# Patient Record
Sex: FEMALE | Race: WHITE | Employment: OTHER | URBAN - METROPOLITAN AREA
[De-identification: names, ages, dates, MRNs, and addresses within clinical notes are randomized per-mention and may not be internally consistent; named-entity substitution may affect disease eponyms.]

---

## 2017-01-12 RX ORDER — SIMVASTATIN 40 MG
40 TABLET ORAL NIGHTLY
Qty: 90 TABLET | Refills: 0 | Status: SHIPPED | OUTPATIENT
Start: 2017-01-12 | End: 2017-04-08 | Stop reason: SDUPTHER

## 2017-04-10 RX ORDER — SIMVASTATIN 40 MG
TABLET ORAL
Qty: 90 TABLET | Refills: 3 | Status: SHIPPED | OUTPATIENT
Start: 2017-04-10 | End: 2017-04-12 | Stop reason: SDUPTHER

## 2017-04-12 ENCOUNTER — OFFICE VISIT (OUTPATIENT)
Dept: INTERNAL MEDICINE | Age: 62
End: 2017-04-12

## 2017-04-12 VITALS
HEIGHT: 63 IN | RESPIRATION RATE: 16 BRPM | HEART RATE: 68 BPM | TEMPERATURE: 97.7 F | SYSTOLIC BLOOD PRESSURE: 112 MMHG | WEIGHT: 139.2 LBS | BODY MASS INDEX: 24.66 KG/M2 | OXYGEN SATURATION: 97 % | DIASTOLIC BLOOD PRESSURE: 68 MMHG

## 2017-04-12 DIAGNOSIS — Z11.59 NEED FOR HEPATITIS C SCREENING TEST: ICD-10-CM

## 2017-04-12 DIAGNOSIS — I10 ESSENTIAL HYPERTENSION: ICD-10-CM

## 2017-04-12 DIAGNOSIS — E78.2 MIXED HYPERLIPIDEMIA: ICD-10-CM

## 2017-04-12 DIAGNOSIS — M54.5 CHRONIC MIDLINE LOW BACK PAIN, WITH SCIATICA PRESENCE UNSPECIFIED: ICD-10-CM

## 2017-04-12 DIAGNOSIS — J45.20 MILD INTERMITTENT ASTHMA WITHOUT COMPLICATION: ICD-10-CM

## 2017-04-12 DIAGNOSIS — E11.9 TYPE 2 DIABETES MELLITUS WITHOUT COMPLICATION, WITHOUT LONG-TERM CURRENT USE OF INSULIN (HCC): Primary | ICD-10-CM

## 2017-04-12 DIAGNOSIS — G89.29 CHRONIC MIDLINE LOW BACK PAIN, WITH SCIATICA PRESENCE UNSPECIFIED: ICD-10-CM

## 2017-04-12 DIAGNOSIS — J44.9 CHRONIC OBSTRUCTIVE PULMONARY DISEASE, UNSPECIFIED COPD TYPE (HCC): ICD-10-CM

## 2017-04-12 DIAGNOSIS — Z79.899 ENCOUNTER FOR LONG-TERM CURRENT USE OF MEDICATION: ICD-10-CM

## 2017-04-12 LAB
ALBUMIN SERPL-MCNC: 4.2 G/DL (ref 3.9–4.9)
ALP BLD-CCNC: 13 U/L (ref 40–130)
ALT SERPL-CCNC: 14 U/L (ref 0–33)
AMPHETAMINE SCREEN, URINE: ABNORMAL
ANION GAP SERPL CALCULATED.3IONS-SCNC: 13 MEQ/L (ref 7–13)
AST SERPL-CCNC: 19 U/L (ref 0–35)
BARBITURATE SCREEN URINE: ABNORMAL
BENZODIAZEPINE SCREEN, URINE: ABNORMAL
BILIRUB SERPL-MCNC: 0.3 MG/DL (ref 0–1.2)
BUN BLDV-MCNC: 23 MG/DL (ref 8–23)
CALCIUM SERPL-MCNC: 9.5 MG/DL (ref 8.6–10.2)
CANNABINOID SCREEN URINE: POSITIVE
CHLORIDE BLD-SCNC: 102 MEQ/L (ref 98–107)
CHOLESTEROL, TOTAL: 153 MG/DL (ref 0–199)
CO2: 24 MEQ/L (ref 22–29)
COCAINE METABOLITE SCREEN URINE: ABNORMAL
CREAT SERPL-MCNC: 0.91 MG/DL (ref 0.5–0.9)
CREATININE URINE: 75.1 MG/DL
GFR AFRICAN AMERICAN: >60
GFR NON-AFRICAN AMERICAN: >60
GLOBULIN: 2.3 G/DL (ref 2.3–3.5)
GLUCOSE BLD-MCNC: 121 MG/DL (ref 74–109)
HBA1C MFR BLD: 5.9 % (ref 4.8–5.9)
HDLC SERPL-MCNC: 68 MG/DL (ref 40–59)
LDL CHOLESTEROL CALCULATED: 61 MG/DL (ref 0–129)
Lab: ABNORMAL
MICROALBUMIN UR-MCNC: 4.4 MG/DL
MICROALBUMIN/CREAT UR-RTO: 58.6 MG/G (ref 0–30)
OPIATE SCREEN URINE: ABNORMAL
PHENCYCLIDINE SCREEN URINE: ABNORMAL
POTASSIUM SERPL-SCNC: 4.2 MEQ/L (ref 3.5–5.1)
SODIUM BLD-SCNC: 139 MEQ/L (ref 132–144)
TOTAL PROTEIN: 6.5 G/DL (ref 6.4–8.1)
TRIGL SERPL-MCNC: 118 MG/DL (ref 0–200)

## 2017-04-12 PROCEDURE — 99214 OFFICE O/P EST MOD 30 MIN: CPT | Performed by: FAMILY MEDICINE

## 2017-04-12 RX ORDER — ALPRAZOLAM 0.25 MG/1
0.25 TABLET ORAL 3 TIMES DAILY PRN
COMMUNITY
End: 2017-04-13

## 2017-04-12 RX ORDER — TRAMADOL HYDROCHLORIDE 50 MG/1
50 TABLET ORAL EVERY 8 HOURS PRN
Qty: 60 TABLET | Refills: 2 | Status: SHIPPED | OUTPATIENT
Start: 2017-04-12 | End: 2017-04-13

## 2017-04-12 RX ORDER — METFORMIN HYDROCHLORIDE 500 MG/1
TABLET, EXTENDED RELEASE ORAL
Qty: 180 TABLET | Refills: 3 | Status: SHIPPED | OUTPATIENT
Start: 2017-04-12 | End: 2018-06-10 | Stop reason: SDUPTHER

## 2017-04-12 RX ORDER — SIMVASTATIN 40 MG
TABLET ORAL
Qty: 90 TABLET | Refills: 3 | Status: SHIPPED | OUTPATIENT
Start: 2017-04-12 | End: 2018-05-24 | Stop reason: SDUPTHER

## 2017-04-13 RX ORDER — LISINOPRIL 2.5 MG/1
2.5 TABLET ORAL DAILY
Qty: 90 TABLET | Refills: 1 | Status: SHIPPED | OUTPATIENT
Start: 2017-04-13 | End: 2017-05-15

## 2017-04-14 LAB — HEPATITIS C ANTIBODY INTERPRETATION: NORMAL

## 2017-05-10 ENCOUNTER — TELEPHONE (OUTPATIENT)
Dept: INTERNAL MEDICINE | Age: 62
End: 2017-05-10

## 2017-05-15 ENCOUNTER — OFFICE VISIT (OUTPATIENT)
Dept: INTERNAL MEDICINE | Age: 62
End: 2017-05-15

## 2017-05-15 VITALS
DIASTOLIC BLOOD PRESSURE: 82 MMHG | RESPIRATION RATE: 16 BRPM | OXYGEN SATURATION: 96 % | HEIGHT: 63 IN | BODY MASS INDEX: 24.41 KG/M2 | HEART RATE: 99 BPM | TEMPERATURE: 98.2 F | WEIGHT: 137.8 LBS | SYSTOLIC BLOOD PRESSURE: 128 MMHG

## 2017-05-15 DIAGNOSIS — R80.9 PROTEINURIA: Primary | ICD-10-CM

## 2017-05-15 PROCEDURE — 99213 OFFICE O/P EST LOW 20 MIN: CPT | Performed by: FAMILY MEDICINE

## 2017-05-15 ASSESSMENT — PATIENT HEALTH QUESTIONNAIRE - PHQ9
2. FEELING DOWN, DEPRESSED OR HOPELESS: 0
1. LITTLE INTEREST OR PLEASURE IN DOING THINGS: 0
SUM OF ALL RESPONSES TO PHQ QUESTIONS 1-9: 0
SUM OF ALL RESPONSES TO PHQ9 QUESTIONS 1 & 2: 0

## 2017-12-11 ENCOUNTER — OFFICE VISIT (OUTPATIENT)
Dept: INTERNAL MEDICINE | Age: 62
End: 2017-12-11

## 2017-12-11 VITALS
HEART RATE: 72 BPM | BODY MASS INDEX: 24.49 KG/M2 | HEIGHT: 63 IN | SYSTOLIC BLOOD PRESSURE: 110 MMHG | RESPIRATION RATE: 16 BRPM | WEIGHT: 138.2 LBS | OXYGEN SATURATION: 97 % | DIASTOLIC BLOOD PRESSURE: 70 MMHG | TEMPERATURE: 97.8 F

## 2017-12-11 DIAGNOSIS — E11.9 TYPE 2 DIABETES MELLITUS WITHOUT COMPLICATION, WITHOUT LONG-TERM CURRENT USE OF INSULIN (HCC): Primary | ICD-10-CM

## 2017-12-11 DIAGNOSIS — J44.9 CHRONIC OBSTRUCTIVE PULMONARY DISEASE, UNSPECIFIED COPD TYPE (HCC): ICD-10-CM

## 2017-12-11 DIAGNOSIS — M54.16 LUMBAR RADICULITIS: ICD-10-CM

## 2017-12-11 DIAGNOSIS — E11.9 ENCOUNTER FOR DIABETIC FOOT EXAM (HCC): ICD-10-CM

## 2017-12-11 DIAGNOSIS — I10 ESSENTIAL HYPERTENSION: ICD-10-CM

## 2017-12-11 DIAGNOSIS — Z12.2 ENCOUNTER FOR SCREENING FOR LUNG CANCER: ICD-10-CM

## 2017-12-11 DIAGNOSIS — Z12.11 SCREENING FOR COLON CANCER: ICD-10-CM

## 2017-12-11 DIAGNOSIS — F17.219 NICOTINE DEPENDENCE, CIGARETTES, WITH UNSPECIFIED NICOTINE-INDUCED DISORDERS: ICD-10-CM

## 2017-12-11 DIAGNOSIS — E55.9 HYPOVITAMINOSIS D: ICD-10-CM

## 2017-12-11 DIAGNOSIS — E78.2 MIXED HYPERLIPIDEMIA: ICD-10-CM

## 2017-12-11 DIAGNOSIS — Z12.31 SCREENING MAMMOGRAM, ENCOUNTER FOR: ICD-10-CM

## 2017-12-11 PROCEDURE — G0296 VISIT TO DETERM LDCT ELIG: HCPCS | Performed by: FAMILY MEDICINE

## 2017-12-11 PROCEDURE — 99214 OFFICE O/P EST MOD 30 MIN: CPT | Performed by: FAMILY MEDICINE

## 2017-12-11 RX ORDER — ESCITALOPRAM OXALATE 10 MG/1
15 TABLET ORAL DAILY
Qty: 130 TABLET | Refills: 2 | Status: SHIPPED | OUTPATIENT
Start: 2017-12-11 | End: 2018-06-11 | Stop reason: SDUPTHER

## 2017-12-11 NOTE — PROGRESS NOTES
Patient: Shannon Morse    YOB: 1955    Date: 12/11/17    Chief Complaint   Patient presents with    Diabetes     6 month follow up. She is due for labs and mammogram    Hypertension     6 month follow up    Hyperlipidemia     6 month follow up       Patient Active Problem List    Diagnosis Date Noted    Proteinuria     Asthma     Anxiety     Chronic back pain     COPD (chronic obstructive pulmonary disease) (HCC)     Depression     Type 2 diabetes mellitus without complication (HCC)     Mixed hyperlipidemia     Hypertension        Allergies   Allergen Reactions    Nickel Itching and Rash       Vitals:    12/11/17 1344   BP: 110/70   Site: Right Arm   Position: Sitting   Cuff Size: Small Adult   Pulse: 72   Resp: 16   Temp: 97.8 °F (36.6 °C)   TempSrc: Oral   SpO2: 97%   Weight: 138 lb 3.2 oz (62.7 kg)   Height: 5' 3\" (1.6 m)      Body mass index is 24.48 kg/m². Treatment Adherence:   Medication compliance:  compliant all of the time  Diet compliance:  compliant most of the time  Weight trend: increasing  Current exercise: no regular exercise  Diabetes Mellitus Type 2: Current symptoms/problems include none. Home blood sugar records:  patient does not test  Any episodes of hypoglycemia? no  Eye exam current (within one year): no  Tobacco history: She  reports that she has been smoking Cigarettes. She has a 30.00 pack-year smoking history. She has never used smokeless tobacco.   Daily Aspirin? No:   Known diabetic complications: none        HPI    She is here to follow-up on her diabetes her blood pressure and her high cholesterol. She is feeling well but she's found that when she runs out of her Lexapro she really goes into tailspin. She has trouble with [facility] calling in her refills on time I told her would be glad to refill the medicine for her. She'll continue to see her counselor.   She's no longer see a psychiatrist.  She is otherwise without complaints her sugars are good her blood pressures excellent she is maintaining her weight. She still smokes however. She still reports that night she gets left-sided leg cramping and spasm that is improved with drinking Gatorade but does return occasionally. Review of Systems    Constitutional: Negative for fatigue, fever and sweats. HEENT: Negative for eye discharge and vision loss. Negative for ear drainage, hearing loss and nasal drainage. Respiratory: positive for cough, negative for  dyspnea and wheezing. Cardiovascular:  Negative for chest pain, claudication and irregular heartbeat/palpitations. Gastrointestinal: Negative for abdominal pain, nausea, constipation and diarrhea. Genitourinary: Negative for dysuria, patient post menopausal.  Metabolic/Endocrine: Negative for cold intolerance, heat intolerance, polydipsia and polyphagia. No unintended weight loss or weight gain. Neuro/Psychiatric: Negative for gait disturbance. Negative for psychiatric symptoms. Dermatologic: Negative for pruritus and rash. Musculoskeletal: positive for bone/joint symptoms. No numbness or tingling. No loss of function. Hematology: Negative for bleeding and easy bruising. Immunology:  Negative for environmental allergies and food allergies. Physical Exam    Patient's medication, allergies, past medical, surgical, social and family histories were reviewed and updated as appropriate. PHYSICAL EXAM   General Appearance:  Alert oriented pleasant cooperative with the exam in no acute distress. HEENT: Eyes clear nonicteric facial muscles symmetrical.  Hearing good  Neck: Soft nontender no adenopathy or masses no carotid bruits  Lungs: Clear to auscultation no wheezes rhonchi or rales  Heart: Regular rate and rhythm without murmurs rubs or gallops  Extremities: Diabetic foot exam : Normal feet bilaterally without edema no deformities no callus no ulceration intact to fine touch. Today        Assessment:  1.  Type 2 diabetes mellitus without

## 2017-12-18 ENCOUNTER — NURSE ONLY (OUTPATIENT)
Dept: INTERNAL MEDICINE | Age: 62
End: 2017-12-18

## 2017-12-18 DIAGNOSIS — M54.16 LUMBAR RADICULITIS: ICD-10-CM

## 2017-12-18 DIAGNOSIS — Z12.11 SCREENING FOR COLON CANCER: ICD-10-CM

## 2017-12-18 DIAGNOSIS — E11.9 TYPE 2 DIABETES MELLITUS WITHOUT COMPLICATION, WITHOUT LONG-TERM CURRENT USE OF INSULIN (HCC): ICD-10-CM

## 2017-12-18 DIAGNOSIS — E55.9 HYPOVITAMINOSIS D: Primary | ICD-10-CM

## 2017-12-18 DIAGNOSIS — I10 ESSENTIAL HYPERTENSION: ICD-10-CM

## 2017-12-18 DIAGNOSIS — E78.2 MIXED HYPERLIPIDEMIA: ICD-10-CM

## 2017-12-18 LAB
ALBUMIN SERPL-MCNC: 4.1 G/DL (ref 3.9–4.9)
ALP BLD-CCNC: 13 U/L (ref 40–130)
ALT SERPL-CCNC: 10 U/L (ref 0–33)
ANION GAP SERPL CALCULATED.3IONS-SCNC: 17 MEQ/L (ref 7–13)
AST SERPL-CCNC: 16 U/L (ref 0–35)
BILIRUB SERPL-MCNC: 0.4 MG/DL (ref 0–1.2)
BILIRUBIN, POC: NORMAL
BLOOD URINE, POC: NORMAL
BUN BLDV-MCNC: 19 MG/DL (ref 8–23)
CALCIUM SERPL-MCNC: 9.2 MG/DL (ref 8.6–10.2)
CHLORIDE BLD-SCNC: 101 MEQ/L (ref 98–107)
CHOLESTEROL, TOTAL: 159 MG/DL (ref 0–199)
CLARITY, POC: NORMAL
CO2: 22 MEQ/L (ref 22–29)
COLOR, POC: YELLOW
CONTROL: NORMAL
CREAT SERPL-MCNC: 0.82 MG/DL (ref 0.5–0.9)
CREATININE URINE: 133.3 MG/DL
GFR AFRICAN AMERICAN: >60
GFR NON-AFRICAN AMERICAN: >60
GLOBULIN: 2.8 G/DL (ref 2.3–3.5)
GLUCOSE BLD-MCNC: 140 MG/DL (ref 74–109)
GLUCOSE URINE, POC: NORMAL
HBA1C MFR BLD: 5.8 % (ref 4.8–5.9)
HDLC SERPL-MCNC: 63 MG/DL (ref 40–59)
HEMOCCULT STL QL: NEGATIVE
KETONES, POC: NORMAL
LDL CHOLESTEROL CALCULATED: 63 MG/DL (ref 0–129)
LEUKOCYTE EST, POC: NORMAL
MICROALBUMIN UR-MCNC: 7.6 MG/DL
MICROALBUMIN/CREAT UR-RTO: 57 MG/G (ref 0–30)
NITRITE, POC: POSITIVE
PH, POC: 6
POTASSIUM SERPL-SCNC: 4.2 MEQ/L (ref 3.5–5.1)
PROTEIN, POC: NORMAL
SODIUM BLD-SCNC: 140 MEQ/L (ref 132–144)
SPECIFIC GRAVITY, POC: 1.02
TOTAL PROTEIN: 6.9 G/DL (ref 6.4–8.1)
TRIGL SERPL-MCNC: 164 MG/DL (ref 0–200)
UROBILINOGEN, POC: NORMAL
VITAMIN B-12: 634 PG/ML (ref 211–946)
VITAMIN D 25-HYDROXY: 55.1 NG/ML (ref 30–100)

## 2017-12-18 PROCEDURE — 81002 URINALYSIS NONAUTO W/O SCOPE: CPT | Performed by: FAMILY MEDICINE

## 2017-12-18 PROCEDURE — 82274 ASSAY TEST FOR BLOOD FECAL: CPT | Performed by: FAMILY MEDICINE

## 2017-12-18 PROCEDURE — 36415 COLL VENOUS BLD VENIPUNCTURE: CPT | Performed by: FAMILY MEDICINE

## 2017-12-18 RX ORDER — SULFAMETHOXAZOLE AND TRIMETHOPRIM 800; 160 MG/1; MG/1
1 TABLET ORAL 2 TIMES DAILY
Qty: 14 TABLET | Refills: 0 | Status: SHIPPED | OUTPATIENT
Start: 2017-12-18 | End: 2017-12-25

## 2017-12-18 NOTE — PROGRESS NOTES
Lab drawn per order of Dr. Patrizia Velazquez with no problems. Patient gave urine specimen in office today.

## 2017-12-21 ENCOUNTER — TELEPHONE (OUTPATIENT)
Dept: CASE MANAGEMENT | Age: 62
End: 2017-12-21

## 2017-12-21 NOTE — LETTER
12/21/2017      Cristian Alvarez  2013 [id number] [facility]    Dear Cristian Alvarez:       Your healthcare provider has referred you to the [facility].  Your appointment is Tuesday, January 9 at [facility]. I am enclosing some information that you may find helpful. Please keep in mind that lung cancer screening does not take the place of quitting smoking. The best way to not get lung cancer is to not smoke. If you would like help with this, please feel free to contact me. Also find enclosed a Statement of Understanding. Please review this document and call me with any questions. You can bring this with you to your appointment as will be asked to sign a copy of this document at the time of your scan. Your provider will contact you when the results of your CT lung screening are available. If you have any questions before then, please do not hesitate to contact me. Kindest regards,        Ervin Brooke RN, BSN  Lung Nodule Navigator  [facility]  [phone]  [email or web address]

## 2017-12-21 NOTE — TELEPHONE ENCOUNTER
Mario Grace is scheduled for a CT Lung Screening on January 9, 2017. I called to introduce myself and inform her I will be working with her throughout the lung screening process and into the follow up phase if needed. The questionnaire for lung cancer screening was completed. Mario Grace is a current smoker with a  49 pack year smoking history. I verified her referring physician has discussed the risks versus benefits associated with CT lung screening, the importance of follow up and annual screening  and offered smoking cessation if necessary. I answered all questions Mario Grace had regarding the CT lung screening procedure and offered smoking cessations resources if needed. The patient requested information on smoking cessation. In closing, I gave  my contact information and encouraged Mario Grace to call me with any questions or concerns.          Diaz Anthony, RN, BSN  [facility]ung Nurse [facility]  Office: [phone]  Fax: [phone]

## 2018-01-09 ENCOUNTER — HOSPITAL ENCOUNTER (OUTPATIENT)
Dept: WOMENS IMAGING | Age: 63
Discharge: HOME | End: 2018-01-09

## 2018-01-09 ENCOUNTER — HOSPITAL ENCOUNTER (OUTPATIENT)
Dept: CT IMAGING | Age: 63
Discharge: HOME | End: 2018-01-09

## 2018-01-09 VITALS
WEIGHT: 138 LBS | BODY MASS INDEX: 24.45 KG/M2 | DIASTOLIC BLOOD PRESSURE: 75 MMHG | SYSTOLIC BLOOD PRESSURE: 114 MMHG | RESPIRATION RATE: 16 BRPM | HEIGHT: 63 IN | HEART RATE: 89 BPM

## 2018-01-09 DIAGNOSIS — F17.219 NICOTINE DEPENDENCE, CIGARETTES, WITH UNSPECIFIED NICOTINE-INDUCED DISORDERS: ICD-10-CM

## 2018-01-09 DIAGNOSIS — Z12.31 SCREENING MAMMOGRAM, ENCOUNTER FOR: ICD-10-CM

## 2018-01-09 PROCEDURE — 77067 SCR MAMMO BI INCL CAD: CPT

## 2018-01-09 PROCEDURE — G0297 LDCT FOR LUNG CA SCREEN: HCPCS

## 2018-05-25 RX ORDER — SIMVASTATIN 40 MG
TABLET ORAL
Qty: 90 TABLET | Refills: 3 | Status: SHIPPED | OUTPATIENT
Start: 2018-05-25 | End: 2019-05-20 | Stop reason: SDUPTHER

## 2018-06-11 ENCOUNTER — OFFICE VISIT (OUTPATIENT)
Dept: INTERNAL MEDICINE CLINIC | Age: 63
End: 2018-06-11
Payer: MEDICARE

## 2018-06-11 VITALS
HEART RATE: 72 BPM | WEIGHT: 144 LBS | BODY MASS INDEX: 25.52 KG/M2 | RESPIRATION RATE: 16 BRPM | SYSTOLIC BLOOD PRESSURE: 114 MMHG | DIASTOLIC BLOOD PRESSURE: 70 MMHG | TEMPERATURE: 97.6 F | HEIGHT: 63 IN | OXYGEN SATURATION: 97 %

## 2018-06-11 DIAGNOSIS — E11.9 TYPE 2 DIABETES MELLITUS WITHOUT COMPLICATION, WITHOUT LONG-TERM CURRENT USE OF INSULIN (HCC): Primary | ICD-10-CM

## 2018-06-11 DIAGNOSIS — E78.2 MIXED HYPERLIPIDEMIA: ICD-10-CM

## 2018-06-11 DIAGNOSIS — F33.42 RECURRENT MAJOR DEPRESSIVE DISORDER, IN FULL REMISSION (HCC): ICD-10-CM

## 2018-06-11 DIAGNOSIS — J44.9 CHRONIC OBSTRUCTIVE PULMONARY DISEASE, UNSPECIFIED COPD TYPE (HCC): ICD-10-CM

## 2018-06-11 DIAGNOSIS — I10 ESSENTIAL HYPERTENSION: ICD-10-CM

## 2018-06-11 PROCEDURE — 99213 OFFICE O/P EST LOW 20 MIN: CPT | Performed by: FAMILY MEDICINE

## 2018-06-11 RX ORDER — ESCITALOPRAM OXALATE 10 MG/1
15 TABLET ORAL DAILY
Qty: 130 TABLET | Refills: 2 | Status: SHIPPED | OUTPATIENT
Start: 2018-06-11 | End: 2018-12-11 | Stop reason: SDUPTHER

## 2018-06-11 ASSESSMENT — PATIENT HEALTH QUESTIONNAIRE - PHQ9
1. LITTLE INTEREST OR PLEASURE IN DOING THINGS: 0
2. FEELING DOWN, DEPRESSED OR HOPELESS: 0
SUM OF ALL RESPONSES TO PHQ9 QUESTIONS 1 & 2: 0
SUM OF ALL RESPONSES TO PHQ QUESTIONS 1-9: 0

## 2018-12-11 ENCOUNTER — OFFICE VISIT (OUTPATIENT)
Dept: INTERNAL MEDICINE CLINIC | Age: 63
End: 2018-12-11
Payer: MEDICARE

## 2018-12-11 VITALS
HEIGHT: 63 IN | DIASTOLIC BLOOD PRESSURE: 68 MMHG | OXYGEN SATURATION: 96 % | TEMPERATURE: 97.3 F | BODY MASS INDEX: 25.87 KG/M2 | HEART RATE: 73 BPM | RESPIRATION RATE: 16 BRPM | SYSTOLIC BLOOD PRESSURE: 100 MMHG | WEIGHT: 146 LBS

## 2018-12-11 DIAGNOSIS — E11.9 TYPE 2 DIABETES MELLITUS WITHOUT COMPLICATION, WITHOUT LONG-TERM CURRENT USE OF INSULIN (HCC): ICD-10-CM

## 2018-12-11 DIAGNOSIS — Z91.81 AT HIGH RISK FOR FALLS: ICD-10-CM

## 2018-12-11 DIAGNOSIS — E78.2 MIXED HYPERLIPIDEMIA: ICD-10-CM

## 2018-12-11 DIAGNOSIS — Z12.11 SCREENING FOR COLON CANCER: ICD-10-CM

## 2018-12-11 DIAGNOSIS — I10 ESSENTIAL HYPERTENSION: Primary | ICD-10-CM

## 2018-12-11 DIAGNOSIS — Z23 NEED FOR VACCINATION WITH 13-POLYVALENT PNEUMOCOCCAL CONJUGATE VACCINE: ICD-10-CM

## 2018-12-11 DIAGNOSIS — Z12.31 VISIT FOR SCREENING MAMMOGRAM: ICD-10-CM

## 2018-12-11 DIAGNOSIS — F33.42 RECURRENT MAJOR DEPRESSIVE DISORDER, IN FULL REMISSION (HCC): ICD-10-CM

## 2018-12-11 DIAGNOSIS — M81.0 SENILE OSTEOPOROSIS: ICD-10-CM

## 2018-12-11 DIAGNOSIS — J45.909 MILD ASTHMA WITHOUT COMPLICATION, UNSPECIFIED WHETHER PERSISTENT: ICD-10-CM

## 2018-12-11 LAB — HBA1C MFR BLD: 5.6 %

## 2018-12-11 PROCEDURE — 3044F HG A1C LEVEL LT 7.0%: CPT | Performed by: FAMILY MEDICINE

## 2018-12-11 PROCEDURE — G8427 DOCREV CUR MEDS BY ELIG CLIN: HCPCS | Performed by: FAMILY MEDICINE

## 2018-12-11 PROCEDURE — 2022F DILAT RTA XM EVC RTNOPTHY: CPT | Performed by: FAMILY MEDICINE

## 2018-12-11 PROCEDURE — 4040F PNEUMOC VAC/ADMIN/RCVD: CPT | Performed by: FAMILY MEDICINE

## 2018-12-11 PROCEDURE — 3017F COLORECTAL CA SCREEN DOC REV: CPT | Performed by: FAMILY MEDICINE

## 2018-12-11 PROCEDURE — 1123F ACP DISCUSS/DSCN MKR DOCD: CPT | Performed by: FAMILY MEDICINE

## 2018-12-11 PROCEDURE — 1090F PRES/ABSN URINE INCON ASSESS: CPT | Performed by: FAMILY MEDICINE

## 2018-12-11 PROCEDURE — 1101F PT FALLS ASSESS-DOCD LE1/YR: CPT | Performed by: FAMILY MEDICINE

## 2018-12-11 PROCEDURE — G0009 ADMIN PNEUMOCOCCAL VACCINE: HCPCS | Performed by: FAMILY MEDICINE

## 2018-12-11 PROCEDURE — 99214 OFFICE O/P EST MOD 30 MIN: CPT | Performed by: FAMILY MEDICINE

## 2018-12-11 PROCEDURE — G8482 FLU IMMUNIZE ORDER/ADMIN: HCPCS | Performed by: FAMILY MEDICINE

## 2018-12-11 PROCEDURE — 4004F PT TOBACCO SCREEN RCVD TLK: CPT | Performed by: FAMILY MEDICINE

## 2018-12-11 PROCEDURE — G8419 CALC BMI OUT NRM PARAM NOF/U: HCPCS | Performed by: FAMILY MEDICINE

## 2018-12-11 PROCEDURE — G8400 PT W/DXA NO RESULTS DOC: HCPCS | Performed by: FAMILY MEDICINE

## 2018-12-11 PROCEDURE — 83036 HEMOGLOBIN GLYCOSYLATED A1C: CPT | Performed by: FAMILY MEDICINE

## 2018-12-11 PROCEDURE — 90670 PCV13 VACCINE IM: CPT | Performed by: FAMILY MEDICINE

## 2018-12-11 ASSESSMENT — PATIENT HEALTH QUESTIONNAIRE - PHQ9
1. LITTLE INTEREST OR PLEASURE IN DOING THINGS: 0
SUM OF ALL RESPONSES TO PHQ9 QUESTIONS 1 & 2: 0
SUM OF ALL RESPONSES TO PHQ QUESTIONS 1-9: 0
SUM OF ALL RESPONSES TO PHQ QUESTIONS 1-9: 0
2. FEELING DOWN, DEPRESSED OR HOPELESS: 0

## 2018-12-11 NOTE — PROGRESS NOTES
she is enjoying herself she still very active she has no cough fever chills chest pain numbness tingling weakness or problems with her bowels or bladder. Review of Systems    Constitutional: Negative for fatigue, fever and sweats. HEENT: Negative for eye discharge and vision loss. Negative for ear drainage, hearing loss and nasal drainage. Respiratory: positive for cough, negative for dyspnea and wheezing. Cardiovascular:  Negative for chest pain, claudication and irregular heartbeat/palpitations. Gastrointestinal: Negative for abdominal pain, nausea, constipation and diarrhea. Genitourinary: Negative for dysuria, patient post menopausal.   Metabolic/Endocrine: Negative for cold intolerance, heat intolerance, polydipsia and polyphagia. No unintended weight loss or weight gain. Neuro/Psychiatric: Negative for gait disturbance. Negative for psychiatric symptoms. Dermatologic: Negative for pruritus and rash. Musculoskeletal: Negative for bone/joint symptoms. No numbness or tingling. No loss of function. Hematology: Negative for bleeding and easy bruising. Immunology:  Negative for environmental allergies and food allergies. Physical Exam    Patient's medication, allergies, past medical, surgical, social and family histories were reviewed and updated as appropriate. PHYSICAL EXAM   General appearance: Alert oriented pleasant cooperative in no acute distress. HEENT: Eyes clear nonicteric facial muscles symmetrical.  Hearing good color good  Neck: Soft nontender no adenopathy without bruits or masses or tenderness  Lungs: Clear to auscultation without wheezes rhonchi or rales  Heart: Regular rate and rhythm without murmurs rubs or gallops  Extremities: Diabetic foot exam: She has normal feet without any decreased sensation she is intact to light touch there is no erythema or edema. She has no ulcerations deformities nail dystrophy or callus. Assessment:   Diagnosis Orders   1.  Essential

## 2018-12-13 ENCOUNTER — NURSE ONLY (OUTPATIENT)
Dept: INTERNAL MEDICINE CLINIC | Age: 63
End: 2018-12-13
Payer: MEDICARE

## 2018-12-13 ENCOUNTER — TELEPHONE (OUTPATIENT)
Dept: INTERNAL MEDICINE CLINIC | Age: 63
End: 2018-12-13

## 2018-12-13 DIAGNOSIS — E11.9 TYPE 2 DIABETES MELLITUS WITHOUT COMPLICATION, WITHOUT LONG-TERM CURRENT USE OF INSULIN (HCC): ICD-10-CM

## 2018-12-13 DIAGNOSIS — Z12.12 SCREENING FOR COLORECTAL CANCER: Primary | ICD-10-CM

## 2018-12-13 DIAGNOSIS — I10 ESSENTIAL HYPERTENSION: ICD-10-CM

## 2018-12-13 DIAGNOSIS — E78.2 MIXED HYPERLIPIDEMIA: ICD-10-CM

## 2018-12-13 DIAGNOSIS — Z12.11 SCREENING FOR COLORECTAL CANCER: Primary | ICD-10-CM

## 2018-12-13 DIAGNOSIS — Z12.11 SCREENING FOR COLON CANCER: ICD-10-CM

## 2018-12-13 LAB
ALBUMIN SERPL-MCNC: 4.4 G/DL (ref 3.9–4.9)
ALP BLD-CCNC: 14 U/L (ref 40–130)
ALT SERPL-CCNC: 12 U/L (ref 0–33)
ANION GAP SERPL CALCULATED.3IONS-SCNC: 13 MEQ/L (ref 7–13)
AST SERPL-CCNC: 15 U/L (ref 0–35)
BILIRUB SERPL-MCNC: 0.4 MG/DL (ref 0–1.2)
BUN BLDV-MCNC: 19 MG/DL (ref 8–23)
CALCIUM SERPL-MCNC: 9.7 MG/DL (ref 8.6–10.2)
CHLORIDE BLD-SCNC: 97 MEQ/L (ref 98–107)
CHOLESTEROL, TOTAL: 156 MG/DL (ref 0–199)
CO2: 28 MEQ/L (ref 22–29)
CONTROL: NORMAL
CREAT SERPL-MCNC: 1.05 MG/DL (ref 0.5–0.9)
CREATININE URINE: 78.4 MG/DL
GFR AFRICAN AMERICAN: >60
GFR NON-AFRICAN AMERICAN: 52.5
GLOBULIN: 3.4 G/DL (ref 2.3–3.5)
GLUCOSE BLD-MCNC: 139 MG/DL (ref 74–109)
HDLC SERPL-MCNC: 70 MG/DL (ref 40–59)
HEMOCCULT STL QL: NEGATIVE
LDL CHOLESTEROL CALCULATED: 52 MG/DL (ref 0–129)
MICROALBUMIN UR-MCNC: 3.2 MG/DL
MICROALBUMIN/CREAT UR-RTO: 40.8 MG/G (ref 0–30)
POTASSIUM SERPL-SCNC: 4.4 MEQ/L (ref 3.5–5.1)
SODIUM BLD-SCNC: 138 MEQ/L (ref 132–144)
TOTAL PROTEIN: 7.8 G/DL (ref 6.4–8.1)
TRIGL SERPL-MCNC: 171 MG/DL (ref 0–200)

## 2018-12-13 PROCEDURE — 82274 ASSAY TEST FOR BLOOD FECAL: CPT | Performed by: FAMILY MEDICINE

## 2019-05-20 RX ORDER — SIMVASTATIN 40 MG
TABLET ORAL
Qty: 90 TABLET | Refills: 3 | Status: SHIPPED | OUTPATIENT
Start: 2019-05-20 | End: 2020-06-11 | Stop reason: SDUPTHER

## 2019-05-20 NOTE — TELEPHONE ENCOUNTER
requesting medication refill.      Rx requested:  Requested Prescriptions     Pending Prescriptions Disp Refills    simvastatin (ZOCOR) 40 MG tablet [Pharmacy Med Name: SIMVASTATIN 40 MG TABLET] 90 tablet 3     Sig: TAKE 1 TABLET BY MOUTH EVERY DAY IN THE EVENING       Last Office Visit:   12/11/2018      Next Visit Date:  Future Appointments   Date Time Provider Josué Vargas   6/11/2019  1:00 PM Richard Gerber  [place], [place]

## 2019-06-11 ENCOUNTER — OFFICE VISIT (OUTPATIENT)
Dept: FAMILY MEDICINE CLINIC | Age: 64
End: 2019-06-11
Payer: MEDICARE

## 2019-06-11 VITALS
HEIGHT: 63 IN | BODY MASS INDEX: 25.8 KG/M2 | RESPIRATION RATE: 16 BRPM | TEMPERATURE: 97.5 F | HEART RATE: 76 BPM | WEIGHT: 145.6 LBS | SYSTOLIC BLOOD PRESSURE: 122 MMHG | OXYGEN SATURATION: 94 % | DIASTOLIC BLOOD PRESSURE: 80 MMHG

## 2019-06-11 DIAGNOSIS — Z12.2 ENCOUNTER FOR SCREENING FOR LUNG CANCER: ICD-10-CM

## 2019-06-11 DIAGNOSIS — J44.9 CHRONIC OBSTRUCTIVE PULMONARY DISEASE, UNSPECIFIED COPD TYPE (HCC): ICD-10-CM

## 2019-06-11 DIAGNOSIS — Z12.31 VISIT FOR SCREENING MAMMOGRAM: ICD-10-CM

## 2019-06-11 DIAGNOSIS — F17.210 CIGARETTE NICOTINE DEPENDENCE, UNCOMPLICATED: ICD-10-CM

## 2019-06-11 DIAGNOSIS — E11.9 TYPE 2 DIABETES MELLITUS WITHOUT COMPLICATION, WITHOUT LONG-TERM CURRENT USE OF INSULIN (HCC): Primary | ICD-10-CM

## 2019-06-11 DIAGNOSIS — M85.80 SENILE OSTEOPENIA: ICD-10-CM

## 2019-06-11 DIAGNOSIS — M81.0 SENILE OSTEOPOROSIS: ICD-10-CM

## 2019-06-11 DIAGNOSIS — Z23 NEED FOR SHINGLES VACCINE: ICD-10-CM

## 2019-06-11 DIAGNOSIS — Z87.891 PERSONAL HISTORY OF TOBACCO USE: ICD-10-CM

## 2019-06-11 DIAGNOSIS — I10 ESSENTIAL HYPERTENSION: ICD-10-CM

## 2019-06-11 DIAGNOSIS — E78.2 MIXED HYPERLIPIDEMIA: ICD-10-CM

## 2019-06-11 LAB — HBA1C MFR BLD: 5.8 %

## 2019-06-11 PROCEDURE — G0296 VISIT TO DETERM LDCT ELIG: HCPCS | Performed by: FAMILY MEDICINE

## 2019-06-11 PROCEDURE — G8427 DOCREV CUR MEDS BY ELIG CLIN: HCPCS | Performed by: FAMILY MEDICINE

## 2019-06-11 PROCEDURE — 99214 OFFICE O/P EST MOD 30 MIN: CPT | Performed by: FAMILY MEDICINE

## 2019-06-11 PROCEDURE — 1123F ACP DISCUSS/DSCN MKR DOCD: CPT | Performed by: FAMILY MEDICINE

## 2019-06-11 PROCEDURE — G8926 SPIRO NO PERF OR DOC: HCPCS | Performed by: FAMILY MEDICINE

## 2019-06-11 PROCEDURE — G8419 CALC BMI OUT NRM PARAM NOF/U: HCPCS | Performed by: FAMILY MEDICINE

## 2019-06-11 PROCEDURE — 3017F COLORECTAL CA SCREEN DOC REV: CPT | Performed by: FAMILY MEDICINE

## 2019-06-11 PROCEDURE — 2022F DILAT RTA XM EVC RTNOPTHY: CPT | Performed by: FAMILY MEDICINE

## 2019-06-11 PROCEDURE — 4040F PNEUMOC VAC/ADMIN/RCVD: CPT | Performed by: FAMILY MEDICINE

## 2019-06-11 PROCEDURE — 3023F SPIROM DOC REV: CPT | Performed by: FAMILY MEDICINE

## 2019-06-11 PROCEDURE — G8400 PT W/DXA NO RESULTS DOC: HCPCS | Performed by: FAMILY MEDICINE

## 2019-06-11 PROCEDURE — 4004F PT TOBACCO SCREEN RCVD TLK: CPT | Performed by: FAMILY MEDICINE

## 2019-06-11 PROCEDURE — 3044F HG A1C LEVEL LT 7.0%: CPT | Performed by: FAMILY MEDICINE

## 2019-06-11 PROCEDURE — 1090F PRES/ABSN URINE INCON ASSESS: CPT | Performed by: FAMILY MEDICINE

## 2019-06-11 PROCEDURE — 83036 HEMOGLOBIN GLYCOSYLATED A1C: CPT | Performed by: FAMILY MEDICINE

## 2019-06-19 ENCOUNTER — TELEPHONE (OUTPATIENT)
Dept: CASE MANAGEMENT | Age: 64
End: 2019-06-19

## 2019-06-25 ENCOUNTER — HOSPITAL ENCOUNTER (OUTPATIENT)
Dept: WOMENS IMAGING | Age: 64
Discharge: HOME | End: 2019-06-27
Payer: MEDICARE

## 2019-06-25 ENCOUNTER — HOSPITAL ENCOUNTER (OUTPATIENT)
Dept: CT IMAGING | Age: 64
Discharge: HOME | End: 2019-06-27
Payer: MEDICARE

## 2019-06-25 VITALS
SYSTOLIC BLOOD PRESSURE: 119 MMHG | WEIGHT: 145 LBS | BODY MASS INDEX: 25.69 KG/M2 | DIASTOLIC BLOOD PRESSURE: 78 MMHG | HEIGHT: 63 IN | RESPIRATION RATE: 16 BRPM | HEART RATE: 81 BPM

## 2019-06-25 DIAGNOSIS — Z12.31 VISIT FOR SCREENING MAMMOGRAM: ICD-10-CM

## 2019-06-25 DIAGNOSIS — M81.0 SENILE OSTEOPOROSIS: ICD-10-CM

## 2019-06-25 DIAGNOSIS — Z12.2 ENCOUNTER FOR SCREENING FOR LUNG CANCER: ICD-10-CM

## 2019-06-25 DIAGNOSIS — Z87.891 PERSONAL HISTORY OF TOBACCO USE: ICD-10-CM

## 2019-06-25 PROCEDURE — G0297 LDCT FOR LUNG CA SCREEN: HCPCS

## 2019-06-25 PROCEDURE — 77080 DXA BONE DENSITY AXIAL: CPT

## 2019-06-25 PROCEDURE — 77063 BREAST TOMOSYNTHESIS BI: CPT

## 2019-07-11 RX ORDER — ALENDRONATE SODIUM 70 MG/1
70 TABLET ORAL
Qty: 12 TABLET | Refills: 3 | Status: SHIPPED | OUTPATIENT
Start: 2019-07-11 | End: 2020-08-10 | Stop reason: ALTCHOICE

## 2019-07-25 DIAGNOSIS — E11.9 TYPE 2 DIABETES MELLITUS WITHOUT COMPLICATION, WITHOUT LONG-TERM CURRENT USE OF INSULIN (HCC): ICD-10-CM

## 2019-07-25 RX ORDER — METFORMIN HYDROCHLORIDE 500 MG/1
TABLET, EXTENDED RELEASE ORAL
Qty: 90 TABLET | Refills: 3 | Status: SHIPPED | OUTPATIENT
Start: 2019-07-25 | End: 2020-06-11 | Stop reason: SDUPTHER

## 2019-09-13 RX ORDER — CITALOPRAM 40 MG/1
40 TABLET ORAL DAILY
Qty: 90 TABLET | Refills: 0 | Status: SHIPPED | OUTPATIENT
Start: 2019-09-13 | End: 2019-12-09 | Stop reason: SDUPTHER

## 2019-12-11 ENCOUNTER — OFFICE VISIT (OUTPATIENT)
Dept: FAMILY MEDICINE CLINIC | Age: 64
End: 2019-12-11
Payer: MEDICARE

## 2019-12-11 VITALS
HEIGHT: 63 IN | OXYGEN SATURATION: 96 % | BODY MASS INDEX: 26.05 KG/M2 | DIASTOLIC BLOOD PRESSURE: 70 MMHG | SYSTOLIC BLOOD PRESSURE: 100 MMHG | TEMPERATURE: 97.7 F | WEIGHT: 147 LBS | RESPIRATION RATE: 16 BRPM | HEART RATE: 88 BPM

## 2019-12-11 VITALS
HEIGHT: 64 IN | RESPIRATION RATE: 16 BRPM | TEMPERATURE: 97.7 F | HEART RATE: 88 BPM | BODY MASS INDEX: 25.1 KG/M2 | SYSTOLIC BLOOD PRESSURE: 100 MMHG | DIASTOLIC BLOOD PRESSURE: 70 MMHG | OXYGEN SATURATION: 96 % | WEIGHT: 147 LBS

## 2019-12-11 DIAGNOSIS — M85.80 SENILE OSTEOPENIA: ICD-10-CM

## 2019-12-11 DIAGNOSIS — E11.9 TYPE 2 DIABETES MELLITUS WITHOUT COMPLICATION, WITHOUT LONG-TERM CURRENT USE OF INSULIN (HCC): Primary | ICD-10-CM

## 2019-12-11 DIAGNOSIS — J44.9 CHRONIC OBSTRUCTIVE PULMONARY DISEASE, UNSPECIFIED COPD TYPE (HCC): ICD-10-CM

## 2019-12-11 DIAGNOSIS — Z23 NEED FOR SHINGLES VACCINE: ICD-10-CM

## 2019-12-11 DIAGNOSIS — F33.42 RECURRENT MAJOR DEPRESSIVE DISORDER, IN FULL REMISSION (HCC): ICD-10-CM

## 2019-12-11 DIAGNOSIS — I10 ESSENTIAL HYPERTENSION: ICD-10-CM

## 2019-12-11 DIAGNOSIS — Z00.00 ROUTINE GENERAL MEDICAL EXAMINATION AT A HEALTH CARE FACILITY: Primary | ICD-10-CM

## 2019-12-11 DIAGNOSIS — E78.2 MIXED HYPERLIPIDEMIA: ICD-10-CM

## 2019-12-11 LAB — HBA1C MFR BLD: 6.2 %

## 2019-12-11 PROCEDURE — 4004F PT TOBACCO SCREEN RCVD TLK: CPT | Performed by: FAMILY MEDICINE

## 2019-12-11 PROCEDURE — 83036 HEMOGLOBIN GLYCOSYLATED A1C: CPT | Performed by: FAMILY MEDICINE

## 2019-12-11 PROCEDURE — 1123F ACP DISCUSS/DSCN MKR DOCD: CPT | Performed by: FAMILY MEDICINE

## 2019-12-11 PROCEDURE — G8482 FLU IMMUNIZE ORDER/ADMIN: HCPCS | Performed by: FAMILY MEDICINE

## 2019-12-11 PROCEDURE — G8399 PT W/DXA RESULTS DOCUMENT: HCPCS | Performed by: FAMILY MEDICINE

## 2019-12-11 PROCEDURE — 1090F PRES/ABSN URINE INCON ASSESS: CPT | Performed by: FAMILY MEDICINE

## 2019-12-11 PROCEDURE — 3017F COLORECTAL CA SCREEN DOC REV: CPT | Performed by: FAMILY MEDICINE

## 2019-12-11 PROCEDURE — G0438 PPPS, INITIAL VISIT: HCPCS | Performed by: FAMILY MEDICINE

## 2019-12-11 PROCEDURE — 3044F HG A1C LEVEL LT 7.0%: CPT | Performed by: FAMILY MEDICINE

## 2019-12-11 PROCEDURE — 3023F SPIROM DOC REV: CPT | Performed by: FAMILY MEDICINE

## 2019-12-11 PROCEDURE — 99214 OFFICE O/P EST MOD 30 MIN: CPT | Performed by: FAMILY MEDICINE

## 2019-12-11 PROCEDURE — G8926 SPIRO NO PERF OR DOC: HCPCS | Performed by: FAMILY MEDICINE

## 2019-12-11 PROCEDURE — G8417 CALC BMI ABV UP PARAM F/U: HCPCS | Performed by: FAMILY MEDICINE

## 2019-12-11 PROCEDURE — 4040F PNEUMOC VAC/ADMIN/RCVD: CPT | Performed by: FAMILY MEDICINE

## 2019-12-11 PROCEDURE — G8427 DOCREV CUR MEDS BY ELIG CLIN: HCPCS | Performed by: FAMILY MEDICINE

## 2019-12-11 PROCEDURE — 2022F DILAT RTA XM EVC RTNOPTHY: CPT | Performed by: FAMILY MEDICINE

## 2019-12-11 ASSESSMENT — PATIENT HEALTH QUESTIONNAIRE - PHQ9
SUM OF ALL RESPONSES TO PHQ QUESTIONS 1-9: 0
SUM OF ALL RESPONSES TO PHQ QUESTIONS 1-9: 0

## 2019-12-24 ENCOUNTER — TELEPHONE (OUTPATIENT)
Dept: WOMENS IMAGING | Age: 64
End: 2019-12-24

## 2020-03-03 RX ORDER — ESCITALOPRAM OXALATE 10 MG/1
TABLET ORAL
Qty: 130 TABLET | Refills: 1 | Status: SHIPPED | OUTPATIENT
Start: 2020-03-03 | End: 2020-06-11 | Stop reason: SDUPTHER

## 2020-06-11 ENCOUNTER — OFFICE VISIT (OUTPATIENT)
Dept: FAMILY MEDICINE CLINIC | Age: 65
End: 2020-06-11
Payer: MEDICARE

## 2020-06-11 VITALS
WEIGHT: 147 LBS | SYSTOLIC BLOOD PRESSURE: 112 MMHG | BODY MASS INDEX: 26.05 KG/M2 | OXYGEN SATURATION: 98 % | TEMPERATURE: 97.6 F | DIASTOLIC BLOOD PRESSURE: 88 MMHG | RESPIRATION RATE: 16 BRPM | HEART RATE: 73 BPM | HEIGHT: 63 IN

## 2020-06-11 DIAGNOSIS — I10 ESSENTIAL HYPERTENSION: ICD-10-CM

## 2020-06-11 DIAGNOSIS — E11.9 TYPE 2 DIABETES MELLITUS WITHOUT COMPLICATION, WITHOUT LONG-TERM CURRENT USE OF INSULIN (HCC): ICD-10-CM

## 2020-06-11 DIAGNOSIS — E78.2 MIXED HYPERLIPIDEMIA: ICD-10-CM

## 2020-06-11 LAB
ALBUMIN SERPL-MCNC: 3.9 G/DL (ref 3.5–4.6)
ALP BLD-CCNC: 16 U/L (ref 40–130)
ALT SERPL-CCNC: 9 U/L (ref 0–33)
ANION GAP SERPL CALCULATED.3IONS-SCNC: 15 MEQ/L (ref 9–15)
AST SERPL-CCNC: 17 U/L (ref 0–35)
BILIRUB SERPL-MCNC: <0.2 MG/DL (ref 0.2–0.7)
BUN BLDV-MCNC: 21 MG/DL (ref 8–23)
CALCIUM SERPL-MCNC: 9.6 MG/DL (ref 8.5–9.9)
CHLORIDE BLD-SCNC: 101 MEQ/L (ref 95–107)
CHOLESTEROL, FASTING: 147 MG/DL (ref 0–199)
CO2: 22 MEQ/L (ref 20–31)
CREAT SERPL-MCNC: 0.95 MG/DL (ref 0.5–0.9)
GFR AFRICAN AMERICAN: >60
GFR NON-AFRICAN AMERICAN: 58.7
GLOBULIN: 3.2 G/DL (ref 2.3–3.5)
GLUCOSE BLD-MCNC: 86 MG/DL (ref 70–99)
HBA1C MFR BLD: 6.5 % (ref 4.8–5.9)
HDLC SERPL-MCNC: 67 MG/DL (ref 40–59)
LDL CHOLESTEROL CALCULATED: 51 MG/DL (ref 0–129)
POTASSIUM SERPL-SCNC: 4.3 MEQ/L (ref 3.4–4.9)
SODIUM BLD-SCNC: 138 MEQ/L (ref 135–144)
TOTAL PROTEIN: 7.1 G/DL (ref 6.3–8)
TRIGLYCERIDE, FASTING: 144 MG/DL (ref 0–150)

## 2020-06-11 PROCEDURE — 3023F SPIROM DOC REV: CPT | Performed by: FAMILY MEDICINE

## 2020-06-11 PROCEDURE — 1090F PRES/ABSN URINE INCON ASSESS: CPT | Performed by: FAMILY MEDICINE

## 2020-06-11 PROCEDURE — G8427 DOCREV CUR MEDS BY ELIG CLIN: HCPCS | Performed by: FAMILY MEDICINE

## 2020-06-11 PROCEDURE — 2022F DILAT RTA XM EVC RTNOPTHY: CPT | Performed by: FAMILY MEDICINE

## 2020-06-11 PROCEDURE — 4040F PNEUMOC VAC/ADMIN/RCVD: CPT | Performed by: FAMILY MEDICINE

## 2020-06-11 PROCEDURE — 1123F ACP DISCUSS/DSCN MKR DOCD: CPT | Performed by: FAMILY MEDICINE

## 2020-06-11 PROCEDURE — 4004F PT TOBACCO SCREEN RCVD TLK: CPT | Performed by: FAMILY MEDICINE

## 2020-06-11 PROCEDURE — G8417 CALC BMI ABV UP PARAM F/U: HCPCS | Performed by: FAMILY MEDICINE

## 2020-06-11 PROCEDURE — G8399 PT W/DXA RESULTS DOCUMENT: HCPCS | Performed by: FAMILY MEDICINE

## 2020-06-11 PROCEDURE — G8926 SPIRO NO PERF OR DOC: HCPCS | Performed by: FAMILY MEDICINE

## 2020-06-11 PROCEDURE — 3017F COLORECTAL CA SCREEN DOC REV: CPT | Performed by: FAMILY MEDICINE

## 2020-06-11 PROCEDURE — 3046F HEMOGLOBIN A1C LEVEL >9.0%: CPT | Performed by: FAMILY MEDICINE

## 2020-06-11 PROCEDURE — 99214 OFFICE O/P EST MOD 30 MIN: CPT | Performed by: FAMILY MEDICINE

## 2020-06-11 RX ORDER — ALBUTEROL SULFATE 90 UG/1
2 AEROSOL, METERED RESPIRATORY (INHALATION) 4 TIMES DAILY PRN
Qty: 1 INHALER | Refills: 3 | Status: SHIPPED | OUTPATIENT
Start: 2020-06-11 | End: 2021-10-04 | Stop reason: SDUPTHER

## 2020-06-11 RX ORDER — ESCITALOPRAM OXALATE 10 MG/1
TABLET ORAL
Qty: 130 TABLET | Refills: 1 | Status: SHIPPED | OUTPATIENT
Start: 2020-06-11 | End: 2020-09-14 | Stop reason: SDUPTHER

## 2020-06-11 RX ORDER — ZOSTER VACCINE RECOMBINANT, ADJUVANTED 50 MCG/0.5
0.5 KIT INTRAMUSCULAR SEE ADMIN INSTRUCTIONS
Qty: 0.5 ML | Refills: 1 | Status: SHIPPED | OUTPATIENT
Start: 2020-06-11 | End: 2020-12-08

## 2020-06-11 RX ORDER — SIMVASTATIN 40 MG
TABLET ORAL
Qty: 90 TABLET | Refills: 3 | Status: SHIPPED | OUTPATIENT
Start: 2020-06-11 | End: 2021-06-15

## 2020-06-11 RX ORDER — ZOLEDRONIC ACID 5 MG/100ML
5 INJECTION, SOLUTION INTRAVENOUS ONCE
Qty: 100 ML | Refills: 0 | Status: SHIPPED | OUTPATIENT
Start: 2020-06-11 | End: 2021-10-04

## 2020-06-11 RX ORDER — METFORMIN HYDROCHLORIDE 500 MG/1
TABLET, EXTENDED RELEASE ORAL
Qty: 90 TABLET | Refills: 3 | Status: SHIPPED | OUTPATIENT
Start: 2020-06-11 | End: 2020-09-14 | Stop reason: SDUPTHER

## 2020-06-11 NOTE — PROGRESS NOTES
Patient: Kierra Bautista    YOB: 1955    Date: 6/11/20    Chief Complaint   Patient presents with    Hypertension     6 month f/u.  Diabetes     6 month f/u. Pts. last A1C was done on 12/11/2019 and was 6.2    Hyperlipidemia     6 month f/u.  Discuss Medications     Pt. is not taking the Fosamax right now because she either forgets or she is getting so much congestion from smoking that she is throwing it up. Patient Active Problem List    Diagnosis Date Noted    Osteoporosis      Overview Note:     2019 T -3.2      [facility] 06/11/2018    Proteinuria     Asthma     Anxiety     Chronic back pain     COPD (chronic obstructive pulmonary disease) (HCC)     Depression     Type 2 diabetes mellitus without complication (HCC)     Mixed hyperlipidemia     Essential hypertension        Allergies   Allergen Reactions    Nickel Itching and Rash       Vitals:    06/11/20 1324   BP: 112/88   Site: Left Upper Arm   Position: Sitting   Cuff Size: Medium Adult   Pulse: 73   Resp: 16   Temp: 97.6 °F (36.4 °C)   TempSrc: Temporal   SpO2: 98%   Weight: 147 lb (66.7 kg)   Height: 5' 3\" (1.6 m)      Body mass index is 26.04 kg/m². Treatment Adherence:   Medication compliance:  compliant all of the time  Diet compliance:  compliant all of the time  Weight trend: stable  Current exercise: no regular exercise  What might prevent you from meeting your goal?: none    Diabetes Mellitus Type 2: Current symptoms/problems include vomiting. Home blood sugar records: Doesn't check sugars at home  Any episodes of hypoglycemia? no  Eye exam current (within one year): no  Tobacco history: She  reports that she has been smoking cigarettes. She has a 49.00 pack-year smoking history. She has never used smokeless tobacco.   Daily Aspirin? No  Known diabetic complications: none        HPI    She comes in today to follow-up on her chronic medical problems.   Her rhythm without murmurs rubs or gallops          Assessment:   Diagnosis Orders   1. Mixed hyperlipidemia  simvastatin (ZOCOR) 40 MG tablet    Comprehensive Metabolic Panel    Lipid, Fasting   2. Chronic obstructive pulmonary disease, unspecified COPD type ([facility])  fluticasone-salmeterol (ADVAIR DISKUS) 100-50 MCG/DOSE diskus inhaler    albuterol sulfate HFA (VENTOLIN HFA) 108 (90 Base) MCG/ACT inhaler   3. Recurrent major depressive disorder, in full remission ([facility]  escitalopram (LEXAPRO) 10 MG tablet   4. Type 2 diabetes mellitus without complication, without long-term current use of insulin ([facility])  metFORMIN (GLUCOPHAGE-XR) 500 MG extended release tablet    Hemoglobin A1C    Microalbumin / Creatinine Urine Ratio   5. Mild asthma without complication, unspecified whether persistent  fluticasone-salmeterol (ADVAIR DISKUS) 100-50 MCG/DOSE diskus inhaler    albuterol sulfate HFA (VENTOLIN HFA) 108 (90 Base) MCG/ACT inhaler   6. Essential hypertension  Comprehensive Metabolic Panel   7. Visit for screening mammogram  TONO DIGITAL SCREEN W OR WO CAD BILATERAL   8. Senile osteoporosis  zoledronic acid (RECLAST) 5 MG/100ML SOLN   9.  Anxiety  escitalopram (LEXAPRO) 10 MG tablet               Plan:  Current Outpatient Medications   Medication Sig Dispense Refill    escitalopram (LEXAPRO) 10 MG tablet TAKE 1 & 1/2 TABLETS BY MOUTH EVERY  tablet 1    metFORMIN (GLUCOPHAGE-XR) 500 MG extended release tablet TAKE ONE TABLET BY MOUTH A DAY 90 tablet 3    simvastatin (ZOCOR) 40 MG tablet TAKE 1 TABLET BY MOUTH EVERY DAY IN THE EVENING 90 tablet 3    zoster recombinant adjuvanted vaccine (SHINGRIX) 50 MCG/0.5ML SUSR injection Inject 0.5 mLs into the muscle See Admin Instructions 1 dose now and repeat in 2-6 months 0.5 mL 1    fluticasone-salmeterol (ADVAIR DISKUS) 100-50 MCG/DOSE diskus inhaler Inhale 1 puff into the lungs every 12 hours 60 each 3    albuterol sulfate HFA (VENTOLIN HFA) 108 (90 Base) MCG/ACT inhaler Inhale 2 puffs into the lungs 4 times daily as needed for Wheezing 1 Inhaler 3    zoledronic acid (RECLAST) 5 MG/100ML SOLN Infuse 100 mLs intravenously once for 1 dose 733.01 100 mL 0    citalopram (CELEXA) 40 MG tablet TAKE 1 TABLET BY MOUTH EVERY DAY 90 tablet 3    alendronate (FOSAMAX) 70 MG tablet Take 1 tablet by mouth every 7 days 12 tablet 3    vitamin D (CHOLECALCIFEROL) 1000 UNIT TABS tablet Take 1,000 Units by mouth daily      Doxylamine Succinate, Sleep, (UNISOM PO) Take 1 tablet by mouth daily as needed      BIOTIN PO Take 1 tablet by mouth daily      Multiple Vitamins-Minerals (SENIOR MULTIVITAMIN PLUS PO) Take 1 tablet by mouth daily      Naproxen Sodium (ALEVE) 220 MG CAPS Take  by mouth daily as needed. No current facility-administered medications for this visit.       Orders Placed This Encounter   Procedures    TONO DIGITAL SCREEN W OR WO CAD BILATERAL     Standing Status:   Future     Standing Expiration Date:   8/11/2021    Hemoglobin A1C     Standing Status:   Future     Standing Expiration Date:   6/11/2021    Comprehensive Metabolic Panel     Standing Status:   Future     Standing Expiration Date:   6/11/2021    Microalbumin / Creatinine Urine Ratio     Standing Status:   Future     Standing Expiration Date:   6/11/2021    Lipid, Fasting     Standing Status:   Future     Standing Expiration Date:   6/11/2021       Orders Placed This Encounter   Medications    escitalopram (LEXAPRO) 10 MG tablet     Sig: TAKE 1 & 1/2 TABLETS BY MOUTH EVERY DAY     Dispense:  130 tablet     Refill:  1    metFORMIN (GLUCOPHAGE-XR) 500 MG extended release tablet     Sig: TAKE ONE TABLET BY MOUTH A DAY     Dispense:  90 tablet     Refill:  3    simvastatin (ZOCOR) 40 MG tablet     Sig: TAKE 1 TABLET BY MOUTH EVERY DAY IN THE EVENING     Dispense:  90 tablet     Refill:  3    zoster recombinant adjuvanted vaccine (SHINGRIX) 50 MCG/0.5ML SUSR injection     Sig: Inject 0.5 mLs into the muscle See

## 2020-06-12 PROBLEM — M81.0 SENILE OSTEOPOROSIS: Status: ACTIVE | Noted: 2020-06-12

## 2020-06-12 RX ORDER — ZOLEDRONIC ACID 5 MG/100ML
5 INJECTION, SOLUTION INTRAVENOUS ONCE
Status: CANCELLED | OUTPATIENT
Start: 2020-06-12

## 2020-06-30 ENCOUNTER — HOSPITAL ENCOUNTER (OUTPATIENT)
Dept: WOMENS IMAGING | Age: 65
Discharge: HOME | End: 2020-07-02
Payer: MEDICARE

## 2020-06-30 PROCEDURE — 77063 BREAST TOMOSYNTHESIS BI: CPT

## 2020-07-06 ENCOUNTER — NURSE ONLY (OUTPATIENT)
Dept: FAMILY MEDICINE CLINIC | Age: 65
End: 2020-07-06
Payer: MEDICARE

## 2020-07-06 ENCOUNTER — TELEPHONE (OUTPATIENT)
Dept: FAMILY MEDICINE CLINIC | Age: 65
End: 2020-07-06

## 2020-07-06 LAB
CONTROL: ABNORMAL
HEMOCCULT STL QL: ABNORMAL

## 2020-07-06 PROCEDURE — 82274 ASSAY TEST FOR BLOOD FECAL: CPT | Performed by: FAMILY MEDICINE

## 2020-07-06 NOTE — TELEPHONE ENCOUNTER
The stool sample that was dropped off tested positive for blood. It is recommended that when this test is positive that a follow-up colonoscopy done.   I will put in a referral to a gastroenterologist .

## 2020-07-15 ENCOUNTER — TELEPHONE (OUTPATIENT)
Dept: ENDOSCOPY | Age: 65
End: 2020-07-15

## 2020-07-15 NOTE — TELEPHONE ENCOUNTER
called in prescription for Suprep bowel prep kit to [facility] in [place] 7/15/2020 at 3:05pm

## 2020-07-28 ENCOUNTER — NURSE ONLY (OUTPATIENT)
Dept: PRIMARY CARE CLINIC | Age: 65
End: 2020-07-28

## 2020-07-29 ENCOUNTER — HOSPITAL ENCOUNTER (OUTPATIENT)
Age: 65
Setting detail: SPECIMEN
Discharge: HOME | End: 2020-07-29
Payer: MEDICARE

## 2020-07-29 PROCEDURE — U0003 INFECTIOUS AGENT DETECTION BY NUCLEIC ACID (DNA OR RNA); SEVERE ACUTE RESPIRATORY SYNDROME CORONAVIRUS 2 (SARS-COV-2) (CORONAVIRUS DISEASE [COVID-19]), AMPLIFIED PROBE TECHNIQUE, MAKING USE OF HIGH THROUGHPUT TECHNOLOGIES AS DESCRIBED BY CMS-2020-01-R: HCPCS

## 2020-07-31 LAB
SARS-COV-2: NOT DETECTED
SOURCE: NORMAL

## 2020-08-10 ENCOUNTER — HOSPITAL ENCOUNTER (OUTPATIENT)
Dept: INFUSION THERAPY | Age: 65
Setting detail: INFUSION SERIES
Discharge: HOME | End: 2020-08-10
Payer: MEDICARE

## 2020-08-10 VITALS
RESPIRATION RATE: 18 BRPM | TEMPERATURE: 97.2 F | SYSTOLIC BLOOD PRESSURE: 136 MMHG | HEART RATE: 76 BPM | DIASTOLIC BLOOD PRESSURE: 71 MMHG

## 2020-08-10 DIAGNOSIS — M81.0 SENILE OSTEOPOROSIS: Primary | ICD-10-CM

## 2020-08-10 PROCEDURE — 6360000002 HC RX W HCPCS: Performed by: FAMILY MEDICINE

## 2020-08-10 PROCEDURE — 96365 THER/PROPH/DIAG IV INF INIT: CPT

## 2020-08-10 RX ORDER — ZOLEDRONIC ACID 5 MG/100ML
5 INJECTION, SOLUTION INTRAVENOUS ONCE
Status: COMPLETED | OUTPATIENT
Start: 2020-08-10 | End: 2020-08-10

## 2020-08-10 RX ORDER — ZOLEDRONIC ACID 5 MG/100ML
5 INJECTION, SOLUTION INTRAVENOUS ONCE
Status: CANCELLED | OUTPATIENT
Start: 2020-08-10

## 2020-08-10 RX ADMIN — ZOLEDRONIC ACID 5 MG: 5 INJECTION, SOLUTION INTRAVENOUS at 13:12

## 2020-08-10 NOTE — FLOWSHEET NOTE
AVS printed and given to patient. Left the unit ambulatory. All equipment used in the care for this patient has been cleaned.

## 2020-08-10 NOTE — FLOWSHEET NOTE
Patient to the floor ambulatory for her yearly reclast.  Vital  signs taken. Education given regarding Reclast. Verbalized understanding. Call light within reach.

## 2020-09-14 RX ORDER — ESCITALOPRAM OXALATE 10 MG/1
TABLET ORAL
Qty: 130 TABLET | Refills: 1 | Status: SHIPPED | OUTPATIENT
Start: 2020-09-14 | End: 2021-01-21 | Stop reason: SDUPTHER

## 2020-09-14 RX ORDER — METFORMIN HYDROCHLORIDE 500 MG/1
TABLET, EXTENDED RELEASE ORAL
Qty: 90 TABLET | Refills: 3 | Status: SHIPPED | OUTPATIENT
Start: 2020-09-14 | End: 2021-12-01 | Stop reason: SDUPTHER

## 2020-09-14 NOTE — TELEPHONE ENCOUNTER
Patient requesting medication refill.  Please approve or deny this request.    Rx requested:  Requested Prescriptions     Pending Prescriptions Disp Refills    escitalopram (LEXAPRO) 10 MG tablet 130 tablet 1     Sig: TAKE 1 & 1/2 TABLETS BY MOUTH EVERY DAY    metFORMIN (GLUCOPHAGE-XR) 500 MG extended release tablet 90 tablet 3     Sig: TAKE ONE TABLET BY MOUTH A DAY         Last Office Visit:   6/11/2020      Next Visit Date:  Future Appointments   Date Time Provider Josué Vargas   12/10/2020  1:00 PM Daryl Carbone  [place], [place] [address]

## 2021-01-21 ENCOUNTER — OFFICE VISIT (OUTPATIENT)
Dept: FAMILY MEDICINE CLINIC | Age: 66
End: 2021-01-21
Payer: MEDICARE

## 2021-01-21 VITALS
HEIGHT: 63 IN | TEMPERATURE: 97.7 F | HEART RATE: 78 BPM | RESPIRATION RATE: 16 BRPM | WEIGHT: 145 LBS | DIASTOLIC BLOOD PRESSURE: 78 MMHG | BODY MASS INDEX: 25.69 KG/M2 | SYSTOLIC BLOOD PRESSURE: 110 MMHG | OXYGEN SATURATION: 95 %

## 2021-01-21 DIAGNOSIS — S34.109D CLOSED FRACTURE OF LUMBAR VERTEBRA WITH ROUTINE HEALING AND SPINAL CORD INJURY, SUBSEQUENT ENCOUNTER (HCC): ICD-10-CM

## 2021-01-21 DIAGNOSIS — F33.42 RECURRENT MAJOR DEPRESSIVE DISORDER, IN FULL REMISSION (HCC): ICD-10-CM

## 2021-01-21 DIAGNOSIS — S32.009D CLOSED FRACTURE OF LUMBAR VERTEBRA WITH ROUTINE HEALING AND SPINAL CORD INJURY, SUBSEQUENT ENCOUNTER (HCC): ICD-10-CM

## 2021-01-21 DIAGNOSIS — F41.9 ANXIETY: ICD-10-CM

## 2021-01-21 DIAGNOSIS — I10 ESSENTIAL HYPERTENSION: ICD-10-CM

## 2021-01-21 DIAGNOSIS — E11.9 TYPE 2 DIABETES MELLITUS WITHOUT COMPLICATION, WITHOUT LONG-TERM CURRENT USE OF INSULIN (HCC): Primary | ICD-10-CM

## 2021-01-21 DIAGNOSIS — J44.9 CHRONIC OBSTRUCTIVE PULMONARY DISEASE, UNSPECIFIED COPD TYPE (HCC): ICD-10-CM

## 2021-01-21 DIAGNOSIS — E78.2 MIXED HYPERLIPIDEMIA: ICD-10-CM

## 2021-01-21 LAB
ALBUMIN SERPL-MCNC: 4.4 G/DL (ref 3.5–4.6)
ALP BLD-CCNC: 12 U/L (ref 40–130)
ALT SERPL-CCNC: 9 U/L (ref 0–33)
ANION GAP SERPL CALCULATED.3IONS-SCNC: 12 MEQ/L (ref 9–15)
AST SERPL-CCNC: 17 U/L (ref 0–35)
BILIRUB SERPL-MCNC: 0.3 MG/DL (ref 0.2–0.7)
BUN BLDV-MCNC: 22 MG/DL (ref 8–23)
CALCIUM SERPL-MCNC: 9.6 MG/DL (ref 8.5–9.9)
CHLORIDE BLD-SCNC: 100 MEQ/L (ref 95–107)
CHOLESTEROL, FASTING: 155 MG/DL (ref 0–199)
CO2: 27 MEQ/L (ref 20–31)
CREAT SERPL-MCNC: 1.02 MG/DL (ref 0.5–0.9)
GFR AFRICAN AMERICAN: >60
GFR NON-AFRICAN AMERICAN: 54
GLOBULIN: 3.2 G/DL (ref 2.3–3.5)
GLUCOSE BLD-MCNC: 91 MG/DL (ref 70–99)
HBA1C MFR BLD: 6.2 %
HDLC SERPL-MCNC: 64 MG/DL (ref 40–59)
LDL CHOLESTEROL CALCULATED: 64 MG/DL (ref 0–129)
POTASSIUM SERPL-SCNC: 4.1 MEQ/L (ref 3.4–4.9)
SODIUM BLD-SCNC: 139 MEQ/L (ref 135–144)
TOTAL PROTEIN: 7.6 G/DL (ref 6.3–8)
TRIGLYCERIDE, FASTING: 135 MG/DL (ref 0–150)

## 2021-01-21 PROCEDURE — 99214 OFFICE O/P EST MOD 30 MIN: CPT | Performed by: FAMILY MEDICINE

## 2021-01-21 PROCEDURE — 83036 HEMOGLOBIN GLYCOSYLATED A1C: CPT | Performed by: FAMILY MEDICINE

## 2021-01-21 RX ORDER — FLUTICASONE FUROATE AND VILANTEROL 100; 25 UG/1; UG/1
1 POWDER RESPIRATORY (INHALATION) DAILY
Qty: 1 EACH | Refills: 3 | Status: SHIPPED | OUTPATIENT
Start: 2021-01-21 | End: 2021-05-13

## 2021-01-21 RX ORDER — CEFUROXIME AXETIL 250 MG/1
250 TABLET ORAL 2 TIMES DAILY
Qty: 20 TABLET | Refills: 0 | Status: SHIPPED | OUTPATIENT
Start: 2021-01-21 | End: 2021-01-31

## 2021-01-21 RX ORDER — ESCITALOPRAM OXALATE 10 MG/1
TABLET ORAL
Qty: 130 TABLET | Refills: 2 | Status: SHIPPED | OUTPATIENT
Start: 2021-01-21 | End: 2021-02-28

## 2021-01-21 NOTE — PROGRESS NOTES
Patient: Katie Jones (: 1955) is a 77 y.o. female,Established patient, here for evaluation of the following chief complaint(s):  Chief Complaint   Patient presents with    Diabetes     6 month follow up. She is due for labs.  Hypertension     6 month follow up    Hyperlipidemia     6 month follow up    Nausea & Vomiting     She complains of nausea and vomiting off and on.     Health Maintenance     Declines CT lung screening today. Date: 21    Allergies   Allergen Reactions    Nickel Itching and Rash       Vitals:    21 1325   BP: 110/78   Site: Right Upper Arm   Position: Sitting   Cuff Size: Small Adult   Pulse: 78   Resp: 16   Temp: 97.7 °F (36.5 °C)   TempSrc: Oral   SpO2: 95%   Weight: 145 lb (65.8 kg)   Height: 5' 3\" (1.6 m)      Body mass index is 25.69 kg/m². PHQ Scores 2019 2018 2018 5/15/2017 11/15/2014   PHQ2 Score 0 0 0 0 2   PHQ9 Score 0 0 0 0 2     Interpretation of Total Score Depression Severity: 1-4 = Minimal depression, 5-9 = Mild depression, 10-14 = Moderate depression, 15-19 = Moderately severe depression, 20-27 = Severe depression      Treatment Adherence:   Medication compliance:  compliant all of the time  Diet compliance:  compliant most of the time  Weight trend: decreasing  Current exercise: no regular exercise  Diabetes Mellitus Type 2: Current symptoms/problems include none. Home blood sugar records:  patient does not test  Any episodes of hypoglycemia? no  Eye exam current (within one year): no  Tobacco history: She  reports that she has been smoking cigarettes. She has a 49.00 pack-year smoking history. She has never used smokeless tobacco.   Daily Aspirin?  No:   Known diabetic complications: none        HPI    She comes in today to follow-up on her diabetes her lipids her blood pressure and her breathing disorder breathing has been bothering her of late because she could not afford the Advair inhaler would put her on her insurance stopped covering it she could not afford the co-pay. She says that the rescue inhaler helps her quite a bit. She still smoking and she is working desperately and trying to stop. She has had a moist cough no fever chills ear pain sore throat nausea vomiting or change in smell or taste. Review of Systems    Constitutional: Negative for fatigue, fever and sweats. HEENT: Negative for eye discharge and vision loss. Negative for ear drainage, hearing loss and nasal drainage. Respiratory: positive for cough, dyspnea and wheezing. Cardiovascular:  Negative for chest pain, claudication and irregular heartbeat/palpitations. Gastrointestinal: Negative for abdominal pain,  constipation and diarrhea. Positive for nausea and vomiting. Genitourinary: Negative for dysuria, patient postmenopausal.  Metabolic/Endocrine: Negative for cold intolerance, heat intolerance, polydipsia and polyphagia. No unintended weight loss or weight gain. Neuro/Psychiatric: Negative for gait disturbance. Negative for psychiatric symptoms. Dermatologic: Negative for pruritus and rash. Musculoskeletal: Negative for bone/joint symptoms. No numbness or tingling. No loss of function. Hematology: Negative for bleeding and easy bruising. Immunology:  Negative for environmental allergies and food allergies. Physical Exam    Patient's medication, allergies, past medical, surgical, social and family histories were reviewed and updated as appropriate. PHYSICAL EXAM     General: Alert oriented pleasant cooperative no acute distress. HEENT: Eyes clear nonicteric facial muscles symmetrical.  Color good  Neck: Soft nontender no adenopathy or masses no carotid bruits  Lungs: Scattered expiratory rhonchi occasional expiratory wheezes no rales no diminished breath sounds  Heart: Regular rate and rhythm without murmur  Extremities: Diabetic foot exam: She has dry skin bilaterally without callus ulcerations erythema or swelling.   She has no joint deformity her nails are well cared for. She is intact to sensation globally bilaterally to light touch              Assessment:   Diagnosis Orders   1. Type 2 diabetes mellitus without complication, without long-term current use of insulin ([facility])  POCT glycosylated hemoglobin (Hb A1C) 6.2 under good control    HM DIABETES FOOT EXAM   2. Mixed hyperlipidemia  Lipid, Fasting   3. Essential hypertension  Comprehensive Metabolic Panel   4. Closed fracture of lumbar vertebra with routine healing and spinal cord injury, [facility] ([facility]   resolved   5. Chronic obstructive pulmonary disease, unspecified COPD type ([facility]   we will place her on a cephalosporin and and Breo inhaler hopefully this will be covered by her insurance to help her with her breathing   6. Recurrent major depressive disorder, in full remission ([facility]  escitalopram (LEXAPRO) 10 MG tablet   7. Anxiety  escitalopram (LEXAPRO) 10 MG tablet         On this date 01/21/21 I have spent 32 minutes reviewing previous notes, test results and face to face with the patient discussing the diagnosis and importance of compliance with the treatment plan.        Plan:  Current Outpatient Medications   Medication Sig Dispense Refill    escitalopram (LEXAPRO) 10 MG tablet TAKE 1 & 1/2 TABLETS BY MOUTH EVERY  tablet 2    fluticasone-vilanterol (BREO ELLIPTA) 100-25 MCG/INH AEPB inhaler Inhale 1 puff into the lungs daily 1 each 3    cefUROXime (CEFTIN) 250 MG tablet Take 1 tablet by mouth 2 times daily for 10 days 20 tablet 0    metFORMIN (GLUCOPHAGE-XR) 500 MG extended release tablet TAKE ONE TABLET BY MOUTH A DAY 90 tablet 3    simvastatin (ZOCOR) 40 MG tablet TAKE 1 TABLET BY MOUTH EVERY DAY IN THE EVENING 90 tablet 3    albuterol sulfate HFA (VENTOLIN HFA) 108 (90 Base) MCG/ACT inhaler Inhale 2 puffs into the lungs 4 times daily as needed for Wheezing 1 Inhaler 3    vitamin D (CHOLECALCIFEROL) 1000 UNIT TABS tablet Take 1,000 Units by mouth daily      Doxylamine Succinate, Sleep, (UNISOM PO) Take 1 tablet by mouth daily as needed      Multiple Vitamins-Minerals (SENIOR MULTIVITAMIN PLUS PO) Take 1 tablet by mouth daily      Naproxen Sodium (ALEVE) 220 MG CAPS Take  by mouth daily as needed.  zoledronic acid (RECLAST) 5 MG/100ML SOLN Infuse 100 mLs intravenously once for 1 dose 733.01 100 mL 0     No current facility-administered medications for this visit. Orders Placed This Encounter   Procedures    Comprehensive Metabolic Panel     Standing Status:   Future     Number of Occurrences:   1     Standing Expiration Date:   1/21/2022    Lipid, Fasting     Standing Status:   Future     Number of Occurrences:   1     Standing Expiration Date:   1/21/2022    POCT glycosylated hemoglobin (Hb A1C)    HM DIABETES FOOT EXAM       Orders Placed This Encounter   Medications    escitalopram (LEXAPRO) 10 MG tablet     Sig: TAKE 1 & 1/2 TABLETS BY MOUTH EVERY DAY     Dispense:  130 tablet     Refill:  2    fluticasone-vilanterol (BREO ELLIPTA) 100-25 MCG/INH AEPB inhaler     Sig: Inhale 1 puff into the lungs daily     Dispense:  1 each     Refill:  3    cefUROXime (CEFTIN) 250 MG tablet     Sig: Take 1 tablet by mouth 2 times daily for 10 days     Dispense:  20 tablet     Refill:  0              Return in about 6 months (around 7/21/2021). An electronic signature was used to authenticate this note.   Dr. Ayanna Mccain      1/21/21  2:29 PM

## 2021-02-28 DIAGNOSIS — F41.9 ANXIETY: ICD-10-CM

## 2021-02-28 DIAGNOSIS — F33.42 RECURRENT MAJOR DEPRESSIVE DISORDER, IN FULL REMISSION (HCC): ICD-10-CM

## 2021-02-28 RX ORDER — ESCITALOPRAM OXALATE 10 MG/1
TABLET ORAL
Qty: 130 TABLET | Refills: 1 | Status: SHIPPED | OUTPATIENT
Start: 2021-02-28 | End: 2022-05-05 | Stop reason: SDUPTHER

## 2021-02-28 NOTE — TELEPHONE ENCOUNTER
Rx requested:  Requested Prescriptions     Pending Prescriptions Disp Refills    escitalopram (LEXAPRO) 10 MG tablet [Pharmacy Med Name: ESCITALOPRAM 10 MG TABLET] 130 tablet 1     Sig: TAKE 1 AND 1/2 TABLETS DAILY BY MOUTH       Last Office Visit:   1/21/2021        Next Visit Date:  Future Appointments   Date Time Provider Josué Vargas   7/21/2021  1:00 PM Rosendo Romero MD [address] [place] [address] 7

## 2021-05-13 RX ORDER — FLUTICASONE FUROATE AND VILANTEROL TRIFENATATE 100; 25 UG/1; UG/1
POWDER RESPIRATORY (INHALATION)
Qty: 1 EACH | Refills: 3 | Status: SHIPPED | OUTPATIENT
Start: 2021-05-13 | End: 2021-10-04 | Stop reason: SDUPTHER

## 2021-07-21 ENCOUNTER — OFFICE VISIT (OUTPATIENT)
Dept: FAMILY MEDICINE CLINIC | Age: 66
End: 2021-07-21
Payer: MEDICARE

## 2021-07-21 VITALS
DIASTOLIC BLOOD PRESSURE: 70 MMHG | WEIGHT: 144 LBS | SYSTOLIC BLOOD PRESSURE: 100 MMHG | BODY MASS INDEX: 25.52 KG/M2 | HEART RATE: 88 BPM | HEIGHT: 63 IN | OXYGEN SATURATION: 95 % | RESPIRATION RATE: 16 BRPM | TEMPERATURE: 97.9 F

## 2021-07-21 DIAGNOSIS — I10 ESSENTIAL HYPERTENSION: ICD-10-CM

## 2021-07-21 DIAGNOSIS — Z12.31 VISIT FOR SCREENING MAMMOGRAM: ICD-10-CM

## 2021-07-21 DIAGNOSIS — E78.2 MIXED HYPERLIPIDEMIA: ICD-10-CM

## 2021-07-21 DIAGNOSIS — E11.9 TYPE 2 DIABETES MELLITUS WITHOUT COMPLICATION, WITHOUT LONG-TERM CURRENT USE OF INSULIN (HCC): Primary | ICD-10-CM

## 2021-07-21 DIAGNOSIS — Z23 NEED FOR 23-POLYVALENT PNEUMOCOCCAL POLYSACCHARIDE VACCINE: ICD-10-CM

## 2021-07-21 DIAGNOSIS — Z12.11 SCREENING FOR COLON CANCER: ICD-10-CM

## 2021-07-21 DIAGNOSIS — M81.0 SENILE OSTEOPOROSIS: ICD-10-CM

## 2021-07-21 DIAGNOSIS — E11.9 TYPE 2 DIABETES MELLITUS WITHOUT COMPLICATION, WITHOUT LONG-TERM CURRENT USE OF INSULIN (HCC): ICD-10-CM

## 2021-07-21 LAB
ALBUMIN SERPL-MCNC: 4.2 G/DL (ref 3.5–4.6)
ALP BLD-CCNC: 15 U/L (ref 40–130)
ALT SERPL-CCNC: 9 U/L (ref 0–33)
ANION GAP SERPL CALCULATED.3IONS-SCNC: 16 MEQ/L (ref 9–15)
AST SERPL-CCNC: 17 U/L (ref 0–35)
BILIRUB SERPL-MCNC: 0.3 MG/DL (ref 0.2–0.7)
BUN BLDV-MCNC: 15 MG/DL (ref 8–23)
CALCIUM SERPL-MCNC: 9.6 MG/DL (ref 8.5–9.9)
CHLORIDE BLD-SCNC: 101 MEQ/L (ref 95–107)
CHOLESTEROL, FASTING: 136 MG/DL (ref 0–199)
CO2: 23 MEQ/L (ref 20–31)
CREAT SERPL-MCNC: 0.99 MG/DL (ref 0.5–0.9)
CREATININE URINE: 91.4 MG/DL
GFR AFRICAN AMERICAN: >60
GFR NON-AFRICAN AMERICAN: 55.8
GLOBULIN: 3.3 G/DL (ref 2.3–3.5)
GLUCOSE BLD-MCNC: 118 MG/DL (ref 70–99)
HBA1C MFR BLD: 5.2 %
HDLC SERPL-MCNC: 54 MG/DL (ref 40–59)
LDL CHOLESTEROL CALCULATED: 53 MG/DL (ref 0–129)
MICROALBUMIN UR-MCNC: 11.1 MG/DL
MICROALBUMIN/CREAT UR-RTO: 121.4 MG/G (ref 0–30)
POTASSIUM SERPL-SCNC: 4.3 MEQ/L (ref 3.4–4.9)
SODIUM BLD-SCNC: 140 MEQ/L (ref 135–144)
TOTAL PROTEIN: 7.5 G/DL (ref 6.3–8)
TRIGLYCERIDE, FASTING: 145 MG/DL (ref 0–150)

## 2021-07-21 PROCEDURE — G0009 ADMIN PNEUMOCOCCAL VACCINE: HCPCS | Performed by: FAMILY MEDICINE

## 2021-07-21 PROCEDURE — 83036 HEMOGLOBIN GLYCOSYLATED A1C: CPT | Performed by: FAMILY MEDICINE

## 2021-07-21 PROCEDURE — 90732 PPSV23 VACC 2 YRS+ SUBQ/IM: CPT | Performed by: FAMILY MEDICINE

## 2021-07-21 PROCEDURE — 99214 OFFICE O/P EST MOD 30 MIN: CPT | Performed by: FAMILY MEDICINE

## 2021-07-21 RX ORDER — SODIUM CHLORIDE 9 MG/ML
25 INJECTION, SOLUTION INTRAVENOUS PRN
Status: CANCELLED | OUTPATIENT
Start: 2021-07-21

## 2021-07-21 RX ORDER — METHYLPREDNISOLONE SODIUM SUCCINATE 125 MG/2ML
125 INJECTION, POWDER, LYOPHILIZED, FOR SOLUTION INTRAMUSCULAR; INTRAVENOUS ONCE
Status: CANCELLED | OUTPATIENT
Start: 2021-07-21 | End: 2021-07-21

## 2021-07-21 RX ORDER — SODIUM CHLORIDE 9 MG/ML
INJECTION, SOLUTION INTRAVENOUS ONCE
Status: CANCELLED | OUTPATIENT
Start: 2021-07-21 | End: 2021-07-21

## 2021-07-21 RX ORDER — SODIUM CHLORIDE 9 MG/ML
INJECTION, SOLUTION INTRAVENOUS CONTINUOUS
Status: CANCELLED | OUTPATIENT
Start: 2021-07-21

## 2021-07-21 RX ORDER — DIPHENHYDRAMINE HYDROCHLORIDE 50 MG/ML
50 INJECTION INTRAMUSCULAR; INTRAVENOUS ONCE
Status: CANCELLED | OUTPATIENT
Start: 2021-07-21 | End: 2021-07-21

## 2021-07-21 RX ORDER — ZOLEDRONIC ACID 5 MG/100ML
5 INJECTION, SOLUTION INTRAVENOUS ONCE
Status: CANCELLED | OUTPATIENT
Start: 2021-07-21 | End: 2021-07-21

## 2021-07-21 RX ORDER — 0.9 % SODIUM CHLORIDE 0.9 %
250 INTRAVENOUS SOLUTION INTRAVENOUS ONCE
Status: CANCELLED | OUTPATIENT
Start: 2021-07-21 | End: 2021-07-21

## 2021-07-21 RX ORDER — EPINEPHRINE 1 MG/ML
0.3 INJECTION, SOLUTION, CONCENTRATE INTRAVENOUS PRN
Status: CANCELLED | OUTPATIENT
Start: 2021-07-21

## 2021-07-21 RX ORDER — SODIUM CHLORIDE 0.9 % (FLUSH) 0.9 %
5-40 SYRINGE (ML) INJECTION PRN
Status: CANCELLED | OUTPATIENT
Start: 2021-07-21

## 2021-07-21 RX ORDER — HEPARIN SODIUM (PORCINE) LOCK FLUSH IV SOLN 100 UNIT/ML 100 UNIT/ML
500 SOLUTION INTRAVENOUS PRN
Status: CANCELLED | OUTPATIENT
Start: 2021-07-21

## 2021-07-21 SDOH — ECONOMIC STABILITY: TRANSPORTATION INSECURITY
IN THE PAST 12 MONTHS, HAS THE LACK OF TRANSPORTATION KEPT YOU FROM MEDICAL APPOINTMENTS OR FROM GETTING MEDICATIONS?: NO

## 2021-07-21 SDOH — ECONOMIC STABILITY: TRANSPORTATION INSECURITY
IN THE PAST 12 MONTHS, HAS LACK OF TRANSPORTATION KEPT YOU FROM MEETINGS, WORK, OR FROM GETTING THINGS NEEDED FOR DAILY LIVING?: NO

## 2021-07-21 SDOH — ECONOMIC STABILITY: FOOD INSECURITY: WITHIN THE PAST 12 MONTHS, YOU WORRIED THAT YOUR FOOD WOULD RUN OUT BEFORE YOU GOT MONEY TO BUY MORE.: NEVER TRUE

## 2021-07-21 SDOH — ECONOMIC STABILITY: FOOD INSECURITY: WITHIN THE PAST 12 MONTHS, THE FOOD YOU BOUGHT JUST DIDN'T LAST AND YOU DIDN'T HAVE MONEY TO GET MORE.: NEVER TRUE

## 2021-07-21 ASSESSMENT — SOCIAL DETERMINANTS OF HEALTH (SDOH): HOW HARD IS IT FOR YOU TO PAY FOR THE VERY BASICS LIKE FOOD, HOUSING, MEDICAL CARE, AND HEATING?: NOT HARD AT ALL
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
## 2021-09-02 ENCOUNTER — HOSPITAL ENCOUNTER (OUTPATIENT)
Dept: INFUSION THERAPY | Age: 66
Setting detail: INFUSION SERIES
Discharge: HOME | End: 2021-09-02
Payer: MEDICARE

## 2021-09-02 VITALS
SYSTOLIC BLOOD PRESSURE: 114 MMHG | RESPIRATION RATE: 16 BRPM | DIASTOLIC BLOOD PRESSURE: 69 MMHG | TEMPERATURE: 97.5 F | HEART RATE: 70 BPM

## 2021-09-02 DIAGNOSIS — M81.0 SENILE OSTEOPOROSIS: Primary | ICD-10-CM

## 2021-09-02 PROCEDURE — 6360000002 HC RX W HCPCS: Performed by: FAMILY MEDICINE

## 2021-09-02 PROCEDURE — 96365 THER/PROPH/DIAG IV INF INIT: CPT

## 2021-09-02 RX ORDER — HEPARIN SODIUM (PORCINE) LOCK FLUSH IV SOLN 100 UNIT/ML 100 UNIT/ML
500 SOLUTION INTRAVENOUS PRN
Status: CANCELLED | OUTPATIENT
Start: 2021-09-02

## 2021-09-02 RX ORDER — EPINEPHRINE 1 MG/ML
0.3 INJECTION, SOLUTION, CONCENTRATE INTRAVENOUS PRN
Status: CANCELLED | OUTPATIENT
Start: 2021-09-02

## 2021-09-02 RX ORDER — 0.9 % SODIUM CHLORIDE 0.9 %
250 INTRAVENOUS SOLUTION INTRAVENOUS ONCE
Status: CANCELLED | OUTPATIENT
Start: 2021-09-02 | End: 2021-09-02

## 2021-09-02 RX ORDER — METHYLPREDNISOLONE SODIUM SUCCINATE 125 MG/2ML
125 INJECTION, POWDER, LYOPHILIZED, FOR SOLUTION INTRAMUSCULAR; INTRAVENOUS ONCE
Status: CANCELLED | OUTPATIENT
Start: 2021-09-02 | End: 2021-09-02

## 2021-09-02 RX ORDER — SODIUM CHLORIDE 9 MG/ML
INJECTION, SOLUTION INTRAVENOUS CONTINUOUS
Status: CANCELLED | OUTPATIENT
Start: 2021-09-02

## 2021-09-02 RX ORDER — SODIUM CHLORIDE 9 MG/ML
INJECTION, SOLUTION INTRAVENOUS ONCE
Status: CANCELLED | OUTPATIENT
Start: 2021-09-02 | End: 2021-09-02

## 2021-09-02 RX ORDER — SODIUM CHLORIDE 0.9 % (FLUSH) 0.9 %
5-40 SYRINGE (ML) INJECTION PRN
Status: CANCELLED | OUTPATIENT
Start: 2021-09-02

## 2021-09-02 RX ORDER — ZOLEDRONIC ACID 5 MG/100ML
5 INJECTION, SOLUTION INTRAVENOUS ONCE
Status: COMPLETED | OUTPATIENT
Start: 2021-09-02 | End: 2021-09-02

## 2021-09-02 RX ORDER — ZOLEDRONIC ACID 5 MG/100ML
5 INJECTION, SOLUTION INTRAVENOUS ONCE
Status: CANCELLED | OUTPATIENT
Start: 2021-09-02 | End: 2021-09-02

## 2021-09-02 RX ORDER — DIPHENHYDRAMINE HYDROCHLORIDE 50 MG/ML
50 INJECTION INTRAMUSCULAR; INTRAVENOUS ONCE
Status: CANCELLED | OUTPATIENT
Start: 2021-09-02 | End: 2021-09-02

## 2021-09-02 RX ORDER — SODIUM CHLORIDE 9 MG/ML
25 INJECTION, SOLUTION INTRAVENOUS PRN
Status: CANCELLED | OUTPATIENT
Start: 2021-09-02

## 2021-09-02 RX ADMIN — ZOLEDRONIC ACID 5 MG: 5 INJECTION, SOLUTION INTRAVENOUS at 12:58

## 2021-09-02 NOTE — FLOWSHEET NOTE
Infusion is complete. Tolerated well. Left the unit ambulatory. All equipment used in the care for this patient has been cleaned.

## 2021-10-04 ENCOUNTER — OFFICE VISIT (OUTPATIENT)
Dept: FAMILY MEDICINE CLINIC | Age: 66
End: 2021-10-04
Payer: MEDICARE

## 2021-10-04 VITALS
RESPIRATION RATE: 17 BRPM | OXYGEN SATURATION: 96 % | DIASTOLIC BLOOD PRESSURE: 78 MMHG | HEART RATE: 78 BPM | SYSTOLIC BLOOD PRESSURE: 120 MMHG | BODY MASS INDEX: 25.52 KG/M2 | WEIGHT: 144 LBS | HEIGHT: 63 IN | TEMPERATURE: 97.2 F

## 2021-10-04 DIAGNOSIS — I10 ESSENTIAL HYPERTENSION: ICD-10-CM

## 2021-10-04 DIAGNOSIS — Z12.11 SCREEN FOR COLON CANCER: ICD-10-CM

## 2021-10-04 DIAGNOSIS — N18.31 CHRONIC RENAL FAILURE, STAGE 3A (HCC): ICD-10-CM

## 2021-10-04 DIAGNOSIS — Z87.891 PERSONAL HISTORY OF TOBACCO USE: ICD-10-CM

## 2021-10-04 DIAGNOSIS — E11.9 TYPE 2 DIABETES MELLITUS WITHOUT COMPLICATION, WITHOUT LONG-TERM CURRENT USE OF INSULIN (HCC): ICD-10-CM

## 2021-10-04 DIAGNOSIS — J44.9 CHRONIC OBSTRUCTIVE PULMONARY DISEASE, UNSPECIFIED COPD TYPE (HCC): Primary | ICD-10-CM

## 2021-10-04 DIAGNOSIS — F33.42 RECURRENT MAJOR DEPRESSIVE DISORDER, IN FULL REMISSION (HCC): ICD-10-CM

## 2021-10-04 DIAGNOSIS — Z23 NEED FOR INFLUENZA VACCINATION: ICD-10-CM

## 2021-10-04 DIAGNOSIS — Z23 NEED FOR SHINGLES VACCINE: ICD-10-CM

## 2021-10-04 PROCEDURE — G0296 VISIT TO DETERM LDCT ELIG: HCPCS | Performed by: FAMILY MEDICINE

## 2021-10-04 PROCEDURE — 99215 OFFICE O/P EST HI 40 MIN: CPT | Performed by: FAMILY MEDICINE

## 2021-10-04 RX ORDER — ALBUTEROL SULFATE 90 UG/1
2 AEROSOL, METERED RESPIRATORY (INHALATION) 4 TIMES DAILY PRN
Qty: 1 EACH | Refills: 2 | Status: SHIPPED | OUTPATIENT
Start: 2021-10-04 | End: 2022-05-05 | Stop reason: SDUPTHER

## 2021-10-04 RX ORDER — FLUTICASONE FUROATE AND VILANTEROL TRIFENATATE 100; 25 UG/1; UG/1
POWDER RESPIRATORY (INHALATION)
Qty: 1 EACH | Refills: 3 | Status: SHIPPED | OUTPATIENT
Start: 2021-10-04 | End: 2022-05-05 | Stop reason: SDUPTHER

## 2021-10-04 NOTE — PROGRESS NOTES
FRACTURE SURGERY      TUBAL LIGATION       Social History     Socioeconomic History    Marital status:      Spouse name: Not on file    Number of children: Not on file    Years of education: Not on file    Highest education level: Not on file   Occupational History    Not on file   Tobacco Use    Smoking status: Current Some Day Smoker     Packs/day: 1.00     Years: 49.00     Pack years: 49.00     Types: Cigarettes    Smokeless tobacco: Never Used    Tobacco comment: 12/21/17 started age 11   Vaping Use    Vaping Use: Never used   Substance and Sexual Activity    Alcohol use: No     Comment: denies    Drug use: No    Sexual activity: Not Currently   Other Topics Concern    Not on file   Social History Narrative    Not on file     Social Determinants of Health     Financial Resource Strain: Low Risk     Difficulty of Paying Living Expenses: Not hard at all   Food Insecurity: No Food Insecurity    Worried About [address] in the Last Year: Never true    Mena of Food in the Last Year: Never true   Transportation Needs: No Transportation Needs    Lack of Transportation (Medical): No    Lack of Transportation (Non-Medical):  No   Physical Activity:     Days of Exercise per Week:     Minutes of Exercise per Session:    Stress:     Feeling of Stress :    Social Connections:     Frequency of Communication with Friends and Family:     Frequency of Social Gatherings with Friends and Family:     Attends Rastafari Services:     Active Member of Clubs or Organizations:     Attends Club or Organization Meetings:     Marital Status:    Intimate Partner Violence:     Fear of Current or Ex-Partner:     Emotionally Abused:     Physically Abused:     Sexually Abused:      Family History   Problem Relation Age of Onset    Heart Disease Sister     High Blood Pressure Other     Cancer Other     High Blood Pressure Mother     Cancer Brother      Allergies   Allergen Reactions    Cardiovascular:      Rate and Rhythm: Normal rate and regular rhythm. Pulses:           Dorsalis pedis pulses are 2+ on the right side and 2+ on the left side. Heart sounds: Normal heart sounds. No murmur heard. Pulmonary:      Effort: No respiratory distress. Breath sounds: Decreased breath sounds (diffusely, mild reduction, occasinal rhonchorous cough) present. No wheezing or rales. Musculoskeletal:      Right lower leg: No edema. Left lower leg: No edema. Skin:     General: Skin is warm and dry. Neurological:      Mental Status: She is alert and oriented to person, place, and time. Lab Results   Component Value Date    LABA1C 5.2 07/21/2021    LABA1C 6.2 01/21/2021    LABA1C 6.5 (H) 06/11/2020     Lab Results   Component Value Date    LABMICR 11.10 (H) 07/21/2021    CREATININE 0.99 (H) 07/21/2021     Lab Results   Component Value Date    ALT 9 07/21/2021    AST 17 07/21/2021     Lab Results   Component Value Date    CHOL 156 12/13/2018    TRIG 171 12/13/2018    HDL 54 07/21/2021    LDLCALC 53 07/21/2021        Assessment & Plan   Visit Diagnoses and Associated Orders     Chronic obstructive pulmonary disease, unspecified COPD type ([facility] [address]    -  Primary    Stable, fair control on current meds. URged cessation of tobacco.    albuterol sulfate HFA (VENTOLIN HFA) 108 (90 Base) MCG/ACT inhaler [77699]      fluticasone-vilanterol (BREO ELLIPTA) 100-25 MCG/INH AEPB inhaler [753995]           Essential hypertension        Stable and well-controlled on current meds         Type 2 diabetes mellitus without complication, without long-term current use of insulin (HCC)        Stable and well-controlled on current meds         Chronic renal failure, stage 3a ([address]        stable and well controlled. Avoid NSAIDs and other nephrotoxic agents. Recurrent major depressive disorder, in full remission ([address]        worse, poorly controlled; no SI. Defers [facility] and med change at this time. Rec read: [employer]. Personal history of tobacco use        Is trying to quit. Support offert for cessation efforts. UT VISIT TO DISCUSS LUNG CA SCREEN W LDCT [ [facility]]      CT Lung Screen (Annual) [[address] Custom]   - Future Order         Need for influenza vaccination        WIll get all boosters at once at pharmacy. Need for shingles vaccine        WIll get all boosters at once at pharmacy. zoster recombinant adjuvanted vaccine [facility]) 50 MCG/0.5ML SUSR injection [445990]           Screen for colon cancer                   Time spent on prep, appt, A&P, note:  45 minutes. Reviewed with the patient: all disease processes, current clinical status, medications, activities and diet.      Side effects, adverse effects of the medication prescribed today, as well as treatment plan/ rationale and result expectations have been discussed with the patient who expresses understanding and desires to proceed.     Close follow up to evaluate treatment results and for coordination of care. I have reviewed the patient's medical history in detail and updated the computerized patient record. More than 50% of the appointment was spent in face-to-face counseling, education and care coordination. Orders Placed This Encounter   Procedures    CT Lung Screen (Annual)     Age: Patient is 74 y.o. Smoking History: Social History    Tobacco Use      Smoking status: Current Some Day Smoker        Packs/day: 1.00        Years: 49.00        Pack years: 52        Types: Cigarettes      Smokeless tobacco: Never Used      Tobacco comment: 17 started age 11    Vaping Use      Vaping Use: Never used    Alcohol use: No      Comment: denies    Drug use: No   Pack years: 52    Date of last lung cancer screenin2019     Standing Status:   Future     Standing Expiration Date:   2023     Order Specific Question:   Is there documentation of shared decision making? Answer:    Yes Order Specific Question:   Is this a low dose CT or a routine CT? Answer:   Low Dose CT [1]     Order Specific Question:   Is this the first (baseline) CT or an annual exam?     Answer:   Baseline [1]     Order Specific Question:   Does the patient show any signs or symptoms of lung cancer? Answer:   No     Order Specific Question:   Smoking Status? Answer:   Current Some Day Smoker [2]     Order Specific Question:   Smoking packs per day? Answer:   1     Order Specific Question:   Years smoking? Answer:   52    SC VISIT TO DISCUSS LUNG CA SCREEN W LDCT     Orders Placed This Encounter   Medications    albuterol sulfate HFA (VENTOLIN HFA) 108 (90 Base) MCG/ACT inhaler     Sig: Inhale 2 puffs into the lungs 4 times daily as needed for Wheezing     Dispense:  1 each     Refill:  2    fluticasone-vilanterol (BREO ELLIPTA) 100-25 MCG/INH AEPB inhaler     Sig: TAKE 1 PUFF BY MOUTH EVERY DAY     Dispense:  1 each     Refill:  3    zoster recombinant adjuvanted vaccine (SHINGRIX) 50 MCG/0.5ML SUSR injection     Sig: Inject 0.5 mLs into the muscle See Admin Instructions 1 dose now and repeat in 2-6 months     Dispense:  0.5 mL     Refill:  0     Medications Discontinued During This Encounter   Medication Reason    Multiple Vitamins-Minerals (SENIOR MULTIVITAMIN PLUS PO) LIST CLEANUP    albuterol sulfate HFA (VENTOLIN HFA) 108 (90 Base) MCG/ACT inhaler REORDER    BREO ELLIPTA 100-25 MCG/INH AEPB inhaler REORDER     Return in about 6 months (around 4/4/2022) for COPD, HTN - OV. Controlled Substance Monitoring:    Acute and Chronic Pain Monitoring:   No flowsheet data found.         Shon Grewal MD  Low Dose CT (LDCT) Lung Screening criteria met:     Age 55-77(Medicare) or 50-80 (USPSTF)   Pack year smoking >30 (Medicare) or >20 (USPSTF)   Still smoking or less than 15 year since quit   No sign or symptoms of lung cancer   > 11 months since last LDCT     Risks and benefits of lung cancer screening with LDCT scans discussed:    Significance of positive screen - False-positive LDCT results often occur. 95% of all positive results do not lead to a diagnosis of cancer. Usually further imaging can resolve most false-positive results; however, some patients may require invasive procedures. Over diagnosis risk - 10% to 12% of screen-detected lung cancer cases are over diagnosedthat is, the cancer would not have been detected in the patient's lifetime without the screening. Need for follow up screens annually to continue lung cancer screening effectiveness     Risks associated with radiation from annual LDCT- Radiation exposure is about the same as for a mammogram, which is about 1/3 of the annual background radiation exposure from everyday life. Starting screening at age 54 is not likely to increase cancer risk from radiation exposure. Patients with comorbidities resulting in life expectancy of < 10 years, or that would preclude treatment of an abnormality identified on CT, should not be screened due to lack of benefit.     To obtain maximal benefit from this screening, smoking cessation and long-term abstinence from smoking is critical

## 2021-10-06 ENCOUNTER — TELEPHONE (OUTPATIENT)
Dept: CASE MANAGEMENT | Age: 66
End: 2021-10-06

## 2021-10-19 ENCOUNTER — HOSPITAL ENCOUNTER (OUTPATIENT)
Dept: CT IMAGING | Age: 66
Discharge: HOME | End: 2021-10-21
Payer: MEDICARE

## 2021-10-19 DIAGNOSIS — Z87.891 PERSONAL HISTORY OF TOBACCO USE: ICD-10-CM

## 2021-10-19 PROCEDURE — 71271 CT THORAX LUNG CANCER SCR C-: CPT

## 2021-11-30 DIAGNOSIS — E11.9 TYPE 2 DIABETES MELLITUS WITHOUT COMPLICATION, WITHOUT LONG-TERM CURRENT USE OF INSULIN (HCC): ICD-10-CM

## 2021-11-30 NOTE — TELEPHONE ENCOUNTER
Rx request   Requested Prescriptions     Pending Prescriptions Disp Refills    metFORMIN (GLUCOPHAGE-XR) 500 MG extended release tablet 90 tablet 3     Sig: TAKE ONE TABLET BY MOUTH A DAY     LOV 10/4/2021  Next Visit Date:  Future Appointments   Date Time Provider Josué Vargas   4/4/2022  1:30 PM Cookie Abrams MD [address] [place] [address]

## 2021-12-01 RX ORDER — METFORMIN HYDROCHLORIDE 500 MG/1
TABLET, EXTENDED RELEASE ORAL
Qty: 90 TABLET | Refills: 3 | Status: SHIPPED | OUTPATIENT
Start: 2021-12-01 | End: 2022-05-22 | Stop reason: SDUPTHER

## 2022-05-05 ENCOUNTER — OFFICE VISIT (OUTPATIENT)
Dept: FAMILY MEDICINE CLINIC | Age: 67
End: 2022-05-05
Payer: MEDICARE

## 2022-05-05 VITALS
HEART RATE: 81 BPM | BODY MASS INDEX: 26.22 KG/M2 | DIASTOLIC BLOOD PRESSURE: 78 MMHG | OXYGEN SATURATION: 95 % | TEMPERATURE: 96.7 F | HEIGHT: 63 IN | WEIGHT: 148 LBS | SYSTOLIC BLOOD PRESSURE: 122 MMHG

## 2022-05-05 DIAGNOSIS — J44.9 CHRONIC OBSTRUCTIVE PULMONARY DISEASE, UNSPECIFIED COPD TYPE (HCC): ICD-10-CM

## 2022-05-05 DIAGNOSIS — E78.2 MIXED HYPERLIPIDEMIA: ICD-10-CM

## 2022-05-05 DIAGNOSIS — F33.42 RECURRENT MAJOR DEPRESSIVE DISORDER, IN FULL REMISSION (HCC): ICD-10-CM

## 2022-05-05 DIAGNOSIS — Z00.00 MEDICARE ANNUAL WELLNESS VISIT, SUBSEQUENT: Primary | ICD-10-CM

## 2022-05-05 DIAGNOSIS — E11.9 TYPE 2 DIABETES MELLITUS WITHOUT COMPLICATION, WITHOUT LONG-TERM CURRENT USE OF INSULIN (HCC): ICD-10-CM

## 2022-05-05 DIAGNOSIS — F41.9 ANXIETY: ICD-10-CM

## 2022-05-05 DIAGNOSIS — Z23 NEED FOR TDAP VACCINATION: ICD-10-CM

## 2022-05-05 PROCEDURE — 90715 TDAP VACCINE 7 YRS/> IM: CPT | Performed by: FAMILY MEDICINE

## 2022-05-05 PROCEDURE — 99214 OFFICE O/P EST MOD 30 MIN: CPT | Performed by: FAMILY MEDICINE

## 2022-05-05 PROCEDURE — G0439 PPPS, SUBSEQ VISIT: HCPCS | Performed by: FAMILY MEDICINE

## 2022-05-05 PROCEDURE — 90471 IMMUNIZATION ADMIN: CPT | Performed by: FAMILY MEDICINE

## 2022-05-05 RX ORDER — SIMVASTATIN 40 MG
TABLET ORAL
Qty: 90 TABLET | Refills: 4 | Status: SHIPPED | OUTPATIENT
Start: 2022-05-05

## 2022-05-05 RX ORDER — ALBUTEROL SULFATE 90 UG/1
2 AEROSOL, METERED RESPIRATORY (INHALATION) 4 TIMES DAILY PRN
Qty: 1 EACH | Refills: 2 | Status: SHIPPED | OUTPATIENT
Start: 2022-05-05 | End: 2022-09-06

## 2022-05-05 RX ORDER — ESCITALOPRAM OXALATE 10 MG/1
TABLET ORAL
Qty: 130 TABLET | Refills: 1 | Status: CANCELLED | OUTPATIENT
Start: 2022-05-05

## 2022-05-05 RX ORDER — SIMVASTATIN 40 MG
TABLET ORAL
Qty: 90 TABLET | Refills: 3 | Status: CANCELLED | OUTPATIENT
Start: 2022-05-05

## 2022-05-05 RX ORDER — FLUTICASONE FUROATE AND VILANTEROL TRIFENATATE 100; 25 UG/1; UG/1
POWDER RESPIRATORY (INHALATION)
Qty: 1 EACH | Refills: 3 | Status: CANCELLED | OUTPATIENT
Start: 2022-05-05

## 2022-05-05 RX ORDER — ESCITALOPRAM OXALATE 10 MG/1
TABLET ORAL
Qty: 130 TABLET | Refills: 1 | Status: SHIPPED | OUTPATIENT
Start: 2022-05-05

## 2022-05-05 RX ORDER — FLUTICASONE FUROATE AND VILANTEROL TRIFENATATE 100; 25 UG/1; UG/1
POWDER RESPIRATORY (INHALATION)
Qty: 1 EACH | Refills: 3 | Status: SHIPPED | OUTPATIENT
Start: 2022-05-05 | End: 2022-10-17

## 2022-05-05 ASSESSMENT — PATIENT HEALTH QUESTIONNAIRE - PHQ9
1. LITTLE INTEREST OR PLEASURE IN DOING THINGS: 0
SUM OF ALL RESPONSES TO PHQ QUESTIONS 1-9: 0
3. TROUBLE FALLING OR STAYING ASLEEP: 0
SUM OF ALL RESPONSES TO PHQ QUESTIONS 1-9: 0
8. MOVING OR SPEAKING SO SLOWLY THAT OTHER PEOPLE COULD HAVE NOTICED. OR THE OPPOSITE, BEING SO FIGETY OR RESTLESS THAT YOU HAVE BEEN MOVING AROUND A LOT MORE THAN USUAL: 0
SUM OF ALL RESPONSES TO PHQ QUESTIONS 1-9: 0
4. FEELING TIRED OR HAVING LITTLE ENERGY: 0
SUM OF ALL RESPONSES TO PHQ QUESTIONS 1-9: 0
2. FEELING DOWN, DEPRESSED OR HOPELESS: 0
7. TROUBLE CONCENTRATING ON THINGS, SUCH AS READING THE NEWSPAPER OR WATCHING TELEVISION: 0
7. TROUBLE CONCENTRATING ON THINGS, SUCH AS READING THE NEWSPAPER OR WATCHING TELEVISION: 0
SUM OF ALL RESPONSES TO PHQ QUESTIONS 1-9: 0
9. THOUGHTS THAT YOU WOULD BE BETTER OFF DEAD, OR OF HURTING YOURSELF: 0
SUM OF ALL RESPONSES TO PHQ QUESTIONS 1-9: 0
8. MOVING OR SPEAKING SO SLOWLY THAT OTHER PEOPLE COULD HAVE NOTICED. OR THE OPPOSITE, BEING SO FIGETY OR RESTLESS THAT YOU HAVE BEEN MOVING AROUND A LOT MORE THAN USUAL: 0
5. POOR APPETITE OR OVEREATING: 0
SUM OF ALL RESPONSES TO PHQ9 QUESTIONS 1 & 2: 0
9. THOUGHTS THAT YOU WOULD BE BETTER OFF DEAD, OR OF HURTING YOURSELF: 0
SUM OF ALL RESPONSES TO PHQ9 QUESTIONS 1 & 2: 0
10. IF YOU CHECKED OFF ANY PROBLEMS, HOW DIFFICULT HAVE THESE PROBLEMS MADE IT FOR YOU TO DO YOUR WORK, TAKE CARE OF THINGS AT HOME, OR GET ALONG WITH OTHER PEOPLE: 0
2. FEELING DOWN, DEPRESSED OR HOPELESS: 0
6. FEELING BAD ABOUT YOURSELF - OR THAT YOU ARE A FAILURE OR HAVE LET YOURSELF OR YOUR FAMILY DOWN: 0
3. TROUBLE FALLING OR STAYING ASLEEP: 0
4. FEELING TIRED OR HAVING LITTLE ENERGY: 0
1. LITTLE INTEREST OR PLEASURE IN DOING THINGS: 0
SUM OF ALL RESPONSES TO PHQ QUESTIONS 1-9: 0
6. FEELING BAD ABOUT YOURSELF - OR THAT YOU ARE A FAILURE OR HAVE LET YOURSELF OR YOUR FAMILY DOWN: 0
10. IF YOU CHECKED OFF ANY PROBLEMS, HOW DIFFICULT HAVE THESE PROBLEMS MADE IT FOR YOU TO DO YOUR WORK, TAKE CARE OF THINGS AT HOME, OR GET ALONG WITH OTHER PEOPLE: 0
5. POOR APPETITE OR OVEREATING: 0
SUM OF ALL RESPONSES TO PHQ QUESTIONS 1-9: 0

## 2022-05-05 ASSESSMENT — LIFESTYLE VARIABLES: HOW OFTEN DO YOU HAVE A DRINK CONTAINING ALCOHOL: NEVER

## 2022-05-05 NOTE — PROGRESS NOTES
Medicare Annual Wellness Visit    Zebedee Lennox is here for No chief complaint on file. Assessment & Plan   Medicare annual wellness visit, subsequent  Type 2 diabetes mellitus without complication, without long-term current use of insulin ([facility] [address]  Comments:  Stable and very well controlled on current meds  Orders:  -     Hemoglobin A1C; Future  Chronic obstructive pulmonary disease, unspecified COPD type ([facility] [address]  Comments:  Stable, fair control on current meds. URged cessation of tobacco.  Orders:  -     fluticasone-vilanterol (BREO ELLIPTA) 100-25 MCG/INH AEPB inhaler; TAKE 1 PUFF BY MOUTH EVERY DAY, Disp-1 each, R-3Normal  -     albuterol sulfate HFA (VENTOLIN HFA) 108 (90 Base) MCG/ACT inhaler; Inhale 2 puffs into the lungs 4 times daily as needed for Wheezing, Disp-1 each, R-2Normal  -     Spirometry With Bronchodilator  Mixed hyperlipidemia  Comments: Follow labs. Orders:  -     simvastatin (ZOCOR) 40 MG tablet; TAKE 1 TABLET BY MOUTH EVERY DAY IN THE EVENING, Disp-90 tablet, R-4Normal  Anxiety  Comments:  Stable and relatively well-controlled on Lexapro. Orders:  -     escitalopram (LEXAPRO) 10 MG tablet; TAKE 1 AND 1/2 TABLETS DAILY BY MOUTH, Disp-130 tablet, R-1Normal  Recurrent major depressive disorder, in full remission ([facility] [address]  Comments:  Stable and relatively well-controlled on Lexapro  Orders:  -     escitalopram (LEXAPRO) 10 MG tablet; TAKE 1 AND 1/2 TABLETS DAILY BY MOUTH, Disp-130 tablet, R-1Normal  Need for Tdap vaccination  -     Tdap (age 6y and older) IM ([facility] [address])      Recommendations for Preventive Services Due: see orders and patient instructions/AVS.  Recommended screening schedule for the next 5-10 years is provided to the patient in written form: see Patient Instructions/AVS.     Return for Medicare Annual Wellness Visit in 1 year and f/u 6 months.      Subjective   The following acute and/or chronic problems were also addressed today:    Patient's complete Health Risk Assessment and screening values have been reviewed and are found in Flowsheets. The following problems were reviewed today and where indicated follow up appointments were made and/or referrals ordered.     Positive Risk Factor Screenings with Interventions:         Tobacco Use:     Tobacco Use: High Risk    Smoking Tobacco Use: Current Some Day Smoker    Smokeless Tobacco Use: Never Used     E-Cigarettes/Vaping Use     Questions Responses    E-Cigarette/Vaping Use Never User    Start Date     Passive Exposure     Quit Date     Counseling Given     Comments         Substance Use - Tobacco Interventions:  tobacco cessation tips and resources provided, patient agrees to think about his/her triggers for tobacco use, why he/she should quit, and learn more about the harmful effects of tobacco, patient agrees to eliminate tobacco products from his/her home and work environments, patient agrees to avoid triggers and negative influences           General Health and ACP:  General  In general, how would you say your health is?: Fair  In the past 7 days, have you experienced any of the following: New or Increased Pain, New or Increased Fatigue, Loneliness, Social Isolation, Stress or Anger?: No  Do you get the social and emotional support that you need?: Yes  Do you have a Living Will?: (!) No    Advance Directives     Power of  Living Will ACP-Advance Directive ACP-Power of     Not on File Not on File Not on File Not on File      General Health Risk Interventions:  · No Living Will: Patient declines ACP discussion/assistance    Health Habits/Nutrition:     Physical Activity: Inactive    Days of Exercise per Week: 0 days    Minutes of Exercise per Session: 0 min     Have you lost any weight without trying in the past 3 months?: No     Have you seen the dentist within the past year?: N/A - wear dentures    Health Habits/Nutrition Interventions:  · Dental exam overdue:  dentures    Hearing/Vision:  Do you or your family notice any trouble with your hearing that hasn't been managed with hearing aids?: (!) Yes  Do you have difficulty driving, watching TV, or doing any of your daily activities because of your eyesight?: No  Have you had an eye exam within the past year?: (!) No  No exam data present    Hearing/Vision Interventions:  · Hearing concerns:  patient declines any further evaluation/treatment for hearing issues  · Vision concerns:  patient encouraged to make appointment with his/her eye specialist    Safety:  Do you have working smoke detectors?: (!) No  Do you have any tripping hazards - loose or unsecured carpets or rugs?: No  Do you have any tripping hazards - clutter in doorways, halls, or stairs?: No  Do you have either shower bars, grab bars, non-slip mats or non-slip surfaces in your shower or bathtub?: Yes  Do all of your stairways have a railing or banister?: Yes  Do you always fasten your seatbelt when you are in a car?: Yes    Safety Interventions:  · Home safety tips provided           Objective   There were no vitals filed for this visit. There is no height or weight on file to calculate BMI. Patient appears to be alert and oriented to person, place, time, situation and is in no acute distress. Mood appears stable and speech and thought are grossly normal.         Allergies   Allergen Reactions    Nickel Itching and Rash     Prior to Visit Medications    Medication Sig Taking?  Authorizing Provider   fluticasone-vilanterol (BREO ELLIPTA) 100-25 MCG/INH AEPB inhaler TAKE 1 PUFF BY MOUTH EVERY DAY Yes Elba Owen MD   albuterol sulfate HFA (VENTOLIN HFA) 108 (90 Base) MCG/ACT inhaler Inhale 2 puffs into the lungs 4 times daily as needed for Wheezing Yes Elba Owen MD   simvastatin (ZOCOR) 40 MG tablet TAKE 1 TABLET BY MOUTH EVERY DAY IN THE EVENING Yes Elba Owen MD   escitalopram (LEXAPRO) 10 MG tablet TAKE 1 AND 1/2 TABLETS DAILY BY MOUTH Yes Joseph Shearer Doyle Kayser, MD   metFORMIN (GLUCOPHAGE-XR) 500 MG extended release tablet TAKE ONE TABLET BY MOUTH A DAY  Dayami Arcos MD   Multiple Vitamins-Minerals (HAIR SKIN AND NAILS FORMULA) TABS Take 1 tablet by mouth daily  Historical Provider, MD   zoledronic acid (RECLAST) 5 MG/100ML SOLN Infuse 100 mLs intravenously once for 1 dose 733.01  Alvaro Krueger MD   vitamin D (CHOLECALCIFEROL) 1000 UNIT TABS tablet Take 1,000 Units by mouth daily  Historical Provider, MD   Doxylamine Succinate, Sleep, (UNISOM PO) Take 1 tablet by mouth daily as needed  Historical Provider, MD   Naproxen Sodium (ALEVE) 220 MG CAPS Take  by mouth daily as needed. Historical Provider, MD Horn (Including outside providers/suppliers regularly involved in providing care):   Patient Care Team:  Dayami Arcos MD as PCP - General (Family Medicine)  Dayami Arcos MD as PCP - [facility] Empaneled Provider     Reviewed and updated this visit:  Tobacco  Allergies  Meds  Problems  Med Hx  Surg Hx  Soc Hx  Fam Hx                 Tobacco Cessation Counseling: Patient advised about behavior change, including information about personal health harms, usage of appropriate cessation measures and benefits of cessation.   Time spent (minutes): 3

## 2022-05-05 NOTE — PATIENT INSTRUCTIONS
Stopping Smoking: Care Instructions  Your Care Instructions     Cigarette smokers crave the nicotine in cigarettes. Giving it up is much harder than simply changing a habit. Your body has to stop craving the nicotine. It is hard to quit, but you can do it. There are many tools that people use to quitsmoking. You may find that combining tools works best for you. There are several steps to quitting. First you get ready to quit. Then you get support to help you. After that, you learn new skills and behaviors to become anonsmoker. For many people, a necessary step is getting and using medicine. Your doctor will help you set up the plan that best meets your needs. You may want to attend a smoking cessation program to help you quit smoking. When you choose a program, look for one that has proven success. Ask your doctor for ideas. You will greatly increase your chances of success if you take medicineas well as get counseling or join a cessation program.  Some of the changes you feel when you first quit tobacco are uncomfortable. Your body will miss the nicotine at first, and you may feel short-tempered and grumpy. You may have trouble sleeping or concentrating. Medicine can help you deal with these symptoms. You may struggle with changing your smoking habits and rituals. The last step is the tricky one: Be prepared for the smoking urge to continue for a time. This is a lot to deal with, but keep at it. You willfeel better. Follow-up care is a key part of your treatment and safety. Be sure to make and go to all appointments, and call your doctor if you are having problems. It's also a good idea to know your test results and keep alist of the medicines you take. How can you care for yourself at home?  Ask your family, friends, and coworkers for support. You have a better chance of quitting if you have help and support.    Join a support group, such as Nicotine Anonymous, for people who are trying to quit smoking.  Consider signing up for a smoking cessation program, such as the American Lung Association's Freedom from Smoking program.   Get text messaging support. Go to the website at www.smokefree. gov to sign up for the [facility] program.   Set a quit date. Pick your date carefully so that it is not right in the middle of a big deadline or stressful time. Once you quit, do not even take a puff. Get rid of all ashtrays and lighters after your last cigarette. Clean your house and your clothes so that they do not smell of smoke.  Learn how to be a nonsmoker. Think about ways you can avoid those things that make you reach for a cigarette. ? Avoid situations that put you at greatest risk for smoking. For some people, it is hard to have a drink with friends without smoking. For others, they might skip a coffee break with coworkers who smoke. ? Change your daily routine. Take a different route to work or eat a meal in a different place.  Cut down on stress. Calm yourself or release tension by doing an activity you enjoy, such as reading a book, taking a hot bath, or gardening.  Talk to your doctor or pharmacist about nicotine replacement therapy, which replaces the nicotine in your body. You still get nicotine but you do not use tobacco. Nicotine replacement products help you slowly reduce the amount of nicotine you need. These products come in several forms, many of them available over-the-counter:  ? Nicotine patches  ? Nicotine gum and lozenges  ? Nicotine inhaler   Ask your doctor about bupropion (Wellbutrin) or varenicline (Chantix), which are prescription medicines. They do not contain nicotine. They help you by reducing withdrawal symptoms, such as stress and anxiety.  Some people find hypnosis, acupuncture, and massage helpful for ending the smoking habit.  Eat a healthy diet and get regular exercise. Having healthy habits will help your body move past its craving for nicotine.    Be prepared to keep trying. Most people are not successful the first few times they try to quit. Do not get mad at yourself if you smoke again. Make a list of things you learned and think about when you want to try again, such as next week, next month, or next year. Where can you learn more? Go to https://chpeafshanewstephanie.healthTRSB Groupe. org and sign in to your Ourpalm account. Enter Q130 in the PolicyStat box to learn more about \"Stopping Smoking: Care Instructions. \"     If you do not have an account, please click on the \"Sign Up Now\" link. Current as of: October 28, 2021               Content Version: 13.2  © 2006-2022 [employer]. Care instructions adapted under license by [facility] ([facility]). If you have questions about a medical condition or this instruction, always ask your healthcare professional. [place] [address] any warranty or liability for your use of this information. Learning About Benefits From Quitting Smoking  How does quitting smoking make you healthier? If you're thinking about quitting smoking, you may have a few reasons to besmoke-free. Your health may be one of them.  When you quit smoking, you lower your risks for cancer, lung disease, heart attack, stroke, blood vessel disease, and blindness from macular degeneration.  When you're smoke-free, you get sick less often, and you heal faster. You are less likely to get colds, flu, bronchitis, and pneumonia.  As a nonsmoker, you may find that your mood is better and you are less stressed. When and how will you feel healthier? Quitting has real health benefits that start from day 1 of being smoke-free. And the longer you stay smoke-free, the healthier you get and the better youfeel. The first hours   After just 20 minutes, your blood pressure and heart rate go down. That means there's less stress on your heart and blood vessels.    Within 12 hours, the level of carbon monoxide in your blood drops back to normal. That makes room for more oxygen. With more oxygen in your body, you may notice that you have more energy than when you smoked. After 2 weeks   Your lungs start to work better.  Your risk of heart attack starts to drop. After 1 month   When your lungs are clear, you cough less and breathe deeper, so it's easier to be active.  Your sense of taste and smell return. That means you can enjoy food more than you have since you started smoking. Over the years   Over the years, your risks of heart disease, heart attack, and stroke are lower.  After 10 years, your risk of dying from lung cancer is cut by about half. And your risk for many other types of cancer is lower too. How would quitting help others in your life? When you quit smoking, you improve the health of everyone who now breathes inyour smoke.  Their heart, lung, and cancer risks drop, much like yours.  They are sick less. For babies and small children, living smoke-free means they're less likely to have ear infections, pneumonia, and bronchitis.  If you're a woman who is or will be pregnant someday, quitting smoking means a healthier .  Children who are close to you are less likely to become adult smokers. Where can you learn more? Go to https://AllSchoolStuff.com.Kings[employer]. org and sign in to your SofTech account. Enter [phone] in the [facility] box to learn more about \"Learning About Benefits From Quitting Smoking. \"     If you do not have an account, please click on the \"Sign Up Now\" link. Current as of: 2021               Content Version: 13.2   Healthwise, Incorporated. Care instructions adapted under license by [facility] ([facility]). If you have questions about a medical condition or this instruction, always ask your healthcare professional. [place] [address] any warranty or liability for your use of this information.            Deciding About Using Medicines To Quit Smoking  How can you decide about using medicines to quit smoking? What are the medicines you can use? Your doctor may prescribe varenicline (Chantix) or bupropion (Zyban). These medicines can help you cope with cravings for tobacco. They are pills thatdon't contain nicotine. You also can use nicotine replacement products. These do contain nicotine. There are many types.  Gum and lozenges slowly release nicotine into your mouth.  Patches stick to your skin. They slowly release nicotine into your bloodstream.   An inhaler has a gallegos that contains nicotine. You breathe in a puff of nicotine vapor through your mouth and throat.  Nasal spray releases a mist that contains nicotine. What are key points about this decision?  Using medicines can double your chances of quitting smoking. They can ease cravings and withdrawal symptoms.  Getting counseling along with using medicine can raise your chances of quitting even more.  If you smoke fewer than 5 cigarettes a day, you may not need medicines to help you quit smoking.  These medicines have less nicotine than cigarettes. And by itself, nicotine is not nearly as harmful as smoking. The tars, carbon monoxide, and other toxic chemicals in tobacco cause the harmful effects.  The side effects of nicotine replacement products depend on the type of product. For example, a patch can make your skin red and itchy. Medicines in pill form can make you sick to your stomach. They can also cause dry mouth and trouble sleeping. For most people, the side effects are not bad enough to make them stop using the products. Why might you choose to use medicines to quit smoking?  You have tried on your own to stop smoking, but you were not able to stop.  You smoke more than 5 cigarettes a day.  You want to increase your chances of quitting smoking.  You want to reduce your cravings and withdrawal symptoms.    You feel the benefits of medicine outweigh the side effects. Why might you choose not to use medicine?  You want to try quitting on your own by stopping all at once (\"cold turkey\").  You want to cut back slowly on the number of cigarettes you smoke.  You smoke fewer than 5 cigarettes a day.  You do not like using medicine.  You feel the side effects of medicines outweigh the benefits.  You are worried about the cost of medicines. Your decision  Thinking about the facts and your feelings can help you make a decision that is right for you. Be sure you understand the benefits and risks of your options,and think about what else you need to do before you make the decision. Where can you learn more? Go to https://AptelapeTransform Software and ServicesewNeomed Institute.Siamosoci. org and sign in to your Dinner Lab account. Enter H880 in the [employer] Sciences box to learn more about \"Deciding About Using Medicines To Quit Smoking. \"     If you do not have an account, please click on the \"Sign Up Now\" link. Current as of: October 28, 2021               Content Version: 13.2  © 2006-2022 Healthwise, [employer]. Care instructions adapted under license by [facility] ([facility]). If you have questions about a medical condition or this instruction, always ask your healthcare professional. [place] [address] any warranty or liability for your use of this information. Personalized Preventive Plan for Meg Martinez - 5/5/2022  Medicare offers a range of preventive health benefits. Some of the tests and screenings are paid in full while other may be subject to a deductible, co-insurance, and/or copay. Some of these benefits include a comprehensive review of your medical history including lifestyle, illnesses that may run in your family, and various assessments and screenings as appropriate. After reviewing your medical record and screening and assessments performed today your provider may have ordered immunizations, labs, imaging, and/or referrals for you.   A list of these orders (if applicable) as well as your Preventive Care list are included within your After Visit Summary for your review. Other Preventive Recommendations:    · A preventive eye exam performed by an eye specialist is recommended every 1-2 years to screen for glaucoma; cataracts, macular degeneration, and other eye disorders. · A preventive dental visit is recommended every 6 months. · Try to get at least 150 minutes of exercise per week or 10,000 steps per day on a pedometer . · Order or download the FREE \"Exercise & Physical Activity: Your Everyday Guide\" from [employer]. Call 5-461.205.7098 or search [employer] on A[employer] online. · You need 0572-9117 mg of calcium and 4174-6286 IU of vitamin D per day. It is possible to meet your calcium requirement with diet alone, but a vitamin D supplement is usually necessary to meet this goal.  · When exposed to the sun, use a sunscreen that protects against both UVA and UVB radiation with an SPF of 30 or greater. Reapply every 2 to 3 hours or after sweating, drying off with a towel, or swimming. · Always wear a seat belt when traveling in a car. Always wear a helmet when riding a bicycle or motorcycle.

## 2022-05-22 RX ORDER — METFORMIN HYDROCHLORIDE 500 MG/1
TABLET, EXTENDED RELEASE ORAL
Qty: 90 TABLET | Refills: 3 | Status: SHIPPED | OUTPATIENT
Start: 2022-05-22

## 2022-05-22 NOTE — PROGRESS NOTES
Subjective  [facility], 74 y.o. female presents today with:  Chief Complaint   Patient presents with    Follow-up     no other concerns     Diabetes           HPI    Patient is here for hyperlipidemia. Is compliant with medications and has no apparent side effects from them. Patient is here for DM f/u. Sugars have been moderately well-controlled and patient has not been experiencing hyper- or hypoglycemic symptoms. Compliance with meds is good and there are no side effects. Patient exercises occasionally and tries to eat healthy. Is up to date on foot and eye exams and immunizations. COPD. On LABA/ICS and prn albuterol - less than 3 times a week. No significant cough, mild baseline shortness of breath, occasional wheezing, mild, occasional chest tightness. Patient is being treated for depression and has been compliant with meds which do not cause side effects. Mood is improved. No suicidal ideation.  Sleep is normal.      No other questions and or concerns for today's visit            Past Medical History:   Diagnosis Date    Anxiety     Asthma     Chronic back pain     Chronic renal failure, stage 3a ([facility] [address]     COPD (chronic obstructive pulmonary disease) (HCC)     Depression     History of tinnitus     Hyperlipidemia     Hypertension     Obesity     Osteoporosis     2019 T -3.2    Peripheral vascular disease (HCC)     Proteinuria     Type II or unspecified type diabetes mellitus without mention of complication, not stated as uncontrolled      Past Surgical History:   Procedure Laterality Date    BREAST SURGERY      L BREAST BIOPSY    FRACTURE SURGERY      TUBAL LIGATION       Social History     Socioeconomic History    Marital status:      Spouse name: Not on file    Number of children: Not on file    Years of education: Not on file    Highest education level: Not on file   Occupational History    Not on file   Tobacco Use    Smoking status: Current Some Day Smoker Packs/day: 1.00     Years: 49.00     Pack years: 49.00     Types: Cigarettes    Smokeless tobacco: Never Used    Tobacco comment: 12/21/17 started age 11   Vaping Use    Vaping Use: Never used   Substance and Sexual Activity    Alcohol use: No     Comment: denies    Drug use: No    Sexual activity: Not Currently   Other Topics Concern    Not on file   Social History Narrative    Not on file     Social Determinants of Health     Financial Resource Strain: Low Risk     Difficulty of Paying Living Expenses: Not hard at all   Food Insecurity: No Food Insecurity    Worried About [address] in the Last Year: Never true    [address] [facility] [address] in the Last Year: Never true   Transportation Needs: No Transportation Needs    Lack of Transportation (Medical): No    Lack of Transportation (Non-Medical):  No   Physical Activity: Inactive    Days of Exercise per Week: 0 days    Minutes of Exercise per Session: 0 min   Stress:     Feeling of Stress : Not on file   Social Connections:     Frequency of Communication with Friends and Family: Not on file    Frequency of Social Gatherings with Friends and Family: Not on file    Attends Mandaen Services: Not on file    Active Member of Clubs or Organizations: Not on file    Attends Club or Organization Meetings: Not on file    Marital Status: Not on file   Intimate Partner Violence:     Fear of Current or Ex-Partner: Not on file    Emotionally Abused: Not on file    Physically Abused: Not on file    Sexually Abused: Not on file   Housing Stability:     Unable to Pay for Housing in the Last Year: Not on file    Number of [place] in the Last Year: Not on file    Unstable Housing in the Last Year: Not on file     Family History   Problem Relation Age of Onset    Heart Disease Sister     High Blood Pressure Other     Cancer Other     High Blood Pressure Mother     Cancer Brother      Allergies   Allergen Reactions    Nickel Itching and Rash Current Outpatient Medications   Medication Sig Dispense Refill    metFORMIN (GLUCOPHAGE-XR) 500 MG extended release tablet TAKE ONE TABLET BY MOUTH A DAY 90 tablet 3    Multiple Vitamins-Minerals (HAIR SKIN AND NAILS FORMULA) TABS Take 1 tablet by mouth daily      vitamin D (CHOLECALCIFEROL) 1000 UNIT TABS tablet Take 1,000 Units by mouth daily      Doxylamine Succinate, Sleep, (UNISOM PO) Take 1 tablet by mouth daily as needed      Naproxen Sodium (ALEVE) 220 MG CAPS Take  by mouth daily as needed.  fluticasone-vilanterol (BREO ELLIPTA) 100-25 MCG/INH AEPB inhaler TAKE 1 PUFF BY MOUTH EVERY DAY 1 each 3    albuterol sulfate HFA (VENTOLIN HFA) 108 (90 Base) MCG/ACT inhaler Inhale 2 puffs into the lungs 4 times daily as needed for Wheezing 1 each 2    simvastatin (ZOCOR) 40 MG tablet TAKE 1 TABLET BY MOUTH EVERY DAY IN THE EVENING 90 tablet 4    escitalopram (LEXAPRO) 10 MG tablet TAKE 1 AND 1/2 TABLETS DAILY BY MOUTH 130 tablet 1    zoledronic acid (RECLAST) 5 MG/100ML SOLN Infuse 100 mLs intravenously once for 1 dose 733.01 100 mL 0     No current facility-administered medications for this visit. PMH, Surgical Hx, Family Hx, and Social Hxreviewed and updated. Health Maintenance reviewed. Objective    Vitals:    05/05/22 1332   BP: 122/78   Pulse: 81   Temp: 96.7 °F (35.9 °C)   TempSrc: Temporal   SpO2: 95%   Weight: 148 lb (67.1 kg)   Height: 5' 3\" (1.6 m)        Physical Exam  Constitutional:       General: She is not in acute distress. Appearance: She is well-developed. HENT:      Head: Normocephalic and atraumatic. Eyes:      General: No scleral icterus. Conjunctiva/sclera: Conjunctivae normal.   Cardiovascular:      Rate and Rhythm: Normal rate and regular rhythm. Pulses:           Dorsalis pedis pulses are 2+ on the right side and 2+ on the left side. Heart sounds: Normal heart sounds. No murmur heard. Pulmonary:      Effort: No respiratory distress. Breath sounds: Decreased breath sounds (diffusely, mild reduction, occasinal rhonchorous cough) present. No wheezing or rales. Musculoskeletal:      Right lower leg: No edema. Left lower leg: No edema. Skin:     General: Skin is warm and dry. Neurological:      Mental Status: She is alert and oriented to person, place, and time. Lab Results   Component Value Date    LABA1C 5.2 07/21/2021    LABA1C 6.2 01/21/2021    LABA1C 6.5 (H) 06/11/2020     Lab Results   Component Value Date    LABMICR 11.10 (H) 07/21/2021    CREATININE 0.99 (H) 07/21/2021     Lab Results   Component Value Date    ALT 9 07/21/2021    AST 17 07/21/2021     Lab Results   Component Value Date    CHOL 156 12/13/2018    TRIG 171 12/13/2018    HDL 54 07/21/2021    LDLCALC 53 07/21/2021        Assessment & Plan   Visit Diagnoses and Associated Orders     Chronic obstructive pulmonary disease, unspecified COPD type ([address]        Stable, fair control on current meds. URged cessation of tobacco.         Type 2 diabetes mellitus without complication, without long-term current use of insulin (HCC)        Stable and very well controlled on current medications. metFORMIN (GLUCOPHAGE-XR) 500 MG extended release tablet [[id number]]           Recurrent major depressive disorder, in full remission ([address]        Stable and well-controlled         Mixed hyperlipidemia        Follow labs. Anxiety        Continue Lexapro                     Reviewed with the patient: all disease processes, current clinical status, medications, activities and diet.      Side effects, adverse effects of the medication prescribed today, as well as treatment plan/ rationale and result expectations have been discussed with the patient who expresses understanding and desires to proceed.     Close follow up to evaluate treatment results and for coordination of care.   I have reviewed the patient's medical history in detail and updated the computerized patient record. More than 50% of the appointment was spent in face-to-face counseling, education and care coordination. Please note this report has been partially produced using speech recognition software and may contain mistakes related to that system including errors in grammar, punctuation and spelling as well as words and phrases that may seem inappropriate. If there are questions or concerns, please feel free to contact me to clarify. No orders of the defined types were placed in this encounter. Orders Placed This Encounter   Medications    metFORMIN (GLUCOPHAGE-XR) 500 MG extended release tablet     Sig: TAKE ONE TABLET BY MOUTH A DAY     Dispense:  90 tablet     Refill:  3     Medications Discontinued During This Encounter   Medication Reason    metFORMIN (GLUCOPHAGE-XR) 500 MG extended release tablet REORDER     Return in about 6 months (around 11/5/2022). Controlled Substance Monitoring:    Acute and Chronic Pain Monitoring:   No flowsheet data found.         Leela Velez MD

## 2022-06-06 DIAGNOSIS — E11.9 TYPE 2 DIABETES MELLITUS WITHOUT COMPLICATION, WITHOUT LONG-TERM CURRENT USE OF INSULIN (HCC): ICD-10-CM

## 2022-06-06 LAB — HBA1C MFR BLD: 7.1 % (ref 4.8–5.9)

## 2022-07-22 DIAGNOSIS — J44.9 CHRONIC OBSTRUCTIVE PULMONARY DISEASE, UNSPECIFIED COPD TYPE (HCC): ICD-10-CM

## 2022-07-25 RX ORDER — ALBUTEROL SULFATE 90 UG/1
2 AEROSOL, METERED RESPIRATORY (INHALATION) 4 TIMES DAILY PRN
Qty: 6.7 EACH | Refills: 2 | OUTPATIENT
Start: 2022-07-25

## 2022-07-25 NOTE — TELEPHONE ENCOUNTER
11/7/2022 Indio Poe MD [facility] Primary Care f/u 6 months       Past Visits    Date Provider Specialty Visit Type Primary Dx   07/07/2022 Indio Poe MD Family Medicine Office Visit Chronic obstructive pulmonary disease, unspecified COPD type [facility])   05/05/2022 Indio Poe MD Family Medicine Office Visit Medicare annual wellness visit, subsequent   05/05/2022 Indio Poe MD Family Medicine Office Visit Chronic obstructive pulmonary disease, unspecified COPD type ([facility] [address]

## 2022-08-01 ENCOUNTER — TELEPHONE (OUTPATIENT)
Dept: FAMILY MEDICINE CLINIC | Age: 67
End: 2022-08-01

## 2022-08-01 NOTE — TELEPHONE ENCOUNTER
Patient called into the office wanting to schedule with Dr. Marilin Best. Sx: double vision since Saturday and \"wooziness\"; per protocol I advised her to go to the ER. Patient verbalized understanding.

## 2022-08-18 ENCOUNTER — TELEPHONE (OUTPATIENT)
Dept: GASTROENTEROLOGY | Age: 67
End: 2022-08-18

## 2022-08-18 ENCOUNTER — TELEPHONE (OUTPATIENT)
Dept: FAMILY MEDICINE CLINIC | Age: 67
End: 2022-08-18

## 2022-08-18 DIAGNOSIS — Z12.31 ENCOUNTER FOR SCREENING MAMMOGRAM FOR MALIGNANT NEOPLASM OF BREAST: Primary | ICD-10-CM

## 2022-09-05 DIAGNOSIS — J44.9 CHRONIC OBSTRUCTIVE PULMONARY DISEASE, UNSPECIFIED COPD TYPE (HCC): ICD-10-CM

## 2022-09-06 RX ORDER — ALBUTEROL SULFATE 90 UG/1
2 AEROSOL, METERED RESPIRATORY (INHALATION) 4 TIMES DAILY PRN
Qty: 6.7 EACH | Refills: 2 | Status: SHIPPED | OUTPATIENT
Start: 2022-09-06

## 2022-10-15 DIAGNOSIS — J44.9 CHRONIC OBSTRUCTIVE PULMONARY DISEASE, UNSPECIFIED COPD TYPE (HCC): ICD-10-CM

## 2022-10-17 NOTE — TELEPHONE ENCOUNTER
Rx request   Requested Prescriptions     Pending Prescriptions Disp Refills    fluticasone-vilanterol (BREO ELLIPTA) 100-25 MCG/INH AEPB inhaler [Pharmacy Med Name: Jacob Riser 100-25 MCG INH] 60 each 3     Sig: INHALE 1 PUFF BY MOUTH EVERY DAY     LOV 7/7/2022  Last refill 5/5/22  Next Visit Date:  No future appointments.

## 2022-10-18 ENCOUNTER — TELEPHONE (OUTPATIENT)
Dept: FAMILY MEDICINE CLINIC | Age: 67
End: 2022-10-18

## 2022-10-18 DIAGNOSIS — J44.9 CHRONIC OBSTRUCTIVE PULMONARY DISEASE, UNSPECIFIED COPD TYPE (HCC): Primary | ICD-10-CM

## 2022-10-18 NOTE — TELEPHONE ENCOUNTER
Pharmacy states that fluticasone-vilanterol is not covered by insurance. Alternatives: Wixela 250-50, Symbicort 160-4.5, Breo Ellipta 100-25 mcg, Advair 250-50, Fluticasone- salmeterol 250-50. Please send to [facility] pharmacy on [address]

## 2022-10-19 RX ORDER — FLUTICASONE FUROATE AND VILANTEROL TRIFENATATE 100; 25 UG/1; UG/1
1 POWDER RESPIRATORY (INHALATION) DAILY
Qty: 60 EACH | Refills: 2 | Status: SHIPPED | OUTPATIENT
Start: 2022-10-19

## 2022-11-10 ENCOUNTER — OFFICE VISIT (OUTPATIENT)
Dept: FAMILY MEDICINE CLINIC | Age: 67
End: 2022-11-10
Payer: MEDICARE

## 2022-11-10 VITALS
HEART RATE: 110 BPM | HEIGHT: 63 IN | BODY MASS INDEX: 26.93 KG/M2 | TEMPERATURE: 97.2 F | SYSTOLIC BLOOD PRESSURE: 118 MMHG | DIASTOLIC BLOOD PRESSURE: 76 MMHG | OXYGEN SATURATION: 96 % | WEIGHT: 152 LBS

## 2022-11-10 DIAGNOSIS — Z87.891 PERSONAL HISTORY OF TOBACCO USE: ICD-10-CM

## 2022-11-10 DIAGNOSIS — F33.42 RECURRENT MAJOR DEPRESSIVE DISORDER, IN FULL REMISSION (HCC): ICD-10-CM

## 2022-11-10 DIAGNOSIS — E11.9 TYPE 2 DIABETES MELLITUS WITHOUT COMPLICATION, WITHOUT LONG-TERM CURRENT USE OF INSULIN (HCC): ICD-10-CM

## 2022-11-10 DIAGNOSIS — E78.2 MIXED HYPERLIPIDEMIA: ICD-10-CM

## 2022-11-10 DIAGNOSIS — F41.9 ANXIETY: ICD-10-CM

## 2022-11-10 DIAGNOSIS — N18.31 CHRONIC RENAL FAILURE, STAGE 3A (HCC): ICD-10-CM

## 2022-11-10 DIAGNOSIS — L57.0 ACTINIC KERATOSIS OF LEFT CHEEK: ICD-10-CM

## 2022-11-10 DIAGNOSIS — Z12.11 SCREENING FOR COLON CANCER: ICD-10-CM

## 2022-11-10 DIAGNOSIS — J44.9 CHRONIC OBSTRUCTIVE PULMONARY DISEASE, UNSPECIFIED COPD TYPE (HCC): Primary | ICD-10-CM

## 2022-11-10 PROBLEM — E11.22 TYPE 2 DIABETES MELLITUS WITH CHRONIC KIDNEY DISEASE (HCC): Status: ACTIVE | Noted: 2022-11-10

## 2022-11-10 LAB — HBA1C MFR BLD: 7.2 %

## 2022-11-10 PROCEDURE — 3078F DIAST BP <80 MM HG: CPT | Performed by: PHYSICIAN ASSISTANT

## 2022-11-10 PROCEDURE — 99213 OFFICE O/P EST LOW 20 MIN: CPT | Performed by: PHYSICIAN ASSISTANT

## 2022-11-10 PROCEDURE — 3051F HG A1C>EQUAL 7.0%<8.0%: CPT | Performed by: PHYSICIAN ASSISTANT

## 2022-11-10 PROCEDURE — 1123F ACP DISCUSS/DSCN MKR DOCD: CPT | Performed by: PHYSICIAN ASSISTANT

## 2022-11-10 PROCEDURE — 3074F SYST BP LT 130 MM HG: CPT | Performed by: PHYSICIAN ASSISTANT

## 2022-11-10 PROCEDURE — 83036 HEMOGLOBIN GLYCOSYLATED A1C: CPT | Performed by: PHYSICIAN ASSISTANT

## 2022-11-10 RX ORDER — ESCITALOPRAM OXALATE 10 MG/1
TABLET ORAL
Qty: 130 TABLET | Refills: 1 | Status: SHIPPED | OUTPATIENT
Start: 2022-11-10

## 2022-11-10 SDOH — ECONOMIC STABILITY: FOOD INSECURITY: WITHIN THE PAST 12 MONTHS, YOU WORRIED THAT YOUR FOOD WOULD RUN OUT BEFORE YOU GOT MONEY TO BUY MORE.: NEVER TRUE

## 2022-11-10 SDOH — ECONOMIC STABILITY: FOOD INSECURITY: WITHIN THE PAST 12 MONTHS, THE FOOD YOU BOUGHT JUST DIDN'T LAST AND YOU DIDN'T HAVE MONEY TO GET MORE.: NEVER TRUE

## 2022-11-10 ASSESSMENT — PATIENT HEALTH QUESTIONNAIRE - PHQ9
6. FEELING BAD ABOUT YOURSELF - OR THAT YOU ARE A FAILURE OR HAVE LET YOURSELF OR YOUR FAMILY DOWN: 0
SUM OF ALL RESPONSES TO PHQ QUESTIONS 1-9: 0
4. FEELING TIRED OR HAVING LITTLE ENERGY: 0
7. TROUBLE CONCENTRATING ON THINGS, SUCH AS READING THE NEWSPAPER OR WATCHING TELEVISION: 0
SUM OF ALL RESPONSES TO PHQ QUESTIONS 1-9: 0
SUM OF ALL RESPONSES TO PHQ QUESTIONS 1-9: 0
SUM OF ALL RESPONSES TO PHQ9 QUESTIONS 1 & 2: 0
1. LITTLE INTEREST OR PLEASURE IN DOING THINGS: 0
9. THOUGHTS THAT YOU WOULD BE BETTER OFF DEAD, OR OF HURTING YOURSELF: 0
8. MOVING OR SPEAKING SO SLOWLY THAT OTHER PEOPLE COULD HAVE NOTICED. OR THE OPPOSITE, BEING SO FIGETY OR RESTLESS THAT YOU HAVE BEEN MOVING AROUND A LOT MORE THAN USUAL: 0
2. FEELING DOWN, DEPRESSED OR HOPELESS: 0
3. TROUBLE FALLING OR STAYING ASLEEP: 0
5. POOR APPETITE OR OVEREATING: 0
10. IF YOU CHECKED OFF ANY PROBLEMS, HOW DIFFICULT HAVE THESE PROBLEMS MADE IT FOR YOU TO DO YOUR WORK, TAKE CARE OF THINGS AT HOME, OR GET ALONG WITH OTHER PEOPLE: 0
SUM OF ALL RESPONSES TO PHQ QUESTIONS 1-9: 0

## 2022-11-10 ASSESSMENT — SOCIAL DETERMINANTS OF HEALTH (SDOH): HOW HARD IS IT FOR YOU TO PAY FOR THE VERY BASICS LIKE FOOD, HOUSING, MEDICAL CARE, AND HEATING?: NOT HARD AT ALL

## 2022-11-10 NOTE — PROGRESS NOTES
Subjective  Home Mclain 1955 is a 69 y.o. female who presents today with:  Chief Complaint   Patient presents with    Establish Care     No other concerns        HPI  COPD/Asthma  Patient is on a LABA/ICS and prn albuterol. Patient denies chest pain or SOB at this time. Has base line SOB with exertion. No wheezing at this time. No chest tightness. Well controlled with medication. Patient in contemplation about quitting smoking, but has significantly reduced her smoking. Type DM II w/ stage 3a CKD  Last A1c 11/10/22 7.2%. Stable in comparison to last A1C. Compliant with Metformin. Has not experienced hyper/hypoglycemic symptoms. Patient is not exercising due to sob. Tries to eat health. Has not completed Diabetic foot exam.     Senile Osteoporosis  Patient has not had repeat DEXA. Patient was on reclast. Currently on Vitamin D. Complaint with medication. Depression/Anxiety  Patient is doing well. Still enjoys her daily activities. Compliant with medication. No SE. No SI/HI or sleep disturbance. Rash  Patient reports she has had flaky scab on the left check for \"couple of year\". Patient reports it was bigger in the past. Occasionally itchy. Patient would like to get it removed. Review of Systems   Constitutional:  Negative for activity change, appetite change, chills and fever. HENT:  Negative for congestion, drooling, sinus pressure, sinus pain and sore throat. Eyes:  Negative for visual disturbance. Respiratory:  Positive for shortness of breath. Negative for cough and chest tightness. Cardiovascular:  Negative for chest pain. Gastrointestinal:  Negative for abdominal pain, diarrhea, nausea and vomiting. Endocrine: Negative for cold intolerance. Genitourinary:  Negative for dysuria, flank pain, frequency and hematuria. Musculoskeletal:  Negative for arthralgias and back pain. Skin:  Positive for rash. Allergic/Immunologic: Negative for food allergies.    Neurological: Negative for weakness, light-headedness, numbness and headaches. Hematological:  Does not bruise/bleed easily. Past Medical History:   Diagnosis Date    Anxiety     Asthma     Chronic back pain     Chronic renal failure, stage 3a (HCC)     COPD (chronic obstructive pulmonary disease) (HCC)     Depression     History of tinnitus     Hyperlipidemia     Hypertension     Obesity     Osteoporosis     2019 T -3.2    Peripheral vascular disease (HCC)     Proteinuria     Type II or unspecified type diabetes mellitus without mention of complication, not stated as uncontrolled      Past Surgical History:   Procedure Laterality Date    BREAST SURGERY      L BREAST BIOPSY    FRACTURE SURGERY      TUBAL LIGATION       Social History     Socioeconomic History    Marital status:      Spouse name: Not on file    Number of children: Not on file    Years of education: Not on file    Highest education level: Not on file   Occupational History    Not on file   Tobacco Use    Smoking status: Some Days     Packs/day: 1.00     Years: 49.00     Pack years: 49.00     Types: Cigarettes    Smokeless tobacco: Never    Tobacco comments:     12/21/17 started age 11   Vaping Use    Vaping Use: Never used   Substance and Sexual Activity    Alcohol use: No     Comment: denies    Drug use: No    Sexual activity: Not Currently   Other Topics Concern    Not on file   Social History Narrative    Not on file     Social Determinants of Health     Financial Resource Strain: Low Risk     Difficulty of Paying Living Expenses: Not hard at all   Food Insecurity: No Food Insecurity    Worried About Running Out of Food in the Last Year: Never true    Ran Out of Food in the Last Year: Never true   Transportation Needs: No Transportation Needs    Lack of Transportation (Medical): No    Lack of Transportation (Non-Medical):  No   Physical Activity: Inactive    Days of Exercise per Week: 0 days    Minutes of Exercise per Session: 0 min   Stress: Not on file   Social Connections: Not on file   Intimate Partner Violence: Not on file   Housing Stability: Not on file     Family History   Problem Relation Age of Onset    Heart Disease Sister     High Blood Pressure Other     Cancer Other     High Blood Pressure Mother     Cancer Brother      Allergies   Allergen Reactions    Nickel Itching and Rash     Current Outpatient Medications   Medication Sig Dispense Refill    escitalopram (LEXAPRO) 10 MG tablet TAKE 1 AND 1/2 TABLETS DAILY BY MOUTH 130 tablet 1    BREO ELLIPTA 100-25 MCG/INH AEPB inhaler Inhale 1 puff into the lungs daily 60 each 2    albuterol sulfate HFA (PROVENTIL;VENTOLIN;PROAIR) 108 (90 Base) MCG/ACT inhaler INHALE 2 PUFFS INTO THE LUNGS 4 TIMES DAILY AS NEEDED FOR WHEEZING. 6.7 each 2    metFORMIN (GLUCOPHAGE-XR) 500 MG extended release tablet TAKE ONE TABLET BY MOUTH A DAY 90 tablet 3    simvastatin (ZOCOR) 40 MG tablet TAKE 1 TABLET BY MOUTH EVERY DAY IN THE EVENING 90 tablet 4    Multiple Vitamins-Minerals (HAIR SKIN AND NAILS FORMULA) TABS Take 1 tablet by mouth daily      vitamin D (CHOLECALCIFEROL) 1000 UNIT TABS tablet Take 1,000 Units by mouth daily      Doxylamine Succinate, Sleep, (UNISOM PO) Take 1 tablet by mouth daily as needed      Naproxen Sodium 220 MG CAPS Take  by mouth daily as needed. zoledronic acid (RECLAST) 5 MG/100ML SOLN Infuse 100 mLs intravenously once for 1 dose 733.01 100 mL 0     No current facility-administered medications for this visit. PMH, Surgical Hx, Family Hx, and Social Hx reviewed and updated. Health Maintenance reviewed.     Objective  Vitals:    11/10/22 1421   BP: 118/76   Site: Right Upper Arm   Position: Sitting   Cuff Size: Medium Adult   Pulse: (!) 110   Temp: 97.2 °F (36.2 °C)   TempSrc: Temporal   SpO2: 96%   Weight: 152 lb (68.9 kg)   Height: 5' 3\" (1.6 m)     BP Readings from Last 3 Encounters:   11/10/22 118/76   07/07/22 128/70   05/05/22 122/78     Wt Readings from Last 3 Encounters: 11/10/22 152 lb (68.9 kg)   07/07/22 150 lb (68 kg)   05/05/22 148 lb (67.1 kg)       Lab Results   Component Value Date    LABA1C 7.2 11/10/2022    LABA1C 7.1 (H) 06/06/2022    LABA1C 5.2 07/21/2021     Lab Results   Component Value Date    LABMICR 11.30 (H) 11/11/2022    CREATININE 0.99 (H) 07/21/2021     Lab Results   Component Value Date    ALT 9 07/21/2021    AST 17 07/21/2021     Lab Results   Component Value Date    CHOL 156 12/13/2018    TRIG 171 12/13/2018    HDL 59 11/11/2022    LDLCALC 70 11/11/2022          Physical Exam  Vitals and nursing note reviewed. Constitutional:       General: She is awake. She is not in acute distress. Appearance: Normal appearance. She is well-developed. She is not ill-appearing, toxic-appearing or diaphoretic. HENT:      Head: Normocephalic and atraumatic. Right Ear: Hearing and external ear normal.      Left Ear: Hearing and external ear normal.      Nose: Nose normal.   Eyes:      General: Lids are normal. Vision grossly intact. Gaze aligned appropriately. Conjunctiva/sclera: Conjunctivae normal.      Pupils: Pupils are equal, round, and reactive to light. Cardiovascular:      Rate and Rhythm: Normal rate and regular rhythm. Pulses: Normal pulses. Heart sounds: Normal heart sounds, S1 normal and S2 normal.   Pulmonary:      Effort: Pulmonary effort is normal.      Breath sounds: Normal breath sounds and air entry. Musculoskeletal:      Cervical back: Normal range of motion. Skin:     General: Skin is warm. Capillary Refill: Capillary refill takes less than 2 seconds. Comments: Scab on the right cheek. Rough in feel. No bleeding. Neurological:      Mental Status: She is alert and oriented to person, place, and time. Gait: Gait is intact.    Psychiatric:         Attention and Perception: Attention normal.         Mood and Affect: Mood normal.         Speech: Speech normal.         Behavior: Behavior normal. Behavior is cooperative. Assessment & Plan   Kiersten Ott was seen today for establish care. Diagnoses and all orders for this visit:    Chronic obstructive pulmonary disease, unspecified COPD type ([facility] [address]  -     CT lung screen [Initial/Annual]; Future  -     Full PFT Study With Bronchodilator; Future    Anxiety  Comments:  Stable and relatively well-controlled on Lexapro. Orders:  -     escitalopram (LEXAPRO) 10 MG tablet; TAKE 1 AND 1/2 TABLETS DAILY BY MOUTH    Recurrent major depressive disorder, in full remission ([facility] [address]  Comments:  Stable and relatively well-controlled on Lexapro  Orders:  -     escitalopram (LEXAPRO) 10 MG tablet; TAKE 1 AND 1/2 TABLETS DAILY BY MOUTH    Personal history of tobacco use  -     CT lung screen [Initial/Annual]; Future    Chronic renal failure, stage 3a (HCC)  -     Microalbumin / Creatinine Urine Ratio; Future  - continue monitor functions. Limit NSAID use. Need better control of medication. Type 2 diabetes mellitus without complication, without long-term current use of insulin (HCC)  -     POCT glycosylated hemoglobin (Hb A1C)  -      DIABETES FOOT EXAM  -     Microalbumin / Creatinine Urine Ratio; Future  - Emphasized diet and exercises. - Will move metformin to twice daily. Will f/u in 3 months for revaluation. Mixed hyperlipidemia  -     Lipid, Fasting; Future  - Stable, well controlled with diet. Screening for colon cancer  -     Fecal DNA Colorectal cancer screening (Cologuard)    Actinic keratosis of left cheek  -     Lucita Casillas [place], , Dermatology, [place]    Orders Placed This Encounter   Procedures    Fecal DNA Colorectal cancer screening (Cologuard)    CT lung screen [Initial/Annual]     Age: 69 y.o.    Smoking History:   Social History    Tobacco Use      Smoking status: Some Days        Packs/day: 1.00        Years: 49.00        Pack years: 52        Types: Cigarettes      Smokeless tobacco: Never      Tobacco comments: 12/21/17 started age 11 Vaping Use      Vaping Use: Never used    Alcohol use: No      Comment: denies    Drug use: No    Pack years: 52  Last CT lung screen: 10/21/2021     Standing Status:   Future     Standing Expiration Date:   11/10/2023     Order Specific Question:   Is there documentation of shared decision making? Answer:   Yes     Order Specific Question:   Does the patient show any signs or symptoms of lung cancer? Answer:   No     Order Specific Question:   Is this the first (baseline) CT or an annual exam?     Answer: Annual [2]     Order Specific Question:   Is this a low dose CT or a routine CT? Answer:   Low Dose CT [1]     Order Specific Question:   Smoking Status? Answer:   Some Days [2]     Order Specific Question:   Smoking packs per day? Answer:   1     Order Specific Question:   Years smoking?      Answer:   49    Lipid, Fasting     Standing Status:   Future     Number of Occurrences:   1     Standing Expiration Date:   11/10/2023    Microalbumin / Creatinine Urine Ratio     Standing Status:   Future     Number of Occurrences:   1     Standing Expiration Date:   11/10/2023    Angelito Lang DO, Dermatology, [place]     Referral Priority:   Routine     Referral Type:   Eval and Treat     Referral Reason:   Specialty Services Required     Referred to Provider:   Yair Mera DO     Requested Specialty:   Family Medicine     Number of Visits Requested:   1    POCT glycosylated hemoglobin (Hb A1C)    [facility] DIABETES FOOT EXAM    Full PFT Study With Bronchodilator     If an ABG is needed along with this PFT procedure, please place the appropriate lab order     Standing Status:   Future     Standing Expiration Date:   11/10/2023     Orders Placed This Encounter   Medications    escitalopram (LEXAPRO) 10 MG tablet     Sig: TAKE 1 AND 1/2 TABLETS DAILY BY MOUTH     Dispense:  130 tablet     Refill:  1     Medications Discontinued During This Encounter   Medication Reason    escitalopram (LEXAPRO) 10 MG tablet REORDER     Return in about 3 months (around 2/10/2023). Reviewed with the patient: current clinical status, medications, activities and diet. Side effects, adverse effects of the medication prescribed today, as well as treatment plan/ rationale and result expectations have been discussed with the patient who expresses understanding and desires to proceed. Close follow up to evaluate treatment results and for coordination of care. I have reviewed the patient's medical history in detail and updated the computerized patient record.     Jhon Mercado

## 2022-11-11 DIAGNOSIS — N18.31 CHRONIC RENAL FAILURE, STAGE 3A (HCC): ICD-10-CM

## 2022-11-11 DIAGNOSIS — E78.2 MIXED HYPERLIPIDEMIA: ICD-10-CM

## 2022-11-11 DIAGNOSIS — E11.9 TYPE 2 DIABETES MELLITUS WITHOUT COMPLICATION, WITHOUT LONG-TERM CURRENT USE OF INSULIN (HCC): ICD-10-CM

## 2022-11-11 LAB
CHOLESTEROL, FASTING: 173 MG/DL (ref 0–199)
CREATININE URINE: 79.8 MG/DL
HDLC SERPL-MCNC: 59 MG/DL (ref 40–59)
LDL CHOLESTEROL CALCULATED: 70 MG/DL (ref 0–129)
MICROALBUMIN UR-MCNC: 11.3 MG/DL
MICROALBUMIN/CREAT UR-RTO: 141.6 MG/G (ref 0–30)
TRIGLYCERIDE, FASTING: 219 MG/DL (ref 0–150)

## 2022-11-13 ASSESSMENT — ENCOUNTER SYMPTOMS
SINUS PRESSURE: 0
CHEST TIGHTNESS: 0
COUGH: 0
SHORTNESS OF BREATH: 1
SORE THROAT: 0
SINUS PAIN: 0
ABDOMINAL PAIN: 0
BACK PAIN: 0
NAUSEA: 0
DIARRHEA: 0
VOMITING: 0

## 2022-11-13 ASSESSMENT — VISUAL ACUITY: OU: 1

## 2022-11-22 ENCOUNTER — TELEPHONE (OUTPATIENT)
Dept: CARDIOTHORACIC SURGERY | Age: 67
End: 2022-11-22

## 2022-11-22 NOTE — TELEPHONE ENCOUNTER
Physician documentation on smoking history and CT Lung Screening reviewed. All required documentation complete. Patient is a current every day smoker with a 49 pack year history ( 1 ppd x 49 years) per physician documentation.

## 2022-11-23 ENCOUNTER — OFFICE VISIT (OUTPATIENT)
Dept: FAMILY MEDICINE CLINIC | Age: 67
End: 2022-11-23
Payer: MEDICARE

## 2022-11-23 VITALS — BODY MASS INDEX: 26.93 KG/M2 | WEIGHT: 152 LBS | HEIGHT: 63 IN | TEMPERATURE: 98.6 F

## 2022-11-23 DIAGNOSIS — D49.2 ABNORMAL SKIN GROWTH: ICD-10-CM

## 2022-11-23 DIAGNOSIS — L98.9 CHANGING SKIN LESION: Primary | ICD-10-CM

## 2022-11-23 PROCEDURE — 99214 OFFICE O/P EST MOD 30 MIN: CPT | Performed by: FAMILY MEDICINE

## 2022-11-23 PROCEDURE — 1123F ACP DISCUSS/DSCN MKR DOCD: CPT | Performed by: FAMILY MEDICINE

## 2022-11-23 ASSESSMENT — ENCOUNTER SYMPTOMS: COLOR CHANGE: 1

## 2022-11-23 NOTE — PROGRESS NOTES
Diagnosis Orders   1. Changing skin lesion        2. Abnormal skin growth          Return for 1 to 2-week appointment 15-minute procedure shave lesion left cheek. Patient Instructions   Patient will return for shave biopsy of lesion next week. Consider doing a partial biopsy with liquid nitrogen versus a full biopsy versus a full excision. Subjective:      Patient ID: Michel Deal is a 69 y.o. female who presents for:  Chief Complaint   Patient presents with    Skin Exam     Left cheek - x 1 year        Patient presents with a year-long presence of the wound that started out looking like a pimple and now has grown in size and increased in flake and sometimes bleeds. No history of skin cancer. Current Outpatient Medications on File Prior to Visit   Medication Sig Dispense Refill    escitalopram (LEXAPRO) 10 MG tablet TAKE 1 AND 1/2 TABLETS DAILY BY MOUTH 130 tablet 1    BREO ELLIPTA 100-25 MCG/INH AEPB inhaler Inhale 1 puff into the lungs daily 60 each 2    albuterol sulfate HFA (PROVENTIL;VENTOLIN;PROAIR) 108 (90 Base) MCG/ACT inhaler INHALE 2 PUFFS INTO THE LUNGS 4 TIMES DAILY AS NEEDED FOR WHEEZING. 6.7 each 2    metFORMIN (GLUCOPHAGE-XR) 500 MG extended release tablet TAKE ONE TABLET BY MOUTH A DAY 90 tablet 3    simvastatin (ZOCOR) 40 MG tablet TAKE 1 TABLET BY MOUTH EVERY DAY IN THE EVENING 90 tablet 4    Multiple Vitamins-Minerals (HAIR SKIN AND NAILS FORMULA) TABS Take 1 tablet by mouth daily      zoledronic acid (RECLAST) 5 MG/100ML SOLN Infuse 100 mLs intravenously once for 1 dose 733.01 100 mL 0    vitamin D (CHOLECALCIFEROL) 1000 UNIT TABS tablet Take 1,000 Units by mouth daily      Doxylamine Succinate, Sleep, (UNISOM PO) Take 1 tablet by mouth daily as needed      Naproxen Sodium 220 MG CAPS Take  by mouth daily as needed. No current facility-administered medications on file prior to visit.      Past Medical History:   Diagnosis Date    Anxiety     Asthma     Chronic back pain Chronic renal failure, stage 3a (HCC)     COPD (chronic obstructive pulmonary disease) (HCC)     Depression     History of tinnitus     Hyperlipidemia     Hypertension     Obesity     Osteoporosis     2019 T -3.2    Peripheral vascular disease (HCC)     Proteinuria     Type II or unspecified type diabetes mellitus without mention of complication, not stated as uncontrolled      Past Surgical History:   Procedure Laterality Date    BREAST SURGERY      L BREAST BIOPSY    FRACTURE SURGERY      TUBAL LIGATION       Social History     Socioeconomic History    Marital status:      Spouse name: Not on file    Number of children: Not on file    Years of education: Not on file    Highest education level: Not on file   Occupational History    Not on file   Tobacco Use    Smoking status: Some Days     Packs/day: 1.00     Years: 49.00     Pack years: 49.00     Types: Cigarettes    Smokeless tobacco: Never    Tobacco comments:     12/21/17 started age 11   Vaping Use    Vaping Use: Never used   Substance and Sexual Activity    Alcohol use: No     Comment: denies    Drug use: No    Sexual activity: Not Currently   Other Topics Concern    Not on file   Social History Narrative    Not on file     Social Determinants of Health     Financial Resource Strain: Low Risk     Difficulty of Paying Living Expenses: Not hard at all   Food Insecurity: No Food Insecurity    Worried About Running Out of Food in the Last Year: Never true    Ran Out of Food in the Last Year: Never true   Transportation Needs: No Transportation Needs    Lack of Transportation (Medical): No    Lack of Transportation (Non-Medical):  No   Physical Activity: Inactive    Days of Exercise per Week: 0 days    Minutes of Exercise per Session: 0 min   Stress: Not on file   Social Connections: Not on file   Intimate Partner Violence: Not on file   Housing Stability: Not on file     Family History   Problem Relation Age of Onset    Heart Disease Sister     High Blood Pressure Other     Cancer Other     High Blood Pressure Mother     Cancer Brother      Allergies:  Nickel    Review of Systems   Constitutional:  Negative for chills and fever. Skin:  Positive for color change. Allergic/Immunologic: Negative for environmental allergies, food allergies and immunocompromised state. Hematological:  Negative for adenopathy. Does not bruise/bleed easily. Psychiatric/Behavioral:  Negative for behavioral problems and sleep disturbance. Objective:   Temp 98.6 °F (37 °C)   Ht 5' 3\" (1.6 m)   Wt 152 lb (68.9 kg)   BMI 26.93 kg/m²     Physical Exam  Constitutional:       General: She is not in acute distress. Appearance: Normal appearance. She is well-developed. She is not toxic-appearing. HENT:      Head: Normocephalic and atraumatic. Right Ear: Hearing and tympanic membrane normal.      Left Ear: Hearing and tympanic membrane normal.      Nose: Nose normal. No nasal deformity. Eyes:      General: Lids are normal.         Right eye: No discharge. Left eye: No discharge. Conjunctiva/sclera: Conjunctivae normal.      Pupils: Pupils are equal, round, and reactive to light. Neck:      Thyroid: No thyroid mass or thyromegaly. Vascular: No JVD. Trachea: Trachea and phonation normal.   Cardiovascular:      Rate and Rhythm: Normal rate and regular rhythm. Pulmonary:      Effort: No accessory muscle usage or respiratory distress. Musculoskeletal:      Cervical back: Full passive range of motion without pain. Comments: FROM all large muscle groups and joints as witnessed when walking to exam table, getting on, and getting off the exam table. Skin:     General: Skin is warm and dry. Findings: No rash. Comments: Left cheek erythematous base rough white flake lesion x1   Neurological:      Mental Status: She is alert. Motor: No tremor or atrophy.       Gait: Gait normal.   Psychiatric:         Speech: Speech normal. week.  Consider doing a partial biopsy with liquid nitrogen versus a full biopsy versus a full excision. This note was partially created with the assistance of dictation. This may lead to grammatical or spelling errors. Arie Villela M.D.

## 2022-11-23 NOTE — PATIENT INSTRUCTIONS
Patient will return for shave biopsy of lesion next week. Consider doing a partial biopsy with liquid nitrogen versus a full biopsy versus a full excision.

## 2022-11-24 LAB — NONINV COLON CA DNA+OCC BLD SCRN STL QL: NEGATIVE

## 2022-11-25 ENCOUNTER — HOSPITAL ENCOUNTER (OUTPATIENT)
Dept: PULMONOLOGY | Age: 67
Discharge: HOME | End: 2022-11-25
Payer: MEDICARE

## 2022-11-25 ENCOUNTER — HOSPITAL ENCOUNTER (OUTPATIENT)
Dept: CT IMAGING | Age: 67
Discharge: HOME | End: 2022-11-27
Payer: MEDICARE

## 2022-11-25 DIAGNOSIS — J44.9 CHRONIC OBSTRUCTIVE PULMONARY DISEASE, UNSPECIFIED COPD TYPE (HCC): ICD-10-CM

## 2022-11-25 DIAGNOSIS — Z87.891 PERSONAL HISTORY OF TOBACCO USE: ICD-10-CM

## 2022-11-25 PROCEDURE — 94729 DIFFUSING CAPACITY: CPT

## 2022-11-25 PROCEDURE — 6360000002 HC RX W HCPCS: Performed by: PHYSICIAN ASSISTANT

## 2022-11-25 PROCEDURE — 94060 EVALUATION OF WHEEZING: CPT

## 2022-11-25 PROCEDURE — 94726 PLETHYSMOGRAPHY LUNG VOLUMES: CPT

## 2022-11-25 PROCEDURE — 71271 CT THORAX LUNG CANCER SCR C-: CPT

## 2022-11-25 RX ORDER — ALBUTEROL SULFATE 2.5 MG/3ML
2.5 SOLUTION RESPIRATORY (INHALATION) EVERY 6 HOURS PRN
Status: DISCONTINUED | OUTPATIENT
Start: 2022-11-25 | End: 2022-11-26 | Stop reason: HOSPADM

## 2022-11-25 RX ADMIN — ALBUTEROL SULFATE 2.5 MG: 2.5 SOLUTION RESPIRATORY (INHALATION) at 13:29

## 2022-11-28 PROCEDURE — 94060 EVALUATION OF WHEEZING: CPT | Performed by: INTERNAL MEDICINE

## 2022-11-28 PROCEDURE — 94726 PLETHYSMOGRAPHY LUNG VOLUMES: CPT | Performed by: INTERNAL MEDICINE

## 2022-11-28 NOTE — PROCEDURES
[address]                      [facility], [address] [facility]                    PULMONARY FUNCTION  Mulu Ibanezy   69 y.o.   female  Height 63 in  Weight 152 lb      Referring provider   KENJI Pascual    Reading provider   Neris Alonzo MD    Test meets ATS criteria for acceptability and reproducibility Yes    Diagnosis: BREWSTER: Yes  Cough   Yes, wheezing Yes  Smoking   yes    Spirometry   FVC            1.83 L   63%  Post bronchodilator 1.85 L  63% Change 0 %  FEV1          0.81 L  36%   Post bronchodilator  0.80 L  36%   Change -1%  FEV1/FVC  44  %             Post bronchodilator  43 %  MKI30-46% 0.35 L  18%  Post bronchodilator  0.34 L  17%   Change -4%    Lung volume   SVC           2.20 L  75%   RV              4.26 L 201%   TLC            6.46  L 131%   RV/TLC      65 %       Test interpretation     Spirometry meets ATS criteria for severe obstructive airway disease with no significant response to bronchodilator  Lung volume shows air trapping and hyperinflation       Clinical correlation is recommended     Neris Alonzo MD [facility], 11/28/2022 12:47 PM

## 2022-12-01 ENCOUNTER — OFFICE VISIT (OUTPATIENT)
Dept: FAMILY MEDICINE CLINIC | Age: 67
End: 2022-12-01

## 2022-12-01 VITALS — HEIGHT: 63 IN | TEMPERATURE: 98.8 F | WEIGHT: 152 LBS | BODY MASS INDEX: 26.93 KG/M2

## 2022-12-01 DIAGNOSIS — D49.2 ABNORMAL SKIN GROWTH: ICD-10-CM

## 2022-12-01 DIAGNOSIS — L98.9 CHANGING SKIN LESION: ICD-10-CM

## 2022-12-01 DIAGNOSIS — D49.2 ABNORMAL SKIN GROWTH: Primary | ICD-10-CM

## 2022-12-01 DIAGNOSIS — L57.0 ACTINIC KERATOSES: ICD-10-CM

## 2022-12-01 ASSESSMENT — ENCOUNTER SYMPTOMS: COLOR CHANGE: 1

## 2022-12-01 NOTE — PROGRESS NOTES
Diagnosis Orders   1. Abnormal skin growth  SC DESTRUC PREMALIGNANT, FIRST LESION    Specimen to Pathology Outpatient      2. Actinic keratoses  42395 - SC TANGENTIAL BIOPSY SKIN SINGLE LESION      3. Changing skin lesion  18362 - SC TANGENTIAL BIOPSY SKIN SINGLE LESION    Specimen to Pathology Outpatient            Orders Placed This Encounter   Procedures    Specimen to Pathology Outpatient     Margins requested on all specimens  R/O squamous cell cancer versus actinic keratosis  Location left anterior cheek. The thickest and most abnormal portion of the wound on the lateral aspect is sent to pathology     Standing Status:   Future     Standing Expiration Date:   12/1/2023     Order Specific Question:   PREVIOUS BIOPSY     Answer:   No     Order Specific Question:   PREOP DIAGNOSIS     Answer:   Abnormal skin growth changing skin lesion     Order Specific Question:   FROZEN SECTION - NO OR YES/SPECIMEN     Answer:   No    [id number] - SC TANGENTIAL BIOPSY SKIN SINGLE LESION    SC DESTRUC PREMALIGNANT, [address] [facility] [address] was seen today for procedure. Diagnoses and all orders for this visit:    Abnormal skin growth  -     SC DESTRUC PREMALIGNANT, FIRST LESION  -     Specimen to Pathology Outpatient; Future    Actinic keratoses  -     86018 - SC TANGENTIAL BIOPSY SKIN SINGLE LESION    Changing skin lesion  -     [id number] - SC TANGENTIAL BIOPSY SKIN SINGLE LESION  -     Specimen to Pathology Outpatient; Future      Return for Based on test results.     Patient Instructions   Incision/ Shave biopsy/ Laceration repair    -Clean surgical area with antibacterial soap and water once daily.    -Keep surgical site moist with vaseline or antibiotic ointment (single- Bacitracin, not triple-Neosporin) and apply a fresh bandage daily until a solid scab forms or if the wound is at risk for trauma or dirt.   -Follow up immediately if any growing redness (minimal redness or pale pink is normal along wound edges) surrounds the surgical site or if dripping drainage occurs at surgical site. Once a solid scab forms no more bandage needed. A wet scab can look yellow. This is not infection, just moisture.  -You may be instructed to soak the wound with Hydrogen Peroxide to loosen scabbing around sutures, this is not to be done more often that every 3 days, should be for 30 seconds-1 min and then rinsed off with water.   -Once the lesion is healed be sure to apply sunscreen to the area to prevent burn of the newer and more delicate skin during the first 6 months of healing.    -If the scar begins to be raised you may massage the area firmly twice a day to help break down scar tissue and help the area become a flat scar. There is some evidence that Mederma applied to a scar daily for the first few months can help shrink and fade it more quickly then without intervention. Cryotherapy instructions    Post op instructions given. A printed copy provided. It is best to leave blisters alone if they form for the first 1-3 days to allow the desired damaged tissue (precancer lesion, wart, or whatever lesion is being removed) to separate from healthy tissue. They should be covered with a bandage to prevent blister breakage and dirt exposure. The wounds should remain dry while there is a blister, therefore if this is a sweaty location like the foot you may need to change socks multiple times per day. When the blister(s) pop, or patient removes the top as instructed between day 2-3, apply antibiotic (NOT triple antibiotic, one brand is Neosporin) ointment, Bacitracin, and a bandage to affected area(s). The ointment should be applied to the open area as long as it is not covered with a scab. Exposed tissue is meant to be moist.  Once a scab is formed the patient may stop applying ointment. The scab may appear yellow while moist, don't confuse this with infection. If the wound is infection pus will drain from the site.  If this treatment was for a large wart you may note that a plug of skin may fall out of the area that was treated. That is the center of the wart and it is appropriate for it to come out. If exposed skin remains, treat that area as you would a ruptured blister as mentioned above. Bacitracin sample supplied Incision/ Shave biopsy/ Laceration repair    -Clean surgical area with antibacterial soap and water once daily.    -Keep surgical site moist with vaseline or antibiotic ointment (single- Bacitracin, not triple-Neosporin) and apply a fresh bandage daily until a solid scab forms or if the wound is at risk for trauma or dirt.   -Follow up immediately if any growing redness (minimal redness or pale pink is normal along wound edges) surrounds the surgical site or if dripping drainage occurs at surgical site. Once a solid scab forms no more bandage needed. A wet scab can look yellow. This is not infection, just moisture.  -You may be instructed to soak the wound with Hydrogen Peroxide to loosen scabbing around sutures, this is not to be done more often that every 3 days, should be for 30 seconds-1 min and then rinsed off with water.   -Once the lesion is healed be sure to apply sunscreen to the area to prevent burn of the newer and more delicate skin during the first 6 months of healing.    -If the scar begins to be raised you may massage the area firmly twice a day to help break down scar tissue and help the area become a flat scar. There is some evidence that Mederma applied to a scar daily for the first few months can help shrink and fade it more quickly then without intervention. Cryotherapy instructions    Post op instructions given. A printed copy provided. It is best to leave blisters alone if they form for the first 1-3 days to allow the desired damaged tissue (precancer lesion, wart, or whatever lesion is being removed) to separate from healthy tissue.   They should be covered with a bandage to prevent blister breakage and dirt exposure. The wounds should remain dry while there is a blister, therefore if this is a sweaty location like the foot you may need to change socks multiple times per day. When the blister(s) pop, or patient removes the top as instructed between day 2-3, apply antibiotic (NOT triple antibiotic, one brand is Neosporin) ointment, Bacitracin, and a bandage to affected area(s). The ointment should be applied to the open area as long as it is not covered with a scab. Exposed tissue is meant to be moist.  Once a scab is formed the patient may stop applying ointment. The scab may appear yellow while moist, don't confuse this with infection. If the wound is infection pus will drain from the site. If this treatment was for a large wart you may note that a plug of skin may fall out of the area that was treated. That is the center of the wart and it is appropriate for it to come out. If exposed skin remains, treat that area as you would a ruptured blister as mentioned above. Bacitracin sample supplied             She is here for biopsy of lesion. Most of it is very superficial and we have had the discussion on the strategy for how to determine what were dealing with and minimize the scar on the front of the face. Current Outpatient Medications on File Prior to Visit   Medication Sig Dispense Refill    escitalopram (LEXAPRO) 10 MG tablet TAKE 1 AND 1/2 TABLETS DAILY BY MOUTH 130 tablet 1    BREO ELLIPTA 100-25 MCG/INH AEPB inhaler Inhale 1 puff into the lungs daily 60 each 2    albuterol sulfate HFA (PROVENTIL;VENTOLIN;PROAIR) 108 (90 Base) MCG/ACT inhaler INHALE 2 PUFFS INTO THE LUNGS 4 TIMES DAILY AS NEEDED FOR WHEEZING.  6.7 each 2    metFORMIN (GLUCOPHAGE-XR) 500 MG extended release tablet TAKE ONE TABLET BY MOUTH A DAY 90 tablet 3    simvastatin (ZOCOR) 40 MG tablet TAKE 1 TABLET BY MOUTH EVERY DAY IN THE EVENING 90 tablet 4    Multiple Vitamins-Minerals (HAIR SKIN AND NAILS FORMULA) TABS Take 1 tablet by mouth daily      zoledronic acid (RECLAST) 5 MG/100ML SOLN Infuse 100 mLs intravenously once for 1 dose 733.01 100 mL 0    vitamin D (CHOLECALCIFEROL) 1000 UNIT TABS tablet Take 1,000 Units by mouth daily      Doxylamine Succinate, Sleep, (UNISOM PO) Take 1 tablet by mouth daily as needed      Naproxen Sodium 220 MG CAPS Take  by mouth daily as needed. No current facility-administered medications on file prior to visit. Nickel    Review of Systems   Constitutional:  Negative for chills and fever. Skin:  Positive for color change. Allergic/Immunologic: Negative for environmental allergies, food allergies and immunocompromised state. Hematological:  Negative for adenopathy. Does not bruise/bleed easily. Psychiatric/Behavioral:  Negative for behavioral problems and sleep disturbance. Temp 98.8 °F (37.1 °C)   Ht 5' 3\" (1.6 m)   Wt 152 lb (68.9 kg)   BMI 26.93 kg/m²   Physical Exam  Constitutional:       General: She is not in acute distress. Appearance: Normal appearance. She is well-developed. She is not toxic-appearing. HENT:      Head: Normocephalic and atraumatic. Right Ear: Hearing and tympanic membrane normal.      Left Ear: Hearing and tympanic membrane normal.      Nose: Nose normal. No nasal deformity. Eyes:      General: Lids are normal.         Right eye: No discharge. Left eye: No discharge. Conjunctiva/sclera: Conjunctivae normal.      Pupils: Pupils are equal, round, and reactive to light. Neck:      Thyroid: No thyroid mass or thyromegaly. Vascular: No JVD. Trachea: Trachea and phonation normal.   Cardiovascular:      Rate and Rhythm: Normal rate and regular rhythm. Pulmonary:      Effort: No accessory muscle usage or respiratory distress. Musculoskeletal:      Cervical back: Full passive range of motion without pain.       Comments: FROM all large muscle groups and joints as witnessed when walking to exam table, getting on, and getting off the exam table. Skin:     General: Skin is warm and dry. Findings: No rash. Comments: Left anterior cheek erythematous base raised lesion 1.5 cm with the lateral aspect having a thickened white plaque that is measuring approximately 6 x 6 mm   Neurological:      Mental Status: She is alert. Motor: No tremor or atrophy. Gait: Gait normal.   Psychiatric:         Speech: Speech normal.         Behavior: Behavior normal.         Thought Content: Thought content normal.         PROCEDURE: Tangential biopsy of portion of the left cheek wound laterally  Informed consent was given by the patient. Risks including infection, bleeding and scarring were discussed. No guarantee how the scar will look. Patient skin was prepped with alcohol. Wound was anesthetized using 1.0 cc of 1% lidocaine with epinephrine for anesthesia. A Dermablade was used to obtain the complete removal of the visible lesion. drysol was used at the base of site. Bacitracin ointment and bandage were placed on the wound. Estimated blood loss less than 1 cc    Post op wound care instructions were given to the patient. He/She will f/u in 2 weeks or sooner if needed for problems. PROCEDURE: The remainder of the lesion including more of the erythematous base is treated with liquid nitrogen today  The procedure was discussed with the patient. All questions were answered and alternative options discussed. The patient is aware of the risks of bleeding, infection, unsatisfactory scar result. Informed consent paperwork was signed by the patient. Liquid nitrogen was applied to the affected areas until freezing was noted then a thaw was allowed between dosing for a total of 2 applications. Applied to 1 lesion(s). The patient tolerated the procedure well. Post op instructions given. A printed copy provided. Ambrocio Stacy was seen today for procedure.     Diagnoses and all orders for this visit:    Abnormal skin growth  -     ND DESTRUC PREMALIGNANT, FIRST LESION  -     Specimen to Pathology Outpatient; Future    Actinic keratoses  -     19893 - ND TANGENTIAL BIOPSY SKIN SINGLE LESION    Changing skin lesion  -     04695 - ND TANGENTIAL BIOPSY SKIN SINGLE LESION  -     Specimen to Pathology Outpatient; Future      Return for Based on test results. Patient Instructions   Incision/ Shave biopsy/ Laceration repair    -Clean surgical area with antibacterial soap and water once daily.    -Keep surgical site moist with vaseline or antibiotic ointment (single- Bacitracin, not triple-Neosporin) and apply a fresh bandage daily until a solid scab forms or if the wound is at risk for trauma or dirt.   -Follow up immediately if any growing redness (minimal redness or pale pink is normal along wound edges) surrounds the surgical site or if dripping drainage occurs at surgical site. Once a solid scab forms no more bandage needed. A wet scab can look yellow. This is not infection, just moisture.  -You may be instructed to soak the wound with Hydrogen Peroxide to loosen scabbing around sutures, this is not to be done more often that every 3 days, should be for 30 seconds-1 min and then rinsed off with water.   -Once the lesion is healed be sure to apply sunscreen to the area to prevent burn of the newer and more delicate skin during the first 6 months of healing.    -If the scar begins to be raised you may massage the area firmly twice a day to help break down scar tissue and help the area become a flat scar. There is some evidence that Mederma applied to a scar daily for the first few months can help shrink and fade it more quickly then without intervention. Cryotherapy instructions    Post op instructions given. A printed copy provided.     It is best to leave blisters alone if they form for the first 1-3 days to allow the desired damaged tissue (precancer lesion, wart, or whatever lesion is being removed) to separate from healthy tissue. They should be covered with a bandage to prevent blister breakage and dirt exposure. The wounds should remain dry while there is a blister, therefore if this is a sweaty location like the foot you may need to change socks multiple times per day. When the blister(s) pop, or patient removes the top as instructed between day 2-3, apply antibiotic (NOT triple antibiotic, one brand is Neosporin) ointment, Bacitracin, and a bandage to affected area(s). The ointment should be applied to the open area as long as it is not covered with a scab. Exposed tissue is meant to be moist.  Once a scab is formed the patient may stop applying ointment. The scab may appear yellow while moist, don't confuse this with infection. If the wound is infection pus will drain from the site. If this treatment was for a large wart you may note that a plug of skin may fall out of the area that was treated. That is the center of the wart and it is appropriate for it to come out. If exposed skin remains, treat that area as you would a ruptured blister as mentioned above. Bacitracin sample supplied Incision/ Shave biopsy/ Laceration repair    -Clean surgical area with antibacterial soap and water once daily.    -Keep surgical site moist with vaseline or antibiotic ointment (single- Bacitracin, not triple-Neosporin) and apply a fresh bandage daily until a solid scab forms or if the wound is at risk for trauma or dirt.   -Follow up immediately if any growing redness (minimal redness or pale pink is normal along wound edges) surrounds the surgical site or if dripping drainage occurs at surgical site. Once a solid scab forms no more bandage needed. A wet scab can look yellow.   This is not infection, just moisture.  -You may be instructed to soak the wound with Hydrogen Peroxide to loosen scabbing around sutures, this is not to be done more often that every 3 days, should be for 30 seconds-1 min and then rinsed off with water.   -Once the lesion is healed be sure to apply sunscreen to the area to prevent burn of the newer and more delicate skin during the first 6 months of healing.    -If the scar begins to be raised you may massage the area firmly twice a day to help break down scar tissue and help the area become a flat scar. There is some evidence that Mederma applied to a scar daily for the first few months can help shrink and fade it more quickly then without intervention. Cryotherapy instructions    Post op instructions given. A printed copy provided. It is best to leave blisters alone if they form for the first 1-3 days to allow the desired damaged tissue (precancer lesion, wart, or whatever lesion is being removed) to separate from healthy tissue. They should be covered with a bandage to prevent blister breakage and dirt exposure. The wounds should remain dry while there is a blister, therefore if this is a sweaty location like the foot you may need to change socks multiple times per day. When the blister(s) pop, or patient removes the top as instructed between day 2-3, apply antibiotic (NOT triple antibiotic, one brand is Neosporin) ointment, Bacitracin, and a bandage to affected area(s). The ointment should be applied to the open area as long as it is not covered with a scab. Exposed tissue is meant to be moist.  Once a scab is formed the patient may stop applying ointment. The scab may appear yellow while moist, don't confuse this with infection. If the wound is infection pus will drain from the site. If this treatment was for a large wart you may note that a plug of skin may fall out of the area that was treated. That is the center of the wart and it is appropriate for it to come out. If exposed skin remains, treat that area as you would a ruptured blister as mentioned above. Bacitracin sample supplied       Robbie Flynn M.D.     This note was partially created with the assistance of dictation. This may lead to grammatical or spelling errors.

## 2022-12-01 NOTE — PATIENT INSTRUCTIONS
Incision/ Shave biopsy/ Laceration repair    -Clean surgical area with antibacterial soap and water once daily.    -Keep surgical site moist with vaseline or antibiotic ointment (single- Bacitracin, not triple-Neosporin) and apply a fresh bandage daily until a solid scab forms or if the wound is at risk for trauma or dirt.   -Follow up immediately if any growing redness (minimal redness or pale pink is normal along wound edges) surrounds the surgical site or if dripping drainage occurs at surgical site. Once a solid scab forms no more bandage needed. A wet scab can look yellow. This is not infection, just moisture.  -You may be instructed to soak the wound with Hydrogen Peroxide to loosen scabbing around sutures, this is not to be done more often that every 3 days, should be for 30 seconds-1 min and then rinsed off with water.   -Once the lesion is healed be sure to apply sunscreen to the area to prevent burn of the newer and more delicate skin during the first 6 months of healing.    -If the scar begins to be raised you may massage the area firmly twice a day to help break down scar tissue and help the area become a flat scar. There is some evidence that Mederma applied to a scar daily for the first few months can help shrink and fade it more quickly then without intervention. Cryotherapy instructions    Post op instructions given. A printed copy provided. It is best to leave blisters alone if they form for the first 1-3 days to allow the desired damaged tissue (precancer lesion, wart, or whatever lesion is being removed) to separate from healthy tissue. They should be covered with a bandage to prevent blister breakage and dirt exposure. The wounds should remain dry while there is a blister, therefore if this is a sweaty location like the foot you may need to change socks multiple times per day.    When the blister(s) pop, or patient removes the top as instructed between day 2-3, apply antibiotic (NOT triple antibiotic, one brand is Neosporin) ointment, Bacitracin, and a bandage to affected area(s). The ointment should be applied to the open area as long as it is not covered with a scab. Exposed tissue is meant to be moist.  Once a scab is formed the patient may stop applying ointment. The scab may appear yellow while moist, don't confuse this with infection. If the wound is infection pus will drain from the site. If this treatment was for a large wart you may note that a plug of skin may fall out of the area that was treated. That is the center of the wart and it is appropriate for it to come out. If exposed skin remains, treat that area as you would a ruptured blister as mentioned above. Bacitracin sample supplied Incision/ Shave biopsy/ Laceration repair    -Clean surgical area with antibacterial soap and water once daily.    -Keep surgical site moist with vaseline or antibiotic ointment (single- Bacitracin, not triple-Neosporin) and apply a fresh bandage daily until a solid scab forms or if the wound is at risk for trauma or dirt.   -Follow up immediately if any growing redness (minimal redness or pale pink is normal along wound edges) surrounds the surgical site or if dripping drainage occurs at surgical site. Once a solid scab forms no more bandage needed. A wet scab can look yellow. This is not infection, just moisture.  -You may be instructed to soak the wound with Hydrogen Peroxide to loosen scabbing around sutures, this is not to be done more often that every 3 days, should be for 30 seconds-1 min and then rinsed off with water.   -Once the lesion is healed be sure to apply sunscreen to the area to prevent burn of the newer and more delicate skin during the first 6 months of healing.    -If the scar begins to be raised you may massage the area firmly twice a day to help break down scar tissue and help the area become a flat scar.  There is some evidence that Mederma applied to a scar daily for the first few months can help shrink and fade it more quickly then without intervention. Cryotherapy instructions    Post op instructions given. A printed copy provided. It is best to leave blisters alone if they form for the first 1-3 days to allow the desired damaged tissue (precancer lesion, wart, or whatever lesion is being removed) to separate from healthy tissue. They should be covered with a bandage to prevent blister breakage and dirt exposure. The wounds should remain dry while there is a blister, therefore if this is a sweaty location like the foot you may need to change socks multiple times per day. When the blister(s) pop, or patient removes the top as instructed between day 2-3, apply antibiotic (NOT triple antibiotic, one brand is Neosporin) ointment, Bacitracin, and a bandage to affected area(s). The ointment should be applied to the open area as long as it is not covered with a scab. Exposed tissue is meant to be moist.  Once a scab is formed the patient may stop applying ointment. The scab may appear yellow while moist, don't confuse this with infection. If the wound is infection pus will drain from the site. If this treatment was for a large wart you may note that a plug of skin may fall out of the area that was treated. That is the center of the wart and it is appropriate for it to come out. If exposed skin remains, treat that area as you would a ruptured blister as mentioned above.     Bacitracin sample supplied

## 2022-12-12 NOTE — RESULT ENCOUNTER NOTE
Notify patient lesion is squamous cell cancer. They do not have evidence that the entire lesion has been removed because it was friable and fell apart as we were removing it. I would like to refer her to a specialist for Mohs removal of the remainder of the lesion if she is okay with that.   If so, diagnosis squamous cell cancer of the face refer to Dr. Devonna Colla Mohs procedure

## 2022-12-13 DIAGNOSIS — C44.320 SCC (SQUAMOUS CELL CARCINOMA), FACE: Primary | ICD-10-CM

## 2023-02-10 ENCOUNTER — OFFICE VISIT (OUTPATIENT)
Dept: FAMILY MEDICINE CLINIC | Age: 68
End: 2023-02-10
Payer: MEDICARE

## 2023-02-10 VITALS
RESPIRATION RATE: 14 BRPM | BODY MASS INDEX: 27.29 KG/M2 | HEIGHT: 63 IN | DIASTOLIC BLOOD PRESSURE: 72 MMHG | OXYGEN SATURATION: 95 % | WEIGHT: 154 LBS | TEMPERATURE: 96.4 F | HEART RATE: 107 BPM | SYSTOLIC BLOOD PRESSURE: 114 MMHG

## 2023-02-10 DIAGNOSIS — E55.9 VITAMIN D DEFICIENCY: Primary | ICD-10-CM

## 2023-02-10 DIAGNOSIS — F33.42 RECURRENT MAJOR DEPRESSIVE DISORDER, IN FULL REMISSION (HCC): ICD-10-CM

## 2023-02-10 DIAGNOSIS — Z72.0 TOBACCO ABUSE: ICD-10-CM

## 2023-02-10 DIAGNOSIS — E11.22 TYPE 2 DIABETES MELLITUS WITH STAGE 3A CHRONIC KIDNEY DISEASE, WITHOUT LONG-TERM CURRENT USE OF INSULIN (HCC): ICD-10-CM

## 2023-02-10 DIAGNOSIS — F41.9 ANXIETY: ICD-10-CM

## 2023-02-10 DIAGNOSIS — J44.9 CHRONIC OBSTRUCTIVE PULMONARY DISEASE, UNSPECIFIED COPD TYPE (HCC): ICD-10-CM

## 2023-02-10 DIAGNOSIS — N18.31 TYPE 2 DIABETES MELLITUS WITH STAGE 3A CHRONIC KIDNEY DISEASE, WITHOUT LONG-TERM CURRENT USE OF INSULIN (HCC): ICD-10-CM

## 2023-02-10 DIAGNOSIS — I10 ESSENTIAL HYPERTENSION: ICD-10-CM

## 2023-02-10 DIAGNOSIS — N18.31 CHRONIC RENAL FAILURE, STAGE 3A (HCC): ICD-10-CM

## 2023-02-10 DIAGNOSIS — E78.2 MIXED HYPERLIPIDEMIA: ICD-10-CM

## 2023-02-10 PROCEDURE — 99213 OFFICE O/P EST LOW 20 MIN: CPT | Performed by: PHYSICIAN ASSISTANT

## 2023-02-10 PROCEDURE — 3078F DIAST BP <80 MM HG: CPT | Performed by: PHYSICIAN ASSISTANT

## 2023-02-10 PROCEDURE — 1123F ACP DISCUSS/DSCN MKR DOCD: CPT | Performed by: PHYSICIAN ASSISTANT

## 2023-02-10 PROCEDURE — 3074F SYST BP LT 130 MM HG: CPT | Performed by: PHYSICIAN ASSISTANT

## 2023-02-10 RX ORDER — MELATONIN
1000 DAILY
Qty: 30 TABLET | Refills: 5 | Status: SHIPPED | OUTPATIENT
Start: 2023-02-10

## 2023-02-10 RX ORDER — ESCITALOPRAM OXALATE 10 MG/1
10 TABLET ORAL DAILY
Qty: 90 TABLET | Refills: 1 | Status: SHIPPED | OUTPATIENT
Start: 2023-02-10 | End: 2023-05-11

## 2023-02-10 RX ORDER — NICOTINE 21 MG/24HR
1 PATCH, TRANSDERMAL 24 HOURS TRANSDERMAL EVERY 24 HOURS
Qty: 30 PATCH | Refills: 3 | Status: SHIPPED | OUTPATIENT
Start: 2023-02-10 | End: 2024-02-10

## 2023-02-10 RX ORDER — SIMVASTATIN 40 MG
TABLET ORAL
Qty: 90 TABLET | Refills: 4 | Status: SHIPPED | OUTPATIENT
Start: 2023-02-10

## 2023-02-10 RX ORDER — FLUTICASONE FUROATE AND VILANTEROL 100; 25 UG/1; UG/1
1 POWDER RESPIRATORY (INHALATION) DAILY
Qty: 60 EACH | Refills: 2 | Status: SHIPPED | OUTPATIENT
Start: 2023-02-10

## 2023-02-10 RX ORDER — METFORMIN HYDROCHLORIDE 500 MG/1
TABLET, EXTENDED RELEASE ORAL
Qty: 90 TABLET | Refills: 3 | Status: SHIPPED | OUTPATIENT
Start: 2023-02-10

## 2023-02-10 RX ORDER — ALBUTEROL SULFATE 90 UG/1
2 AEROSOL, METERED RESPIRATORY (INHALATION) 4 TIMES DAILY PRN
Qty: 6.7 EACH | Refills: 2 | Status: SHIPPED | OUTPATIENT
Start: 2023-02-10

## 2023-02-10 SDOH — ECONOMIC STABILITY: FOOD INSECURITY: WITHIN THE PAST 12 MONTHS, THE FOOD YOU BOUGHT JUST DIDN'T LAST AND YOU DIDN'T HAVE MONEY TO GET MORE.: NEVER TRUE

## 2023-02-10 SDOH — ECONOMIC STABILITY: INCOME INSECURITY: HOW HARD IS IT FOR YOU TO PAY FOR THE VERY BASICS LIKE FOOD, HOUSING, MEDICAL CARE, AND HEATING?: NOT HARD AT ALL

## 2023-02-10 SDOH — ECONOMIC STABILITY: HOUSING INSECURITY
IN THE LAST 12 MONTHS, WAS THERE A TIME WHEN YOU DID NOT HAVE A STEADY PLACE TO SLEEP OR SLEPT IN A SHELTER (INCLUDING NOW)?: NO

## 2023-02-10 SDOH — ECONOMIC STABILITY: FOOD INSECURITY: WITHIN THE PAST 12 MONTHS, YOU WORRIED THAT YOUR FOOD WOULD RUN OUT BEFORE YOU GOT MONEY TO BUY MORE.: NEVER TRUE

## 2023-02-10 ASSESSMENT — PATIENT HEALTH QUESTIONNAIRE - PHQ9
5. POOR APPETITE OR OVEREATING: 0
2. FEELING DOWN, DEPRESSED OR HOPELESS: 0
SUM OF ALL RESPONSES TO PHQ QUESTIONS 1-9: 0
SUM OF ALL RESPONSES TO PHQ9 QUESTIONS 1 & 2: 0
SUM OF ALL RESPONSES TO PHQ QUESTIONS 1-9: 0
8. MOVING OR SPEAKING SO SLOWLY THAT OTHER PEOPLE COULD HAVE NOTICED. OR THE OPPOSITE, BEING SO FIGETY OR RESTLESS THAT YOU HAVE BEEN MOVING AROUND A LOT MORE THAN USUAL: 0
6. FEELING BAD ABOUT YOURSELF - OR THAT YOU ARE A FAILURE OR HAVE LET YOURSELF OR YOUR FAMILY DOWN: 0
3. TROUBLE FALLING OR STAYING ASLEEP: 0
4. FEELING TIRED OR HAVING LITTLE ENERGY: 0
1. LITTLE INTEREST OR PLEASURE IN DOING THINGS: 0
7. TROUBLE CONCENTRATING ON THINGS, SUCH AS READING THE NEWSPAPER OR WATCHING TELEVISION: 0
SUM OF ALL RESPONSES TO PHQ QUESTIONS 1-9: 0
10. IF YOU CHECKED OFF ANY PROBLEMS, HOW DIFFICULT HAVE THESE PROBLEMS MADE IT FOR YOU TO DO YOUR WORK, TAKE CARE OF THINGS AT HOME, OR GET ALONG WITH OTHER PEOPLE: 0
9. THOUGHTS THAT YOU WOULD BE BETTER OFF DEAD, OR OF HURTING YOURSELF: 0
SUM OF ALL RESPONSES TO PHQ QUESTIONS 1-9: 0

## 2023-02-10 NOTE — PROGRESS NOTES
Grisel Clark Living 1955 is a 69 y.o. female who presents today with:  Chief Complaint   Patient presents with    3 Month Follow-Up       HPI  Depression and Anxiety  Doing well with lexapro. Patient compliant with medication. No se. Patient states anxiety and mood is more stable. She recently just lost her dog. No SI/HI. DM  Patient is here for DM f/u. Sugars have been fairly well-controlled and patient has not been experiencing hyper- or hypoglycemic symptoms. Compliance with meds is good and there are no side effects. Patient exercises occasionally and tries to eat healthy. CKD  Last microalbuminuria creatinine  levels at 141/6 on 11/11/22. Patient is avoiding nephrotoxic agents. Denies any swelling in legs. Patient is trying to drink plenty of fluids. Patient is here for hyperlipidemia. Is compliant with medications and has no apparent side effects from them. COPD  - compliant with her medication. - patient trying to stop smoking.   - PFT on 11/25/22 shows COPD with hyperinflation with no significant response to bronchodilator    Tobacco Dependence  - down to 10 cigarettes a day. Would like to try patch therapy. Review of Systems   Constitutional:  Negative for activity change, appetite change, chills and fever. HENT:  Negative for congestion, drooling, sinus pressure, sinus pain and sore throat. Eyes:  Negative for visual disturbance. Respiratory:  Negative for cough, chest tightness and shortness of breath. Cardiovascular:  Negative for chest pain. Gastrointestinal:  Negative for abdominal pain, diarrhea, nausea and vomiting. Endocrine: Negative for cold intolerance. Genitourinary:  Negative for dysuria, flank pain, frequency and hematuria. Musculoskeletal:  Negative for arthralgias and back pain. Skin:  Negative for rash. Allergic/Immunologic: Negative for food allergies.    Neurological:  Negative for weakness, light-headedness, numbness and headaches. Hematological:  Does not bruise/bleed easily. Past Medical History:   Diagnosis Date    Anxiety     Asthma     Chronic back pain     Chronic renal failure, stage 3a (HCC)     COPD (chronic obstructive pulmonary disease) (HCC)     Depression     History of tinnitus     Hyperlipidemia     Hypertension     Obesity     Osteoporosis     2019 T -3.2    Peripheral vascular disease (HCC)     Proteinuria     Type II or unspecified type diabetes mellitus without mention of complication, not stated as uncontrolled      Past Surgical History:   Procedure Laterality Date    BREAST SURGERY      L BREAST BIOPSY    FRACTURE SURGERY      TUBAL LIGATION       Social History     Socioeconomic History    Marital status:      Spouse name: Not on file    Number of children: Not on file    Years of education: Not on file    Highest education level: Not on file   Occupational History    Not on file   Tobacco Use    Smoking status: Some Days     Packs/day: 1.00     Years: 49.00     Pack years: 49.00     Types: Cigarettes    Smokeless tobacco: Never    Tobacco comments:     12/21/17 started age 11   Vaping Use    Vaping Use: Never used   Substance and Sexual Activity    Alcohol use: No     Comment: denies    Drug use: No    Sexual activity: Not Currently   Other Topics Concern    Not on file   Social History Narrative    Not on file     Social Determinants of Health     Financial Resource Strain: Low Risk     Difficulty of Paying Living Expenses: Not hard at all   Food Insecurity: No Food Insecurity    Worried About Running Out of Food in the Last Year: Never true    Ran Out of Food in the Last Year: Never true   Transportation Needs: No Transportation Needs    Lack of Transportation (Medical): No    Lack of Transportation (Non-Medical):  No   Physical Activity: Inactive    Days of Exercise per Week: 0 days    Minutes of Exercise per Session: 0 min   Stress: Not on file   Social Connections: Not on file   Intimate Partner Violence: Not on file   Housing Stability: Unknown    Unable to Pay for Housing in the Last Year: Not on file    Number of [place] in the Last Year: Not on file    Unstable Housing in the Last Year: No     Family History   Problem Relation Age of Onset    Heart Disease Sister     High Blood Pressure Other     Cancer Other     High Blood Pressure Mother     Cancer Brother      Allergies   Allergen Reactions    Nickel Itching and Rash     Current Outpatient Medications   Medication Sig Dispense Refill    albuterol sulfate HFA (PROVENTIL;VENTOLIN;PROAIR) 108 (90 Base) MCG/ACT inhaler Inhale 2 puffs into the lungs 4 times daily as needed for Wheezing 6.7 each 2    fluticasone furoate-vilanterol (BREO ELLIPTA) 100-25 MCG/ACT inhaler Inhale 1 puff into the lungs daily 60 each 2    escitalopram (LEXAPRO) 10 MG tablet Take 1 tablet by mouth daily 90 tablet 1    metFORMIN (GLUCOPHAGE-XR) 500 MG extended release tablet TAKE ONE TABLET BY MOUTH A DAY 90 tablet 3    simvastatin (ZOCOR) 40 MG tablet TAKE 1 TABLET BY MOUTH EVERY DAY IN THE EVENING 90 tablet 4    Multiple Vitamins-Minerals (HAIR SKIN AND NAILS FORMULA) TABS Take 1 tablet by mouth daily 30 tablet 5    vitamin D3 (CHOLECALCIFEROL) 25 MCG (1000 UT) TABS tablet Take 1 tablet by mouth daily 30 tablet 5    nicotine (NICODERM CQ) 14 MG/24HR Place 1 patch onto the skin every 24 hours 30 patch 3    Doxylamine Succinate, Sleep, (UNISOM PO) Take 1 tablet by mouth daily as needed      zoledronic acid (RECLAST) 5 MG/100ML SOLN Infuse 100 mLs intravenously once for 1 dose 733.01 100 mL 0     No current facility-administered medications for this visit. PMH, Surgical Hx, Family Hx, and Social Hx reviewed and updated. Health Maintenance reviewed.     Objective  Vitals:    02/10/23 1308   BP: 114/72   Site: Right Upper Arm   Position: Sitting   Cuff Size: Medium Adult   Pulse: (!) 107   Resp: 14   Temp: (!) 96.4 °F (35.8 °C)   TempSrc: Temporal   SpO2: 95% Weight: 154 lb (69.9 kg)   Height: 5' 3\" (1.6 m)     BP Readings from Last 3 Encounters:   02/10/23 114/72   11/10/22 118/76   07/07/22 128/70     Wt Readings from Last 3 Encounters:   02/10/23 154 lb (69.9 kg)   12/01/22 152 lb (68.9 kg)   11/23/22 152 lb (68.9 kg)       Lab Results   Component Value Date    LABA1C 7.2 11/10/2022    LABA1C 7.1 (H) 06/06/2022    LABA1C 5.2 07/21/2021     Lab Results   Component Value Date    LABMICR 11.30 (H) 11/11/2022    CREATININE 0.99 (H) 07/21/2021     Lab Results   Component Value Date    ALT 9 07/21/2021    AST 17 07/21/2021     Lab Results   Component Value Date    CHOL 156 12/13/2018    TRIG 171 12/13/2018    HDL 59 11/11/2022    LDLCALC 70 11/11/2022          Physical Exam  Vitals and nursing note reviewed. Constitutional:       General: She is awake. She is not in acute distress. Appearance: Normal appearance. She is well-developed. She is not ill-appearing, toxic-appearing or diaphoretic. HENT:      Head: Normocephalic and atraumatic. Right Ear: Hearing and external ear normal.      Left Ear: Hearing and external ear normal.      Nose: Nose normal.   Eyes:      General: Lids are normal. Vision grossly intact. Gaze aligned appropriately. Conjunctiva/sclera: Conjunctivae normal.      Pupils: Pupils are equal, round, and reactive to light. Cardiovascular:      Rate and Rhythm: Normal rate and regular rhythm. Pulses: Normal pulses. Heart sounds: Normal heart sounds, S1 normal and S2 normal.   Pulmonary:      Effort: Pulmonary effort is normal.      Breath sounds: Normal air entry. Wheezing present. Musculoskeletal:      Cervical back: Normal range of motion. Skin:     General: Skin is warm. Capillary Refill: Capillary refill takes less than 2 seconds. Neurological:      Mental Status: She is alert and oriented to person, place, and time. Gait: Gait is intact.    Psychiatric:         Attention and Perception: Attention normal. Mood and Affect: Mood normal.         Speech: Speech normal.         Behavior: Behavior normal. Behavior is cooperative. Assessment & Plan   1. Vitamin D deficiency  -     vitamin D3 (CHOLECALCIFEROL) 25 MCG (1000 UT) TABS tablet; Take 1 tablet by mouth daily, Disp-30 tablet, R-5Normal  2. Chronic obstructive pulmonary disease, unspecified COPD type ([facility]  Comments:  Stable, fair control on current meds. URged cessation of tobacco.  Assessment & Plan:   Borderline controlled, continue current medications, lifestyle modifications recommended and will start nicotine replacement  Orders:  -     albuterol sulfate HFA (PROVENTIL;VENTOLIN;PROAIR) 108 (90 Base) MCG/ACT inhaler; Inhale 2 puffs into the lungs 4 times daily as needed for Wheezing, Disp-6.7 each, R-2DX Code Needed  . Normal  -     fluticasone furoate-vilanterol (BREO ELLIPTA) 100-25 MCG/ACT inhaler; Inhale 1 puff into the lungs daily, Disp-60 each, R-2Normal  3. Recurrent major depressive disorder, in full remission ([facility]  Assessment & Plan:   Well-controlled, continue current medications  Orders:  -     escitalopram (LEXAPRO) 10 MG tablet; Take 1 tablet by mouth daily, Disp-90 tablet, R-1Normal  4. Chronic renal failure, stage 3a ([facility]  Assessment & Plan:   At goal, continue current medications and avoid nephrotoxic agents  Orders:  -     Comprehensive Metabolic Panel; Future  5. Anxiety  Comments:  Stable and relatively well-controlled on Lexapro. Assessment & Plan:   Well-controlled, continue current medications  Orders:  -     escitalopram (LEXAPRO) 10 MG tablet; Take 1 tablet by mouth daily, Disp-90 tablet, R-1Normal  6. Recurrent major depressive disorder, in full remission ([facility]  Comments:  Stable and relatively well-controlled on Lexapro  Assessment & Plan:   Well-controlled, continue current medications  Orders:  -     escitalopram (LEXAPRO) 10 MG tablet; Take 1 tablet by mouth daily, Disp-90 tablet, R-1Normal  7.  Mixed hyperlipidemia  Comments: Follow labs. Assessment & Plan:   At goal, continue current medications and lifestyle modifications recommended  Orders:  -     simvastatin (ZOCOR) 40 MG tablet; TAKE 1 TABLET BY MOUTH EVERY DAY IN THE EVENING, Disp-90 tablet, R-4Normal  8. Tobacco abuse  -     nicotine (NICODERM CQ) 14 MG/24HR; Place 1 patch onto the skin every 24 hours, Disp-30 patch, R-3Normal  9. Type 2 diabetes mellitus with stage 3a chronic kidney disease, without long-term current use of insulin ([facility]  Assessment & Plan:   At goal, continue current medications and lifestyle modifications recommended  Orders:  -     metFORMIN (GLUCOPHAGE-XR) 500 MG extended release tablet; TAKE ONE TABLET BY MOUTH A DAY, Disp-90 tablet, R-3Normal  10. Essential hypertension  Assessment & Plan:   Well-controlled, continue current medications     Orders Placed This Encounter   Procedures    Comprehensive Metabolic Panel     Standing Status:   Future     Standing Expiration Date:   2/10/2024     Orders Placed This Encounter   Medications    albuterol sulfate HFA (PROVENTIL;VENTOLIN;PROAIR) 108 (90 Base) MCG/ACT inhaler     Sig: Inhale 2 puffs into the lungs 4 times daily as needed for Wheezing     Dispense:  6.7 each     Refill:  2     DX Code Needed  .     fluticasone furoate-vilanterol (BREO ELLIPTA) 100-25 MCG/ACT inhaler     Sig: Inhale 1 puff into the lungs daily     Dispense:  60 each     Refill:  2    escitalopram (LEXAPRO) 10 MG tablet     Sig: Take 1 tablet by mouth daily     Dispense:  90 tablet     Refill:  1    metFORMIN (GLUCOPHAGE-XR) 500 MG extended release tablet     Sig: TAKE ONE TABLET BY MOUTH A DAY     Dispense:  90 tablet     Refill:  3    simvastatin (ZOCOR) 40 MG tablet     Sig: TAKE 1 TABLET BY MOUTH EVERY DAY IN THE EVENING     Dispense:  90 tablet     Refill:  4    Multiple Vitamins-Minerals (HAIR SKIN AND NAILS FORMULA) TABS     Sig: Take 1 tablet by mouth daily     Dispense:  30 tablet     Refill:  5 vitamin D3 (CHOLECALCIFEROL) 25 MCG (1000 UT) TABS tablet     Sig: Take 1 tablet by mouth daily     Dispense:  30 tablet     Refill:  5    nicotine (NICODERM CQ) 14 MG/24HR     Sig: Place 1 patch onto the skin every 24 hours     Dispense:  30 patch     Refill:  3     Medications Discontinued During This Encounter   Medication Reason    BREO ELLIPTA 100-25 MCG/INH AEPB inhaler LIST CLEANUP    vitamin D (CHOLECALCIFEROL) 1000 UNIT TABS tablet LIST CLEANUP    Naproxen Sodium 220 MG CAPS LIST CLEANUP    Multiple Vitamins-Minerals (HAIR SKIN AND NAILS FORMULA) TABS REORDER    metFORMIN (GLUCOPHAGE-XR) 500 MG extended release tablet REORDER    simvastatin (ZOCOR) 40 MG tablet REORDER    albuterol sulfate HFA (PROVENTIL;VENTOLIN;PROAIR) 108 (90 Base) MCG/ACT inhaler REORDER    escitalopram (LEXAPRO) 10 MG tablet REORDER     Return for Follow Up AWV. On this date 2/10/2023 I have spent 30 minutes reviewing previous notes, test results and face to face with the patient discussing the diagnosis and importance of compliance with the treatment plan as well as documenting on the day of the visit. Reviewed with the patient: current clinical status, medications, activities and diet. Side effects, adverse effects of the medication prescribed today, as well as treatment plan/ rationale and result expectations have been discussed with the patient who expresses understanding and desires to proceed. Close follow up to evaluate treatment results and for coordination of care. I have reviewed the patient's medical history in detail and updated the computerized patient record.     Jhon Anders

## 2023-02-11 ASSESSMENT — ENCOUNTER SYMPTOMS
SHORTNESS OF BREATH: 0
SINUS PRESSURE: 0
NAUSEA: 0
ABDOMINAL PAIN: 0
DIARRHEA: 0
COUGH: 0
SORE THROAT: 0
CHEST TIGHTNESS: 0
VOMITING: 0
SINUS PAIN: 0
BACK PAIN: 0

## 2023-02-11 ASSESSMENT — VISUAL ACUITY: OU: 1

## 2023-02-12 NOTE — ASSESSMENT & PLAN NOTE
Borderline controlled, continue current medications, lifestyle modifications recommended and will start nicotine replacement

## 2023-03-04 DIAGNOSIS — E55.9 VITAMIN D DEFICIENCY: ICD-10-CM

## 2023-03-06 RX ORDER — MELATONIN
Qty: 30 TABLET | Refills: 5 | OUTPATIENT
Start: 2023-03-06

## 2023-05-03 DIAGNOSIS — F41.9 ANXIETY: ICD-10-CM

## 2023-05-03 DIAGNOSIS — F33.42 RECURRENT MAJOR DEPRESSIVE DISORDER, IN FULL REMISSION (HCC): ICD-10-CM

## 2023-05-03 NOTE — TELEPHONE ENCOUNTER
Rx request   Requested Prescriptions     Pending Prescriptions Disp Refills    escitalopram (LEXAPRO) 10 MG tablet [Pharmacy Med Name: ESCITALOPRAM 10 MG TABLET] 130 tablet 1     Sig: TAKE 1 AND 1/2 TABLETS BY MOUTH DAILY     LOV 2/10/2023  Next Visit Date:  Future Appointments   Date Time Provider Josué Vargas   5/11/2023  1:00 PM Reina Taveras

## 2023-05-04 RX ORDER — ESCITALOPRAM OXALATE 10 MG/1
TABLET ORAL
Qty: 130 TABLET | Refills: 1 | Status: SHIPPED | OUTPATIENT
Start: 2023-05-04

## 2023-05-11 ENCOUNTER — OFFICE VISIT (OUTPATIENT)
Dept: FAMILY MEDICINE CLINIC | Age: 68
End: 2023-05-11

## 2023-05-11 VITALS
DIASTOLIC BLOOD PRESSURE: 75 MMHG | WEIGHT: 162.8 LBS | OXYGEN SATURATION: 94 % | HEIGHT: 63 IN | BODY MASS INDEX: 28.84 KG/M2 | SYSTOLIC BLOOD PRESSURE: 118 MMHG | TEMPERATURE: 98.2 F | HEART RATE: 110 BPM

## 2023-05-11 DIAGNOSIS — Z72.0 TOBACCO ABUSE: ICD-10-CM

## 2023-05-11 DIAGNOSIS — Z87.891 PERSONAL HISTORY OF TOBACCO USE, PRESENTING HAZARDS TO HEALTH: ICD-10-CM

## 2023-05-11 DIAGNOSIS — N18.31 CHRONIC RENAL FAILURE, STAGE 3A (HCC): ICD-10-CM

## 2023-05-11 DIAGNOSIS — Z12.31 ENCOUNTER FOR SCREENING MAMMOGRAM FOR BREAST CANCER: ICD-10-CM

## 2023-05-11 DIAGNOSIS — Z00.00 MEDICARE ANNUAL WELLNESS VISIT, SUBSEQUENT: Primary | ICD-10-CM

## 2023-05-11 DIAGNOSIS — E11.22 TYPE 2 DIABETES MELLITUS WITH STAGE 3A CHRONIC KIDNEY DISEASE, WITHOUT LONG-TERM CURRENT USE OF INSULIN (HCC): ICD-10-CM

## 2023-05-11 DIAGNOSIS — Z78.0 ASYMPTOMATIC MENOPAUSAL STATE: ICD-10-CM

## 2023-05-11 DIAGNOSIS — E55.9 VITAMIN D DEFICIENCY: ICD-10-CM

## 2023-05-11 DIAGNOSIS — N18.31 TYPE 2 DIABETES MELLITUS WITH STAGE 3A CHRONIC KIDNEY DISEASE, WITHOUT LONG-TERM CURRENT USE OF INSULIN (HCC): ICD-10-CM

## 2023-05-11 ASSESSMENT — PATIENT HEALTH QUESTIONNAIRE - PHQ9
2. FEELING DOWN, DEPRESSED OR HOPELESS: 0
4. FEELING TIRED OR HAVING LITTLE ENERGY: 1
5. POOR APPETITE OR OVEREATING: 1
1. LITTLE INTEREST OR PLEASURE IN DOING THINGS: 0
SUM OF ALL RESPONSES TO PHQ QUESTIONS 1-9: 2
SUM OF ALL RESPONSES TO PHQ QUESTIONS 1-9: 2
10. IF YOU CHECKED OFF ANY PROBLEMS, HOW DIFFICULT HAVE THESE PROBLEMS MADE IT FOR YOU TO DO YOUR WORK, TAKE CARE OF THINGS AT HOME, OR GET ALONG WITH OTHER PEOPLE: 0
SUM OF ALL RESPONSES TO PHQ QUESTIONS 1-9: 2
9. THOUGHTS THAT YOU WOULD BE BETTER OFF DEAD, OR OF HURTING YOURSELF: 0
3. TROUBLE FALLING OR STAYING ASLEEP: 0
8. MOVING OR SPEAKING SO SLOWLY THAT OTHER PEOPLE COULD HAVE NOTICED. OR THE OPPOSITE, BEING SO FIGETY OR RESTLESS THAT YOU HAVE BEEN MOVING AROUND A LOT MORE THAN USUAL: 0
7. TROUBLE CONCENTRATING ON THINGS, SUCH AS READING THE NEWSPAPER OR WATCHING TELEVISION: 0
SUM OF ALL RESPONSES TO PHQ9 QUESTIONS 1 & 2: 0
SUM OF ALL RESPONSES TO PHQ QUESTIONS 1-9: 2
6. FEELING BAD ABOUT YOURSELF - OR THAT YOU ARE A FAILURE OR HAVE LET YOURSELF OR YOUR FAMILY DOWN: 0

## 2023-05-11 ASSESSMENT — LIFESTYLE VARIABLES
HOW MANY STANDARD DRINKS CONTAINING ALCOHOL DO YOU HAVE ON A TYPICAL DAY: PATIENT DOES NOT DRINK
HOW OFTEN DO YOU HAVE A DRINK CONTAINING ALCOHOL: NEVER

## 2023-05-23 ENCOUNTER — HOSPITAL ENCOUNTER (OUTPATIENT)
Dept: WOMENS IMAGING | Age: 68
Discharge: HOME | End: 2023-05-25
Payer: MEDICARE

## 2023-05-23 DIAGNOSIS — Z78.0 ASYMPTOMATIC MENOPAUSAL STATE: ICD-10-CM

## 2023-05-23 DIAGNOSIS — Z12.31 ENCOUNTER FOR SCREENING MAMMOGRAM FOR BREAST CANCER: ICD-10-CM

## 2023-05-23 PROCEDURE — 77080 DXA BONE DENSITY AXIAL: CPT

## 2023-05-23 PROCEDURE — 77063 BREAST TOMOSYNTHESIS BI: CPT

## 2023-06-09 ENCOUNTER — TELEPHONE (OUTPATIENT)
Dept: FAMILY MEDICINE CLINIC | Age: 68
End: 2023-06-09

## 2023-06-21 DIAGNOSIS — J44.9 CHRONIC OBSTRUCTIVE PULMONARY DISEASE, UNSPECIFIED COPD TYPE (HCC): ICD-10-CM

## 2023-06-21 RX ORDER — FLUTICASONE FUROATE AND VILANTEROL TRIFENATATE 100; 25 UG/1; UG/1
POWDER RESPIRATORY (INHALATION)
Qty: 60 EACH | Refills: 2 | Status: SHIPPED | OUTPATIENT
Start: 2023-06-21

## 2023-06-21 NOTE — TELEPHONE ENCOUNTER
Comments:     Last Office Visit (last PCP visit):   5/11/2023    Next Visit Date:  Future Appointments   Date Time Provider Josué Vargas   11/13/2023  1:00 PM KENJI Gomez [facility]       **If hasn't been seen in over a year OR hasn't followed up according to last diabetes/ADHD visit, make appointment for patient before sending refill to provider.     Rx requested:  Requested Prescriptions     Pending Prescriptions Disp Refills    BREO ELLIPTA 100-25 MCG/ACT inhaler [Pharmacy Med Name: BREO ELLIPTA 100-25 MCG INH] 60 each 2     Sig: TAKE 1 PUFF BY MOUTH EVERY DAY

## 2023-09-14 DIAGNOSIS — E78.2 MIXED HYPERLIPIDEMIA: ICD-10-CM

## 2023-09-14 DIAGNOSIS — J44.9 CHRONIC OBSTRUCTIVE PULMONARY DISEASE, UNSPECIFIED COPD TYPE (HCC): ICD-10-CM

## 2023-09-14 DIAGNOSIS — F33.42 RECURRENT MAJOR DEPRESSIVE DISORDER, IN FULL REMISSION (HCC): ICD-10-CM

## 2023-09-14 DIAGNOSIS — F41.9 ANXIETY: ICD-10-CM

## 2023-09-14 RX ORDER — SIMVASTATIN 40 MG
TABLET ORAL
Qty: 90 TABLET | Refills: 4 | OUTPATIENT
Start: 2023-09-14

## 2023-09-14 RX ORDER — ESCITALOPRAM OXALATE 10 MG/1
15 TABLET ORAL DAILY
Qty: 135 TABLET | Refills: 0 | Status: SHIPPED | OUTPATIENT
Start: 2023-09-14 | End: 2023-12-13

## 2023-09-14 RX ORDER — FLUTICASONE FUROATE AND VILANTEROL 100; 25 UG/1; UG/1
POWDER RESPIRATORY (INHALATION)
Qty: 60 EACH | Refills: 2 | Status: SHIPPED | OUTPATIENT
Start: 2023-09-14

## 2023-09-14 RX ORDER — ALBUTEROL SULFATE 90 UG/1
2 AEROSOL, METERED RESPIRATORY (INHALATION) 4 TIMES DAILY PRN
Qty: 6.7 EACH | Refills: 2 | Status: SHIPPED | OUTPATIENT
Start: 2023-09-14

## 2023-09-14 NOTE — TELEPHONE ENCOUNTER
Refill Request. Patient states that she will be looking for a new provider because she is not interested in changing her insurance.  She requested that we cancell her appointment on 11/13/2023    LOV  5/11/2023

## 2023-09-14 NOTE — TELEPHONE ENCOUNTER
Helen sent about 2-3 month supply. Please inform patient to establish with another provider before November.

## 2023-10-25 ENCOUNTER — TRANSCRIBE ORDERS (OUTPATIENT)
Dept: ADMINISTRATIVE | Age: 68
End: 2023-10-25

## 2023-10-25 DIAGNOSIS — Z12.2 SCREENING FOR LUNG CANCER: Primary | ICD-10-CM

## 2023-10-25 DIAGNOSIS — Z12.2 SCREENING FOR MALIGNANT NEOPLASM OF RESPIRATORY ORGAN: ICD-10-CM

## 2023-12-14 DIAGNOSIS — F41.9 ANXIETY: ICD-10-CM

## 2023-12-14 DIAGNOSIS — F33.42 RECURRENT MAJOR DEPRESSIVE DISORDER, IN FULL REMISSION (HCC): ICD-10-CM

## 2023-12-14 RX ORDER — ESCITALOPRAM OXALATE 10 MG/1
TABLET ORAL
Qty: 135 TABLET | Refills: 0 | OUTPATIENT
Start: 2023-12-14

## 2023-12-21 DIAGNOSIS — J44.9 CHRONIC OBSTRUCTIVE PULMONARY DISEASE, UNSPECIFIED COPD TYPE (HCC): ICD-10-CM

## 2023-12-21 RX ORDER — ALBUTEROL SULFATE 90 UG/1
2 AEROSOL, METERED RESPIRATORY (INHALATION) 4 TIMES DAILY PRN
Qty: 6.7 EACH | Refills: 2 | OUTPATIENT
Start: 2023-12-21

## 2024-01-18 ENCOUNTER — APPOINTMENT (OUTPATIENT)
Dept: CT IMAGING | Age: 69
DRG: 064 | End: 2024-01-18
Payer: MEDICARE

## 2024-01-18 ENCOUNTER — APPOINTMENT (OUTPATIENT)
Dept: GENERAL RADIOLOGY | Age: 69
DRG: 064 | End: 2024-01-18
Payer: MEDICARE

## 2024-01-18 ENCOUNTER — APPOINTMENT (OUTPATIENT)
Dept: CT IMAGING | Age: 69
DRG: 064 | End: 2024-01-18
Attending: INTERNAL MEDICINE
Payer: MEDICARE

## 2024-01-18 ENCOUNTER — HOSPITAL ENCOUNTER (INPATIENT)
Age: 69
LOS: 5 days | Discharge: HOME | DRG: 064 | End: 2024-01-23
Attending: INTERNAL MEDICINE | Admitting: INTERNAL MEDICINE
Payer: MEDICARE

## 2024-01-18 DIAGNOSIS — I61.0 BASAL GANGLIA HEMORRHAGE (HCC): ICD-10-CM

## 2024-01-18 DIAGNOSIS — I63.512 CEREBRAL INFARCTION DUE TO OCCLUSION OF LEFT MIDDLE CEREBRAL ARTERY (HCC): Primary | ICD-10-CM

## 2024-01-18 PROBLEM — I62.9 INTRACRANIAL HEMORRHAGE (HCC): Status: ACTIVE | Noted: 2024-01-18

## 2024-01-18 LAB
ALBUMIN SERPL-MCNC: 2 G/DL (ref 3.5–4.6)
ALP SERPL-CCNC: 16 U/L (ref 40–130)
ALT SERPL-CCNC: 37 U/L (ref 0–33)
ANION GAP SERPL CALCULATED.3IONS-SCNC: 15 MEQ/L (ref 9–15)
ANION GAP SERPL CALCULATED.3IONS-SCNC: 15 MEQ/L (ref 9–15)
APTT PPP: 27.3 SEC (ref 24.4–36.8)
AST SERPL-CCNC: 38 U/L (ref 0–35)
BASOPHILS # BLD: 0 K/UL (ref 0–0.2)
BASOPHILS NFR BLD: 0.2 %
BILIRUB SERPL-MCNC: <0.2 MG/DL (ref 0.2–0.7)
BUN SERPL-MCNC: 36 MG/DL (ref 8–23)
BUN SERPL-MCNC: 41 MG/DL (ref 8–23)
CALCIUM SERPL-MCNC: 6.6 MG/DL (ref 8.5–9.9)
CALCIUM SERPL-MCNC: 9.3 MG/DL (ref 8.5–9.9)
CHLORIDE SERPL-SCNC: 109 MEQ/L (ref 95–107)
CHLORIDE SERPL-SCNC: 98 MEQ/L (ref 95–107)
CO2 SERPL-SCNC: 16 MEQ/L (ref 20–31)
CO2 SERPL-SCNC: 24 MEQ/L (ref 20–31)
CREAT SERPL-MCNC: 1 MG/DL (ref 0.5–0.9)
CREAT SERPL-MCNC: 1.14 MG/DL (ref 0.5–0.9)
EOSINOPHIL # BLD: 0.1 K/UL (ref 0–0.7)
EOSINOPHIL NFR BLD: 1 %
ERYTHROCYTE [DISTWIDTH] IN BLOOD BY AUTOMATED COUNT: 12.8 % (ref 11.5–14.5)
GLOBULIN SER CALC-MCNC: 3.3 G/DL (ref 2.3–3.5)
GLUCOSE BLD-MCNC: 141 MG/DL (ref 70–99)
GLUCOSE BLD-MCNC: 174 MG/DL (ref 70–99)
GLUCOSE BLD-MCNC: 224 MG/DL (ref 70–99)
GLUCOSE SERPL-MCNC: 171 MG/DL (ref 70–99)
GLUCOSE SERPL-MCNC: 183 MG/DL (ref 70–99)
HCT VFR BLD AUTO: 42.9 % (ref 37–47)
HGB BLD-MCNC: 13.8 G/DL (ref 12–16)
INR PPP: 1.1
LYMPHOCYTES # BLD: 0.9 K/UL (ref 1–4.8)
LYMPHOCYTES NFR BLD: 8.9 %
MAGNESIUM SERPL-MCNC: 1.7 MG/DL (ref 1.7–2.4)
MCH RBC QN AUTO: 29.9 PG (ref 27–31.3)
MCHC RBC AUTO-ENTMCNC: 32.2 % (ref 33–37)
MCV RBC AUTO: 93.1 FL (ref 79.4–94.8)
MONOCYTES # BLD: 0.6 K/UL (ref 0.2–0.8)
MONOCYTES NFR BLD: 5.9 %
NEUTROPHILS # BLD: 8.7 K/UL (ref 1.4–6.5)
NEUTS SEG NFR BLD: 83 %
PERFORMED ON: ABNORMAL
PLATELET # BLD AUTO: 277 K/UL (ref 130–400)
POC CREATININE WHOLE BLOOD: 1
POTASSIUM SERPL-SCNC: 2.6 MEQ/L (ref 3.4–4.9)
POTASSIUM SERPL-SCNC: 3.7 MEQ/L (ref 3.4–4.9)
PROT SERPL-MCNC: 5.3 G/DL (ref 6.3–8)
PROTHROMBIN TIME: 14.4 SEC (ref 12.3–14.9)
RBC # BLD AUTO: 4.61 M/UL (ref 4.2–5.4)
SODIUM SERPL-SCNC: 137 MEQ/L (ref 135–144)
SODIUM SERPL-SCNC: 140 MEQ/L (ref 135–144)
TROPONIN, HIGH SENSITIVITY: 14 NG/L (ref 0–19)
TROPONIN, HIGH SENSITIVITY: 16 NG/L (ref 0–19)
WBC # BLD AUTO: 10.5 K/UL (ref 4.8–10.8)

## 2024-01-18 PROCEDURE — 80053 COMPREHEN METABOLIC PANEL: CPT

## 2024-01-18 PROCEDURE — 93005 ELECTROCARDIOGRAM TRACING: CPT

## 2024-01-18 PROCEDURE — 6360000002 HC RX W HCPCS

## 2024-01-18 PROCEDURE — 87088 URINE BACTERIA CULTURE: CPT

## 2024-01-18 PROCEDURE — 6370000000 HC RX 637 (ALT 250 FOR IP): Performed by: INTERNAL MEDICINE

## 2024-01-18 PROCEDURE — 85610 PROTHROMBIN TIME: CPT

## 2024-01-18 PROCEDURE — 96365 THER/PROPH/DIAG IV INF INIT: CPT

## 2024-01-18 PROCEDURE — 71045 X-RAY EXAM CHEST 1 VIEW: CPT

## 2024-01-18 PROCEDURE — 2580000003 HC RX 258: Performed by: INTERNAL MEDICINE

## 2024-01-18 PROCEDURE — 87077 CULTURE AEROBIC IDENTIFY: CPT

## 2024-01-18 PROCEDURE — B244ZZ4 ULTRASONOGRAPHY OF RIGHT HEART, TRANSESOPHAGEAL: ICD-10-PCS | Performed by: INTERNAL MEDICINE

## 2024-01-18 PROCEDURE — 70496 CT ANGIOGRAPHY HEAD: CPT

## 2024-01-18 PROCEDURE — 2000000000 HC ICU R&B

## 2024-01-18 PROCEDURE — 85730 THROMBOPLASTIN TIME PARTIAL: CPT

## 2024-01-18 PROCEDURE — 87186 SC STD MICRODIL/AGAR DIL: CPT

## 2024-01-18 PROCEDURE — 84484 ASSAY OF TROPONIN QUANT: CPT

## 2024-01-18 PROCEDURE — 92610 EVALUATE SWALLOWING FUNCTION: CPT

## 2024-01-18 PROCEDURE — 99222 1ST HOSP IP/OBS MODERATE 55: CPT | Performed by: NEUROLOGICAL SURGERY

## 2024-01-18 PROCEDURE — 70498 CT ANGIOGRAPHY NECK: CPT

## 2024-01-18 PROCEDURE — 94640 AIRWAY INHALATION TREATMENT: CPT

## 2024-01-18 PROCEDURE — 70450 CT HEAD/BRAIN W/O DYE: CPT

## 2024-01-18 PROCEDURE — 6360000004 HC RX CONTRAST MEDICATION

## 2024-01-18 PROCEDURE — 36415 COLL VENOUS BLD VENIPUNCTURE: CPT

## 2024-01-18 PROCEDURE — 99285 EMERGENCY DEPT VISIT HI MDM: CPT

## 2024-01-18 PROCEDURE — 83735 ASSAY OF MAGNESIUM: CPT

## 2024-01-18 PROCEDURE — 87086 URINE CULTURE/COLONY COUNT: CPT

## 2024-01-18 PROCEDURE — 85025 COMPLETE CBC W/AUTO DIFF WBC: CPT

## 2024-01-18 PROCEDURE — 81001 URINALYSIS AUTO W/SCOPE: CPT

## 2024-01-18 PROCEDURE — 99291 CRITICAL CARE FIRST HOUR: CPT | Performed by: INTERNAL MEDICINE

## 2024-01-18 RX ORDER — NICOTINE 21 MG/24HR
1 PATCH, TRANSDERMAL 24 HOURS TRANSDERMAL EVERY 24 HOURS
Status: DISCONTINUED | OUTPATIENT
Start: 2024-01-18 | End: 2024-01-23 | Stop reason: HOSPADM

## 2024-01-18 RX ORDER — ALBUTEROL SULFATE 90 UG/1
2 AEROSOL, METERED RESPIRATORY (INHALATION) 4 TIMES DAILY PRN
Status: DISCONTINUED | OUTPATIENT
Start: 2024-01-18 | End: 2024-01-23 | Stop reason: HOSPADM

## 2024-01-18 RX ORDER — ONDANSETRON 2 MG/ML
4 INJECTION INTRAMUSCULAR; INTRAVENOUS EVERY 6 HOURS PRN
Status: DISCONTINUED | OUTPATIENT
Start: 2024-01-18 | End: 2024-01-23 | Stop reason: HOSPADM

## 2024-01-18 RX ORDER — INSULIN LISPRO 100 [IU]/ML
0-4 INJECTION, SOLUTION INTRAVENOUS; SUBCUTANEOUS NIGHTLY
Status: DISCONTINUED | OUTPATIENT
Start: 2024-01-18 | End: 2024-01-23 | Stop reason: HOSPADM

## 2024-01-18 RX ORDER — ESCITALOPRAM OXALATE 10 MG/1
15 TABLET ORAL DAILY
Status: DISCONTINUED | OUTPATIENT
Start: 2024-01-18 | End: 2024-01-23 | Stop reason: HOSPADM

## 2024-01-18 RX ORDER — SODIUM CHLORIDE 0.9 % (FLUSH) 0.9 %
5-40 SYRINGE (ML) INJECTION EVERY 12 HOURS SCHEDULED
Status: DISCONTINUED | OUTPATIENT
Start: 2024-01-18 | End: 2024-01-23 | Stop reason: HOSPADM

## 2024-01-18 RX ORDER — ROSUVASTATIN CALCIUM 40 MG/1
40 TABLET, COATED ORAL NIGHTLY
Status: DISCONTINUED | OUTPATIENT
Start: 2024-01-18 | End: 2024-01-23 | Stop reason: HOSPADM

## 2024-01-18 RX ORDER — DEXTROSE MONOHYDRATE 100 MG/ML
INJECTION, SOLUTION INTRAVENOUS CONTINUOUS PRN
Status: DISCONTINUED | OUTPATIENT
Start: 2024-01-18 | End: 2024-01-23 | Stop reason: HOSPADM

## 2024-01-18 RX ORDER — BUDESONIDE AND FORMOTEROL FUMARATE DIHYDRATE 80; 4.5 UG/1; UG/1
2 AEROSOL RESPIRATORY (INHALATION)
Status: DISCONTINUED | OUTPATIENT
Start: 2024-01-18 | End: 2024-01-23 | Stop reason: HOSPADM

## 2024-01-18 RX ORDER — ONDANSETRON 4 MG/1
4 TABLET, ORALLY DISINTEGRATING ORAL EVERY 8 HOURS PRN
Status: DISCONTINUED | OUTPATIENT
Start: 2024-01-18 | End: 2024-01-23 | Stop reason: HOSPADM

## 2024-01-18 RX ORDER — POTASSIUM CHLORIDE 7.45 MG/ML
10 INJECTION INTRAVENOUS ONCE
Status: COMPLETED | OUTPATIENT
Start: 2024-01-18 | End: 2024-01-18

## 2024-01-18 RX ORDER — ESCITALOPRAM OXALATE 10 MG/1
15 TABLET ORAL DAILY
Status: ON HOLD | COMMUNITY

## 2024-01-18 RX ORDER — INSULIN LISPRO 100 [IU]/ML
0-4 INJECTION, SOLUTION INTRAVENOUS; SUBCUTANEOUS
Status: DISCONTINUED | OUTPATIENT
Start: 2024-01-18 | End: 2024-01-23 | Stop reason: HOSPADM

## 2024-01-18 RX ORDER — SODIUM CHLORIDE, SODIUM LACTATE, POTASSIUM CHLORIDE, CALCIUM CHLORIDE 600; 310; 30; 20 MG/100ML; MG/100ML; MG/100ML; MG/100ML
INJECTION, SOLUTION INTRAVENOUS CONTINUOUS
Status: DISCONTINUED | OUTPATIENT
Start: 2024-01-18 | End: 2024-01-19

## 2024-01-18 RX ORDER — FLUTICASONE FUROATE AND VILANTEROL 100; 25 UG/1; UG/1
1 POWDER RESPIRATORY (INHALATION) DAILY
Status: DISCONTINUED | OUTPATIENT
Start: 2024-01-18 | End: 2024-01-18 | Stop reason: CLARIF

## 2024-01-18 RX ORDER — LABETALOL HYDROCHLORIDE 5 MG/ML
10 INJECTION, SOLUTION INTRAVENOUS EVERY 10 MIN PRN
Status: DISCONTINUED | OUTPATIENT
Start: 2024-01-18 | End: 2024-01-23 | Stop reason: HOSPADM

## 2024-01-18 RX ORDER — SODIUM CHLORIDE 0.9 % (FLUSH) 0.9 %
5-40 SYRINGE (ML) INJECTION PRN
Status: DISCONTINUED | OUTPATIENT
Start: 2024-01-18 | End: 2024-01-23 | Stop reason: HOSPADM

## 2024-01-18 RX ORDER — BUDESONIDE AND FORMOTEROL FUMARATE DIHYDRATE 80; 4.5 UG/1; UG/1
AEROSOL RESPIRATORY (INHALATION)
Status: DISCONTINUED
Start: 2024-01-18 | End: 2024-01-18 | Stop reason: WASHOUT

## 2024-01-18 RX ORDER — SODIUM CHLORIDE 9 MG/ML
INJECTION, SOLUTION INTRAVENOUS PRN
Status: DISCONTINUED | OUTPATIENT
Start: 2024-01-18 | End: 2024-01-23 | Stop reason: HOSPADM

## 2024-01-18 RX ORDER — ACETAMINOPHEN 325 MG/1
650 TABLET ORAL EVERY 4 HOURS PRN
Status: DISCONTINUED | OUTPATIENT
Start: 2024-01-18 | End: 2024-01-23 | Stop reason: HOSPADM

## 2024-01-18 RX ADMIN — POTASSIUM CHLORIDE 10 MEQ: 7.46 INJECTION, SOLUTION INTRAVENOUS at 09:33

## 2024-01-18 RX ADMIN — SODIUM CHLORIDE, POTASSIUM CHLORIDE, SODIUM LACTATE AND CALCIUM CHLORIDE: 600; 310; 30; 20 INJECTION, SOLUTION INTRAVENOUS at 16:12

## 2024-01-18 RX ADMIN — IOPAMIDOL 75 ML: 612 INJECTION, SOLUTION INTRAVENOUS at 09:17

## 2024-01-18 RX ADMIN — Medication 10 ML: at 21:06

## 2024-01-18 RX ADMIN — BUDESONIDE AND FORMOTEROL FUMARATE DIHYDRATE 2 PUFF: 80; 4.5 AEROSOL RESPIRATORY (INHALATION) at 18:00

## 2024-01-18 ASSESSMENT — PAIN DESCRIPTION - LOCATION
LOCATION: ARM

## 2024-01-18 ASSESSMENT — PAIN - FUNCTIONAL ASSESSMENT
PAIN_FUNCTIONAL_ASSESSMENT: 0-10

## 2024-01-18 ASSESSMENT — PAIN SCALES - GENERAL
PAINLEVEL_OUTOF10: 8
PAINLEVEL_OUTOF10: 0
PAINLEVEL_OUTOF10: 8
PAINLEVEL_OUTOF10: 0
PAINLEVEL_OUTOF10: 8
PAINLEVEL_OUTOF10: 8

## 2024-01-18 ASSESSMENT — PAIN DESCRIPTION - ORIENTATION
ORIENTATION: RIGHT

## 2024-01-18 NOTE — H&P
Problem Relation Age of Onset    Heart Disease Sister     High Blood Pressure Other     Cancer Other     High Blood Pressure Mother     Cancer Brother        Review of Systems   12 point review of systems reviewed with patient with pertinent positives listed in HPI otherwise ROS negative    Physical Exam   /83   Pulse (!) 109   Temp 97.5 °F (36.4 °C) (Tympanic)   Resp 20   Ht 1.6 m (5' 3\")   Wt 70.8 kg (156 lb)   SpO2 94%   BMI 27.63 kg/m²       CONSTITUTIONAL:  Awake, alert, no apparent distress, and appears stated age  EYES: pupils equal, round and reactive to light   NECK:  Supple   LUNGS:  No increased work of breathing, good air exchange, clear to auscultation bilaterally, no crackles or wheezing  CARDIOVASCULAR:  Normal apical impulse, regular rate and rhythm  ABDOMEN:  No scars, normal bowel sounds, soft, non-distended, non-tender  MUSCULOSKELETAL:  There is no redness, warmth, or swelling of the joints.  Full range of motion noted  NEUROLOGIC:  Awake, oriented to self and situation.  Right-sided facial droop, right-sided weakness  SKIN:  No bruising or bleeding, normal skin color, texture, turgor, no redness, warmth, or swelling, no rashes, and no lesions    Labs      Recent Results (from the past 24 hour(s))   POCT Glucose    Collection Time: 01/18/24  8:24 AM   Result Value Ref Range    POC Glucose 224 (H) 70 - 99 mg/dl    Performed on ACCU-CHEK    CBC with Auto Differential    Collection Time: 01/18/24  8:30 AM   Result Value Ref Range    WBC 10.5 4.8 - 10.8 K/uL    RBC 4.61 4.20 - 5.40 M/uL    Hemoglobin 13.8 12.0 - 16.0 g/dL    Hematocrit 42.9 37.0 - 47.0 %    MCV 93.1 79.4 - 94.8 fL    MCH 29.9 27.0 - 31.3 pg    MCHC 32.2 (L) 33.0 - 37.0 %    RDW 12.8 11.5 - 14.5 %    Platelets 277 130 - 400 K/uL    Neutrophils % 83.0 %    Lymphocytes % 8.9 %    Monocytes % 5.9 %    Eosinophils % 1.0 %    Basophils % 0.2 %    Neutrophils Absolute 8.7 (H) 1.4 - 6.5 K/uL    Lymphocytes Absolute 0.9 (L) 1.0  DROOP, R SIDED WEAKNESS, SLURRED SPEECH TECHNOLOGIST PROVIDED HISTORY: Reason for exam:->R SIDED FACIAL DROOP, R SIDED WEAKNESS, SLURRED SPEECH Has a \"code stroke\" or \"stroke alert\" been called?->Yes Decision Support Exception - unselect if not a suspected or confirmed emergency medical condition->Emergency Medical Condition (MA) What reading provider will be dictating this exam?->CRC FINDINGS: BRAIN/VENTRICLES: In approximately 2.0 by 1.8 x 1.5 cm acute intraparenchymal hematoma is present within the corona radiata inferolateral to the left frontal horn of the lateral ventricle.  There is no significant mass effect or midline shift.  No abnormal extra-axial fluid collection.  Ex vacuo dilatation of the lateral and 3rd ventricles appears appropriate for generalized volume loss.  A small to moderate-sized area of encephalomalacia within the right temporal occipital junction and very small areas of encephalomalacia within the central left cerebellar hemisphere, left frontoparietal corona radiata, and right thalamus are most consistent with chronic ischemic infarcts.  Mild predominantly supratentorial white matter changes are most consistent with chronic small vessel microangiopathy. ORBITS: The visualized portion of the orbits demonstrate no acute abnormality. SINUSES: A small amount of fluid is present within the left maxillary sinus. The other visualized paranasal sinuses and mastoid air cells demonstrate no acute abnormality. SOFT TISSUES/SKULL:  No acute abnormality of the visualized skull or soft tissues.     Approximately 2 cm acute intraparenchymal left frontal lobe hematoma.  Other chronic findings, as noted. Veronica Del Cid NP was notified at approximately 8:54 a.m. RECOMMENDATIONS: Follow-up imaging; as clinically warranted.       Assessment      Hospital Problems             Last Modified POA    * (Principal) Intracranial hemorrhage (HCC) 1/18/2024 Yes       Plan   Intracranial hemorrhage  Anxiety

## 2024-01-18 NOTE — PROGRESS NOTES
[facility]   Facility/Department: [facility]  Speech Language Pathology  Clinical Bedside Swallow Evaluation    NAME:Mulu Pandya  : 1955 (68 y.o.)   [x]   confirmed    MRN: [id number]  ROOM: [place] [id number]  ADMISSION DATE: 2024  PATIENT DIAGNOSIS(ES): Intracranial hemorrhage (HCC) [I62.9]  Basal ganglia hemorrhage (HCC) [I61.0]  Cerebral infarction due to occlusion of left middle cerebral artery (HCC) [I63.512]  Chief Complaint   Patient presents with    Cerebrovascular Accident     Patient Active Problem List    Diagnosis Date Noted    Type 2 diabetes mellitus with chronic kidney disease 11/10/2022    Intracranial hemorrhage (HCC) 2024    Cerebral infarction due to occlusion of left middle cerebral artery (HCC) 2024    Chronic renal failure, stage 3a (HCC)     Senile osteoporosis 2020    Recurrent major depressive disorder, in full remission (HCC) 2018    Proteinuria     Asthma     Anxiety     Chronic back pain     COPD (chronic obstructive pulmonary disease) (HCC)     Depression     Mixed hyperlipidemia     Essential hypertension      Past Medical History:   Diagnosis Date    Anxiety     Asthma     Chronic back pain     Chronic renal failure, stage 3a (HCC)     COPD (chronic obstructive pulmonary disease) (HCC)     Depression     History of tinnitus     Hyperlipidemia     Hypertension     Obesity     Osteoporosis      T -3.2    Peripheral vascular disease (HCC)     Proteinuria     Type II or unspecified type diabetes mellitus without mention of complication, not stated as uncontrolled      Past Surgical History:   Procedure Laterality Date    BREAST SURGERY Left 2009    Benign bx    FRACTURE SURGERY      TUBAL LIGATION       Allergies   Allergen Reactions    Nickel Itching and Rash       DATE ONSET: 24    Date of Evaluation: 2024   Evaluating Therapist: Sandra Stewart, SLP    Dysphagia Diagnosis  Dysphagia Diagnosis: Suspected needs further  Phase - Comment: Moderate oral dysphagia present on all trialed consistencies with reduced labial seal,  labial/lingual/buccal strength, ROM, and coordination which resulted in reduced lingual manipulation, anterior loss, decreased A-P transfer, and suspected poor bolus control and premature entry to pharynx. Min buccal resiude following pureed trials.  Pharyngeal Phase Comment: Pharyngeal dysphagia suspected d/t wet cough, wet vocal quality, throat clear, and audible swallows. Pt exhibited watery eyes and fluctuations of 02 following all trials. SpO2 range from 100-92 on thin liquids trials and 100-96% on pureed solid trials. Fluctuations of >2 in SpO2 may indicate reduced airway protection.  Pt did not pass 3oz swallow screen. Hyolaryngeal movement observed but suspected weak.    PO Trials  Neuromuscular Estim Used: No  Assessment Method(s): Observation;Palpation  Patient Position: upright in bed  Vocal Quality: Wet;Low volume  Consistency Presented: Pureed  How Presented: SLP-fed/Presented  How much presented: 3  Bolus Acceptance: No impairment  Bolus Formation/Control: Impaired  Type of Impairment: Lip closure;Suspected premature spilling  Propulsion: Discoordination  Oral Residue: Less than 10% of bolus;Right  Initiation of Swallow: Other (comment) (suspected delayed)  Aspiration Signs/Symptoms: Decrease in O2 saturations;Watery eyes;Change of vocal quality;Throat clear  Pharyngeal Phase Characteristics: Suspected pharyngeal residue;Other (comment) (suspected)  Additional PO Trials: 1      PO Trials 2  Consistency Presented: Thin;Ice Chips  How Presented: Cup/sip;SLP-fed/Presented  How much presented: 6 (2 ice cubes)  Bolus Acceptance: No impairment  Bolus Formation/Control: Impaired  Type of Impairment: Suspected premature spilling;Lip closure;Spillage  Propulsion: Discoordination  Oral Residue: None  Initiation of Swallow: Other (comment) (suspected delayed)  Aspiration Signs/Symptoms: Decrease in O2

## 2024-01-18 NOTE — ED PROVIDER NOTES
PHYSICAL EXAM    (up to 7 for level 4, 8 or more for level 5)     ED Triage Vitals [01/18/24 0825]   BP Temp Temp Source Pulse Respirations SpO2 Height Weight - Scale   118/60 97.5 °F (36.4 °C) Tympanic (!) 106 20 96 % -- 70.8 kg (156 lb)       Physical Exam  Vitals and nursing note reviewed.   Constitutional:       General: She is not in acute distress.     Appearance: Normal appearance. She is normal weight. She is not ill-appearing or toxic-appearing.   HENT:      Head: Normocephalic and atraumatic.      Right Ear: Tympanic membrane, ear canal and external ear normal. There is no impacted cerumen.      Left Ear: Tympanic membrane, ear canal and external ear normal. There is no impacted cerumen.      Nose: Nose normal.      Mouth/Throat:      Mouth: Mucous membranes are moist.      Pharynx: Oropharynx is clear.   Eyes:      Extraocular Movements: Extraocular movements intact.      Pupils: Pupils are equal, round, and reactive to light.   Cardiovascular:      Rate and Rhythm: Normal rate and regular rhythm.      Pulses: Normal pulses.      Heart sounds: Normal heart sounds.   Pulmonary:      Effort: Pulmonary effort is normal.      Breath sounds: Normal breath sounds.   Abdominal:      General: Abdomen is flat.      Palpations: Abdomen is soft.   Musculoskeletal:         General: Normal range of motion.      Cervical back: Normal range of motion and neck supple.   Skin:     General: Skin is warm and dry.      Capillary Refill: Capillary refill takes less than 2 seconds.   Neurological:      General: No focal deficit present.      Mental Status: She is alert and oriented to person, place, and time. Mental status is at baseline.      GCS: GCS eye subscore is 4. GCS verbal subscore is 5. GCS motor subscore is 6.      Cranial Nerves: Dysarthria and facial asymmetry present.      Motor: Weakness present.      Comments: Pupils +2, bilaterally.  PERRLA.  A/ox 4.  Patient is noted with mild to moderate dysarthria  Occluded left MCA distal M2 segment.   2. Development venous anomaly (DVA) present within the left basal ganglia   region.  Nearby intraparenchymal hemorrhage is better appreciated on recent   noncontrast CT evaluation.   3. 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  Can correlate with   non-emergent contrast enhanced MR IAC evaluation.   4. Small bubbly air-fluid level present within the left maxillary sinus.   Finding raises the possibility of acute sinusitis in the appropriate clinical   setting.   5. Incidental 1.7 cm left thyroid nodule.  Recommend correlation with   non-emergent thyroid ultrasound.   Preliminary findings are discussed with Dr. Del Cid on 01/18/2024 at 10:34 a.m.         CT HEAD WO CONTRAST   Final Result   Approximately 2 cm acute intraparenchymal left frontal lobe hematoma.  Other   chronic findings, as noted.      Veronica Del Cid NP was notified at approximately 8:54 a.m.      RECOMMENDATIONS:   Follow-up imaging; as clinically warranted.         CT HEAD WO CONTRAST    (Results Pending)         ED BEDSIDE ULTRASOUND:   Performed by ED Physician - none    LABS:  Labs Reviewed   CBC WITH AUTO DIFFERENTIAL - Abnormal; Notable for the following components:       Result Value    MCHC 32.2 (*)     Neutrophils Absolute 8.7 (*)     Lymphocytes Absolute 0.9 (*)     All other components within normal limits   COMPREHENSIVE METABOLIC PANEL - Abnormal; Notable for the following components:    Potassium 2.6 (*)     Chloride 109 (*)     CO2 16 (*)     Glucose 183 (*)     BUN 36 (*)     Creatinine 1.00 (*)     Calcium 6.6 (*)     Total Protein 5.3 (*)     Albumin 2.0 (*)     Alkaline Phosphatase 16 (*)     ALT 37 (*)     AST 38 (*)     All other components within normal limits    Narrative:     CALL  [facility] tel. [phone],  POTASSIUM results called to and read back by MAKAYLA LAROSE RN, 01/18/2024  09:04, by WILBERTO

## 2024-01-18 NOTE — ED TRIAGE NOTES
Pt presents to ED via EMS with stroke like symptoms. Speech slurred and pt unable to ambulate like normal. Alert and oriented X4.

## 2024-01-18 NOTE — CONSULTS
Pulmonary and Critical Care Medicine  Consult Note  Encounter Date: 2024 3:35 PM    Ms. Mulu Pandya is a 68 y.o. female  : 1955  Requesting Provider: Justine Crenshaw DO    Reason for request: ICU management            HISTORY OF PRESENT ILLNESS:    Patient is 68 y.o. presents with right-sided weakness, aphasia right-sided facial droop.  Patient report falling today after above symptoms, no head trauma however she reported head trauma on the right 4 days ago.  She is poor historian mainly due to aphasia,  reports no chest pain or difficulty breathing.  No history of sleep apnea.  She is not on blood thinners          Past Medical History:        Diagnosis Date    Anxiety     Asthma     Chronic back pain     Chronic renal failure, stage 3a (HCC)     COPD (chronic obstructive pulmonary disease) (HCC)     Depression     History of tinnitus     Hyperlipidemia     Hypertension     Obesity     Osteoporosis     2019 T -3.2    Peripheral vascular disease (HCC)     Proteinuria     Type II or unspecified type diabetes mellitus without mention of complication, not stated as uncontrolled        Past Surgical History:        Procedure Laterality Date    BREAST SURGERY Left 2009    Benign bx    FRACTURE SURGERY      TUBAL LIGATION         Social History:     reports that she has been smoking cigarettes. She has a 49.0 pack-year smoking history. She has never used smokeless tobacco. She reports that she does not drink alcohol and does not use drugs.    Family History:       Problem Relation Age of Onset    Heart Disease Sister     High Blood Pressure Other     Cancer Other     High Blood Pressure Mother     Cancer Brother        Allergies:  Nickel        MEDICATIONS during current hospitalization:    Continuous Infusions:   dextrose      sodium chloride         Scheduled Meds:   sodium chloride flush  5-40 mL IntraVENous 2 times per day    rosuvastatin  40 mg Oral Nightly    insulin lispro  0-4 Units SubCUTAneous

## 2024-01-18 NOTE — CARE COORDINATION
Case Management Assessment  Initial Evaluation    Date/Time of Evaluation: 1/18/2024 3:37 PM  Assessment Completed by: Shelley Granados RN    If patient is discharged prior to next notation, then this note serves as note for discharge by case management.    Patient Name: Mulu Pandya                   YOB: 1955  Diagnosis: Intracranial hemorrhage (HCC) [I62.9]  Basal ganglia hemorrhage (HCC) [I61.0]  Cerebral infarction due to occlusion of left middle cerebral artery (HCC) [I63.512]                   Date / Time: 1/18/2024  8:17 AM    Patient Admission Status: Inpatient   Readmission Risk (Low < 19, Mod (19-27), High > 27): Readmission Risk Score: 12.7    Current PCP: Eileen Edmondson APRN - NP  PCP verified by CM? (P) Yes    Chart Reviewed: Yes      History Provided by: (P) Patient  Patient Orientation: (P) Alert and Oriented, Person, Place, Situation, Self    Patient Cognition: (P) Alert    Hospitalization in the last 30 days (Readmission):  No    If yes, Readmission Assessment in  Navigator will be completed.    Advance Directives:      Code Status: Full Code   Patient's Primary Decision Maker is: (P) Legal Next of Kin ( Shaq)    Primary Decision Maker: Shaq Pandya - Spouse - [phone]    Discharge Planning:    Patient lives with: (P) Spouse/Significant Other Type of Home: (P) House  Primary Care Giver: (P) Self  Patient Support Systems include: (P) Spouse/Significant Other   Current Financial resources: (P) Medicare  Current community resources: (P) None  Current services prior to admission: (P) None            Current DME:              Type of Home Care services:       ADLS  Prior functional level: (P) Independent in ADLs/IADLs  Current functional level: (P) Independent in ADLs/IADLs    PT AM-PAC:   /24  OT AM-PAC:   /24    Family can provide assistance at DC: (P) Yes  Would you like Case Management to discuss the discharge plan with any other family members/significant others, and

## 2024-01-18 NOTE — CONSULTS
NEUROSURGERY CONSULT NOTE      Patient Name: Mulu Pandya  Patient : 1955  MRN: [id number]       PCP: Eileen Edmondson, BALTAZAR Irizarry NP        History of Present Ilness: 68 y.o. presents in neurosurgical consultation from Dr. Esparza for evaluation of left frontal ICH.  Per the patient's  she has been falling for the last week and having generalized weakness.  Patient would have transient speech difficulties as well which would improve.  She then presented today with worsening symptoms of right facial droop and right-sided weakness.  CT head revealed left frontal ICH and neurosurgery was consulted.  CTA revealed left M2 occlusion.  Patient denies headache.    Chief Complaint   Patient presents with    Cerebrovascular Accident            Past Medical History:        Diagnosis Date    Anxiety     Asthma     Chronic back pain     Chronic renal failure, stage 3a (HCC)     COPD (chronic obstructive pulmonary disease) (HCC)     Depression     History of tinnitus     Hyperlipidemia     Hypertension     Obesity     Osteoporosis     2019 T -3.2    Peripheral vascular disease (HCC)     Proteinuria     Type II or unspecified type diabetes mellitus without mention of complication, not stated as uncontrolled        Past Surgical History:        Procedure Laterality Date    BREAST SURGERY Left 2009    Benign bx    FRACTURE SURGERY      TUBAL LIGATION         Home Medications:   Prior to Admission medications    Medication Sig Start Date End Date Taking? Authorizing Provider   fluticasone furoate-vilanterol (BREO ELLIPTA) 100-25 MCG/ACT inhaler TAKE 1 PUFF BY MOUTH EVERY DAY 23   Collin Chavez PA   escitalopram (LEXAPRO) 10 MG tablet Take 1.5 tablets by mouth daily 23  Collin Chavez PA   albuterol sulfate HFA (PROVENTIL;VENTOLIN;PROAIR) 108 (90 Base) MCG/ACT inhaler Inhale 2 puffs into the lungs 4 times daily as needed for Wheezing 23   Collin Chavez PA   metFORMIN (GLUCOPHAGE-XR) 500 MG

## 2024-01-18 NOTE — CARE COORDINATION
Case Management Assessment  Initial Evaluation    Date/Time of Evaluation: 1/18/2024 3:31 PM  Assessment Completed by: KASSANDRA Cesar    If patient is discharged prior to next notation, then this note serves as note for discharge by case management.    Patient Name: Mulu Pandya                   YOB: 1955  Diagnosis: Intracranial hemorrhage (HCC) [I62.9]  Basal ganglia hemorrhage (HCC) [I61.0]  Cerebral infarction due to occlusion of left middle cerebral artery (HCC) [I63.512]                   Date / Time: 1/18/2024  8:17 AM    Patient Admission Status: Inpatient   Readmission Risk (Low < 19, Mod (19-27), High > 27): Readmission Risk Score: 12.7    Current PCP: Eileen Edmondson APRN - NP  PCP verified by CM? Yes    Chart Reviewed: Yes      History Provided by: Patient, Spouse  Patient Orientation: Alert and Oriented    Patient Cognition: Alert    Hospitalization in the last 30 days (Readmission):  No    If yes, Readmission Assessment in CM Navigator will be completed.    Advance Directives:      Code Status: Full Code   Patient's Primary Decision Maker is: Legal Next of Kin    Primary Decision Maker: Shaq Pandya - Spouse - [phone]    Discharge Planning:    Patient lives with:   Type of Home:    Primary Care Giver: Self  Patient Support Systems include: Spouse/Significant Other   Current Financial resources: Medicare  Current community resources: None  Current services prior to admission:              Current DME:              Type of Home Care services:       ADLS  Prior functional level: Independent in ADLs/IADLs  Current functional level: Other (see comment) (PEND PT/OT WHEN ABLE)    PT AM-PAC:   /24  OT AM-PAC:   /24    Family can provide assistance at DC: Yes  Would you like Case Management to discuss the discharge plan with any other family members/significant others, and if so, who? Yes ()  Plans to Return to Present Housing: Unknown at present  Other Identified Issues/Barriers  to RETURNING to current housing: MEDICAL CLEARANCE  Potential Assistance needed at discharge:              Potential DME:    Patient expects to discharge to:    Plan for transportation at discharge:      Financial    Payor: [employer] / Plan: [employer] ESSENTIAL/PLUS / Product Type: *No Product type* /     Does insurance require precert for SNF: Yes    Potential assistance Purchasing Medications:    Meds-to-Beds request:        [facility]/pharmacy #[id number] - [place], [place] - [address] [facility] -  [phone] - F [phone]  [address] [place] [id number]  Phone: [phone] Fax: [phone]      Notes:    Factors facilitating achievement of predicted outcomes: Motivated, Cooperative, Pleasant, Sense of humor, and Good insight into deficits    Barriers to discharge: Medical complications    Additional Case Management Notes: LSW met with pt at bedside. Pt is alert and oriented.  is on his way up to the hospital. Pt lives at home with . Independent at baseline. No Home O2. Does not use any DME to ambulate. No Dialysis. DC plan TBD. Pending PT/OT when able.     The Plan for Transition of Care is related to the following treatment goals of Intracranial hemorrhage (HCC) [I62.9]  Basal ganglia hemorrhage (HCC) [I61.0]  Cerebral infarction due to occlusion of left middle cerebral artery (HCC) [I63.512]    IF APPLICABLE: The Patient and/or patient representative Mulu and her family were provided with a choice of provider and agrees with the discharge plan. Freedom of choice list with basic dialogue that supports the patient's individualized plan of care/goals and shares the quality data associated with the providers was provided to:     Patient Representative Name:       The Patient and/or Patient Representative Agree with the Discharge Plan?      KASSANDRA Cesar  Case Management Department  Ph: [phone] Fax: [phone]

## 2024-01-18 NOTE — PROGRESS NOTES
Spiritual Care Services     Summary of Visit:   responded to a request for a follow up visit because patient's  arrived.  was in the ER waiting room and appeared calm.  provided a supportive presence until patient returned from CAT scan.  provided a blessing for a quick healing.    Encounter Summary  Encounter Overview/Reason : Crisis, Follow-up  Service Provided For:: Patient and family together  Referral/Consult From:: Nurse  Support System: Spouse  Complexity of Encounter: High  Begin Time: 0930  End Time : 1000  Total Time Calculated: 30 min     Crisis  Type: Code Stroke                         Spiritual Assessment/Intervention/Outcomes:    Assessment: Calm    Intervention: Sustaining Presence/Ministry of presence, Prayer (assurance of)/[place], Nurtured Hope    Outcome: Receptive      Care Plan:    Plan and Referrals  Plan/Referrals: Continue Support (comment)    Provide further support as needed or requested      Spiritual Care Services   Electronically signed by Chaplain Joe on 1/18/2024 at 10:26 AM.    To reach a  for emotional and spiritual support, place an EPIC consult request.   If a  is needed immediately, dial “0” and ask to page the on-call .

## 2024-01-18 NOTE — PROGRESS NOTES
15;25PM Received from ER via cart to CCU 8. Alert and oriented. Speech slurred. R sided weakness. Able to follow commands. MP_ST.   Dr Pavon in to see patient and upated on stat;us  15:45pm Speech in for swallow eval

## 2024-01-18 NOTE — ACP (ADVANCE CARE PLANNING)
Advance Care Planning   Healthcare Decision Maker:    Primary Decision Maker: Shaq Pandya - [facility] - [phone]    Click here to complete Healthcare Decision Makers including selection of the Healthcare Decision Maker Relationship (ie \"Primary\").  Today we documented Decision Maker(s) consistent with Legal Next of Kin hierarchy.       If the relationship to the patient does NOT follow our state's Next of Kin hierarchy, the patient MUST complete an ACP Document to allow him/her to act on the patient's behalf. :      Electronically signed by KASSANDRA Cesar on 1/18/2024 at 3:31 PM

## 2024-01-18 NOTE — PROGRESS NOTES
Spiritual Health Services     Summary of Visit:  Code Toy.    This  attempted to visit, however, patient was gone to CT and no family present in the ED. [place] advised to contact Spiritual Care when and if family arrive.    Encounter Summary  Encounter Overview/Reason : (P) Attempted Encounter  Service Provided For:: (P) Patient not available (Gone for CT and no family present in the ED.)  Referral/Consult From:: (P) Multi-disciplinary team  Support System: (P) Spouse                               Spiritual Assessment/Intervention/Outcomes:    Assessment: (P) Unable to assess                Care Plan:    Plan and Referrals  Plan/Referrals: (P) Continue Support (comment)    Continue to support.      Spiritual Health Services   Electronically signed by JULIAN Osuna on 1/18/2024 at 8:43 AM.    To reach a  for emotional and spiritual support, place an EPIC consult request.   If a  is needed immediately, dial “0” and ask to page the on-call .

## 2024-01-19 ENCOUNTER — APPOINTMENT (OUTPATIENT)
Dept: MRI IMAGING | Age: 69
DRG: 064 | End: 2024-01-19
Payer: MEDICARE

## 2024-01-19 ENCOUNTER — APPOINTMENT (OUTPATIENT)
Dept: GENERAL RADIOLOGY | Age: 69
DRG: 064 | End: 2024-01-19
Payer: MEDICARE

## 2024-01-19 ENCOUNTER — APPOINTMENT (OUTPATIENT)
Dept: CT IMAGING | Age: 69
DRG: 064 | End: 2024-01-19
Payer: MEDICARE

## 2024-01-19 LAB
ANION GAP SERPL CALCULATED.3IONS-SCNC: 18 MEQ/L (ref 9–15)
BUN SERPL-MCNC: 40 MG/DL (ref 8–23)
CALCIUM SERPL-MCNC: 9.1 MG/DL (ref 8.5–9.9)
CHLORIDE SERPL-SCNC: 101 MEQ/L (ref 95–107)
CHOLEST SERPL-MCNC: 143 MG/DL (ref 0–199)
CO2 SERPL-SCNC: 22 MEQ/L (ref 20–31)
CREAT SERPL-MCNC: 1.08 MG/DL (ref 0.5–0.9)
ERYTHROCYTE [DISTWIDTH] IN BLOOD BY AUTOMATED COUNT: 13.2 % (ref 11.5–14.5)
GLUCOSE BLD-MCNC: 258 MG/DL (ref 70–99)
GLUCOSE BLD-MCNC: 429 MG/DL (ref 70–99)
GLUCOSE SERPL-MCNC: 182 MG/DL (ref 70–99)
HBA1C MFR BLD: 8 % (ref 4.8–5.9)
HCT VFR BLD AUTO: 41 % (ref 37–47)
HDLC SERPL-MCNC: 28 MG/DL (ref 40–59)
HGB BLD-MCNC: 13.6 G/DL (ref 12–16)
LDLC SERPL CALC-MCNC: 85 MG/DL (ref 0–129)
MCH RBC QN AUTO: 29.6 PG (ref 27–31.3)
MCHC RBC AUTO-ENTMCNC: 33.2 % (ref 33–37)
MCV RBC AUTO: 89.1 FL (ref 79.4–94.8)
PERFORMED ON: ABNORMAL
PLATELET # BLD AUTO: 305 K/UL (ref 130–400)
POC CREATININE: 1.3 MG/DL (ref 0.6–1.2)
POC SAMPLE TYPE: ABNORMAL
POTASSIUM SERPL-SCNC: 3.7 MEQ/L (ref 3.4–4.9)
RBC # BLD AUTO: 4.6 M/UL (ref 4.2–5.4)
SODIUM SERPL-SCNC: 141 MEQ/L (ref 135–144)
TRIGL SERPL-MCNC: 148 MG/DL (ref 0–150)
WBC # BLD AUTO: 12.6 K/UL (ref 4.8–10.8)

## 2024-01-19 PROCEDURE — 2000000000 HC ICU R&B

## 2024-01-19 PROCEDURE — 92611 MOTION FLUOROSCOPY/SWALLOW: CPT

## 2024-01-19 PROCEDURE — 6370000000 HC RX 637 (ALT 250 FOR IP): Performed by: INTERNAL MEDICINE

## 2024-01-19 PROCEDURE — 80048 BASIC METABOLIC PNL TOTAL CA: CPT

## 2024-01-19 PROCEDURE — 83036 HEMOGLOBIN GLYCOSYLATED A1C: CPT

## 2024-01-19 PROCEDURE — 94640 AIRWAY INHALATION TREATMENT: CPT

## 2024-01-19 PROCEDURE — 2580000003 HC RX 258: Performed by: INTERNAL MEDICINE

## 2024-01-19 PROCEDURE — 92526 ORAL FUNCTION THERAPY: CPT

## 2024-01-19 PROCEDURE — 94760 N-INVAS EAR/PLS OXIMETRY 1: CPT

## 2024-01-19 PROCEDURE — 36415 COLL VENOUS BLD VENIPUNCTURE: CPT

## 2024-01-19 PROCEDURE — 97162 PT EVAL MOD COMPLEX 30 MIN: CPT

## 2024-01-19 PROCEDURE — 85027 COMPLETE CBC AUTOMATED: CPT

## 2024-01-19 PROCEDURE — 99223 1ST HOSP IP/OBS HIGH 75: CPT | Performed by: PSYCHIATRY & NEUROLOGY

## 2024-01-19 PROCEDURE — 70450 CT HEAD/BRAIN W/O DYE: CPT

## 2024-01-19 PROCEDURE — 70551 MRI BRAIN STEM W/O DYE: CPT

## 2024-01-19 PROCEDURE — 99232 SBSQ HOSP IP/OBS MODERATE 35: CPT | Performed by: INTERNAL MEDICINE

## 2024-01-19 PROCEDURE — 94761 N-INVAS EAR/PLS OXIMETRY MLT: CPT

## 2024-01-19 PROCEDURE — 74230 X-RAY XM SWLNG FUNCJ C+: CPT

## 2024-01-19 PROCEDURE — 92523 SPEECH SOUND LANG COMPREHEN: CPT

## 2024-01-19 PROCEDURE — 2500000003 HC RX 250 WO HCPCS: Performed by: NURSE PRACTITIONER

## 2024-01-19 PROCEDURE — 80061 LIPID PANEL: CPT

## 2024-01-19 PROCEDURE — 99232 SBSQ HOSP IP/OBS MODERATE 35: CPT | Performed by: NEUROLOGICAL SURGERY

## 2024-01-19 RX ORDER — LORAZEPAM 2 MG/ML
1 INJECTION INTRAMUSCULAR
Status: ACTIVE | OUTPATIENT
Start: 2024-01-19 | End: 2024-01-20

## 2024-01-19 RX ORDER — MECOBALAMIN 5000 MCG
5 TABLET,DISINTEGRATING ORAL NIGHTLY
Status: DISCONTINUED | OUTPATIENT
Start: 2024-01-19 | End: 2024-01-23 | Stop reason: HOSPADM

## 2024-01-19 RX ADMIN — BARIUM SULFATE 50 G: 0.81 POWDER, FOR SUSPENSION ORAL at 13:16

## 2024-01-19 RX ADMIN — Medication 5 MG: at 20:00

## 2024-01-19 RX ADMIN — BUDESONIDE AND FORMOTEROL FUMARATE DIHYDRATE 2 PUFF: 80; 4.5 AEROSOL RESPIRATORY (INHALATION) at 16:26

## 2024-01-19 RX ADMIN — INSULIN LISPRO 4 UNITS: 100 INJECTION, SOLUTION INTRAVENOUS; SUBCUTANEOUS at 19:59

## 2024-01-19 RX ADMIN — INSULIN LISPRO 2 UNITS: 100 INJECTION, SOLUTION INTRAVENOUS; SUBCUTANEOUS at 16:55

## 2024-01-19 RX ADMIN — ROSUVASTATIN CALCIUM 40 MG: 40 TABLET, FILM COATED ORAL at 20:00

## 2024-01-19 RX ADMIN — Medication 10 ML: at 20:08

## 2024-01-19 RX ADMIN — METOPROLOL TARTRATE 12.5 MG: 25 TABLET, FILM COATED ORAL at 09:54

## 2024-01-19 RX ADMIN — METOPROLOL TARTRATE 12.5 MG: 25 TABLET, FILM COATED ORAL at 20:00

## 2024-01-19 RX ADMIN — BARIUM SULFATE 80 ML: 400 SUSPENSION ORAL at 13:16

## 2024-01-19 RX ADMIN — BUDESONIDE AND FORMOTEROL FUMARATE DIHYDRATE 2 PUFF: 80; 4.5 AEROSOL RESPIRATORY (INHALATION) at 03:37

## 2024-01-19 ASSESSMENT — PAIN SCALES - GENERAL
PAINLEVEL_OUTOF10: 0

## 2024-01-19 NOTE — PROGRESS NOTES
Physical Therapy Med Surg Initial Assessment  Facility/Department: [facility]  Room: [place] [address]       NAME: Mulu Pandya  : 1955 (68 y.o.)  MRN: [id number]  CODE STATUS: Full Code    Date of Service: 2024    Patient Diagnosis(es): Intracranial hemorrhage (HCC) [I62.9]  Basal ganglia hemorrhage (HCC) [I61.0]  Cerebral infarction due to occlusion of left middle cerebral artery (HCC) [I63.512]   Chief Complaint   Patient presents with    Cerebrovascular Accident     Patient Active Problem List    Diagnosis Date Noted    Type 2 diabetes mellitus with chronic kidney disease 11/10/2022    Intracranial hemorrhage (HCC) 2024    Cerebral infarction due to occlusion of left middle cerebral artery (HCC) 2024    Chronic renal failure, stage 3a (HCC)     Senile osteoporosis 2020    Recurrent major depressive disorder, in full remission (HCC) 2018    Proteinuria     Asthma     Anxiety     Chronic back pain     COPD (chronic obstructive pulmonary disease) (HCC)     Depression     Mixed hyperlipidemia     Essential hypertension         Past Medical History:   Diagnosis Date    Anxiety     Asthma     Chronic back pain     Chronic renal failure, stage 3a (HCC)     COPD (chronic obstructive pulmonary disease) (HCC)     Depression     History of tinnitus     Hyperlipidemia     Hypertension     Obesity     Osteoporosis      T -3.2    Peripheral vascular disease (HCC)     Proteinuria     Type II or unspecified type diabetes mellitus without mention of complication, not stated as uncontrolled      Past Surgical History:   Procedure Laterality Date    BREAST SURGERY Left     Benign bx    FRACTURE SURGERY      TUBAL LIGATION         Chart Reviewed: Yes  Family / Caregiver Present: Yes (Spouse and son)  Diagnosis: Intracranial hemorrhage (HCC)  General Comment  Comments: Pt resting in bed - agreeable to PT evaluation    Restrictions:  Restrictions/Precautions: Fall Risk  Position Activity  touching assist  Long Term Goal 2: Pt to complete transfers with touching assist  Long Term Goal 3: Pt to manage 50ft with LRD and [place]    AMPAC (6 CLICK) BASIC MOBILITY  AM-PAC Inpatient Mobility Raw Score : 15     Therapy Time:   Individual   Time In 0954   Time Out 1016   Minutes 22           Tiffany Carrington, PT, 01/19/24 at 1:12 PM         Definitions for assistance levels  Independent = pt does not require any physical supervision or assistance from another person for activity completion. Device may be needed.  Stand by assistance = pt requires verbal cues or instructions from another person, close to but not touching, to perform the activity  Minimal assistance= pt performs 75% or more of the activity; assistance is required to complete the activity  Moderate assistance= pt performs 50% of the activity; assistance is required to complete the activity  Maximal assistance = pt performs 25% of the activity; assistance is required to complete the activity  Dependent = pt requires total physical assistance to accomplish the task

## 2024-01-19 NOTE — CARE COORDINATION
STEVENW rounded with RN. RN report Therapy currently working with pt at bedside.   PT/OT recommended Rehab.   LSW called pt's  which currently at bedside.   Discuss DC plan with  and pt. FOC offered.   Both agree with DC plan to [facility]. [facility]   Need rehab order.   Pending Rehab acceptance and need pre-cert.     STEVENW will follow.     Electronically signed by KASSANDRA Cesar on 1/19/2024 at 11:01 AM

## 2024-01-19 NOTE — PROGRESS NOTES
[facility]   Facility/Department: [facility]  Speech Language Pathology  Initial Speech/Language/Cognitive Assessment    NAME:Mulu Pandya  : 1955 (68 y.o.)   [x]   confirmed    MRN: [id number]  ROOM: [place] [address]  ADMISSION DATE: 2024  PATIENT DIAGNOSIS(ES): Intracranial hemorrhage (HCC) [I62.9]  Basal ganglia hemorrhage (HCC) [I61.0]  Cerebral infarction due to occlusion of left middle cerebral artery (HCC) [I63.512]  Chief Complaint   Patient presents with    Cerebrovascular Accident     Patient Active Problem List    Diagnosis Date Noted    Type 2 diabetes mellitus with chronic kidney disease 11/10/2022    Intracranial hemorrhage (HCC) 2024    Cerebral infarction due to occlusion of left middle cerebral artery (HCC) 2024    Chronic renal failure, stage 3a (HCC)     Senile osteoporosis 2020    Recurrent major depressive disorder, in full remission (HCC) 2018    Proteinuria     Asthma     Anxiety     Chronic back pain     COPD (chronic obstructive pulmonary disease) (HCC)     Depression     Mixed hyperlipidemia     Essential hypertension      Past Medical History:   Diagnosis Date    Anxiety     Asthma     Chronic back pain     Chronic renal failure, stage 3a (HCC)     COPD (chronic obstructive pulmonary disease) (HCC)     Depression     History of tinnitus     Hyperlipidemia     Hypertension     Obesity     Osteoporosis      T -3.2    Peripheral vascular disease (HCC)     Proteinuria     Type II or unspecified type diabetes mellitus without mention of complication, not stated as uncontrolled      Past Surgical History:   Procedure Laterality Date    BREAST SURGERY Left     Benign bx    FRACTURE SURGERY      TUBAL LIGATION         Date of Onset: 24    Date of Evaluation: 2024   Evaluating Therapist: Micaela Aggarwal, SLP        Diagnosis: pt p/w moderate-severe expressive aphasia and moderate dysarthria. Pt p/w min-mod receptive aphasia needing further  (%): 40 %  Overall Impairment Severity: Moderate    Comprehension  Auditory Comprehension  Comprehension: Exceptions  Simple yes/no questions: WFL  Moderate yes/no questions: WFL  One Step Commands: WFL  Two Step Commands: WFL  Multistep Commands: WFL  Other (Comment):  (needs further assessment of paragraph comprehension)  Reading Comprehension  Reading Status: Unable to assess (needs further assessment)    Expression  Expression  Primary Mode of Expression: Verbal  Verbal Expression  Verbal Expression: Exceptions to functional limits  Initiation: Moderate  Repetition: Moderate (42%)  Automatic Speech: Moderate (% acc, counting unable past 3 without visual cues)  Confrontation: Severe (3/6 acc)  Convergent: Moderate (2/3 acc)  Divergent: Moderate  Responsive: Mild (71% acc)  Conversation: Moderate  Interfering Components: Paraphasia  Effective Techniques: Communication board;Word retrived strategies;Provide extra time;Overarticulate  Written Expression  Written Expression: Unable to assess    Cognition       Additional Assessments       Recommendations  Requires SLP Intervention: Yes  D/C Recommendations: Ongoing speech therapy is recommended during this hospitalization  Duration of Treatment: during LOS or until goals met  Frequency of Treatment: 2-3x/week    Prognosis  Speech Therapy Prognosis  Prognosis: Good  Prognosis Considerations: Age;Previous Level of Function    Education  Patient Education: Role of SLP, rationale of evaluation, results of evaluation, and plan of care    Treatment/Goals  Short Term Goals  Time Frame for Short Term Goals: 1-2 weeks  Goal 1: Pt will complete confrontational naming tasks with 80% accuracy with mild verbal cues to help the patient express their basic wants and needs.  Goal 2: Within 1-5 days of implementing the ST POC, pt's functional reading/writing skills will be assessed in relation to executive functioning and reading comprehension (e.g. bill paying, reading rx

## 2024-01-19 NOTE — PROGRESS NOTES
Internal Medicine   Hospitalist   Progress Note    2024   1:04 PM    Name:  Mulu Pandya  MRN:    [id number]     IP Day: 1     Admit Date: 2024  8:17 AM  PCP: Eileen Edmondson APRN - NP    Code Status:  Full Code    Assessment and Plan:        Active Problems/ diagnosis:     Left basal ganglia ICH  Left MCA occlusion  History of COPD/asthma-listed, no exacerbation  Chronic back pain  Obesity  HTN  HLD  PVD  Type 2 diabetes    Plan  Repeat CT head yesterday and today are stable ICH.  Neurosurgery is following.  Neurologically stable.  Neurology is also following  MRI of the brain has been obtained  Resume current medication  Blood pressure stable, monitor.  Resume current medication  Sliding-scale insulin, hypoglycemia protocol  Discussed plan of care with patient and her  at bedside in the ICU  DVT PPx     7 pm- 7 am, please contact on call Hospitalist for any needs     Subjective:      no new events.  Denies any new symptoms.    Physical Examination:      Vitals:  /83   Pulse (!) 114   Temp 98 °F (36.7 °C) (Oral)   Resp 23   Ht 1.6 m (5' 3\")   Wt 68.7 kg (151 lb 7.3 oz)   SpO2 94%   BMI 26.83 kg/m²   Temp (24hrs), Av °F (36.7 °C), Min:97.8 °F (36.6 °C), Max:98.2 °F (36.8 °C)      General appearance: alert, cooperative and no distress  Mental Status: oriented to person, place and time and normal affect  Lungs: clear to auscultation bilaterally, normal effort  Heart: regular rate and rhythm, no murmur  Abdomen: soft, nontender, nondistended, bowel sounds present, no masses  Extremities: no edema, redness, tenderness in the calves  Skin: no gross lesions, rashes  Neuro exam: Oriented x 3, weak on the right side worse in hand  but able to left care right arm against gravity, 4/5 right leg     Data:     Labs:  Recent Labs     24  0830 24  0352   WBC 10.5 12.6*   HGB 13.8 13.6    305     Recent Labs     24  1743 24  0352    141   K 3.7 3.7   CL 98  inhaler 2 puff  2 puff Inhalation 4x Daily PRN Justine Crenshaw DO        escitalopram (LEXAPRO) tablet 15 mg  15 mg Oral Daily Justine Crenshaw DO        nicotine (NICODERM CQ) 14 MG/24HR 1 patch  1 patch TransDERmal Q24H Justine Crenshaw DO        budesonide-formoterol (SYMBICORT) 80-4.5 MCG/ACT inhaler 2 puff  2 puff Inhalation BID RT Justine Crenshaw DO   2 puff at 01/19/24 0337       New information updated in the note today, rest of the examination did not change compared to yesterday.    Additional work up or/and treatment plan may be added today or then after based on clinical progression. I am managing a portion of pt care. Some medical issues are handled by other specialists. Additional work up and treatment should be done in out pt setting by pt PCP and other out pt providers.      In addition to examining and evaluating pt, I spent additional time explaining care, normaland abnormal findings, and treatment plan. All of pt questions were answered. Counseling, diet and education were provided. Case will be discussed with nursing staff when appropriate. Family will be updated if and when appropriate.       Electronically signed by Justine Crenshaw DO on 1/19/2024 at 1:04 PM

## 2024-01-19 NOTE — PROGRESS NOTES
Pulmonary & Critical Care Medicine ICU Progress Note  Chief complaint : Right sided weakness, aphasia     Subjunctive/24 hour events :   Patient seen and examined during multidisciplinary rounds with RN, charge nurse, RT, pharmacy, dietitian, and social service.   Patient had an uneventful night. She is still slightly weak on the right with aphasia. She is alert and oriented, family is at bedside. She is on room air and O2 sats are 95%. No fever overnight and urine output 800 overnight.       Social History     Tobacco Use    Smoking status: Some Days     Current packs/day: 1.00     Average packs/day: 1 pack/day for 49.0 years (49.0 ttl pk-yrs)     Types: Cigarettes    Smokeless tobacco: Never    Tobacco comments:     12/21/17 started age 13   Substance Use Topics    Alcohol use: No     Comment: denies         Problem Relation Age of Onset    Heart Disease Sister     High Blood Pressure Other     Cancer Other     High Blood Pressure Mother     Cancer Brother        No results for input(s): \"PHART\", \"[id number]\", \"PO2ART\" in the last 72 hours.    MV Settings:     / / /            IV:   dextrose      sodium chloride      lactated ringers IV soln 75 mL/hr at 01/18/24 2314       Vitals:  /83   Pulse (!) 114   Temp 98 °F (36.7 °C) (Oral)   Resp 23   Ht 1.6 m (5' 3\")   Wt 68.7 kg (151 lb 7.3 oz)   SpO2 94%   BMI 26.83 kg/m²    Tmax:       Intake/Output Summary (Last 24 hours) at 1/19/2024 1018  Last data filed at 1/19/2024 0500  Gross per 24 hour   Intake 560.38 ml   Output 800 ml   Net -239.62 ml       EXAM:    General: alert, cooperative  Head: normocephalic, atraumatic  Eyes:No gross abnormalities.  ENT:  MMM no lesions  Neck:  supple and no masses  Chest : Good air movement no rales no wheezes   Heart:: Heart sounds are normal.  Regular rate and rhythm without murmur, gallop or rub.  ABD:  bowel sounds normal, soft, non-tender  Musculoskeletal : no cyanosis, no clubbing, and no edema  Neuro:  Grossly  OTHER: Developmental venous anomaly present within the left basal ganglia region.  Nearby intraparenchymal hemorrhage is better appreciated on recent noncontrast CT evaluation.  No dural venous sinus thrombosis on this non-dedicated study.  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  Small bubbly air-fluid level present within the left maxillary sinus.     1. Occluded left MCA distal M2 segment. 2. Development venous anomaly (DVA) present within the left basal ganglia region.  Nearby intraparenchymal hemorrhage is better appreciated on recent noncontrast CT evaluation. 3. 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  Can correlate with non-emergent contrast enhanced MR IAC evaluation. 4. Small bubbly air-fluid level present within the left maxillary sinus. Finding raises the possibility of acute sinusitis in the appropriate clinical setting. 5. Incidental 1.7 cm left thyroid nodule.  Recommend correlation with non-emergent thyroid ultrasound. Preliminary findings are discussed with Dr. Del Cid on 01/18/2024 at 10:34 a.m.     CT HEAD WO CONTRAST    Result Date: 1/18/2024  EXAMINATION: CT OF THE HEAD WITHOUT CONTRAST  1/18/2024 8:33 am TECHNIQUE: CT of the head was performed without the administration of intravenous contrast. Automated exposure control, iterative reconstruction, and/or weight based adjustment of the mA/kV was utilized to reduce the radiation dose to as low as reasonably achievable. COMPARISON: None available. HISTORY: ORDERING SYSTEM PROVIDED HISTORY: R SIDED FACIAL DROOP, R SIDED WEAKNESS, SLURRED SPEECH TECHNOLOGIST PROVIDED HISTORY: Reason for exam:->R SIDED FACIAL DROOP, R SIDED WEAKNESS, SLURRED SPEECH Has a \"code stroke\" or \"stroke alert\" been called?->Yes Decision Support Exception - unselect if not a suspected or confirmed

## 2024-01-19 NOTE — PROGRESS NOTES
NEUROSURGERY and SPINE FOLLOW-UP NOTE      Patient Name: Mulu Pandya  Patient : 1955  MRN: [id number]       PCP: Eileen Edmondson APRN - NP      History of Present Ilness: 68 y.o. presents with follow-up, she does not have any changes.  She denies any headaches.  Weakness on right side is about the same.  She is denying any hearing loss or dizziness.    Chief Complaint   Patient presents with    Cerebrovascular Accident              Past Medical History:        Diagnosis Date    Anxiety     Asthma     Chronic back pain     Chronic renal failure, stage 3a (HCC)     COPD (chronic obstructive pulmonary disease) (HCC)     Depression     History of tinnitus     Hyperlipidemia     Hypertension     Obesity     Osteoporosis     2019 T -3.2    Peripheral vascular disease (HCC)     Proteinuria     Type II or unspecified type diabetes mellitus without mention of complication, not stated as uncontrolled        Past Surgical History:        Procedure Laterality Date    BREAST SURGERY Left     Benign bx    FRACTURE SURGERY      TUBAL LIGATION         Home Medications:   Prior to Admission medications    Medication Sig Start Date End Date Taking? Authorizing Provider   escitalopram (LEXAPRO) 10 MG tablet Take 1.5 tablets by mouth daily   Yes Provider, MD Keevn   metFORMIN (GLUCOPHAGE) 1000 MG tablet Take 1 tablet by mouth 2 times daily (with meals)   Yes Provider, MD Keven   fluticasone furoate-vilanterol (BREO ELLIPTA) 100-25 MCG/ACT inhaler TAKE 1 PUFF BY MOUTH EVERY DAY 23   Collin Chavez PA   albuterol sulfate HFA (PROVENTIL;VENTOLIN;PROAIR) 108 (90 Base) MCG/ACT inhaler Inhale 2 puffs into the lungs 4 times daily as needed for Wheezing 23   Collin Chavez PA   simvastatin (ZOCOR) 40 MG tablet TAKE 1 TABLET BY MOUTH EVERY DAY IN THE EVENING 2/10/23   Collin Chavez PA   Multiple Vitamins-Minerals (HAIR SKIN AND NAILS FORMULA) TABS Take 1 tablet by mouth daily 2/10/23   Collin Chavez PA  15-Karri-2021

## 2024-01-19 NOTE — PROCEDURES
[facility]   Facility/Department: [facility]  Speech Language Pathology  Modified Barium Swallow (MBS)    NAME:Mulu Pandya  : 1955 (68 y.o.)   [x]   confirmed    MRN: [id number]  ROOM: [place] [address]  ADMISSION DATE: 2024  PATIENT DIAGNOSIS(ES): Intracranial hemorrhage (HCC) [I62.9]  Basal ganglia hemorrhage (HCC) [I61.0]  Cerebral infarction due to occlusion of left middle cerebral artery (HCC) [I63.512]  Chief Complaint   Patient presents with    Cerebrovascular Accident     Patient Active Problem List    Diagnosis Date Noted    Type 2 diabetes mellitus with chronic kidney disease 11/10/2022    Intracranial hemorrhage (HCC) 2024    Cerebral infarction due to occlusion of left middle cerebral artery (HCC) 2024    Chronic renal failure, stage 3a (HCC)     Senile osteoporosis 2020    Recurrent major depressive disorder, in full remission (HCC) 2018    Proteinuria     Asthma     Anxiety     Chronic back pain     COPD (chronic obstructive pulmonary disease) (HCC)     Depression     Mixed hyperlipidemia     Essential hypertension      Past Medical History:   Diagnosis Date    Anxiety     Asthma     Chronic back pain     Chronic renal failure, stage 3a (HCC)     COPD (chronic obstructive pulmonary disease) (HCC)     Depression     History of tinnitus     Hyperlipidemia     Hypertension     Obesity     Osteoporosis      T -3.2    Peripheral vascular disease (HCC)     Proteinuria     Type II or unspecified type diabetes mellitus without mention of complication, not stated as uncontrolled      Past Surgical History:   Procedure Laterality Date    BREAST SURGERY Left     Benign bx    FRACTURE SURGERY      TUBAL LIGATION       Allergies   Allergen Reactions    Nickel Itching and Rash       Date of Onset:  24      Date of Evaluation: 2024   Evaluating Therapist: Micaela Aggarwal SLP      DYSPHAGIA DIAGNOSIS:  Mild-moderate oral; mild pharyngeal

## 2024-01-19 NOTE — PLAN OF CARE
Nutrition Problem #1: Predicted inadequate energy intake  Intervention: Food and/or Nutrient Delivery: Modify Current Diet, Start Oral Nutrition Supplement  Nutritional

## 2024-01-19 NOTE — PROGRESS NOTES
Comprehensive Nutrition Assessment    Type and Reason for Visit:  Initial, Positive Nutrition Screen (+ malnutrition screen)    Nutrition Recommendations/Plan:   Add No Concentrated Sweets to pureed diet with mildly thick (nectar) liquids  Add Frozen oral supplement ( Magic Cup to L & D)  Monitor for adequacy of po intake versus need for supplemental IVF/tube feeding     Malnutrition Assessment:  Malnutrition Status:  No malnutrition (01/19/24 1315)        Nutrition Assessment:    Nutritional status appeared adequate PTA, based on available information, to be dysphagia diet per SLP recommendations . Will add No concentrated sweets to diet to aid in glycemic control, begin frozen oral supplemetn @ L &D ( only ONS available compliant for thick liquids), May need to consider IVF for hydration if pt remains on thick liquids after MBS    Nutrition Related Findings:    PMHx of anxiety, COPD, CVA,  type II DM, HTN, asthma, PUD, HLD, CKD stage III, and osteoporosis who presents to the hospital for right-sided weakness, facial drooping, and slurred speech.\".Dx : ICH, BSE competed 1/19 and dysphagia diet ordered, + residual aphasia;  labs noted : gluc < 200, mild elevation in BUN/Creat, No GI complaints PTA Wound Type: None       Current Nutrition Intake & Therapies:    Average Meal Intake: Unable to assess (new po diet)  Average Supplements Intake: None Ordered  ADULT DIET; Dysphagia - Pureed; Mildly Thick (Nectar)    Anthropometric Measures:  Height: 160 cm (5' 3\")  Ideal Body Weight (IBW): 115 lbs (52 kg)    Admission Body Weight: 70.8 kg (156 lb)  Current Body Weight: 68.5 kg (151 lb), 131.3 % IBW. Weight Source: Bed Scale  Current BMI (kg/m2): 26.8  Usual Body Weight: 69.9 kg (154 lb) (( 2/2023))  % Weight Change (Calculated): -1.9                    BMI Categories: Overweight (BMI 25.0-29.9)    Estimated Daily Nutrient Needs:  Energy Requirements Based On: Kcal/kg  Weight Used for Energy Requirements: Current  Energy

## 2024-01-19 NOTE — PROGRESS NOTES
[facility]  Facility/Department: [facility]  Speech Language Pathology   Treatment Note      Mulu Pandya  1955  [id number]/[id number]  [x]   confirmed      Date: 2024    Intracranial hemorrhage (HCC) [I62.9]  Basal ganglia hemorrhage (HCC) [I61.0]  Cerebral infarction due to occlusion of left middle cerebral artery (HCC) [I63.512]         Diet NPO     Respiratory Status:  Room Air  No active isolations      Subjective:  Alert, Cooperative, and Pleasant        Interventions used this date:  Dysphagia Treatment      Objective/Assessment:  Patient progressing towards goals:  Short-term Goals  Timeframe for Short-term Goals: 2-4x week  Goal 1: Pt will complete lingual/labial/buccal exercises x10/each to promote increased OM strength/coordination and improve oral phase of swallow with cues as needed.  Goal 2: Pt will complete tongue base exercises x10/each  to promote increased ability for bolus control and A-P transfer to improve oral phase of swallow.  Goal 3: Pt will tolerate PO trials deemed approrpiate by treating SLP with adequate oral clearance with no overt s/s of difficulty or aspiration to assess readiness for MBS.  Pt tolerated ice chips x2 with delayed swallow and no wet vocal quality or cough  Pt tolerated puree x3 with increased bolus propulsion time and no overt s/s of aspiration  Thin trials x2 pt had x1 delayed cough  Nectar liquids by cup x2 with no cough or anterior spillage    Pt has reports she has a baseline \"smokers cough\". SLP recommends puree/nectar diet with f/u MBS to rule out aspiration.  Goal 4: Pt will participate in an instrumental procedure to assess oropharyngeal function and determine least restrictive diet.  MBS recommended this date to rule out silent aspiration. Reported to NITZA James, verbalized understanding    Treatment/Activity Tolerance:  Patient tolerated treatment well    Plan:  Continue per POC    Pain Assessment:  Patient does not c/o pain.    Pain

## 2024-01-19 NOTE — PROGRESS NOTES
Shift Summary:  Bedside report received from Graciela Villareal RN, Handoff NIHSS/GCS/Neuro check completed.   Patient continued on Q1 Neuro checks overnight, patient remains alert and oriented x4, continues with slurred speech and expressive aphasia but able to follow commands and answer neurocheck questions, Bp's and HR stable overnight, no complaints of pain. Patient tolerated 0600 CT scan, bedside handoff report with handoff NIHSS/GCS and Neurocheck completed with oncoming nurse NITZA James.

## 2024-01-19 NOTE — CONSULTS
Subjective:      Patient ID: Mulu Pandya is a 68 y.o. female who presents today for intracerebral hemorrhage..    HPI 68-year-old right-handed female who has been following at home brought in with new speech difficulty as well as right-sided weakness.  Initially it was a bed protocol though patient had intracerebral hemorrhage.  Patient has aphasia but tries to communicate and reportedly has been following the last few days.  The etiology of this is not very clear.  Patient noted that she had acute right-sided symptoms.  No previous history of strokes reported.  She actually denies any headaches and is still comprehending very well.  Patient had no head injury and is not on blood thinners.    Review of Systems   Constitutional:  Negative for fever.   HENT:  Negative for ear pain, tinnitus and trouble swallowing.    Eyes:  Negative for photophobia and visual disturbance.   Respiratory:  Negative for choking and shortness of breath.    Cardiovascular:  Negative for chest pain and palpitations.   Gastrointestinal:  Negative for nausea and vomiting.   Musculoskeletal:  Negative for back pain, gait problem, joint swelling, myalgias, neck pain and neck stiffness.   Skin:  Negative for color change.   Allergic/Immunologic: Negative for food allergies.   Neurological:  Positive for speech difficulty and weakness. Negative for dizziness, tremors, seizures, syncope, facial asymmetry, light-headedness, numbness and headaches.   Psychiatric/Behavioral:  Negative for behavioral problems, confusion, hallucinations and sleep disturbance.        Past Medical History:   Diagnosis Date    Anxiety     Asthma     Chronic back pain     Chronic renal failure, stage 3a ([facility])     COPD (chronic obstructive pulmonary disease) ([facility])     Depression     History of tinnitus     Hyperlipidemia     Hypertension     Obesity     Osteoporosis     2019 T -3.2    Peripheral vascular disease (HCC)     Proteinuria     Type II or unspecified type  01/19/2024 03:52 AM    K 3.7 01/19/2024 03:52 AM     01/19/2024 03:52 AM    CO2 22 01/19/2024 03:52 AM    BUN 40 01/19/2024 03:52 AM    CREATININE 1.08 01/19/2024 03:52 AM    GFRAA >60.0 07/21/2021 02:01 PM    LABGLOM 55.1 01/19/2024 03:52 AM    GLUCOSE 182 01/19/2024 03:52 AM    GLUCOSE 132 12/13/2011 10:46 AM    PROT 5.3 01/18/2024 08:30 AM    LABALBU 2.0 01/18/2024 08:30 AM    LABALBU 4.4 12/13/2011 10:46 AM    CALCIUM 9.1 01/19/2024 03:52 AM    BILITOT <0.2 01/18/2024 08:30 AM    ALKPHOS 16 01/18/2024 08:30 AM    AST 38 01/18/2024 08:30 AM    ALT 37 01/18/2024 08:30 AM     Lab Results   Component Value Date/Time    PROTIME 14.4 01/18/2024 08:30 AM    INR 1.1 01/18/2024 08:30 AM     Lab Results   Component Value Date/Time    TSH 2.300 08/06/2014 09:10 PM    [id number] 634 12/18/2017 07:41 PM     Lab Results   Component Value Date/Time    TRIG 148 01/19/2024 03:52 AM    HDL 28 01/19/2024 03:52 AM    LDLCALC 85 01/19/2024 03:52 AM     Lab Results   Component Value Date/Time    LABAMPH Neg 04/12/2017 04:42 PM    BARBSCNU Neg 04/12/2017 04:42 PM    LABBENZ Neg 04/12/2017 04:42 PM    OPIATESCREENURINE Neg 04/12/2017 04:42 PM    PHENCYCLIDINESCREENURINE Neg 04/12/2017 04:42 PM     No results found for: \"LITHIUM\", \"DILFRTOT\", \"VALPROATE\"    Assessment:   Left frontal periventricular Intractable parenchymal hemorrhage.  There is no mass effect or any other asymmetries.  CT scan was reviewed and there appears to be a possible subacute stroke on the right parietal lobe.  This may have explained  her falls.  It is unclear when this may have occurred.  Given this findings and a very atypical presentation of intracerebral hemorrhage in this area an MRI of the brain is recommended.  This is also to rule out an underlying hemorrhagic infarct patient also has occlusion or stenosis of the middle cerebral artery on the left.  Patient's symptoms and appearance was not a TNK candidate or intra-arterial intervention candidate.

## 2024-01-20 LAB
ALBUMIN SERPL-MCNC: 2.4 G/DL (ref 3.5–4.6)
ANION GAP SERPL CALCULATED.3IONS-SCNC: 15 MEQ/L (ref 9–15)
BASOPHILS # BLD: 0.1 K/UL (ref 0–0.2)
BASOPHILS NFR BLD: 0.4 %
BUN SERPL-MCNC: 33 MG/DL (ref 8–23)
CALCIUM SERPL-MCNC: 8.7 MG/DL (ref 8.5–9.9)
CHLORIDE SERPL-SCNC: 103 MEQ/L (ref 95–107)
CO2 SERPL-SCNC: 22 MEQ/L (ref 20–31)
CREAT SERPL-MCNC: 0.95 MG/DL (ref 0.5–0.9)
EKG ATRIAL RATE: 107 BPM
EKG P AXIS: 89 DEGREES
EKG P-R INTERVAL: 152 MS
EKG Q-T INTERVAL: 338 MS
EKG QRS DURATION: 74 MS
EKG QTC CALCULATION (BAZETT): 451 MS
EKG R AXIS: 72 DEGREES
EKG T AXIS: 63 DEGREES
EKG VENTRICULAR RATE: 107 BPM
EOSINOPHIL # BLD: 0.1 K/UL (ref 0–0.7)
EOSINOPHIL NFR BLD: 0.7 %
ERYTHROCYTE [DISTWIDTH] IN BLOOD BY AUTOMATED COUNT: 13.2 % (ref 11.5–14.5)
GLUCOSE BLD-MCNC: 109 MG/DL (ref 70–99)
GLUCOSE BLD-MCNC: 151 MG/DL (ref 70–99)
GLUCOSE BLD-MCNC: 295 MG/DL (ref 70–99)
GLUCOSE BLD-MCNC: 303 MG/DL (ref 70–99)
GLUCOSE BLD-MCNC: 367 MG/DL (ref 70–99)
GLUCOSE SERPL-MCNC: 193 MG/DL (ref 70–99)
HCT VFR BLD AUTO: 38.5 % (ref 37–47)
HGB BLD-MCNC: 12.8 G/DL (ref 12–16)
LYMPHOCYTES # BLD: 1.4 K/UL (ref 1–4.8)
LYMPHOCYTES NFR BLD: 11.4 %
MCH RBC QN AUTO: 29.6 PG (ref 27–31.3)
MCHC RBC AUTO-ENTMCNC: 33.2 % (ref 33–37)
MCV RBC AUTO: 89.1 FL (ref 79.4–94.8)
MONOCYTES # BLD: 1.2 K/UL (ref 0.2–0.8)
MONOCYTES NFR BLD: 10.1 %
NEUTROPHILS # BLD: 9.1 K/UL (ref 1.4–6.5)
NEUTS SEG NFR BLD: 75.6 %
PERFORMED ON: ABNORMAL
PHOSPHATE SERPL-MCNC: 3.9 MG/DL (ref 2.3–4.8)
PLATELET # BLD AUTO: 305 K/UL (ref 130–400)
POTASSIUM SERPL-SCNC: 3.4 MEQ/L (ref 3.4–4.9)
RBC # BLD AUTO: 4.32 M/UL (ref 4.2–5.4)
SODIUM SERPL-SCNC: 140 MEQ/L (ref 135–144)
WBC # BLD AUTO: 12.1 K/UL (ref 4.8–10.8)

## 2024-01-20 PROCEDURE — 6370000000 HC RX 637 (ALT 250 FOR IP): Performed by: INTERNAL MEDICINE

## 2024-01-20 PROCEDURE — 36415 COLL VENOUS BLD VENIPUNCTURE: CPT

## 2024-01-20 PROCEDURE — 2580000003 HC RX 258: Performed by: INTERNAL MEDICINE

## 2024-01-20 PROCEDURE — 97167 OT EVAL HIGH COMPLEX 60 MIN: CPT

## 2024-01-20 PROCEDURE — 80069 RENAL FUNCTION PANEL: CPT

## 2024-01-20 PROCEDURE — 97535 SELF CARE MNGMENT TRAINING: CPT

## 2024-01-20 PROCEDURE — 94640 AIRWAY INHALATION TREATMENT: CPT

## 2024-01-20 PROCEDURE — 99232 SBSQ HOSP IP/OBS MODERATE 35: CPT | Performed by: NURSE PRACTITIONER

## 2024-01-20 PROCEDURE — 85025 COMPLETE CBC W/AUTO DIFF WBC: CPT

## 2024-01-20 PROCEDURE — 51798 US URINE CAPACITY MEASURE: CPT

## 2024-01-20 PROCEDURE — 2700000000 HC OXYGEN THERAPY PER DAY

## 2024-01-20 PROCEDURE — 1210000000 HC MED SURG R&B

## 2024-01-20 PROCEDURE — 94761 N-INVAS EAR/PLS OXIMETRY MLT: CPT

## 2024-01-20 PROCEDURE — 93010 ELECTROCARDIOGRAM REPORT: CPT | Performed by: INTERNAL MEDICINE

## 2024-01-20 RX ORDER — INSULIN LISPRO 100 [IU]/ML
5 INJECTION, SOLUTION INTRAVENOUS; SUBCUTANEOUS
Status: DISCONTINUED | OUTPATIENT
Start: 2024-01-20 | End: 2024-01-23 | Stop reason: HOSPADM

## 2024-01-20 RX ORDER — INSULIN GLARGINE 100 [IU]/ML
10 INJECTION, SOLUTION SUBCUTANEOUS NIGHTLY
Status: DISCONTINUED | OUTPATIENT
Start: 2024-01-20 | End: 2024-01-23 | Stop reason: HOSPADM

## 2024-01-20 RX ORDER — 0.9 % SODIUM CHLORIDE 0.9 %
1000 INTRAVENOUS SOLUTION INTRAVENOUS ONCE
Status: COMPLETED | OUTPATIENT
Start: 2024-01-20 | End: 2024-01-20

## 2024-01-20 RX ADMIN — Medication 10 ML: at 20:53

## 2024-01-20 RX ADMIN — Medication 10 ML: at 11:40

## 2024-01-20 RX ADMIN — BUDESONIDE AND FORMOTEROL FUMARATE DIHYDRATE 2 PUFF: 80; 4.5 AEROSOL RESPIRATORY (INHALATION) at 19:14

## 2024-01-20 RX ADMIN — ESCITALOPRAM OXALATE 15 MG: 10 TABLET ORAL at 08:32

## 2024-01-20 RX ADMIN — INSULIN LISPRO 3 UNITS: 100 INJECTION, SOLUTION INTRAVENOUS; SUBCUTANEOUS at 08:33

## 2024-01-20 RX ADMIN — BUDESONIDE AND FORMOTEROL FUMARATE DIHYDRATE 2 PUFF: 80; 4.5 AEROSOL RESPIRATORY (INHALATION) at 04:23

## 2024-01-20 RX ADMIN — SODIUM CHLORIDE 1000 ML: 9 INJECTION, SOLUTION INTRAVENOUS at 11:53

## 2024-01-20 RX ADMIN — INSULIN LISPRO 4 UNITS: 100 INJECTION, SOLUTION INTRAVENOUS; SUBCUTANEOUS at 11:38

## 2024-01-20 RX ADMIN — INSULIN LISPRO 5 UNITS: 100 INJECTION, SOLUTION INTRAVENOUS; SUBCUTANEOUS at 11:38

## 2024-01-20 RX ADMIN — INSULIN GLARGINE 10 UNITS: 100 INJECTION, SOLUTION SUBCUTANEOUS at 20:52

## 2024-01-20 RX ADMIN — METOPROLOL TARTRATE 12.5 MG: 25 TABLET, FILM COATED ORAL at 08:32

## 2024-01-20 RX ADMIN — ROSUVASTATIN CALCIUM 40 MG: 40 TABLET, FILM COATED ORAL at 20:53

## 2024-01-20 RX ADMIN — METOPROLOL TARTRATE 12.5 MG: 25 TABLET, FILM COATED ORAL at 20:53

## 2024-01-20 RX ADMIN — Medication 5 MG: at 20:53

## 2024-01-20 ASSESSMENT — PAIN SCALES - GENERAL: PAINLEVEL_OUTOF10: 0

## 2024-01-20 NOTE — PROGRESS NOTES
PT ALERT AND ORIENTED X 4 .   FALL RISK... URINTATES PER BEDPAN.   Sr on tele. Denies pain.  Informed family they need the HIPAA code to get information . Updated  at bedside. Cardiology consulted.

## 2024-01-20 NOTE — PROGRESS NOTES
01/20/24 1200   RT Protocol   History Pulmonary Disease 2   Respiratory pattern 0   Breath sounds 0   Cough 0   Indications for Bronchodilator Therapy On home bronchodilators   Bronchodilator Assessment Score 2

## 2024-01-20 NOTE — PROGRESS NOTES
I reviewed MRI which reveals left parietal acute infarction as well as stable left periventricular ICH.  There are multiple old infarcts.  There is right CP angle mass which could be consistent with meningioma or vestibular schwannoma.    I recommend follow-up MRI brain with and without contrast to further evaluate right CP angle mass further,  as outpatient when renal function has improved.  No acute neurosurgical interventions recommended. Stroke management per Neurology.  Bull Arias MD

## 2024-01-20 NOTE — PROGRESS NOTES
Physical Therapy Med Surg Daily Treatment Note  Facility/Department: [address] ORTHO TELE  Room: [place] [address]       NAME: Mulu Pandya  : 1955 (68 y.o.)  MRN: [id number]  CODE STATUS: Full Code    Date of Service: 2024    Patient Diagnosis(es): Intracranial hemorrhage (HCC) [I62.9]  Basal ganglia hemorrhage (HCC) [I61.0]  Cerebral infarction due to occlusion of left middle cerebral artery (HCC) [I63.512]   Chief Complaint   Patient presents with    Cerebrovascular Accident     Patient Active Problem List    Diagnosis Date Noted    Type 2 diabetes mellitus with chronic kidney disease 11/10/2022    Intracranial hemorrhage (HCC) 2024    Cerebral infarction due to occlusion of left middle cerebral artery (HCC) 2024    Chronic renal failure, stage 3a (HCC)     Senile osteoporosis 2020    Recurrent major depressive disorder, in full remission (HCC) 2018    Proteinuria     Asthma     Anxiety     Chronic back pain     COPD (chronic obstructive pulmonary disease) (HCC)     Depression     Mixed hyperlipidemia     Essential hypertension         Past Medical History:   Diagnosis Date    Anxiety     Asthma     Chronic back pain     Chronic renal failure, stage 3a (HCC)     COPD (chronic obstructive pulmonary disease) (HCC)     Depression     History of tinnitus     Hyperlipidemia     Hypertension     Obesity     Osteoporosis      T -3.2    Peripheral vascular disease (HCC)     Proteinuria     Type II or unspecified type diabetes mellitus without mention of complication, not stated as uncontrolled      Past Surgical History:   Procedure Laterality Date    BREAST SURGERY Left     Benign bx    FRACTURE SURGERY      TUBAL LIGATION         Chart Reviewed: Yes  Family / Caregiver Present: Yes (Spouse and son)  Diagnosis: Intracranial hemorrhage (HCC)    Restrictions:       SUBJECTIVE:   Subjective: Patient resting in bed. Agreeable to tx.    Pain  Patient denies pain pre/post

## 2024-01-20 NOTE — PROGRESS NOTES
[facility] OCCUPATIONAL THERAPY EVALUATION - ACUTE     NAME: Mulu Pandya  : 1955 (68 y.o.)  MRN: [id number]  CODE STATUS: Full Code  [facility]    Date of Service: 2024    Patient Diagnosis(es): Intracranial hemorrhage (HCC) [I62.9]  Basal ganglia hemorrhage (HCC) [I61.0]  Cerebral infarction due to occlusion of left middle cerebral artery (HCC) [I63.512]   Patient Active Problem List    Diagnosis Date Noted    Type 2 diabetes mellitus with chronic kidney disease 11/10/2022    Intracranial hemorrhage (HCC) 2024    Cerebral infarction due to occlusion of left middle cerebral artery (HCC) 2024    Chronic renal failure, stage 3a (HCC)     Senile osteoporosis 2020    Recurrent major depressive disorder, in full remission (HCC) 2018    Proteinuria     Asthma     Anxiety     Chronic back pain     COPD (chronic obstructive pulmonary disease) (HCC)     Depression     Mixed hyperlipidemia     Essential hypertension         Past Medical History:   Diagnosis Date    Anxiety     Asthma     Chronic back pain     Chronic renal failure, stage 3a (HCC)     COPD (chronic obstructive pulmonary disease) (HCC)     Depression     History of tinnitus     Hyperlipidemia     Hypertension     Obesity     Osteoporosis      T -3.2    Peripheral vascular disease (HCC)     Proteinuria     Type II or unspecified type diabetes mellitus without mention of complication, not stated as uncontrolled      Past Surgical History:   Procedure Laterality Date    BREAST SURGERY Left     Benign bx    FRACTURE SURGERY      TUBAL LIGATION          Restrictions  Restrictions/Precautions: Fall Risk, Seizure         Other position/activity restrictions: Pt with current bedrest order however verbal affirmation from RN that bedrest order to be removed per physician preference.    Safety Devices: Safety Devices  Type of Devices: All fall risk precautions in place     Patient's date of birth confirmed:  Yes    General:  Chart Reviewed: Yes    Subjective          Pain at start of treatment: No    Pain at end of treatment: No    Location: n/a  Description: n/a  Nursing notified: No  Intervention: None    Prior Level of Function:  Social/Functional History  Lives With: Spouse  Type of Home: House  Home Layout: One level  Home Access: Stairs to enter with rails  Entrance Stairs - Number of Steps: 3  Entrance Stairs - Rails: Left (up)  Bathroom Shower/Tub: Walk-in shower  Bathroom Toilet: Standard  Bathroom Equipment: Grab bars in shower, Shower chair, Hand-held shower  Bathroom Accessibility: Accessible  Home Equipment: Cane  Receives Help From: Family  ADL Assistance: Independent  Homemaking Assistance: Independent  Homemaking Responsibilities: Yes  Ambulation Assistance: Independent (normally without AD)  Transfer Assistance: Independent  Active : Yes  Mode of Transportation: Car  Occupation: Retired  Leisure & Hobbies: TV, music, reading  Additional Comments: pt with aphasia,    OBJECTIVE:     Orientation Status:  Orientation  Overall Orientation Status: Within Functional Limits    Observation:  Observation/Palpation  Posture: Fair  Observation: No acute distress noted, R side facial droop, pt with expressive aphasia, pt with productive cough    Cognition Status:  Cognition  Overall Cognitive Status: Exceptions  Arousal/Alertness: Appropriate responses to stimuli  Following Commands: Follows one step commands consistently  Attention Span: Appears intact  Initiation: Requires cues for some  Sequencing: Requires cues for some    Perception Status:  Perception  Overall Perceptual Status:  (mild R side neglect)    Vision and Hearing Status:  Vision  Vision Exceptions: Wears glasses at all times  Hearing  Hearing: Within functional limits        GROSS ASSESSMENT AROM/PROM:  AROM: Generally decreased, functional (RUE IMP, LUE WFL)       ROM:   LUE AROM (degrees)  LUE AROM : WFL  Left Hand AROM (degrees)  Left Hand

## 2024-01-20 NOTE — RT PROTOCOL NOTE
RT Inhaler-Nebulizer Bronchodilator Protocol Note    There is a bronchodilator order in the chart from a provider indicating to follow the RT Bronchodilator Protocol and there is an “Initiate RT Inhaler-Nebulizer Bronchodilator Protocol” order as well (see protocol at bottom of note).    CXR Findings:  No results found.    The findings from the last RT Protocol Assessment were as follows:   History Pulmonary Disease:    Respiratory Pattern:    Breath Sounds:    Cough:    Indication for Bronchodilator Therapy:    Bronchodilator Assessment Score:      Aerosolized bronchodilator medication orders have been revised according to the RT Inhaler-Nebulizer Bronchodilator Protocol below.    Respiratory Therapist to perform RT Therapy Protocol Assessment initially then follow the protocol.  Repeat RT Therapy Protocol Assessment PRN for score 0-3 or on second treatment, BID, and PRN for scores above 3.    No Indications - adjust the frequency to every 6 hours PRN wheezing or bronchospasm, if no treatments needed after 48 hours then discontinue using Per Protocol order mode.     If indication present, adjust the RT bronchodilator orders based on the Bronchodilator Assessment Score as indicated below.  Use Inhaler orders unless patient has one or more of the following: on home nebulizer, not able to hold breath for 10 seconds, is not alert and oriented, cannot activate and use MDI correctly, or respiratory rate 25 breaths per minute or more, then use the equivalent nebulizer order(s) with same Frequency and PRN reasons based on the score.  If a patient is on this medication at home then do not decrease Frequency below that used at home.    0-3 - enter or revise RT bronchodilator order(s) to equivalent RT Bronchodilator order with Frequency of every 4 hours PRN for wheezing or increased work of breathing using Per Protocol order mode.        4-6 - enter or revise RT Bronchodilator order(s) to two equivalent RT bronchodilator

## 2024-01-20 NOTE — PROGRESS NOTES
NIHSS exam stable as well as neuro exams, no changes throughout shift. Patient denies headache.     Patient taken for MRI see report, Dr. Chavez notified of results. Per Dr. Chavez please keep patient in ICU overnight.    Patient taken for MBS today, see report. Order placed for updated diet.    Patient worked with PT today and was up in chair for a small period of time. Patient is a minimal +1 assist. Patient does need some cues for safety and reminded to move at a slow pace.     Patient did report inability to rest today or sleep overnight due to neuro checks. Environment adjusted for comfort.

## 2024-01-20 NOTE — PROGRESS NOTES
Internal Medicine   Hospitalist   Progress Note    2024   11:29 AM    Name:  Mulu Pandya  MRN:    [id number]     IP Day: 2     Admit Date: 2024  8:17 AM  PCP: Eileen Edmondson APRN - NP    Code Status:  Full Code    Assessment and Plan:        Active Problems/ diagnosis:     Left basal ganglia ICH  Left MCA occlusion  Meningioma noted on MRI  History of COPD/asthma-listed, no exacerbation  Chronic back pain  Obesity  HTN  HLD  PVD  Type 2 diabetes    Plan  Obtain orthostatic vitals   Repeat CT head and MRI and today are stable ICH.  Neurosurgery is following.  Neurologically stable.  Neurology is also following  Resume current medication  Blood pressure stable, monitor.  Resume current medication  Sliding-scale insulin, hypoglycemia protocol. Added scheduled insulin   Discussed plan of care with patient and her  at bedside in the ICU  DVT PPx     7 pm- 7 am, please contact on call Hospitalist for any needs     Dispo- ok for floor transfer. Discussed with NSGY    Subjective:     Doing well today except some orthostatic dizziness.     Physical Examination:      Vitals:  /73   Pulse 90   Temp 97.4 °F (36.3 °C) (Oral)   Resp 27   Ht 1.6 m (5' 3\")   Wt 68.7 kg (151 lb 7.3 oz)   SpO2 95%   BMI 26.83 kg/m²   Temp (24hrs), Av.2 °F (36.8 °C), Min:97.4 °F (36.3 °C), Max:98.6 °F (37 °C)      General appearance: alert, cooperative and no distress  Mental Status: oriented to person, place and time and normal affect  Lungs: clear to auscultation bilaterally, normal effort  Heart: regular rate and rhythm, no murmur  Abdomen: soft, nontender, nondistended, bowel sounds present, no masses  Extremities: no edema, redness, tenderness in the calves  Skin: no gross lesions, rashes  Neuro exam: Oriented x 3, weak on the right side worse in hand  but able to left care right arm against gravity, 4/5 right leg     Data:     Labs:  Recent Labs     24  0352 24  0441   WBC 12.6* 12.1*   HGB

## 2024-01-20 NOTE — PLAN OF CARE
status to a safe level of function  Outcome: Progressing     Problem: Nutrition Deficit:  Goal: Optimize nutritional status  Outcome: Progressing     Problem: Depression  Goal: Will be euthymic at discharge  Description: INTERVENTIONS:  1. Administer medication as ordered  2. Provide emotional support via 1:1 interaction with staff  3. Encourage involvement in milieu/groups/activities  4. Monitor for social isolation  Outcome: Progressing     Problem: Anxiety  Goal: Will report anxiety at manageable levels  Description: INTERVENTIONS:  1. Administer medication as ordered  2. Teach and rehearse alternative coping skills  3. Provide emotional support with 1:1 interaction with staff  Outcome: Progressing     Problem: Spiritual Care  Goal: Pt/Family able to move forward in process of forgiving self, others, and/or higher power  Description: INTERVENTIONS:  1. Assist patient/family to overcome blocks to healing by use of spiritual practices (prayer, meditation, guided imagery, reiki, breath work, etc).  2. De-myth guilt and help patient/family identify possible irrational spiritual/cultural beliefs and values.  3. Explore possibilities of making amends & reconciliation with self, others, and/or a greater power.  4. Guide patient/family in identifying painful feelings of guilt.  5. Help patient/famiy explore and identify spiritual beliefs, cultural understandings or values that may help or hinder letting go of issue.  6. Help patient/family explore feelings of anger, bitterness, resentment.  7. Help patient/family identify and examine the situation in which these feelings are experienced.  8. Help patient/family identify destructive displacement of feelings onto other individuals.  9. Invite use of sacraments/rituals/ceremonies as appropriate (e.g. - confession, anointing, smudging).  10. Refer patient/family to formal counseling and/or to [facility] for further support work.  Outcome: Progressing     Problem:  Confusion  Goal: Confusion, delirium, dementia, or psychosis is improved or at baseline  Description: INTERVENTIONS:  1. Assess for possible contributors to thought disturbance, including medications, impaired vision or hearing, underlying metabolic abnormalities, dehydration, psychiatric diagnoses, and notify attending LIP  2. Hessel high risk fall precautions, as indicated  3. Provide frequent short contacts to provide reality reorientation, refocusing and direction  4. Decrease environmental stimuli, including noise as appropriate  5. Monitor and intervene to maintain adequate nutrition, hydration, elimination, sleep and activity  6. If unable to ensure safety without constant attention obtain sitter and review sitter guidelines with assigned personnel  7. Initiate Psychosocial CNS and Spiritual Care consult, as indicated  Outcome: Progressing     Problem: ABCDS Injury Assessment  Goal: Absence of physical injury  Outcome: Progressing  Flowsheets (Taken 1/20/2024 7926)  Absence of Physical Injury: Implement safety measures based on patient assessment     Problem: Chronic Conditions and Co-morbidities  Goal: Patient's chronic conditions and co-morbidity symptoms are monitored and maintained or improved  Outcome: Progressing

## 2024-01-20 NOTE — PROGRESS NOTES
[facility] Neurology Daily Progress Note  Name: Mulu Pandya  Age: 68 y.o.  Gender: female  CodeStatus: Full Code  Allergies: Nickel    Chief Complaint:Cerebrovascular Accident    Primary Care Provider: Eileen Edmondson APRN - NP  InpatientTreatment Team: Treatment Team: Attending Provider: Justine Crenshaw DO; Consulting Physician: Zheng Chavez MD; Utilization Reviewer: Denisa Bowling RN; Utilization Reviewer: Sunny Swartz RN; Registered Nurse: Kandi Zayas RN  Admission Date: 1/18/2024      HPI   Pt seen and examined for neuro follow up.  Currently alert and oriented x 2, no acute distress, cooperative.  Transfer out of the ICU today.  Patient with some expressive aphasia.  Right-sided weakness and incoordination noted.  Denies headache or vision changes.  Taking bite-size diet.  Thin liquids.  No seizure activity reported.  Afebrile.  Blood pressure stable.  Orthostatics negative.    Vitals:    01/20/24 1259   BP: 119/78   Pulse: 89   Resp: 20   Temp: 97.9 °F (36.6 °C)   SpO2: 98%        Review of Systems   Constitutional:  Negative for fatigue and fever.   HENT:  Negative for hearing loss and trouble swallowing.    Eyes:  Negative for visual disturbance.   Respiratory:  Negative for cough, chest tightness, shortness of breath and wheezing.    Cardiovascular:  Negative for chest pain, palpitations and leg swelling.   Gastrointestinal:  Negative for nausea and vomiting.   Musculoskeletal:  Positive for gait problem.   Skin:  Negative for color change and rash.   Neurological:  Positive for speech difficulty and weakness. Negative for dizziness, tremors, seizures, syncope, facial asymmetry, light-headedness, numbness and headaches.   Psychiatric/Behavioral:  Positive for confusion. Negative for agitation and hallucinations. The patient is not nervous/anxious.          Physical Exam  Vitals and nursing note reviewed.   Constitutional:       General: She is not in acute distress.     Appearance: She is not  restricted diffusion.    There is an acute intraparenchymal hemorrhage in the left anterior parietal  periventricular white matter adjacent to the left frontal horn, with no sign  of any underlying mass or any surrounding edema.  This is not associated with  any restricted diffusion to suggest hemorrhagic conversion of an infarct.  This may be from amyloid angiopathy or hypertension.  There is no mass effect  from this hemorrhage.    There is a moderate-sized area of encephalomalacia of the cortex and  subcortical white matter of the right mid-posterior temporoparietal lobe,  with no restricted diffusion.  This is consistent with an old infarct in the  inferior right mid MCA territory.    An old small infarction is seen in the left inferior cerebellar hemisphere in  the left PICA territory.  Multiple tiny infarcts are scattered throughout  both inferior cerebellar hemispheres in the PICA territories.    An old lacunar infarct is present in the right thalamus.    There is a the right CP angle mass measuring 2.4 x 1.6 x 2.1 cm, poorly seen  on the CT, consistent with a meningioma.  It produces moderate mass effect  upon the right middle cerebellar peduncle and minimal mass effect upon the  right pontomedullary junction.    No midline shift.    There is moderate dilatation of the ventricles and sulci representing  age-appropriate atrophy. There is mild periventricular and subcortical white  matter T2 signal abnormality representing age-appropriate small vessel  ischemia. There is moderate patchy T2 signal abnormality in the brett from  small vessel ischemia.    The sellar/suprasellar regions appear unremarkable.    The normal signal voids within the major intracranial vessels appear  maintained.    ORBITS: The visualized portion of the orbits demonstrate no acute abnormality.    SINUSES: The visualized paranasal sinuses and mastoid air cells demonstrate  no acute abnormality.    BONES/SOFT TISSUES: The bone marrow

## 2024-01-20 NOTE — PROGRESS NOTES
See OT evaluation for all goals and OT POC. Electronically signed by Kolton Carvajal OTR/L on 1/20/2024 at 4:09 PM

## 2024-01-20 NOTE — PROGRESS NOTES
Shift Summary:  1954: POC blood sugar on left side result at 429, perfect served  result and repeated on right side at 1955 POC BG result 326, 4 units insulin given per sliding scale and no further orders at this time.  2000:Head to toe assessment complete, neuro check remains unchanged, patient denies needs at this time.   Rest of shift uneventful, bg back in the 150's, patient received linen change and bath, afebrile, no BM, patient rested well overnight between assessments.

## 2024-01-21 LAB
ALBUMIN SERPL-MCNC: 2.6 G/DL (ref 3.5–4.6)
ANION GAP SERPL CALCULATED.3IONS-SCNC: 14 MEQ/L (ref 9–15)
BACTERIA URNS QL MICRO: ABNORMAL /HPF
BASOPHILS # BLD: 0.1 K/UL (ref 0–0.2)
BASOPHILS NFR BLD: 0.4 %
BILIRUB UR QL STRIP: NEGATIVE
BUN SERPL-MCNC: 27 MG/DL (ref 8–23)
CALCIUM SERPL-MCNC: 8.5 MG/DL (ref 8.5–9.9)
CHLORIDE SERPL-SCNC: 102 MEQ/L (ref 95–107)
CLARITY UR: ABNORMAL
CO2 SERPL-SCNC: 24 MEQ/L (ref 20–31)
COLOR UR: YELLOW
CREAT SERPL-MCNC: 1 MG/DL (ref 0.5–0.9)
EOSINOPHIL # BLD: 0.1 K/UL (ref 0–0.7)
EOSINOPHIL NFR BLD: 0.4 %
EPI CELLS #/AREA URNS AUTO: ABNORMAL /HPF (ref 0–5)
ERYTHROCYTE [DISTWIDTH] IN BLOOD BY AUTOMATED COUNT: 13.2 % (ref 11.5–14.5)
GLUCOSE BLD-MCNC: 154 MG/DL (ref 70–99)
GLUCOSE BLD-MCNC: 203 MG/DL (ref 70–99)
GLUCOSE BLD-MCNC: 225 MG/DL (ref 70–99)
GLUCOSE BLD-MCNC: 245 MG/DL (ref 70–99)
GLUCOSE SERPL-MCNC: 208 MG/DL (ref 70–99)
GLUCOSE UR STRIP-MCNC: 500 MG/DL
HCT VFR BLD AUTO: 37.9 % (ref 37–47)
HGB BLD-MCNC: 12.3 G/DL (ref 12–16)
HGB UR QL STRIP: ABNORMAL
HYALINE CASTS #/AREA URNS AUTO: ABNORMAL /HPF (ref 0–5)
KETONES UR STRIP-MCNC: NEGATIVE MG/DL
LEUKOCYTE ESTERASE UR QL STRIP: ABNORMAL
LYMPHOCYTES # BLD: 1.3 K/UL (ref 1–4.8)
LYMPHOCYTES NFR BLD: 9.5 %
MCH RBC QN AUTO: 29.6 PG (ref 27–31.3)
MCHC RBC AUTO-ENTMCNC: 32.5 % (ref 33–37)
MCV RBC AUTO: 91.1 FL (ref 79.4–94.8)
MONOCYTES # BLD: 1.4 K/UL (ref 0.2–0.8)
MONOCYTES NFR BLD: 10.2 %
NEUTROPHILS # BLD: 10.3 K/UL (ref 1.4–6.5)
NEUTS SEG NFR BLD: 76.8 %
NITRITE UR QL STRIP: NEGATIVE
PERFORMED ON: ABNORMAL
PH UR STRIP: 5.5 [PH] (ref 5–9)
PHOSPHATE SERPL-MCNC: 3.5 MG/DL (ref 2.3–4.8)
PLATELET # BLD AUTO: 321 K/UL (ref 130–400)
POTASSIUM SERPL-SCNC: 3.7 MEQ/L (ref 3.4–4.9)
PROT UR STRIP-MCNC: 30 MG/DL
RBC # BLD AUTO: 4.16 M/UL (ref 4.2–5.4)
RBC #/AREA URNS AUTO: ABNORMAL /HPF (ref 0–5)
SODIUM SERPL-SCNC: 140 MEQ/L (ref 135–144)
SP GR UR STRIP: 1.02 (ref 1–1.03)
URINE REFLEX TO CULTURE: YES
UROBILINOGEN UR STRIP-ACNC: 1 E.U./DL
WBC # BLD AUTO: 13.5 K/UL (ref 4.8–10.8)
WBC #/AREA URNS AUTO: >100 /HPF (ref 0–5)

## 2024-01-21 PROCEDURE — 2580000003 HC RX 258: Performed by: INTERNAL MEDICINE

## 2024-01-21 PROCEDURE — 6370000000 HC RX 637 (ALT 250 FOR IP): Performed by: INTERNAL MEDICINE

## 2024-01-21 PROCEDURE — 99231 SBSQ HOSP IP/OBS SF/LOW 25: CPT | Performed by: NURSE PRACTITIONER

## 2024-01-21 PROCEDURE — 94640 AIRWAY INHALATION TREATMENT: CPT

## 2024-01-21 PROCEDURE — 80069 RENAL FUNCTION PANEL: CPT

## 2024-01-21 PROCEDURE — 97535 SELF CARE MNGMENT TRAINING: CPT

## 2024-01-21 PROCEDURE — 99223 1ST HOSP IP/OBS HIGH 75: CPT | Performed by: INTERNAL MEDICINE

## 2024-01-21 PROCEDURE — 1210000000 HC MED SURG R&B

## 2024-01-21 PROCEDURE — 85025 COMPLETE CBC W/AUTO DIFF WBC: CPT

## 2024-01-21 PROCEDURE — 36415 COLL VENOUS BLD VENIPUNCTURE: CPT

## 2024-01-21 PROCEDURE — 94761 N-INVAS EAR/PLS OXIMETRY MLT: CPT

## 2024-01-21 RX ADMIN — INSULIN LISPRO 1 UNITS: 100 INJECTION, SOLUTION INTRAVENOUS; SUBCUTANEOUS at 08:11

## 2024-01-21 RX ADMIN — INSULIN LISPRO 5 UNITS: 100 INJECTION, SOLUTION INTRAVENOUS; SUBCUTANEOUS at 16:41

## 2024-01-21 RX ADMIN — INSULIN LISPRO 3 UNITS: 100 INJECTION, SOLUTION INTRAVENOUS; SUBCUTANEOUS at 11:52

## 2024-01-21 RX ADMIN — ROSUVASTATIN CALCIUM 40 MG: 40 TABLET, FILM COATED ORAL at 21:48

## 2024-01-21 RX ADMIN — Medication 10 ML: at 08:13

## 2024-01-21 RX ADMIN — INSULIN LISPRO 5 UNITS: 100 INJECTION, SOLUTION INTRAVENOUS; SUBCUTANEOUS at 08:11

## 2024-01-21 RX ADMIN — Medication 5 MG: at 21:48

## 2024-01-21 RX ADMIN — INSULIN LISPRO 5 UNITS: 100 INJECTION, SOLUTION INTRAVENOUS; SUBCUTANEOUS at 11:53

## 2024-01-21 RX ADMIN — INSULIN GLARGINE 10 UNITS: 100 INJECTION, SOLUTION SUBCUTANEOUS at 21:48

## 2024-01-21 RX ADMIN — METOPROLOL TARTRATE 12.5 MG: 25 TABLET, FILM COATED ORAL at 08:12

## 2024-01-21 RX ADMIN — BUDESONIDE AND FORMOTEROL FUMARATE DIHYDRATE 2 PUFF: 80; 4.5 AEROSOL RESPIRATORY (INHALATION) at 17:15

## 2024-01-21 RX ADMIN — INSULIN LISPRO 1 UNITS: 100 INJECTION, SOLUTION INTRAVENOUS; SUBCUTANEOUS at 16:40

## 2024-01-21 RX ADMIN — BUDESONIDE AND FORMOTEROL FUMARATE DIHYDRATE 2 PUFF: 80; 4.5 AEROSOL RESPIRATORY (INHALATION) at 05:23

## 2024-01-21 RX ADMIN — METOPROLOL TARTRATE 12.5 MG: 25 TABLET, FILM COATED ORAL at 21:48

## 2024-01-21 RX ADMIN — ESCITALOPRAM OXALATE 15 MG: 10 TABLET ORAL at 08:12

## 2024-01-21 RX ADMIN — Medication 10 ML: at 21:49

## 2024-01-21 NOTE — PROGRESS NOTES
Internal Medicine   Hospitalist   Progress Note    2024   2:36 PM    Name:  Mulu Pandya  MRN:    [id number]     IP Day: 3     Admit Date: 2024  8:17 AM  PCP: Eileen Edmondson APRN - NP    Code Status:  Full Code    Assessment and Plan:        Active Problems/ diagnosis:     Left basal ganglia ICH  Left MCA occlusion  Meningioma noted on MRI  History of COPD/asthma-listed, no exacerbation  Chronic back pain  Obesity  HTN  HLD  PVD  Type 2 diabetes    Plan  Moved of the ICU yesterday. Neuro is recommending GUSTABO   Repeat CT head and MRI and today are stable ICH.  Neurosurgery is following.  Neurologically stable.  Neurology is also following  Resume current medication  Blood pressure stable, monitor.  Resume current medication  Sliding-scale insulin, hypoglycemia protocol. Added scheduled insulin   Discussed plan of care with patient and her  at bedside in the ICU  DVT PPx     7 pm- 7 am, please contact on call Hospitalist for any needs     Dispo- ok for floor transfer. Discussed with NSGY    Subjective:     Doing well.     Physical Examination:      Vitals:  /71   Pulse (!) 102   Temp 97.9 °F (36.6 °C) (Oral)   Resp 17   Ht 1.6 m (5' 3\")   Wt 68.7 kg (151 lb 7.3 oz)   SpO2 96%   BMI 26.83 kg/m²   Temp (24hrs), Av.1 °F (36.7 °C), Min:97.9 °F (36.6 °C), Max:98.6 °F (37 °C)      General appearance: alert, cooperative and no distress  Mental Status: oriented to person, place and time and normal affect  Lungs: clear to auscultation bilaterally, normal effort  Heart: regular rate and rhythm, no murmur  Abdomen: soft, nontender, nondistended, bowel sounds present, no masses  Extremities: no edema, redness, tenderness in the calves  Skin: no gross lesions, rashes  Neuro exam: Oriented x 3, weak on the right side worse in hand  but able to left care right arm against gravity, 4/5 right leg     Data:     Labs:  Recent Labs     24  0441 24  0457   WBC 12.1* 13.5*   HGB 12.8 12.3  2053    insulin lispro (HUMALOG) injection vial 0-4 Units  0-4 Units SubCUTAneous TID WC Justine Crenshaw DO   3 Units at 01/21/24 1152    insulin lispro (HUMALOG) injection vial 0-4 Units  0-4 Units SubCUTAneous Nightly Justine Crenshaw DO   4 Units at 01/19/24 1959    labetalol (NORMODYNE;TRANDATE) injection 10 mg  10 mg IntraVENous Q10 Min PRN Justine Crenshaw DO        albuterol sulfate HFA (PROVENTIL;VENTOLIN;PROAIR) 108 (90 Base) MCG/ACT inhaler 2 puff  2 puff Inhalation 4x Daily PRN Justine Crenshaw DO        escitalopram (LEXAPRO) tablet 15 mg  15 mg Oral Daily Justine Crenshaw DO   15 mg at 01/21/24 0812    nicotine (NICODERM CQ) 14 MG/24HR 1 patch  1 patch TransDERmal Q24H Justine Crenshaw DO        budesonide-formoterol (SYMBICORT) 80-4.5 MCG/ACT inhaler 2 puff  2 puff Inhalation BID RT Justine Crenshaw DO   2 puff at 01/21/24 0523       New information updated in the note today, rest of the examination did not change compared to yesterday.    Additional work up or/and treatment plan may be added today or then after based on clinical progression. I am managing a portion of pt care. Some medical issues are handled by other specialists. Additional work up and treatment should be done in out pt setting by pt PCP and other out pt providers.      In addition to examining and evaluating pt, I spent additional time explaining care, normaland abnormal findings, and treatment plan. All of pt questions were answered. Counseling, diet and education were provided. Case will be discussed with nursing staff when appropriate. Family will be updated if and when appropriate.       Electronically signed by Justine Crenshaw DO on 1/21/2024 at 2:36 PM

## 2024-01-21 NOTE — PLAN OF CARE
Problem: Discharge Planning  Goal: Discharge to home or other facility with appropriate resources  Outcome: Progressing     Problem: Pain  Goal: Verbalizes/displays adequate comfort level or baseline comfort level  Outcome: Progressing  Flowsheets (Taken 1/20/2024 1715 by Kandi Zayas RN)  Verbalizes/displays adequate comfort level or baseline comfort level: Assess pain using appropriate pain scale     Problem: Safety - Adult  Goal: Free from fall injury  Outcome: Progressing     Problem: Neurosensory - Adult  Goal: Achieves stable or improved neurological status  Outcome: Progressing  Goal: Absence of seizures  Outcome: Progressing  Goal: Remains free of injury related to seizures activity  Outcome: Progressing  Goal: Achieves maximal functionality and self care  Outcome: Progressing     Problem: Respiratory - Adult  Goal: Achieves optimal ventilation and oxygenation  Outcome: Progressing     Problem: Cardiovascular - Adult  Goal: Maintains optimal cardiac output and hemodynamic stability  Outcome: Progressing  Goal: Absence of cardiac dysrhythmias or at baseline  Outcome: Progressing     Problem: Musculoskeletal - Adult  Goal: Return mobility to safest level of function  Outcome: Progressing  Goal: Maintain proper alignment of affected body part  Outcome: Progressing  Goal: Return ADL status to a safe level of function  Outcome: Progressing     Problem: Nutrition Deficit:  Goal: Optimize nutritional status  Outcome: Progressing     Problem: Depression  Goal: Will be euthymic at discharge  Description: INTERVENTIONS:  1. Administer medication as ordered  2. Provide emotional support via 1:1 interaction with staff  3. Encourage involvement in milieu/groups/activities  4. Monitor for social isolation  Outcome: Progressing     Problem: Anxiety  Goal: Will report anxiety at manageable levels  Description: INTERVENTIONS:  1. Administer medication as ordered  2. Teach and rehearse alternative coping skills  3.  activity  6. If unable to ensure safety without constant attention obtain sitter and review sitter guidelines with assigned personnel  7. Initiate Psychosocial CNS and Spiritual Care consult, as indicated  Outcome: Progressing     Problem: ABCDS Injury Assessment  Goal: Absence of physical injury  Outcome: Progressing     Problem: Chronic Conditions and Co-morbidities  Goal: Patient's chronic conditions and co-morbidity symptoms are monitored and maintained or improved  Outcome: Progressing

## 2024-01-21 NOTE — PLAN OF CARE
Problem: Discharge Planning  Goal: Discharge to home or other facility with appropriate resources  1/21/2024 1043 by Lindsey Clarke, RN  Outcome: Progressing     Problem: Pain  Goal: Verbalizes/displays adequate comfort level or baseline comfort level  1/21/2024 1043 by Lindsey Clarke, RN  Outcome: Progressing     Problem: Safety - Adult  Goal: Free from fall injury  1/21/2024 1043 by Lindsey Clarke, RN  Outcome: Progressing

## 2024-01-21 NOTE — PROGRESS NOTES
Physical Therapy Med Surg Daily Treatment Note  Facility/Department: [address] ORTHO TELE  Room: [place] [id number]       NAME: Mulu Pandya  : 1955 (68 y.o.)  MRN: [id number]  CODE STATUS: Full Code    Date of Service: 2024    Patient Diagnosis(es): Intracranial hemorrhage (HCC) [I62.9]  Basal ganglia hemorrhage (HCC) [I61.0]  Cerebral infarction due to occlusion of left middle cerebral artery (HCC) [I63.512]   Chief Complaint   Patient presents with    Cerebrovascular Accident     Patient Active Problem List    Diagnosis Date Noted    Type 2 diabetes mellitus with chronic kidney disease 11/10/2022    Intracranial hemorrhage (HCC) 2024    Cerebral infarction due to occlusion of left middle cerebral artery (HCC) 2024    Chronic renal failure, stage 3a (HCC)     Senile osteoporosis 2020    Recurrent major depressive disorder, in full remission (HCC) 2018    Proteinuria     Asthma     Anxiety     Chronic back pain     COPD (chronic obstructive pulmonary disease) (HCC)     Depression     Mixed hyperlipidemia     Essential hypertension         Past Medical History:   Diagnosis Date    Anxiety     Asthma     Chronic back pain     Chronic renal failure, stage 3a (HCC)     COPD (chronic obstructive pulmonary disease) (HCC)     Depression     History of tinnitus     Hyperlipidemia     Hypertension     Obesity     Osteoporosis      T -3.2    Peripheral vascular disease (HCC)     Proteinuria     Type II or unspecified type diabetes mellitus without mention of complication, not stated as uncontrolled      Past Surgical History:   Procedure Laterality Date    BREAST SURGERY Left     Benign bx    FRACTURE SURGERY      TUBAL LIGATION         Diagnosis: Intracranial hemorrhage (HCC)    Restrictions:  Restrictions/Precautions: Fall Risk;Seizure    SUBJECTIVE:   Subjective: Patient resting in bed. Agreeable to tx.    Pain  Pain: Denies pre and post session pain.    OBJECTIVE:        Bed  mobility  Supine to Sit: Moderate assistance  Sit to Supine: Moderate assistance  Bed Mobility Comments: Use of HR. HOB flat with increased time to complete    Transfers  Sit to Stand: Unable to assess  Stand to Sit: Unable to assess  Comment: Unable to assess due to nausea/vomiting.                        PT Exercises  Exercise Treatment: Seated: Hip flexion, knee ext x10                     ASSESSMENT   Body Structures, Functions, Activity Limitations Requiring Skilled Therapeutic Intervention: Decreased functional mobility ;Decreased ADL status;Decreased tolerance to work activity;Decreased balance;Decreased endurance;Decreased ROM;Decreased body mechanics;Decreased safe awareness;Decreased strength;Decreased fine motor control;Decreased coordination  Assessment: Treatment limited by nausea and vomiting once patient sat EOB. Able to sit EOB And complete open chain LE exercises brielfly. No reports of dizziness however patient returned to supine. Nursing notified.     Discharge Recommendations:  Continue to assess pending progress, Patient would benefit from continued therapy after discharge         Goals  Long Term Goals  Long Term Goal 1: Pt to complete bed mobility with touching assist  Long Term Goal 2: Pt to complete transfers with touching assist  Long Term Goal 3: Pt to manage 50ft with LRD and [place]ion    PLAN    General Plan: 1 time a day 3-6 times a week  Safety Devices  Type of Devices: All fall risk precautions in place, Bed alarm in place, Call light within reach     [facility] (6 CLICK) BASIC MOBILITY  AM-PAC Inpatient Mobility Raw Score : 15     Therapy Time   Individual   Time In 1045   Time Out 1100   Minutes 15     Timed Code Treatment Minutes: 15 Minutes     Bed mob: 10  There ex 5  Dilia Kim PTA, 01/21/24 at 12:29 PM     Electronically signed by Dilia Kim PTA on 1/21/2024 at 12:29 PM      Definitions for assistance levels  Independent = pt does not require any physical supervision or

## 2024-01-21 NOTE — PROGRESS NOTES
lobe  periventricular hematoma identified.  There is no significant surrounding  edema or mass effect.  Stable encephalomalacia areas identified from old  insult within the right temporal lobe with evolving insult identified within  the left parietal lobe.  No abnormal extra-axial fluid collection.  Mild ex  vacuole dilatation seen of the ventricular system.  Minimal chronic small  vessel ischemic changes seen within the periventricular and subcortical white  matter to suggest chronic small vessel ischemic change.  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    ORBITS: The visualized portion of the orbits demonstrate no acute abnormality.    SINUSES: The visualized paranasal sinuses and mastoid air cells demonstrate  no acute abnormality.    SOFT TISSUES/SKULL:  No acute abnormality of the visualized skull or soft  tissues.    Impression  Stable parenchymal hemorrhage involving the left frontal periventricular  white matter.  No significant surrounding edema or mass effect.  Evolving  insult in the left parietal lobe with encephalomalacia change stable within  the right temporal lobe.  No results found for this or any previous visit.  No results found for this or any previous visit.      Carotid duplex: No results found for this or any previous visit.  No results found for this or any previous visit.  No results found for this or any previous visit.      Echo No results found for this or any previous visit.            Assessment/Plan:    1/19/24:  Left frontal periventricular Intractable parenchymal hemorrhage.  There is no mass effect or any other asymmetries.  CT scan was reviewed and there appears to be a possible subacute stroke on the right parietal lobe.  This may have explained  her falls.  It is unclear when this may have occurred.  Given this findings and a very atypical presentation of intracerebral hemorrhage in this area an MRI  of the brain is recommended.  This is also to rule out an underlying hemorrhagic infarct patient also has occlusion or stenosis of the middle cerebral artery on the left.  Patient's symptoms and appearance was not a TNK candidate or intra-arterial intervention candidate.  Patient has weakness of mostly in the right arm and appears to be stable and no headache is reported.  Will follow her up with the MRI.  CT angiogram otherwise did not show anything significant.     1/20/24:  MRI of the brain completed and showed's moderate-sized acute infarct in the left superior mid parietal lobe MCA territory with minimal mass effect.  There is no change in small acute intraparenchymal hemorrhage in the left anterior parietal periventricular white matter.  No mass effect or edema.  There are old infarcts in the right mid posterior MCA territory, left inferior cerebellar hemisphere in the PICA territory, right thalamus and multiple tiny infarcts in both inferior cerebellar hemispheres in the PICA territories.  Patient also noted to have right CP angle meningioma measuring 2.4 x 1.6 x 2.1 cm producing moderate mass effect upon the right middle cerebral appendical and right pontomedullary junction.  CTA of the head neck shows occluded left MCA and distal M2 segment.  Developmental venous anomaly present within the left basal ganglia region with nearby intraparenchymal hemorrhage.  No significant carotid stenosis identified.  No antiplatelets or anticoagulation at this time given intracranial hemorrhage.  HDL low at 28, LDL 85  Hemoglobin A1c 8.0  Blood pressure currently stable  Will obtain echocardiogram  Neurosurgery is following and recommends follow-up MRI of the brain with and without contrast to further evaluate right CP angle mass, as an outpatient when renal function has improved.  No surgical intervention required at this time.  Rehab precertification pending  Case discussed with Dr. Chavez.  Will arrange for GUSTABO on

## 2024-01-21 NOTE — CONSULTS
reading provider will be dictating this exam?->CRC FINDINGS: INTRACRANIAL STRUCTURES/VENTRICLES: There is a moderate-sized area of restricted diffusion in the superior left mid parietal lobe, extending from the cortex through the subcortical white matter.  There is minimal mass effect, with effacement of subjacent sulci but no sign of any effacement of the left lateral ventricle or midline shift.  This is consistent with an acute infarction of the left superior mid MCA territory. There is no sign of any additional restricted diffusion. There is an acute intraparenchymal hemorrhage in the left anterior parietal periventricular white matter adjacent to the left frontal horn, with no sign of any underlying mass or any surrounding edema.  This is not associated with any restricted diffusion to suggest hemorrhagic conversion of an infarct. This may be from amyloid angiopathy or hypertension.  There is no mass effect from this hemorrhage. There is a moderate-sized area of encephalomalacia of the cortex and subcortical white matter of the right mid-posterior temporoparietal lobe, with no restricted diffusion.  This is consistent with an old infarct in the inferior right mid MCA territory. An old small infarction is seen in the left inferior cerebellar hemisphere in the left PICA territory.  Multiple tiny infarcts are scattered throughout both inferior cerebellar hemispheres in the PICA territories. An old lacunar infarct is present in the right thalamus. There is a the right CP angle mass measuring 2.4 x 1.6 x 2.1 cm, poorly seen on the CT, consistent with a meningioma.  It produces moderate mass effect upon the right middle cerebellar peduncle and minimal mass effect upon the right pontomedullary junction. No midline shift. There is moderate dilatation of the ventricles and sulci representing age-appropriate atrophy. There is mild periventricular and subcortical white matter T2 signal abnormality representing  am TECHNIQUE: CT of the head was performed without the administration of intravenous contrast. Automated exposure control, iterative reconstruction, and/or weight based adjustment of the mA/kV was utilized to reduce the radiation dose to as low as reasonably achievable. COMPARISON: None available. HISTORY: ORDERING SYSTEM PROVIDED HISTORY: R SIDED FACIAL DROOP, R SIDED WEAKNESS, SLURRED SPEECH TECHNOLOGIST PROVIDED HISTORY: Reason for exam:->R SIDED FACIAL DROOP, R SIDED WEAKNESS, SLURRED SPEECH Has a \"code stroke\" or \"stroke alert\" been called?->Yes Decision Support Exception - unselect if not a suspected or confirmed emergency medical condition->Emergency Medical Condition (MA) What reading provider will be dictating this exam?->CRC FINDINGS: BRAIN/VENTRICLES: In approximately 2.0 by 1.8 x 1.5 cm acute intraparenchymal hematoma is present within the corona radiata inferolateral to the left frontal horn of the lateral ventricle.  There is no significant mass effect or midline shift.  No abnormal extra-axial fluid collection.  Ex vacuo dilatation of the lateral and 3rd ventricles appears appropriate for generalized volume loss.  A small to moderate-sized area of encephalomalacia within the right temporal occipital junction and very small areas of encephalomalacia within the central left cerebellar hemisphere, left frontoparietal corona radiata, and right thalamus are most consistent with chronic ischemic infarcts.  Mild predominantly supratentorial white matter changes are most consistent with chronic small vessel microangiopathy. ORBITS: The visualized portion of the orbits demonstrate no acute abnormality. SINUSES: A small amount of fluid is present within the left maxillary sinus. The other visualized paranasal sinuses and mastoid air cells demonstrate no acute abnormality. SOFT TISSUES/SKULL:  No acute abnormality of the visualized skull or soft tissues.     Approximately 2 cm acute intraparenchymal left frontal

## 2024-01-22 ENCOUNTER — HOSPITAL ENCOUNTER (INPATIENT)
Age: 69
Discharge: HOME | DRG: 064 | End: 2024-01-24
Attending: INTERNAL MEDICINE
Payer: MEDICARE

## 2024-01-22 VITALS
BODY MASS INDEX: 26.84 KG/M2 | RESPIRATION RATE: 24 BRPM | HEIGHT: 63 IN | TEMPERATURE: 98.9 F | HEART RATE: 90 BPM | DIASTOLIC BLOOD PRESSURE: 63 MMHG | OXYGEN SATURATION: 94 % | WEIGHT: 151.46 LBS | SYSTOLIC BLOOD PRESSURE: 99 MMHG

## 2024-01-22 PROBLEM — R13.12 DYSPHAGIA, OROPHARYNGEAL PHASE: Status: ACTIVE | Noted: 2024-01-22

## 2024-01-22 PROBLEM — R47.01 APHASIA: Status: ACTIVE | Noted: 2024-01-22

## 2024-01-22 PROBLEM — Z78.9 IMPAIRED MOBILITY AND ACTIVITIES OF DAILY LIVING: Status: ACTIVE | Noted: 2024-01-22

## 2024-01-22 PROBLEM — I69.90 LATE EFFECTS OF CVA (CEREBROVASCULAR ACCIDENT): Status: ACTIVE | Noted: 2024-01-22

## 2024-01-22 PROBLEM — Z74.09 IMPAIRED MOBILITY AND ACTIVITIES OF DAILY LIVING: Status: ACTIVE | Noted: 2024-01-22

## 2024-01-22 PROBLEM — E11.65 HYPERGLYCEMIA DUE TO TYPE 2 DIABETES MELLITUS (HCC): Status: ACTIVE | Noted: 2024-01-22

## 2024-01-22 PROBLEM — I63.9 CVA (CEREBRAL VASCULAR ACCIDENT) (HCC): Status: ACTIVE | Noted: 2024-01-22

## 2024-01-22 LAB
ALBUMIN SERPL-MCNC: 2.5 G/DL (ref 3.5–4.6)
ANION GAP SERPL CALCULATED.3IONS-SCNC: 10 MEQ/L (ref 9–15)
BASOPHILS # BLD: 0 K/UL (ref 0–0.2)
BASOPHILS NFR BLD: 0.2 %
BUN SERPL-MCNC: 22 MG/DL (ref 8–23)
CALCIUM SERPL-MCNC: 8.3 MG/DL (ref 8.5–9.9)
CHLORIDE SERPL-SCNC: 101 MEQ/L (ref 95–107)
CO2 SERPL-SCNC: 25 MEQ/L (ref 20–31)
CREAT SERPL-MCNC: 0.95 MG/DL (ref 0.5–0.9)
EOSINOPHIL # BLD: 0 K/UL (ref 0–0.7)
EOSINOPHIL NFR BLD: 0.2 %
ERYTHROCYTE [DISTWIDTH] IN BLOOD BY AUTOMATED COUNT: 13.2 % (ref 11.5–14.5)
GLUCOSE BLD-MCNC: 112 MG/DL (ref 70–99)
GLUCOSE BLD-MCNC: 163 MG/DL (ref 70–99)
GLUCOSE BLD-MCNC: 170 MG/DL (ref 70–99)
GLUCOSE BLD-MCNC: 173 MG/DL (ref 70–99)
GLUCOSE BLD-MCNC: 195 MG/DL (ref 70–99)
GLUCOSE SERPL-MCNC: 195 MG/DL (ref 70–99)
HCT VFR BLD AUTO: 34.2 % (ref 37–47)
HGB BLD-MCNC: 11.4 G/DL (ref 12–16)
LYMPHOCYTES # BLD: 1.4 K/UL (ref 1–4.8)
LYMPHOCYTES NFR BLD: 9.8 %
MCH RBC QN AUTO: 29.3 PG (ref 27–31.3)
MCHC RBC AUTO-ENTMCNC: 33.3 % (ref 33–37)
MCV RBC AUTO: 87.9 FL (ref 79.4–94.8)
MONOCYTES # BLD: 1.4 K/UL (ref 0.2–0.8)
MONOCYTES NFR BLD: 9.7 %
NEUTROPHILS # BLD: 11.3 K/UL (ref 1.4–6.5)
NEUTS SEG NFR BLD: 78 %
PERFORMED ON: ABNORMAL
PHOSPHATE SERPL-MCNC: 3.2 MG/DL (ref 2.3–4.8)
PLATELET # BLD AUTO: 299 K/UL (ref 130–400)
POTASSIUM SERPL-SCNC: 3.6 MEQ/L (ref 3.4–4.9)
RBC # BLD AUTO: 3.89 M/UL (ref 4.2–5.4)
SODIUM SERPL-SCNC: 136 MEQ/L (ref 135–144)
WBC # BLD AUTO: 14.4 K/UL (ref 4.8–10.8)

## 2024-01-22 PROCEDURE — 2580000003 HC RX 258: Performed by: INTERNAL MEDICINE

## 2024-01-22 PROCEDURE — 97535 SELF CARE MNGMENT TRAINING: CPT

## 2024-01-22 PROCEDURE — APPSS15 APP SPLIT SHARED TIME 0-15 MINUTES: Performed by: NURSE PRACTITIONER

## 2024-01-22 PROCEDURE — 93320 DOPPLER ECHO COMPLETE: CPT

## 2024-01-22 PROCEDURE — 7100000011 HC PHASE II RECOVERY - ADDTL 15 MIN: Performed by: INTERNAL MEDICINE

## 2024-01-22 PROCEDURE — 80069 RENAL FUNCTION PANEL: CPT

## 2024-01-22 PROCEDURE — 7100000010 HC PHASE II RECOVERY - FIRST 15 MIN: Performed by: INTERNAL MEDICINE

## 2024-01-22 PROCEDURE — 93312 ECHO TRANSESOPHAGEAL: CPT

## 2024-01-22 PROCEDURE — 99222 1ST HOSP IP/OBS MODERATE 55: CPT | Performed by: PHYSICAL MEDICINE & REHABILITATION

## 2024-01-22 PROCEDURE — 6370000000 HC RX 637 (ALT 250 FOR IP): Performed by: INTERNAL MEDICINE

## 2024-01-22 PROCEDURE — 1210000000 HC MED SURG R&B

## 2024-01-22 PROCEDURE — 94761 N-INVAS EAR/PLS OXIMETRY MLT: CPT

## 2024-01-22 PROCEDURE — 2700000000 HC OXYGEN THERAPY PER DAY

## 2024-01-22 PROCEDURE — 93312 ECHO TRANSESOPHAGEAL: CPT | Performed by: INTERNAL MEDICINE

## 2024-01-22 PROCEDURE — 94640 AIRWAY INHALATION TREATMENT: CPT

## 2024-01-22 PROCEDURE — 93325 DOPPLER ECHO COLOR FLOW MAPG: CPT | Performed by: INTERNAL MEDICINE

## 2024-01-22 PROCEDURE — 6360000002 HC RX W HCPCS: Performed by: INTERNAL MEDICINE

## 2024-01-22 PROCEDURE — 97110 THERAPEUTIC EXERCISES: CPT

## 2024-01-22 PROCEDURE — 36415 COLL VENOUS BLD VENIPUNCTURE: CPT

## 2024-01-22 PROCEDURE — 93320 DOPPLER ECHO COMPLETE: CPT | Performed by: INTERNAL MEDICINE

## 2024-01-22 PROCEDURE — 99232 SBSQ HOSP IP/OBS MODERATE 35: CPT | Performed by: PSYCHIATRY & NEUROLOGY

## 2024-01-22 PROCEDURE — 85025 COMPLETE CBC W/AUTO DIFF WBC: CPT

## 2024-01-22 RX ORDER — MIDAZOLAM HYDROCHLORIDE 1 MG/ML
INJECTION INTRAMUSCULAR; INTRAVENOUS PRN
Status: COMPLETED | OUTPATIENT
Start: 2024-01-22 | End: 2024-01-22

## 2024-01-22 RX ADMIN — INSULIN LISPRO 5 UNITS: 100 INJECTION, SOLUTION INTRAVENOUS; SUBCUTANEOUS at 12:46

## 2024-01-22 RX ADMIN — METOPROLOL TARTRATE 12.5 MG: 25 TABLET, FILM COATED ORAL at 10:00

## 2024-01-22 RX ADMIN — BUDESONIDE AND FORMOTEROL FUMARATE DIHYDRATE 2 PUFF: 80; 4.5 AEROSOL RESPIRATORY (INHALATION) at 20:23

## 2024-01-22 RX ADMIN — FENTANYL CITRATE 25 MCG: 0.05 INJECTION, SOLUTION INTRAMUSCULAR; INTRAVENOUS at 11:02

## 2024-01-22 RX ADMIN — ROSUVASTATIN CALCIUM 40 MG: 40 TABLET, FILM COATED ORAL at 21:34

## 2024-01-22 RX ADMIN — METOPROLOL TARTRATE 12.5 MG: 25 TABLET, FILM COATED ORAL at 21:34

## 2024-01-22 RX ADMIN — Medication 5 MG: at 21:34

## 2024-01-22 RX ADMIN — INSULIN LISPRO 5 UNITS: 100 INJECTION, SOLUTION INTRAVENOUS; SUBCUTANEOUS at 17:39

## 2024-01-22 RX ADMIN — INSULIN GLARGINE 10 UNITS: 100 INJECTION, SOLUTION SUBCUTANEOUS at 21:33

## 2024-01-22 RX ADMIN — MIDAZOLAM HYDROCHLORIDE 1 MG: 1 INJECTION, SOLUTION INTRAMUSCULAR; INTRAVENOUS at 11:02

## 2024-01-22 RX ADMIN — BUDESONIDE AND FORMOTEROL FUMARATE DIHYDRATE 2 PUFF: 80; 4.5 AEROSOL RESPIRATORY (INHALATION) at 06:50

## 2024-01-22 RX ADMIN — Medication 10 ML: at 21:34

## 2024-01-22 ASSESSMENT — ENCOUNTER SYMPTOMS
CHEST TIGHTNESS: 0
STRIDOR: 0
DIARRHEA: 0
VOICE CHANGE: 1
BLOOD IN STOOL: 0
SHORTNESS OF BREATH: 0
COLOR CHANGE: 0
TROUBLE SWALLOWING: 1
PHOTOPHOBIA: 0
WHEEZING: 0
NAUSEA: 0
EYE REDNESS: 0
GASTROINTESTINAL NEGATIVE: 1
RESPIRATORY NEGATIVE: 1
ALLERGIC/IMMUNOLOGIC NEGATIVE: 1
CONSTIPATION: 0
EYES NEGATIVE: 1
TROUBLE SWALLOWING: 0
COUGH: 0
VOMITING: 0

## 2024-01-22 NOTE — PROGRESS NOTES
[facility] Neurology Daily Progress Note  Name: Mulu Pandya  Age: 68 y.o.  Gender: female  CodeStatus: Full Code  Allergies: Nickel    Chief Complaint:Cerebrovascular Accident    Primary Care Provider: Eileen Edmondson APRN - NP  InpatientTreatment Team: Treatment Team: Attending Provider: Shaquille Bauer MD; Consulting Physician: Zheng Chavez MD; Utilization Reviewer: Sunny Swartz, RN; Consulting Physician: Jin Gill DO; Registered Nurse: Melissa Rincon, RN; Utilization Reviewer: Denisa Bowling RN; Patient Care Tech: Tricia Dangelo  Admission Date: 1/18/2024      HPI   Pt seen and examined for neuro follow up.  Currently alert and oriented x 2, no acute distress, cooperative.   Patient with some expressive aphasia.  Right-sided weakness and incoordination noted.  Denies headache or vision changes.  Taking bite-size diet.  Thin liquids.  No seizure activity reported.  Afebrile.  Blood pressure stable.  Orthostatics negative.  GUSTABO completed today and negative for mass or thrombus or PFO.  Ejection fraction 55%.    Exam as noted above and patient reevaluated.  GUSTABO is completed no thrombus or mass    Vitals:    01/22/24 0945   BP: 114/68   Pulse: 94   Resp:    Temp: 98.2 °F (36.8 °C)   SpO2: 93%        Review of Systems   Constitutional:  Negative for fatigue and fever.   HENT:  Negative for hearing loss and trouble swallowing.    Eyes:  Negative for visual disturbance.   Respiratory:  Negative for cough, chest tightness, shortness of breath and wheezing.    Cardiovascular:  Negative for chest pain, palpitations and leg swelling.   Gastrointestinal:  Negative for nausea and vomiting.   Musculoskeletal:  Positive for gait problem.   Skin:  Negative for color change and rash.   Neurological:  Positive for speech difficulty and weakness. Negative for dizziness, tremors, seizures, syncope, facial asymmetry, light-headedness, numbness and headaches.   Psychiatric/Behavioral:  Positive for confusion.  exam:->ICH  Has a \"code stroke\" or \"stroke alert\" been called?->No  What reading provider will be dictating this exam?->CRC    FINDINGS:  BRAIN/VENTRICLES: There is a stable small 2 cm left frontal lobe  periventricular hematoma identified.  There is no significant surrounding  edema or mass effect.  Stable encephalomalacia areas identified from old  insult within the right temporal lobe with evolving insult identified within  the left parietal lobe.  No abnormal extra-axial fluid collection.  Mild ex  vacuole dilatation seen of the ventricular system.  Minimal chronic small  vessel ischemic changes seen within the periventricular and subcortical white  matter to suggest chronic small vessel ischemic change.  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    ORBITS: The visualized portion of the orbits demonstrate no acute abnormality.    SINUSES: The visualized paranasal sinuses and mastoid air cells demonstrate  no acute abnormality.    SOFT TISSUES/SKULL:  No acute abnormality of the visualized skull or soft  tissues.    Impression  Stable parenchymal hemorrhage involving the left frontal periventricular  white matter.  No significant surrounding edema or mass effect.  Evolving  insult in the left parietal lobe with encephalomalacia change stable within  the right temporal lobe.  No results found for this or any previous visit.  No results found for this or any previous visit.      Carotid duplex: No results found for this or any previous visit.  No results found for this or any previous visit.  No results found for this or any previous visit.      Echo No results found for this or any previous visit.            Assessment/Plan:    1/19/24:  Left frontal periventricular Intractable parenchymal hemorrhage.  There is no mass effect or any other asymmetries.  CT scan was reviewed and there appears to be a possible subacute stroke on the right

## 2024-01-22 NOTE — PROGRESS NOTES
Physical Therapy Med Surg Daily Treatment Note  Facility/Department: [address] ORTHO TELE  Room: [place] [id number]       NAME: Mulu Pandya  : 1955 (68 y.o.)  MRN: [id number]  CODE STATUS: Full Code    Date of Service: 2024    Patient Diagnosis(es): Intracranial hemorrhage (HCC) [I62.9]  Basal ganglia hemorrhage (HCC) [I61.0]  Cerebral infarction due to occlusion of left middle cerebral artery (HCC) [I63.512]   Chief Complaint   Patient presents with    Cerebrovascular Accident     Patient Active Problem List    Diagnosis Date Noted    Type 2 diabetes mellitus with chronic kidney disease 11/10/2022    Aphasia 2024    Impaired mobility and activities of daily living 2024    Hyperglycemia due to type 2 diabetes mellitus (HCC) 2024    Dysphagia, oropharyngeal phase 2024    Late effects of CVA (cerebrovascular accident) 2024    CVA (cerebral vascular accident) (HCC) 2024    Intracranial hemorrhage (HCC) 2024    Cerebral infarction due to occlusion of left middle cerebral artery (HCC) 2024    Chronic renal failure, stage 3a (HCC)     Senile osteoporosis 2020    Recurrent major depressive disorder, in full remission (HCC) 2018    Proteinuria     Asthma     Anxiety     Chronic back pain     COPD (chronic obstructive pulmonary disease) (HCC)     Depression     Mixed hyperlipidemia     Essential hypertension         Past Medical History:   Diagnosis Date    Anxiety     Asthma     Chronic back pain     Chronic renal failure, stage 3a (HCC)     COPD (chronic obstructive pulmonary disease) (HCC)     Depression     History of tinnitus     Hyperlipidemia     Hypertension     Obesity     Osteoporosis      T -3.2    Peripheral vascular disease (HCC)     Proteinuria     Type II or unspecified type diabetes mellitus without mention of complication, not stated as uncontrolled      Past Surgical History:   Procedure Laterality Date    BREAST SURGERY Left

## 2024-01-22 NOTE — PROGRESS NOTES
GUSTABO procedure complete. VSS. Patient resting comfortably.     1130: Vital signs remain stable. Patient is awake, responding appropriately. Report given to NITZA Torres. Transport requested for pt to return to [facility].

## 2024-01-22 NOTE — PLAN OF CARE
Problem: Discharge Planning  Goal: Discharge to home or other facility with appropriate resources  Outcome: Progressing     Problem: Pain  Goal: Verbalizes/displays adequate comfort level or baseline comfort level  Outcome: Progressing     Problem: Safety - Adult  Goal: Free from fall injury  Outcome: Progressing     Problem: Neurosensory - Adult  Goal: Achieves stable or improved neurological status  Outcome: Progressing  Goal: Absence of seizures  Outcome: Progressing  Goal: Remains free of injury related to seizures activity  Outcome: Progressing  Goal: Achieves maximal functionality and self care  Outcome: Progressing     Problem: Respiratory - Adult  Goal: Achieves optimal ventilation and oxygenation  Outcome: Progressing     Problem: Cardiovascular - Adult  Goal: Maintains optimal cardiac output and hemodynamic stability  Outcome: Progressing  Goal: Absence of cardiac dysrhythmias or at baseline  Outcome: Progressing     Problem: Musculoskeletal - Adult  Goal: Return mobility to safest level of function  Outcome: Progressing  Goal: Maintain proper alignment of affected body part  Outcome: Progressing  Goal: Return ADL status to a safe level of function  Outcome: Progressing     Problem: Nutrition Deficit:  Goal: Optimize nutritional status  Outcome: Progressing     Problem: Depression  Goal: Will be euthymic at discharge  Description: INTERVENTIONS:  1. Administer medication as ordered  2. Provide emotional support via 1:1 interaction with staff  3. Encourage involvement in milieu/groups/activities  4. Monitor for social isolation  Outcome: Progressing     Problem: Anxiety  Goal: Will report anxiety at manageable levels  Description: INTERVENTIONS:  1. Administer medication as ordered  2. Teach and rehearse alternative coping skills  3. Provide emotional support with 1:1 interaction with staff  Outcome: Progressing     Problem: Spiritual Care  Goal: Pt/Family able to move forward in process of forgiving  self, others, and/or higher power  Description: INTERVENTIONS:  1. Assist patient/family to overcome blocks to healing by use of spiritual practices (prayer, meditation, guided imagery, reiki, breath work, etc).  2. De-myth guilt and help patient/family identify possible irrational spiritual/cultural beliefs and values.  3. Explore possibilities of making amends & reconciliation with self, others, and/or a greater power.  4. Guide patient/family in identifying painful feelings of guilt.  5. Help patient/famiy explore and identify spiritual beliefs, cultural understandings or values that may help or hinder letting go of issue.  6. Help patient/family explore feelings of anger, bitterness, resentment.  7. Help patient/family identify and examine the situation in which these feelings are experienced.  8. Help patient/family identify destructive displacement of feelings onto other individuals.  9. Invite use of sacraments/rituals/ceremonies as appropriate (e.g. - confession, anointing, smudging).  10. Refer patient/family to formal counseling and/or to [facility] for further support work.  Outcome: Progressing     Problem: Confusion  Goal: Confusion, delirium, dementia, or psychosis is improved or at baseline  Description: INTERVENTIONS:  1. Assess for possible contributors to thought disturbance, including medications, impaired vision or hearing, underlying metabolic abnormalities, dehydration, psychiatric diagnoses, and notify attending LIP  2. [place] high risk fall precautions, as indicated  3. Provide frequent short contacts to provide reality reorientation, refocusing and direction  4. Decrease environmental stimuli, including noise as appropriate  5. Monitor and intervene to maintain adequate nutrition, hydration, elimination, sleep and activity  6. If unable to ensure safety without constant attention obtain sitter and review sitter guidelines with assigned personnel  7. Initiate Psychosocial CNS and

## 2024-01-22 NOTE — CONSULTS
3.4 - 4.9 mEq/L    Chloride 101 95 - 107 mEq/L    CO2 25 20 - 31 mEq/L    Anion Gap 10 9 - 15 mEq/L    Glucose 195 (H) 70 - 99 mg/dL    BUN 22 8 - 23 mg/dL    Creatinine 0.95 (H) 0.50 - 0.90 mg/dL    Est, Glom Filt Rate >60.0 >60    Calcium 8.3 (L) 8.5 - 9.9 mg/dL    Phosphorus 3.2 2.3 - 4.8 mg/dL    Albumin 2.5 (L) 3.5 - 4.6 g/dL   CBC with Auto Differential    Collection Time: 01/22/24  3:38 AM   Result Value Ref Range    WBC 14.4 (H) 4.8 - 10.8 K/uL    RBC 3.89 (L) 4.20 - 5.40 M/uL    Hemoglobin 11.4 (L) 12.0 - 16.0 g/dL    Hematocrit 34.2 (L) 37.0 - 47.0 %    MCV 87.9 79.4 - 94.8 fL    MCH 29.3 27.0 - 31.3 pg    MCHC 33.3 33.0 - 37.0 %    RDW 13.2 11.5 - 14.5 %    Platelets 299 130 - 400 K/uL    Neutrophils % 78.0 %    Lymphocytes % 9.8 %    Monocytes % 9.7 %    Eosinophils % 0.2 %    Basophils % 0.2 %    Neutrophils Absolute 11.3 (H) 1.4 - 6.5 K/uL    Lymphocytes Absolute 1.4 1.0 - 4.8 K/uL    Monocytes Absolute 1.4 (H) 0.2 - 0.8 K/uL    Eosinophils Absolute 0.0 0.0 - 0.7 K/uL    Basophils Absolute 0.0 0.0 - 0.2 K/uL   POCT Glucose    Collection Time: 01/22/24  6:24 AM   Result Value Ref Range    POC Glucose 195 (H) 70 - 99 mg/dl    Performed on ACCU-CHEK    POCT Glucose    Collection Time: 01/22/24  7:42 AM   Result Value Ref Range    POC Glucose 173 (H) 70 - 99 mg/dl    Performed on ACCU-CHEK    GUSTABO (PRN contrast/bubbles/3D)    Collection Time: 01/22/24 11:13 AM   Result Value Ref Range    Body Surface Area 1.75 m2   POCT Glucose    Collection Time: 01/22/24 11:58 AM   Result Value Ref Range    POC Glucose 170 (H) 70 - 99 mg/dl    Performed on ACCU-CHEK               Impression:    Impaired mobility and ADLs due to acute hemorrhagic CVA  Late effects of previous CVA  Factors favoring recovery include:  good family support at home        Complex Active General Medical Issues that complicate care and require daily medical supervision:     1.Principal Problem:    Intracranial hemorrhage (HCC)  Active Problems:    conditions, complex social situations, and bowel/bladder dysfunction         It was my pleasure to evaluate Mulu SUPA Pandya today.  Please call [phone] with questions.    Jayne Stacy, DO

## 2024-01-22 NOTE — CARE COORDINATION
Inpatient Rehab referral received. Met with patient and explained [facility] Inpatient Rehab program and requirements, including 3 hours of intense therapy daily, anticipated length of stay, weekly team meetings and goal of discharge to home. Patient choosing [facility] rehab at this time. Patient shakes her head \"yes\" when asked if that is where she would like to do therapy at prior to returning home with .  Electronically signed by Piper Lucas on 1/22/2024 at 2:08 PM

## 2024-01-22 NOTE — FLOWSHEET NOTE
6138 patients daughter called and wanted to make sure we are aware that for a while now, x couple mths she thinks her mom has not been able to eat well, vomiting, nausea and diarrhea. When she does eat she just has emesis after and her stools are very soft.     Perfect serve message sent to Dr. Bauer.    1730 Update for chart. Patient has tolerated lunch and dinner with good appetite and requested additional food between meals. No bowel movements today. Per patient denies nausea and no emesis noted today as well.

## 2024-01-22 NOTE — PROGRESS NOTES
Internal Medicine   Hospitalist   Progress Note    2024   2:13 PM    Name:  Mulu Pandya  MRN:    [id number]     IP Day: 4     Admit Date: 2024  8:17 AM  PCP: Eileen Edmondson APRN - NP    Code Status:  Full Code    Assessment and Plan:        Active Problems/ diagnosis:     Left basal ganglia ICH  Left MCA occlusion  Meningioma noted on MRI  History of COPD/asthma-listed, no exacerbation  Chronic back pain  Obesity  HTN  HLD  PVD  Type 2 diabetes    Plan  Moved of the ICU yesterday. Neuro is recommending GUSTABO   Repeat CT head and MRI and today are stable ICH.  Neurosurgery is following.  Neurologically stable.  Neurology is also following  Resume current medication  Blood pressure stable, monitor.  Resume current medication  Sliding-scale insulin, hypoglycemia protocol. Added scheduled insulin   Discussed plan of care with patient and her  at bedside in the ICU  DVT PPx    - GUSTABO negative, consult to acute rehab placed, pending approval and pre-cert    7 pm- 7 am, please contact on call Hospitalist for any needs     Dispo- ok for floor transfer. Discussed with NSGY    Subjective:     Doing well. No cp, sob.  Aphasic    Physical Examination:      Vitals:  /68   Pulse 94   Temp 98.2 °F (36.8 °C)   Resp 18   Ht 1.6 m (5' 3\")   Wt 68.7 kg (151 lb 7.3 oz)   SpO2 93%   BMI 26.83 kg/m²   Temp (24hrs), Av.4 °F (36.9 °C), Min:98.1 °F (36.7 °C), Max:98.9 °F (37.2 °C)      General appearance: alert, cooperative and no distress  Mental Status: oriented to person, place and time and normal affect  Lungs: clear to auscultation bilaterally, normal effort  Heart: regular rate and rhythm, no murmur  Abdomen: soft, nontender, nondistended, bowel sounds present, no masses  Extremities: no edema, redness, tenderness in the calves  Skin: no gross lesions, rashes  Neuro exam: Oriented x 3, weak on the right side worse in hand  but able to left care right arm against gravity, 4/5 right leg     Data:      Labs:  Recent Labs     01/21/24 0457 01/22/24  0338   WBC 13.5* 14.4*   HGB 12.3 11.4*    299     Recent Labs     01/21/24 0457 01/22/24  0338    136   K 3.7 3.6    101   CO2 24 25   BUN 27* 22   CREATININE 1.00* 0.95*   GLUCOSE 208* 195*     No results for input(s): \"AST\", \"ALT\", \"ALB\", \"BILITOT\", \"ALKPHOS\" in the last 72 hours.      Current Facility-Administered Medications   Medication Dose Route Frequency Provider Last Rate Last Admin    insulin glargine (LANTUS) injection vial 10 Units  10 Units SubCUTAneous Nightly Den, Yazid R, DO   10 Units at 01/21/24 2148    insulin lispro (HUMALOG) injection vial 5 Units  5 Units SubCUTAneous TID WC Den, Yazid R, DO   5 Units at 01/22/24 1246    metoprolol tartrate (LOPRESSOR) tablet 12.5 mg  12.5 mg Oral BID Shannon Pavon MD   12.5 mg at 01/22/24 1000    melatonin disintegrating tablet 5 mg  5 mg Oral Nightly Landon Ramos MD   5 mg at 01/21/24 2148    glucose chewable tablet 16 g  4 tablet Oral PRN Den, Yazid R, DO        dextrose bolus 10% 125 mL  125 mL IntraVENous PRN Den, Yazid R, DO        Or    dextrose bolus 10% 250 mL  250 mL IntraVENous PRN Den, Yazid R, DO        glucagon (rDNA) injection 1 mg  1 mg SubCUTAneous PRN Den, Yazid R, DO        dextrose 10 % infusion   IntraVENous Continuous PRN Den, Yazid R, DO        sodium chloride flush 0.9 % injection 5-40 mL  5-40 mL IntraVENous 2 times per day Den, Yazid R, DO   10 mL at 01/21/24 2149    sodium chloride flush 0.9 % injection 5-40 mL  5-40 mL IntraVENous PRN Den, Yazid R, DO        0.9 % sodium chloride infusion   IntraVENous PRN Den, Yazid R, DO        acetaminophen (TYLENOL) tablet 650 mg  650 mg Oral Q4H PRN Den, Yazid R, DO        ondansetron (ZOFRAN-ODT) disintegrating tablet 4 mg  4 mg Oral Q8H PRN Justine Crenshaw RDO        Or    ondansetron (ZOFRAN) injection 4 mg  4 mg IntraVENous Q6H PRN Den, Justine R, DO

## 2024-01-22 NOTE — CARE COORDINATION
spiritual needs?: No   Funding needs:   Potential Assistance Purchasing Medications: No     Expected Level of Improvement with Rehab  Assist for ADL Min A- Set up   Assist for Transfers Contact Guard / Minimal Assistance  Assist for Gait Contact Guard / Minimal Assistance    Patient's willingness to participate: Yes  Patient's ability to tolerate proposed care: Yes  Patient/Family Goals of Rehab (in patient's/family's own words): Patient shakes head when asked if she wants to get stronger and return home    Anticipated Discharge Plan:  Home with   Home Health, RN PT OT Aide to be determined      Barriers to Discharge:  Home entry accessibility  Equipment needs  Function at  level  Caregiver availability  Resource availability      Rehab evaluation plan: Recommend Acute inpatient rehab  Rehabilitation Impairment Group Code: 01.2  Rehab Impairment Group: Neurologic: Cerebral Bleed / Subarachnoid Hemorrhage / Subdural Hematoma  Estimated Length of Stay (days): 14  Rehab Diagnosis: Impaired mobility and ADL's due to acute infarct in the left superior mid parietal lobe MCA territory   Reviewer's Signature: Electronically signed by Piper Lucas on 1/23/2024 at 11:22 AM      I have reviewed and concur with the above Preadmission Screening.   Rehab Admitting Doctor: Dr. Jayne Stacy, DO

## 2024-01-22 NOTE — PROGRESS NOTES
Arrived to [place] from  [place] alert and oriented. Vital signs obtained. Consent for procedure reviewed with pt and spouse, pt's spouse signed consent. Prepping pt for procedure.

## 2024-01-22 NOTE — CARE COORDINATION
Precert for IRF submitted via [employer] portal. Reference # [id number]. Will monitor for response.  Electronically signed by Piper Lucas on 1/22/2024 at 2:59 PM

## 2024-01-22 NOTE — BRIEF OP NOTE
Section of Cardiology  Adult Brief GUSTABO Procedure Note        Procedure(s):  GUSTABO    Pre-operative Diagnosis:  CVA    H&P Status: Completed and reviewed.     Post-operative Diagnosis:      No mass or thrombus  No PFO with bubble study  Normal LVF EF of 55%  Mild MR  Mild TR  Mild to mod aorta plaque.     Findings:  See full report    Complications:  none    Primary Proceduralist:   Dr.Wes Gill DO    Plan    Neuro recs  RFM    Full procedure note to follow

## 2024-01-23 ENCOUNTER — HOSPITAL ENCOUNTER (INPATIENT)
Age: 69
LOS: 18 days | Discharge: HOME HEALTH | End: 2024-02-10
Attending: PHYSICAL MEDICINE & REHABILITATION | Admitting: PHYSICAL MEDICINE & REHABILITATION
Payer: MEDICARE

## 2024-01-23 VITALS
HEIGHT: 63 IN | DIASTOLIC BLOOD PRESSURE: 70 MMHG | TEMPERATURE: 98.2 F | HEART RATE: 98 BPM | SYSTOLIC BLOOD PRESSURE: 110 MMHG | WEIGHT: 151.46 LBS | RESPIRATION RATE: 18 BRPM | OXYGEN SATURATION: 91 % | BODY MASS INDEX: 26.84 KG/M2

## 2024-01-23 DIAGNOSIS — K86.89 PANCREATIC MASS: ICD-10-CM

## 2024-01-23 DIAGNOSIS — Z78.9 IMPAIRED MOBILITY AND ADLS: ICD-10-CM

## 2024-01-23 DIAGNOSIS — I26.99 PULMONARY EMBOLISM (HCC): ICD-10-CM

## 2024-01-23 DIAGNOSIS — I26.99 ACUTE PULMONARY EMBOLISM WITHOUT ACUTE COR PULMONALE, UNSPECIFIED PULMONARY EMBOLISM TYPE (HCC): Primary | ICD-10-CM

## 2024-01-23 DIAGNOSIS — Z74.09 IMPAIRED MOBILITY AND ADLS: ICD-10-CM

## 2024-01-23 LAB
ALBUMIN SERPL-MCNC: 2.4 G/DL (ref 3.5–4.6)
ANION GAP SERPL CALCULATED.3IONS-SCNC: 13 MEQ/L (ref 9–15)
BASOPHILS # BLD: 0 K/UL (ref 0–0.2)
BASOPHILS NFR BLD: 0.2 %
BUN SERPL-MCNC: 22 MG/DL (ref 8–23)
CALCIUM SERPL-MCNC: 7.9 MG/DL (ref 8.5–9.9)
CHLORIDE SERPL-SCNC: 98 MEQ/L (ref 95–107)
CO2 SERPL-SCNC: 24 MEQ/L (ref 20–31)
CREAT SERPL-MCNC: 1.06 MG/DL (ref 0.5–0.9)
EOSINOPHIL # BLD: 0.1 K/UL (ref 0–0.7)
EOSINOPHIL NFR BLD: 0.3 %
ERYTHROCYTE [DISTWIDTH] IN BLOOD BY AUTOMATED COUNT: 13.3 % (ref 11.5–14.5)
GLUCOSE BLD-MCNC: 109 MG/DL (ref 70–99)
GLUCOSE BLD-MCNC: 135 MG/DL (ref 70–99)
GLUCOSE BLD-MCNC: 191 MG/DL (ref 70–99)
GLUCOSE BLD-MCNC: 99 MG/DL (ref 70–99)
GLUCOSE SERPL-MCNC: 107 MG/DL (ref 70–99)
HCT VFR BLD AUTO: 36.6 % (ref 37–47)
HGB BLD-MCNC: 12 G/DL (ref 12–16)
LYMPHOCYTES # BLD: 1.2 K/UL (ref 1–4.8)
LYMPHOCYTES NFR BLD: 7.5 %
MCH RBC QN AUTO: 29.4 PG (ref 27–31.3)
MCHC RBC AUTO-ENTMCNC: 32.8 % (ref 33–37)
MCV RBC AUTO: 89.7 FL (ref 79.4–94.8)
MONOCYTES # BLD: 1.2 K/UL (ref 0.2–0.8)
MONOCYTES NFR BLD: 8 %
NEUTROPHILS # BLD: 12.8 K/UL (ref 1.4–6.5)
NEUTS SEG NFR BLD: 82.6 %
PERFORMED ON: ABNORMAL
PERFORMED ON: NORMAL
PHOSPHATE SERPL-MCNC: 3.4 MG/DL (ref 2.3–4.8)
PLATELET # BLD AUTO: 298 K/UL (ref 130–400)
POTASSIUM SERPL-SCNC: 3.8 MEQ/L (ref 3.4–4.9)
RBC # BLD AUTO: 4.08 M/UL (ref 4.2–5.4)
SARS-COV-2 RDRP RESP QL NAA+PROBE: NOT DETECTED
SODIUM SERPL-SCNC: 135 MEQ/L (ref 135–144)
WBC # BLD AUTO: 15.5 K/UL (ref 4.8–10.8)

## 2024-01-23 PROCEDURE — 94640 AIRWAY INHALATION TREATMENT: CPT

## 2024-01-23 PROCEDURE — 36415 COLL VENOUS BLD VENIPUNCTURE: CPT

## 2024-01-23 PROCEDURE — 99232 SBSQ HOSP IP/OBS MODERATE 35: CPT | Performed by: PHYSICAL MEDICINE & REHABILITATION

## 2024-01-23 PROCEDURE — 6370000000 HC RX 637 (ALT 250 FOR IP): Performed by: FAMILY MEDICINE

## 2024-01-23 PROCEDURE — 85025 COMPLETE CBC W/AUTO DIFF WBC: CPT

## 2024-01-23 PROCEDURE — 80069 RENAL FUNCTION PANEL: CPT

## 2024-01-23 PROCEDURE — 1180000000 HC REHAB R&B

## 2024-01-23 PROCEDURE — 6370000000 HC RX 637 (ALT 250 FOR IP): Performed by: INTERNAL MEDICINE

## 2024-01-23 PROCEDURE — 97535 SELF CARE MNGMENT TRAINING: CPT

## 2024-01-23 PROCEDURE — 87635 SARS-COV-2 COVID-19 AMP PRB: CPT

## 2024-01-23 PROCEDURE — 94761 N-INVAS EAR/PLS OXIMETRY MLT: CPT

## 2024-01-23 RX ORDER — INSULIN LISPRO 100 [IU]/ML
0-4 INJECTION, SOLUTION INTRAVENOUS; SUBCUTANEOUS NIGHTLY
Status: DISCONTINUED | OUTPATIENT
Start: 2024-01-23 | End: 2024-02-09

## 2024-01-23 RX ORDER — GLUCAGON 1 MG/ML
1 KIT INJECTION PRN
Status: CANCELLED | OUTPATIENT
Start: 2024-01-23

## 2024-01-23 RX ORDER — ESCITALOPRAM OXALATE 10 MG/1
15 TABLET ORAL DAILY
Status: DISCONTINUED | OUTPATIENT
Start: 2024-01-24 | End: 2024-02-09

## 2024-01-23 RX ORDER — ROSUVASTATIN CALCIUM 40 MG/1
40 TABLET, COATED ORAL NIGHTLY
Status: DISCONTINUED | OUTPATIENT
Start: 2024-01-23 | End: 2024-02-09

## 2024-01-23 RX ORDER — ALBUTEROL SULFATE 90 UG/1
2 AEROSOL, METERED RESPIRATORY (INHALATION) 4 TIMES DAILY PRN
Status: DISCONTINUED | OUTPATIENT
Start: 2024-01-23 | End: 2024-01-24

## 2024-01-23 RX ORDER — ONDANSETRON 4 MG/1
4 TABLET, ORALLY DISINTEGRATING ORAL EVERY 8 HOURS PRN
Status: CANCELLED | OUTPATIENT
Start: 2024-01-23

## 2024-01-23 RX ORDER — ACETAMINOPHEN 325 MG/1
650 TABLET ORAL EVERY 4 HOURS PRN
Status: DISCONTINUED | OUTPATIENT
Start: 2024-01-23 | End: 2024-01-24

## 2024-01-23 RX ORDER — INSULIN LISPRO 100 [IU]/ML
0-4 INJECTION, SOLUTION INTRAVENOUS; SUBCUTANEOUS
Status: DISCONTINUED | OUTPATIENT
Start: 2024-01-24 | End: 2024-02-09

## 2024-01-23 RX ORDER — ESCITALOPRAM OXALATE 10 MG/1
15 TABLET ORAL DAILY
Status: CANCELLED | OUTPATIENT
Start: 2024-01-24

## 2024-01-23 RX ORDER — ACETAMINOPHEN 325 MG/1
650 TABLET ORAL EVERY 4 HOURS PRN
Status: CANCELLED | OUTPATIENT
Start: 2024-01-23

## 2024-01-23 RX ORDER — MECOBALAMIN 5000 MCG
5 TABLET,DISINTEGRATING ORAL NIGHTLY
Status: CANCELLED | OUTPATIENT
Start: 2024-01-23

## 2024-01-23 RX ORDER — INSULIN LISPRO 100 [IU]/ML
5 INJECTION, SOLUTION INTRAVENOUS; SUBCUTANEOUS
Status: CANCELLED | OUTPATIENT
Start: 2024-01-23

## 2024-01-23 RX ORDER — INSULIN GLARGINE 100 [IU]/ML
10 INJECTION, SOLUTION SUBCUTANEOUS NIGHTLY
Status: CANCELLED | OUTPATIENT
Start: 2024-01-23

## 2024-01-23 RX ORDER — BUDESONIDE AND FORMOTEROL FUMARATE DIHYDRATE 80; 4.5 UG/1; UG/1
2 AEROSOL RESPIRATORY (INHALATION)
Status: CANCELLED | OUTPATIENT
Start: 2024-01-23

## 2024-01-23 RX ORDER — NICOTINE 21 MG/24HR
1 PATCH, TRANSDERMAL 24 HOURS TRANSDERMAL EVERY 24 HOURS
Status: CANCELLED | OUTPATIENT
Start: 2024-01-23

## 2024-01-23 RX ORDER — INSULIN LISPRO 100 [IU]/ML
0-4 INJECTION, SOLUTION INTRAVENOUS; SUBCUTANEOUS
Status: CANCELLED | OUTPATIENT
Start: 2024-01-23

## 2024-01-23 RX ORDER — GLUCAGON 1 MG/ML
1 KIT INJECTION PRN
Status: DISCONTINUED | OUTPATIENT
Start: 2024-01-23 | End: 2024-02-09

## 2024-01-23 RX ORDER — ROSUVASTATIN CALCIUM 40 MG/1
40 TABLET, COATED ORAL NIGHTLY
Status: CANCELLED | OUTPATIENT
Start: 2024-01-23

## 2024-01-23 RX ORDER — ONDANSETRON 2 MG/ML
4 INJECTION INTRAMUSCULAR; INTRAVENOUS EVERY 6 HOURS PRN
Status: CANCELLED | OUTPATIENT
Start: 2024-01-23

## 2024-01-23 RX ORDER — MECOBALAMIN 5000 MCG
5 TABLET,DISINTEGRATING ORAL NIGHTLY
Status: DISCONTINUED | OUTPATIENT
Start: 2024-01-23 | End: 2024-02-09

## 2024-01-23 RX ORDER — ONDANSETRON 4 MG/1
4 TABLET, ORALLY DISINTEGRATING ORAL EVERY 8 HOURS PRN
Status: DISCONTINUED | OUTPATIENT
Start: 2024-01-23 | End: 2024-02-09

## 2024-01-23 RX ORDER — ALBUTEROL SULFATE 90 UG/1
2 AEROSOL, METERED RESPIRATORY (INHALATION) 4 TIMES DAILY PRN
Status: CANCELLED | OUTPATIENT
Start: 2024-01-23

## 2024-01-23 RX ORDER — INSULIN LISPRO 100 [IU]/ML
5 INJECTION, SOLUTION INTRAVENOUS; SUBCUTANEOUS
Status: DISCONTINUED | OUTPATIENT
Start: 2024-01-24 | End: 2024-02-09

## 2024-01-23 RX ORDER — NICOTINE 21 MG/24HR
1 PATCH, TRANSDERMAL 24 HOURS TRANSDERMAL EVERY 24 HOURS
Status: DISCONTINUED | OUTPATIENT
Start: 2024-01-24 | End: 2024-02-09

## 2024-01-23 RX ORDER — INSULIN LISPRO 100 [IU]/ML
0-4 INJECTION, SOLUTION INTRAVENOUS; SUBCUTANEOUS NIGHTLY
Status: CANCELLED | OUTPATIENT
Start: 2024-01-23

## 2024-01-23 RX ORDER — BUDESONIDE AND FORMOTEROL FUMARATE DIHYDRATE 80; 4.5 UG/1; UG/1
2 AEROSOL RESPIRATORY (INHALATION)
Status: DISCONTINUED | OUTPATIENT
Start: 2024-01-23 | End: 2024-02-09

## 2024-01-23 RX ORDER — INSULIN GLARGINE 100 [IU]/ML
10 INJECTION, SOLUTION SUBCUTANEOUS NIGHTLY
Status: DISCONTINUED | OUTPATIENT
Start: 2024-01-23 | End: 2024-02-09

## 2024-01-23 RX ORDER — ONDANSETRON 2 MG/ML
4 INJECTION INTRAMUSCULAR; INTRAVENOUS EVERY 6 HOURS PRN
Status: DISCONTINUED | OUTPATIENT
Start: 2024-01-23 | End: 2024-02-09

## 2024-01-23 RX ADMIN — INSULIN GLARGINE 10 UNITS: 100 INJECTION, SOLUTION SUBCUTANEOUS at 20:32

## 2024-01-23 RX ADMIN — INSULIN LISPRO 5 UNITS: 100 INJECTION, SOLUTION INTRAVENOUS; SUBCUTANEOUS at 17:53

## 2024-01-23 RX ADMIN — METOPROLOL TARTRATE 12.5 MG: 25 TABLET, FILM COATED ORAL at 20:33

## 2024-01-23 RX ADMIN — Medication 5 MG: at 20:32

## 2024-01-23 RX ADMIN — BUDESONIDE AND FORMOTEROL FUMARATE DIHYDRATE 2 PUFF: 80; 4.5 AEROSOL RESPIRATORY (INHALATION) at 07:01

## 2024-01-23 RX ADMIN — ACETAMINOPHEN 650 MG: 325 TABLET ORAL at 20:32

## 2024-01-23 RX ADMIN — METOPROLOL TARTRATE 12.5 MG: 25 TABLET, FILM COATED ORAL at 09:58

## 2024-01-23 RX ADMIN — BUDESONIDE AND FORMOTEROL FUMARATE DIHYDRATE 2 PUFF: 80; 4.5 AEROSOL RESPIRATORY (INHALATION) at 20:52

## 2024-01-23 RX ADMIN — ROSUVASTATIN CALCIUM 40 MG: 40 TABLET, FILM COATED ORAL at 20:32

## 2024-01-23 RX ADMIN — ESCITALOPRAM OXALATE 15 MG: 10 TABLET ORAL at 09:58

## 2024-01-23 RX ADMIN — INSULIN LISPRO 5 UNITS: 100 INJECTION, SOLUTION INTRAVENOUS; SUBCUTANEOUS at 12:40

## 2024-01-23 RX ADMIN — INSULIN LISPRO 5 UNITS: 100 INJECTION, SOLUTION INTRAVENOUS; SUBCUTANEOUS at 09:58

## 2024-01-23 ASSESSMENT — ENCOUNTER SYMPTOMS
EYES NEGATIVE: 1
ALLERGIC/IMMUNOLOGIC NEGATIVE: 1
RESPIRATORY NEGATIVE: 1
GASTROINTESTINAL NEGATIVE: 1

## 2024-01-23 ASSESSMENT — PAIN SCALES - GENERAL: PAINLEVEL_OUTOF10: 0

## 2024-01-23 NOTE — PROGRESS NOTES
Comprehensive Nutrition Assessment    Type and Reason for Visit:  Reassess    Nutrition Recommendations/Plan:   Continue Soft and bite sized diet, if glucose > 160, add Carb control  Continue with diabetic oral supplement ( Glucerna) @ breakfast and dinner     Malnutrition Assessment:  Malnutrition Status:  No malnutrition (01/23/24 1305)        Nutrition Assessment:    Pt making progress towards nutrition related goals,  tolerating dysphagia diet, supplement preferences updated    Nutrition Related Findings:    PMHx significant for : CKD3, DM, COPD, .Dx : ICH, BSE competed 1/19 and dysphagia diet ordered, + residual aphasia; labs noted : glucose controlled, No GI complaints Wound Type: None       Current Nutrition Intake & Therapies:    Average Meal Intake: 51-75%  Average Supplements Intake: 51-75%  ADULT DIET; Dysphagia - Soft and Bite Sized  ADULT ORAL NUTRITION SUPPLEMENT; Dinner, Breakfast; Diabetic Oral Supplement    Anthropometric Measures:  Height: 160 cm (5' 3\")  Ideal Body Weight (IBW): 115 lbs (52 kg)    Admission Body Weight: 70.8 kg (156 lb)  Current Body Weight: 68.5 kg (151 lb), 131.3 % IBW. Weight Source: Bed Scale  Current BMI (kg/m2): 26.8  Usual Body Weight: 69.9 kg (154 lb) (( 2/2023))  % Weight Change (Calculated): -1.9                    BMI Categories: Overweight (BMI 25.0-29.9)    Estimated Daily Nutrient Needs:  Energy Requirements Based On: Kcal/kg  Weight Used for Energy Requirements: Current  Energy (kcal/day): ~1500 kcals @ 22 kcal/kg  Weight Used for Protein Requirements: Ideal  Protein (g/day): 62 g protein @ 1.2 g/kg ( CKD)  Method Used for Fluid Requirements: 1 ml/kcal  Fluid (ml/day): ~1500    Nutrition Diagnosis:     Altered nutrition-related lab values related to endocrine dysfuntion as evidenced by lab values    Nutrition Interventions:   Food and/or Nutrient Delivery: Continue Current Diet, Continue Oral Nutrition Supplement  Nutrition Education/Counseling: No recommendation at

## 2024-01-23 NOTE — PROGRESS NOTES
[facility] OCCUPATIONAL THERAPY MED SURG TREATMENT NOTE     Date: 2024  Patient Name: Mulu Pandya        MRN: [id number]  Account: [id number]   : 1955  (68 y.o.)  Room: [place] [address]    Chart Review:    Restrictions  Restrictions/Precautions  Restrictions/Precautions: Fall Risk, Seizure     Safety:  Safety Devices  Type of Devices: All fall risk precautions in place;Call light within reach;Chair alarm in place;Left in bed    Patient's birthday verified: Yes    Subjective: \"I am going to have a bowel movement.\"    Pain at start of treatment: No    Pain at end of treatment: No    Objective: Pt  sitting bedside upon arrival, however, left during session.     ADL Status:  Toileting: Dependent/Total  Toileting Skilled Clinical Factors: Bedpan. Pt declined OOB activites despite encouragement. Pt roll right using bed rail with mod assist. Total assist for placing bed pan and completing toileting.    Therapy key for assistance levels -   Independent/Mod I = Pt. is able to perform task with no assistance but may require a device   Stand by assistance = Pt. does not perform task at an independent level but does not need physical assistance, requires verbal cues  Minimal, Moderate, Maximal Assistance = Pt. requires physical assistance (25%, 50%, 75% assist from helper) for task but is able to actively participate in task   Dependent = Pt. requires total assistance with task and is not able to actively participate with task completion    Bed Mobility  Rolling to Left: Moderate assistance  Rolling to Right: Moderate assistance  Bed Mobility Comments: Bed flat with use of bedrails, increased time and effort to complete, verbal cues for sequencing, assist with trunk, pt able to control BLE.    Treatment consisted of:    ADL training    Assessment/Discharge Disposition:   Pt would benefit from continued Occupational Therapy to increase strength, activity tolerance, [place] with functional transfers,

## 2024-01-23 NOTE — CARE COORDINATION
Fax received from [facility] with approval for IRF. Patient approved 1/23-2/5 with NRD 2/3. Auth# [id number]. Patient can admit to room 262 pending clearance from all consults and negative covid test. 2W CM updated.  Electronically signed by Piper Lucas on 1/23/2024 at 3:26 PM

## 2024-01-23 NOTE — PROGRESS NOTES
Subjective:  The patient complains of severe acute on chronic progressive fatigue and aphasia, right-sided weakness, cognitive slowing partially relieved by rest, PT, OT and meds  SLP and exacerbated by exertion and recent hemorrhagic CVA left basal ganglia.  Patient was admitted through the emergency room on January 18, 2024 with right-sided weakness aphasia and dysphagia.  She was diagnosed with acute CVA-subacute stroke on the right parietal lob .  She was admitted under the care of the hospitalist with neurology consulting.  She was also found to have an intracranial hemorrhage.     Neurosurgery was consulted and they did not feel that she should have surgery.  She has been struggling with aphasia dysphagia and hypoxia since being admitted.        Full stroke workup is in process including a GUSTABO.    I am concerned about patient’s medical complexities including:  Principal Problem:    Intracranial hemorrhage (HCC)  Active Problems:    Essential hypertension    Cerebral infarction due to occlusion of left middle cerebral artery (HCC)    Aphasia    Impaired mobility and activities of daily living    Hyperglycemia due to type 2 diabetes mellitus (HCC)    Dysphagia, oropharyngeal phase    Late effects of CVA (cerebrovascular accident)    CVA (cerebral vascular accident) (HCC)  Resolved Problems:    * No resolved hospital problems. *      .    Reviewed recent nursing note and discussed current status and planned care with acute care providers, \"patients daughter called and wanted to make sure we are aware that for a while now, x couple mths she thinks her mom has not been able to eat well, vomiting, nausea and diarrhea. When she does eat she just has emesis after and her stools are very soft.      Perfect serve message sent to Dr. Bauer.     1850 Update for chart. Patient has tolerated lunch and dinner with good appetite and requested additional food between meals. No bowel movements today. Per patient denies

## 2024-01-23 NOTE — PROGRESS NOTES
Physical Therapy Missed Treatment   Facility/Department: [facility] MED SURG W268/W268-01    NAME: Mulu Pandya    : 1955 (68 y.o.)  MRN: [id number]    Account: [id number]  Gender: female    Chart reviewed, attempted PT at 1000. Patient unavailable 2° to:    [] Hold per nsg request    [] Pt declined Pt requested later treatment due to just took dieretic. Will attempt later if schedule premits.     [x] Nsg notified   [] Other notified    [] Pt.. off floor for test/procedure.     [] Pt. Unavailable       Will attempt PT treatment again at earliest convenience.      Electronically signed by Lorne Cueva PTA on 24 at 10:06 AM ESTPhysical Therapy

## 2024-01-23 NOTE — PLAN OF CARE
Nutrition Problem #1: Predicted inadequate energy intake  Intervention: Food and/or Nutrient Delivery: Continue Current Diet, Continue Oral Nutrition Supplement  Nutritional

## 2024-01-23 NOTE — PROGRESS NOTES
Progress Note  Patient: Mulu Pandya  Unit/Bed: W268/W268-01  YOB: 1955  MRN: [id number]  Acct: [id number]   Admitting Diagnosis: Intracranial hemorrhage (HCC) [I62.9]  Basal ganglia hemorrhage (HCC) [I61.0]  Cerebral infarction due to occlusion of left middle cerebral artery (HCC) [I63.512]  Date:  1/18/2024  Hospital Day: 5    Chief Complaint:  CVA    Subjective   Patient is a 68 y.o. female who presents with a chief complaint of MS change/CVA type symptoms. . Patient is followed on a regular basis by Eileen Capps, APRN - NP.  Patient with past medical history of diabetes, hypertension, hyperlipidemia, tobacco abuse who presents with CVA type symptoms.  Neurology requesting transesophageal echocardiogram to rule out cardiac source of emboli.  No prior cardiac history.  No prior history of atrial fibrillation  Normal sinus rhythm on telemetry.  Carotid ultrasound is negative for any significant    1-: S/p GUSTABO   No mass or thrombus  No PFO with bubble study  Normal LVF EF of 55%  Mild MR  Mild TR  Mild to mod aorta plaque    1-: Patient resting in bed, family at bedside  GUSTABO negative  Tele showing   Awaiting pre cert to rehab    Review of Systems:   Review of Systems   Constitutional: Negative.    HENT: Negative.     Eyes: Negative.    Respiratory: Negative.     Cardiovascular: Negative.    Gastrointestinal: Negative.    Endocrine: Negative.    Genitourinary: Negative.    Musculoskeletal: Negative.    Skin: Negative.    Allergic/Immunologic: Negative.    Neurological:  Positive for speech difficulty and weakness.   Hematological: Negative.    Psychiatric/Behavioral: Negative.           Physical Examination:    /70   Pulse 98   Temp 98.2 °F (36.8 °C) (Oral)   Resp 18   Ht 1.6 m (5' 3\")   Wt 68.7 kg (151 lb 7.3 oz)   SpO2 91%   BMI 26.83 kg/m²    Physical Exam  Constitutional:       Appearance: Normal appearance.   Cardiovascular:      Rate and Rhythm: Normal

## 2024-01-23 NOTE — PLAN OF CARE
Problem: Discharge Planning  Goal: Discharge to home or other facility with appropriate resources  Outcome: Progressing     Problem: Pain  Goal: Verbalizes/displays adequate comfort level or baseline comfort level  Outcome: Progressing     Problem: Safety - Adult  Goal: Free from fall injury  Outcome: Progressing     Problem: Neurosensory - Adult  Goal: Achieves stable or improved neurological status  Outcome: Progressing  Goal: Absence of seizures  Outcome: Progressing  Goal: Remains free of injury related to seizures activity  Outcome: Progressing  Goal: Achieves maximal functionality and self care  Outcome: Progressing     Problem: Respiratory - Adult  Goal: Achieves optimal ventilation and oxygenation  Outcome: Progressing     Problem: Cardiovascular - Adult  Goal: Maintains optimal cardiac output and hemodynamic stability  Outcome: Progressing  Goal: Absence of cardiac dysrhythmias or at baseline  Outcome: Progressing     Problem: Musculoskeletal - Adult  Goal: Return mobility to safest level of function  Outcome: Progressing  Goal: Maintain proper alignment of affected body part  Outcome: Progressing  Goal: Return ADL status to a safe level of function  Outcome: Progressing     Problem: Nutrition Deficit:  Goal: Optimize nutritional status  Outcome: Progressing     Problem: Depression  Goal: Will be euthymic at discharge  Description: INTERVENTIONS:  1. Administer medication as ordered  2. Provide emotional support via 1:1 interaction with staff  3. Encourage involvement in milieu/groups/activities  4. Monitor for social isolation  Outcome: Progressing     Problem: Anxiety  Goal: Will report anxiety at manageable levels  Description: INTERVENTIONS:  1. Administer medication as ordered  2. Teach and rehearse alternative coping skills  3. Provide emotional support with 1:1 interaction with staff  Outcome: Progressing     Problem: Spiritual Care  Goal: Pt/Family able to move forward in process of forgiving  Spiritual Care consult, as indicated  Outcome: Progressing     Problem: ABCDS Injury Assessment  Goal: Absence of physical injury  Outcome: Progressing     Problem: Chronic Conditions and Co-morbidities  Goal: Patient's chronic conditions and co-morbidity symptoms are monitored and maintained or improved  Outcome: Progressing     Problem: Skin/Tissue Integrity  Goal: Absence of new skin breakdown  Description: 1.  Monitor for areas of redness and/or skin breakdown  2.  Assess vascular access sites hourly  3.  Every 4-6 hours minimum:  Change oxygen saturation probe site  4.  Every 4-6 hours:  If on nasal continuous positive airway pressure, respiratory therapy assess nares and determine need for appliance change or resting period.  Outcome: Progressing     Problem: Skin/Tissue Integrity - Adult  Goal: Skin integrity remains intact  Outcome: Progressing  Goal: Incisions, wounds, or drain sites healing without S/S of infection  Outcome: Progressing  Goal: Oral mucous membranes remain intact  Outcome: Progressing     Problem: Gastrointestinal - Adult  Goal: Minimal or absence of nausea and vomiting  Outcome: Progressing  Goal: Maintains or returns to baseline bowel function  Outcome: Progressing  Goal: Maintains adequate nutritional intake  Outcome: Progressing     Problem: Genitourinary - Adult  Goal: Absence of urinary retention  Outcome: Progressing     Problem: Infection - Adult  Goal: Absence of infection at discharge  Outcome: Progressing  Goal: Absence of infection during hospitalization  Outcome: Progressing  Goal: Absence of fever/infection during anticipated neutropenic period  Outcome: Progressing     Problem: Metabolic/Fluid and Electrolytes - Adult  Goal: Electrolytes maintained within normal limits  Outcome: Progressing  Goal: Hemodynamic stability and optimal renal function maintained  Outcome: Progressing  Goal: Glucose maintained within prescribed range  Outcome: Progressing     Problem: Hematologic -

## 2024-01-23 NOTE — CARE COORDINATION
at bedside. Discussed acute inpatient rehab providing all of the information patient was given 1/22 such as [facility] Acute Inpatient Rehab program and requirements, including 3 hours of intense therapy daily, anticipated length of stay and goal of discharge to home .  verbalized his understanding and is aware we are awaiting insurance approval at this time.  Electronically signed by Piper Lucas on 1/23/2024 at 11:23 AM  .

## 2024-01-23 NOTE — DISCHARGE SUMMARY
Recent Labs     01/21/24  0457 01/22/24  0338 01/23/24  0442   WBC 13.5* 14.4* 15.5*   HGB 12.3 11.4* 12.0   HCT 37.9 34.2* 36.6*    299 298     Recent Labs     01/21/24  0457 01/22/24  0338 01/23/24  0442    136 135   K 3.7 3.6 3.8    101 98   CO2 24 25 24   BUN 27* 22 22   CREATININE 1.00* 0.95* 1.06*   CALCIUM 8.5 8.3* 7.9*   PHOS 3.5 3.2 3.4     No results for input(s): \"AST\", \"ALT\", \"BILIDIR\", \"BILITOT\", \"ALKPHOS\" in the last 72 hours.  No results for input(s): \"INR\" in the last 72 hours.  No results for input(s): \"CKTOTAL\", \"TROPONINI\" in the last 72 hours.    Urinalysis:   Lab Results   Component Value Date/Time    NITRU Negative 01/18/2024 08:30 AM    WBCUA >100 01/18/2024 08:30 AM    BACTERIA MANY 01/18/2024 08:30 AM    RBCUA 3-5 01/18/2024 08:30 AM    BLOODU MODERATE 01/18/2024 08:30 AM    SPECGRAV 1.024 01/18/2024 08:30 AM    GLUCOSEU 500 01/18/2024 08:30 AM       Radiology:   Most recent    Chest CT      WITH CONTRAST:No results found for this or any previous visit.       WITHOUT CONTRAST: No results found for this or any previous visit.      CXR      2-view: No results found for this or any previous visit.       Portable: Results for orders placed during the hospital encounter of 01/18/24    XR CHEST PORTABLE    Narrative  EXAMINATION:  ONE XRAY VIEW OF THE CHEST    1/18/2024 10:17 am    COMPARISON:  November 2010    HISTORY:  ORDERING SYSTEM PROVIDED HISTORY: AMS  TECHNOLOGIST PROVIDED HISTORY:  Reason for exam:->AMS  What reading provider will be dictating this exam?->CRC    FINDINGS:  The lungs are without acute focal process.  There is no effusion or  pneumothorax. The cardiomediastinal silhouette is without acute process. The  osseous structures are without acute process.    Impression  No acute process.      Echo No results found for this or any previous visit.      Disposition: home    In process/preliminary results:  Outstanding Order Results       Date and Time Order Name  Status Description    1/22/2024 11:13 AM GUSTABO (PRN contrast/bubbles/3D) In process     1/18/2024  8:30 AM Culture, Urine Preliminary             Patient Instructions:   Current Discharge Medication List        CONTINUE these medications which have NOT CHANGED    Details   escitalopram (LEXAPRO) 10 MG tablet Take 1.5 tablets by mouth daily      metFORMIN (GLUCOPHAGE) 1000 MG tablet Take 1 tablet by mouth 2 times daily (with meals)      fluticasone furoate-vilanterol (BREO ELLIPTA) 100-25 MCG/ACT inhaler TAKE 1 PUFF BY MOUTH EVERY DAY  Qty: 60 each, Refills: 2    Associated Diagnoses: Chronic obstructive pulmonary disease, unspecified COPD type (HCC)      albuterol sulfate HFA (PROVENTIL;VENTOLIN;PROAIR) 108 (90 Base) MCG/ACT inhaler Inhale 2 puffs into the lungs 4 times daily as needed for Wheezing  Qty: 6.7 each, Refills: 2    Comments: DX Code Needed  .  Associated Diagnoses: Chronic obstructive pulmonary disease, unspecified COPD type (HCC)      simvastatin (ZOCOR) 40 MG tablet TAKE 1 TABLET BY MOUTH EVERY DAY IN THE EVENING  Qty: 90 tablet, Refills: 4    Associated Diagnoses: Mixed hyperlipidemia      Multiple Vitamins-Minerals (HAIR SKIN AND NAILS FORMULA) TABS Take 1 tablet by mouth daily  Qty: 30 tablet, Refills: 5      vitamin D3 (CHOLECALCIFEROL) 25 MCG (1000 UT) TABS tablet Take 1 tablet by mouth daily  Qty: 30 tablet, Refills: 5    Associated Diagnoses: Vitamin D deficiency      nicotine (NICODERM CQ) 14 MG/24HR Place 1 patch onto the skin every 24 hours  Qty: 30 patch, Refills: 3    Associated Diagnoses: Tobacco abuse      Doxylamine Succinate, Sleep, (UNISOM PO) Take 1 tablet by mouth daily as needed           Activity: activity as tolerated  Diet: soft and bite sized  Wound Care: none needed    Follow-up with PCP 1-2 weeks.    DC time 35 minutes    Signed:  Electronically signed by AARON VITAL MD on 1/23/2024 at 12:55 PM

## 2024-01-23 NOTE — CARE COORDINATION
Per Bethanie with [facility] pt was approved for Acute Rehab and can admit to room #242. Bedside nurse updated.

## 2024-01-23 NOTE — CARE COORDINATION
Checked status of Precert for IRF on [facility] portal that was initiated 1/22, status of \"pending review\" at this time. Reference #[id number]   Electronically signed by Piper Lucas on 1/23/2024 at 1:22 PM

## 2024-01-24 PROBLEM — R32 URINARY INCONTINENCE: Status: ACTIVE | Noted: 2024-01-24

## 2024-01-24 PROBLEM — D32.9 MENINGIOMA (HCC): Status: ACTIVE | Noted: 2024-01-24

## 2024-01-24 PROBLEM — Z74.09 IMPAIRED MOBILITY AND ADLS: Status: ACTIVE | Noted: 2024-01-24

## 2024-01-24 PROBLEM — I61.9 NONTRAUMATIC INTRACEREBRAL HEMORRHAGE (HCC): Status: ACTIVE | Noted: 2024-01-24

## 2024-01-24 PROBLEM — Z78.9 IMPAIRED MOBILITY AND ADLS: Status: ACTIVE | Noted: 2024-01-24

## 2024-01-24 LAB
BACTERIA UR CULT: ABNORMAL
BACTERIA UR CULT: ABNORMAL
BACTERIA URNS QL MICRO: ABNORMAL /HPF
BILIRUB UR QL STRIP: NEGATIVE
CLARITY UR: ABNORMAL
COLOR UR: ABNORMAL
EPI CELLS #/AREA URNS AUTO: ABNORMAL /HPF (ref 0–5)
GLUCOSE BLD-MCNC: 108 MG/DL (ref 70–99)
GLUCOSE BLD-MCNC: 177 MG/DL (ref 70–99)
GLUCOSE BLD-MCNC: 198 MG/DL (ref 70–99)
GLUCOSE BLD-MCNC: 203 MG/DL (ref 70–99)
GLUCOSE UR STRIP-MCNC: NEGATIVE MG/DL
HGB UR QL STRIP: NEGATIVE
HYALINE CASTS #/AREA URNS AUTO: ABNORMAL /HPF (ref 0–5)
KETONES UR STRIP-MCNC: NEGATIVE MG/DL
LEUKOCYTE ESTERASE UR QL STRIP: ABNORMAL
NITRITE UR QL STRIP: NEGATIVE
ORGANISM: ABNORMAL
PERFORMED ON: ABNORMAL
PH UR STRIP: 6 [PH] (ref 5–9)
PROT UR STRIP-MCNC: ABNORMAL MG/DL
RBC #/AREA URNS AUTO: ABNORMAL /HPF (ref 0–5)
SP GR UR STRIP: 1.01 (ref 1–1.03)
URINE REFLEX TO CULTURE: ABNORMAL
UROBILINOGEN UR STRIP-ACNC: 1 E.U./DL
WBC #/AREA URNS AUTO: ABNORMAL /HPF (ref 0–5)

## 2024-01-24 PROCEDURE — 6370000000 HC RX 637 (ALT 250 FOR IP): Performed by: PHYSICAL MEDICINE & REHABILITATION

## 2024-01-24 PROCEDURE — 97535 SELF CARE MNGMENT TRAINING: CPT

## 2024-01-24 PROCEDURE — 2700000000 HC OXYGEN THERAPY PER DAY

## 2024-01-24 PROCEDURE — 97530 THERAPEUTIC ACTIVITIES: CPT

## 2024-01-24 PROCEDURE — 1180000000 HC REHAB R&B

## 2024-01-24 PROCEDURE — 97161 PT EVAL LOW COMPLEX 20 MIN: CPT

## 2024-01-24 PROCEDURE — 97533 SENSORY INTEGRATION: CPT

## 2024-01-24 PROCEDURE — 92523 SPEECH SOUND LANG COMPREHEN: CPT

## 2024-01-24 PROCEDURE — 92610 EVALUATE SWALLOWING FUNCTION: CPT

## 2024-01-24 PROCEDURE — 94761 N-INVAS EAR/PLS OXIMETRY MLT: CPT

## 2024-01-24 PROCEDURE — 6370000000 HC RX 637 (ALT 250 FOR IP): Performed by: FAMILY MEDICINE

## 2024-01-24 PROCEDURE — 97112 NEUROMUSCULAR REEDUCATION: CPT

## 2024-01-24 PROCEDURE — 97116 GAIT TRAINING THERAPY: CPT

## 2024-01-24 PROCEDURE — 94640 AIRWAY INHALATION TREATMENT: CPT

## 2024-01-24 PROCEDURE — 6360000002 HC RX W HCPCS: Performed by: PHYSICAL MEDICINE & REHABILITATION

## 2024-01-24 PROCEDURE — 97167 OT EVAL HIGH COMPLEX 60 MIN: CPT

## 2024-01-24 PROCEDURE — 99223 1ST HOSP IP/OBS HIGH 75: CPT | Performed by: PHYSICAL MEDICINE & REHABILITATION

## 2024-01-24 PROCEDURE — 81001 URINALYSIS AUTO W/SCOPE: CPT

## 2024-01-24 PROCEDURE — 99221 1ST HOSP IP/OBS SF/LOW 40: CPT | Performed by: NURSE PRACTITIONER

## 2024-01-24 RX ORDER — ENEMA 19; 7 G/133ML; G/133ML
1 ENEMA RECTAL DAILY PRN
Status: DISCONTINUED | OUTPATIENT
Start: 2024-01-24 | End: 2024-02-09

## 2024-01-24 RX ORDER — UBIDECARENONE 100 MG
100 CAPSULE ORAL DAILY
Status: DISCONTINUED | OUTPATIENT
Start: 2024-01-24 | End: 2024-02-09

## 2024-01-24 RX ORDER — BISACODYL 10 MG
10 SUPPOSITORY, RECTAL RECTAL DAILY PRN
Status: DISCONTINUED | OUTPATIENT
Start: 2024-01-24 | End: 2024-02-09

## 2024-01-24 RX ORDER — ALBUTEROL SULFATE 2.5 MG/3ML
2.5 SOLUTION RESPIRATORY (INHALATION) EVERY 6 HOURS PRN
Status: DISCONTINUED | OUTPATIENT
Start: 2024-01-24 | End: 2024-02-09

## 2024-01-24 RX ORDER — ALBUTEROL SULFATE 90 UG/1
2 AEROSOL, METERED RESPIRATORY (INHALATION) 2 TIMES DAILY
Status: DISCONTINUED | OUTPATIENT
Start: 2024-01-24 | End: 2024-02-09

## 2024-01-24 RX ORDER — LIDOCAINE 4 G/G
3 PATCH TOPICAL DAILY
Status: DISCONTINUED | OUTPATIENT
Start: 2024-01-24 | End: 2024-02-03

## 2024-01-24 RX ORDER — VITAMIN B COMPLEX
2000 TABLET ORAL
Status: DISCONTINUED | OUTPATIENT
Start: 2024-01-24 | End: 2024-02-09

## 2024-01-24 RX ORDER — ALBUTEROL SULFATE 90 UG/1
2 AEROSOL, METERED RESPIRATORY (INHALATION) EVERY 4 HOURS PRN
Status: DISCONTINUED | OUTPATIENT
Start: 2024-01-24 | End: 2024-02-09

## 2024-01-24 RX ORDER — CYANOCOBALAMIN 1000 UG/ML
1000 INJECTION, SOLUTION INTRAMUSCULAR; SUBCUTANEOUS WEEKLY
Status: DISCONTINUED | OUTPATIENT
Start: 2024-01-24 | End: 2024-02-09

## 2024-01-24 RX ORDER — ACETAMINOPHEN 325 MG/1
650 TABLET ORAL EVERY 4 HOURS PRN
Status: DISCONTINUED | OUTPATIENT
Start: 2024-01-24 | End: 2024-02-09

## 2024-01-24 RX ADMIN — INSULIN LISPRO 5 UNITS: 100 INJECTION, SOLUTION INTRAVENOUS; SUBCUTANEOUS at 08:20

## 2024-01-24 RX ADMIN — METOPROLOL TARTRATE 12.5 MG: 25 TABLET, FILM COATED ORAL at 08:20

## 2024-01-24 RX ADMIN — ALBUTEROL SULFATE 2 PUFF: 90 AEROSOL, METERED RESPIRATORY (INHALATION) at 16:44

## 2024-01-24 RX ADMIN — BUDESONIDE AND FORMOTEROL FUMARATE DIHYDRATE 2 PUFF: 80; 4.5 AEROSOL RESPIRATORY (INHALATION) at 16:30

## 2024-01-24 RX ADMIN — Medication 2000 UNITS: at 17:24

## 2024-01-24 RX ADMIN — INSULIN LISPRO 5 UNITS: 100 INJECTION, SOLUTION INTRAVENOUS; SUBCUTANEOUS at 12:15

## 2024-01-24 RX ADMIN — METOPROLOL TARTRATE 12.5 MG: 25 TABLET, FILM COATED ORAL at 21:21

## 2024-01-24 RX ADMIN — Medication 5 MG: at 21:21

## 2024-01-24 RX ADMIN — INSULIN GLARGINE 10 UNITS: 100 INJECTION, SOLUTION SUBCUTANEOUS at 21:22

## 2024-01-24 RX ADMIN — BUDESONIDE AND FORMOTEROL FUMARATE DIHYDRATE 2 PUFF: 80; 4.5 AEROSOL RESPIRATORY (INHALATION) at 04:06

## 2024-01-24 RX ADMIN — Medication 100 MG: at 12:15

## 2024-01-24 RX ADMIN — INSULIN LISPRO 1 UNITS: 100 INJECTION, SOLUTION INTRAVENOUS; SUBCUTANEOUS at 12:16

## 2024-01-24 RX ADMIN — ESCITALOPRAM OXALATE 15 MG: 10 TABLET ORAL at 08:20

## 2024-01-24 RX ADMIN — ROSUVASTATIN CALCIUM 40 MG: 40 TABLET, FILM COATED ORAL at 21:21

## 2024-01-24 RX ADMIN — CYANOCOBALAMIN 1000 MCG: 1000 INJECTION, SOLUTION INTRAMUSCULAR; SUBCUTANEOUS at 12:15

## 2024-01-24 NOTE — PROGRESS NOTES
Pt ambulated to the bathroom and back to bed with FWW with CGA from RN. Pt had a continent BM earlier today. IV to L hand flushed and patent. Diet changed to regular per SLP recommendation. Oxygen 2L/min via nasal cannula in place, breathing treatments ordered by hospitalist per respiratory therapist recommendation. No c/o pain. Call light in reach, bed alarm activated. Electronically signed by Liza Portillo RN on 1/24/2024 at 5:53 PM

## 2024-01-24 NOTE — PROGRESS NOTES
01/23/24 2100   RT Protocol   History Pulmonary Disease 2   Respiratory pattern 0   Breath sounds 0   Cough 0   Indications for Bronchodilator Therapy On home bronchodilators   Bronchodilator Assessment Score 2

## 2024-01-24 NOTE — PROGRESS NOTES
01/24/24 1637   RT Protocol   History Pulmonary Disease 2   Respiratory pattern 0   Breath sounds 2   Cough 0   Indications for Bronchodilator Therapy Decreased or absent breath sounds   Bronchodilator Assessment Score 4

## 2024-01-24 NOTE — PROGRESS NOTES
[facility]   [facility]  Speech Language Pathology  Initial Speech/Language/Cognitive Assessment    NAME:Mulu Pandya  : 1955 (68 y.o.)   [x]   confirmed    MRN: [id number]  ROOM: [place] [id number]  ADMISSION DATE: 2024  PATIENT DIAGNOSIS(ES): Impaired mobility and ADLs [Z74.09, Z78.9]  No chief complaint on file.    Patient Active Problem List    Diagnosis Date Noted    Type 2 diabetes mellitus with chronic kidney disease 11/10/2022    Impaired mobility and ADLs dt CVA-2 cm acute intraparenchymal left frontal lobe hematoma. 2024    Urinary incontinence 2024    Meningioma (HCC) 2024    Aphasia 2024    Impaired mobility and activities of daily living 2024    Hyperglycemia due to type 2 diabetes mellitus (HCC) 2024    Dysphagia, oropharyngeal phase 2024    Late effects of CVA (cerebrovascular accident) 2024    CVA (cerebral vascular accident) (HCC) 2024    Intracranial hemorrhage (HCC) 2024    Cerebral infarction due to occlusion of left middle cerebral artery (HCC) 2024    Chronic renal failure, stage 3a (HCC)     Senile osteoporosis 2020    Recurrent major depressive disorder, in full remission (HCC) 2018    Proteinuria     Asthma     Anxiety     Chronic back pain     COPD (chronic obstructive pulmonary disease) (HCC)     Depression     Mixed hyperlipidemia     Essential hypertension      Past Medical History:   Diagnosis Date    Anxiety     Asthma     Chronic back pain     Chronic renal failure, stage 3a (HCC)     COPD (chronic obstructive pulmonary disease) (HCC)     Depression     History of tinnitus     Hyperlipidemia     Hypertension     Obesity     Osteoporosis      T -3.2    Peripheral vascular disease (HCC)     Proteinuria     Type II or unspecified type diabetes mellitus without mention of complication, not stated as uncontrolled      Past Surgical History:   Procedure Laterality Date     lower dentures  Oral Hygiene Comments: suspected thrush on tongue- informed Miroslava GODDARD  Lingual: Right deviation (mild and only occurred intermittently)  Mandible: No impairment    Motor Speech  Motor Speech  Apraxic Characteristics: Verbal;Awareness of errors;Errors increase as phonemic sequence increases;Numerous attempts at the word;Phonemic errors  Dysarthric Characteristics: Blended word boundaries;Decreased breath support;Imprecise  Intelligibility: Impaired  Word Intelligibility (%): 100 %  Phrase Intelligibility (%): 90 %  Sentence Intelligibility (%): 70 %  Overall Impairment Severity: Mild  Compensatory Strategies for Motor Speech: slow rate    Comprehension  Auditory Comprehension  Comprehension: Exceptions  Simple yes/no questions: WFL  Moderate yes/no questions: Mild  Basic Questions: WFL  Complex Questions: WFL  One Step Commands: WFL  Two Step Commands: WFL  Multistep Commands: Mild  Complex/Abstract Commands: WFL  Pictures: WFL  Conversation: Mild  Reading Comprehension  Reading Status: Unable to assess    Expression  Expression  Primary Mode of Expression: Verbal  Verbal Expression  Verbal Expression: Exceptions to functional limits  Initiation: WFL  Repetition: Mild (pt able to repeat up to sentence level however phonemic substitutions occurred frequently)  Automatic Speech: Mild (independently stated automatics however phonemic errors occurred)  Confrontation: WFL  Convergent: Mild (4/5)  Divergent: Mild (named 10 animals in 1 minute)  Responsive: WFL  Conversation: Mild (generated multiple sentences with no word retrieval errors in cookie theft picture however decreased speech accuracy and apraxic errors occurred)  Effective Techniques: Provide extra time  Written Expression  Dominant Hand: Right    Cognition  Orientation  Overall Orientation Status: Within Functional Limits  Orientation Level: Oriented to person;Oriented to place;Oriented to time;Oriented to situation (oriented to day, month, and

## 2024-01-24 NOTE — FLOWSHEET NOTE
Patient Admitted to room 243 from [facility]. This nurse removed right hand IV. Pure wick inserted by this nurse. Vital were checked by this nurse. Patient denies pain, SOB or dizziness. Patient in bed, call light within reach and bed alarm on.

## 2024-01-24 NOTE — PLAN OF CARE
Nutrition Problem #1: Inadequate oral intake  Intervention: Food and/or Nutrient Delivery: Continue Current Diet, Start Oral Nutrition Supplement

## 2024-01-24 NOTE — CONSULTS
House[address] in [place]         Home Layout: One level    Home Access: Stairs to enter with rails - Number of Steps: 3 - Rails: Left (up)    Bathroom Shower/Tub: Walk-in shower,  Toilet: Standard, Equipment: Grab bars in shower, Shower chair, Hand-held shower,  Accessibility: Accessible    Home Equipment: Cane    Receives Help From: Family    ADL Assistance: Independent    Homemaking Assistance: Independent,  Responsibilities: Yes    Ambulation Assistance: Independent (normally without AD)    Transfer Assistance: Independent    Active : Yes -Car    Occupation: Retired    Leisure & Hobbies: TV, music, reading    Additional Comments: pt with aphasia,         Social Determinants of Health     Financial Resource Strain: Low Risk  (2/10/2023)    Overall Financial Resource Strain (CARDIA)     Difficulty of Paying Living Expenses: Not hard at all   Food Insecurity: No Food Insecurity (1/23/2024)    Hunger Vital Sign     Worried About Running Out of Food in the Last Year: Never true     Ran Out of Food in the Last Year: Never true   Transportation Needs: No Transportation Needs (1/23/2024)    PRAPARE - Transportation     Lack of Transportation (Medical): No     Lack of Transportation (Non-Medical): No   Physical Activity: Inactive (5/11/2023)    Exercise Vital Sign     Days of Exercise per Week: 0 days     Minutes of Exercise per Session: 0 min   Stress: Not on file   Social Connections: Not on file   Intimate Partner Violence: Not on file   Housing Stability: Low Risk  (1/23/2024)    Housing Stability Vital Sign     Unable to Pay for Housing in the Last Year: No     Number of Places Lived in the Last Year: 1     Unstable Housing in the Last Year: No          Review of Systems   Constitutional:  Negative for appetite change, chills, fatigue and fever.   HENT:  Negative for hearing loss and trouble swallowing.    Eyes:  Negative for visual disturbance.   Respiratory:  Negative for cough, chest tightness, shortness of  15 mg Oral Daily    insulin glargine  10 Units SubCUTAneous Nightly    insulin lispro  5 Units SubCUTAneous TID     melatonin  5 mg Oral Nightly    metoprolol tartrate  12.5 mg Oral BID    nicotine  1 patch TransDERmal Q24H    insulin lispro  0-4 Units SubCUTAneous TID     insulin lispro  0-4 Units SubCUTAneous Nightly    rosuvastatin  40 mg Oral Nightly     PRN Meds: acetaminophen, bisacodyl, sodium phosphate, albuterol sulfate HFA, glucagon (rDNA), glucose, ondansetron **OR** ondansetron    Labs:   Recent Labs     01/22/24 0338 01/23/24 0442   WBC 14.4* 15.5*   HGB 11.4* 12.0   HCT 34.2* 36.6*    298     Recent Labs     01/22/24 0338 01/23/24 0442    135   K 3.6 3.8    98   CO2 25 24   BUN 22 22   CREATININE 0.95* 1.06*   CALCIUM 8.3* 7.9*   PHOS 3.2 3.4     No results for input(s): \"AST\", \"ALT\", \"BILIDIR\", \"BILITOT\", \"ALKPHOS\" in the last 72 hours.  No results for input(s): \"INR\" in the last 72 hours.  No results for input(s): \"CKTOTAL\", \"TROPONINI\" in the last 72 hours.    Urinalysis:   Lab Results   Component Value Date/Time    NITRU Negative 01/24/2024 02:13 AM    WBCUA 0-2 01/24/2024 02:13 AM    BACTERIA MANY 01/24/2024 02:13 AM    RBCUA 3-5 01/24/2024 02:13 AM    BLOODU Negative 01/24/2024 02:13 AM    SPECGRAV 1.012 01/24/2024 02:13 AM    GLUCOSEU Negative 01/24/2024 02:13 AM       Radiology:   Most recent    EEG No valid procedures specified.    MRI of Brain No results found for this or any previous visit.  Results for orders placed during the hospital encounter of 01/18/24    MRI BRAIN WO CONTRAST    Narrative  EXAMINATION:  MRI OF THE BRAIN WITHOUT CONTRAST  1/19/2024 1:54 pm    TECHNIQUE:  Multiplanar multisequence MRI of the brain was performed without the  administration of intravenous contrast.    COMPARISON:  CT of the head from earlier today.    HISTORY:  ORDERING SYSTEM PROVIDED HISTORY: f/u cva /bleed  TECHNOLOGIST PROVIDED HISTORY:  Reason for exam:->f/u cva

## 2024-01-24 NOTE — PROGRESS NOTES
Facility/Department: [facility]  Rehabilitation Initial Assessment: Occupational Therapy  [id number]    NAME: Mulu Pandya  : 1955  MRN: [id number]    Date of Service: 2024    Rehab Diagnosis(es): Impaired mobility and ADL's due to acute infart in the left superior mid parietal lobe MCA territory  Patient Active Problem List    Diagnosis Date Noted    Type 2 diabetes mellitus with chronic kidney disease 11/10/2022    Impaired mobility and ADLs dt CVA 2024    Urinary incontinence 2024    Aphasia 2024    Impaired mobility and activities of daily living 2024    Hyperglycemia due to type 2 diabetes mellitus (HCC) 2024    Dysphagia, oropharyngeal phase 2024    Late effects of CVA (cerebrovascular accident) 2024    CVA (cerebral vascular accident) (HCC) 2024    Intracranial hemorrhage (HCC) 2024    Cerebral infarction due to occlusion of left middle cerebral artery (HCC) 2024    Chronic renal failure, stage 3a (HCC)     Senile osteoporosis 2020    Recurrent major depressive disorder, in full remission (HCC) 2018    Proteinuria     Asthma     Anxiety     Chronic back pain     COPD (chronic obstructive pulmonary disease) (HCC)     Depression     Mixed hyperlipidemia     Essential hypertension        Past Medical History:   Diagnosis Date    Anxiety     Asthma     Chronic back pain     Chronic renal failure, stage 3a (HCC)     COPD (chronic obstructive pulmonary disease) (HCC)     Depression     History of tinnitus     Hyperlipidemia     Hypertension     Obesity     Osteoporosis      T -3.2    Peripheral vascular disease (HCC)     Proteinuria     Type II or unspecified type diabetes mellitus without mention of complication, not stated as uncontrolled      Past Surgical History:   Procedure Laterality Date    BREAST SURGERY Left     Benign bx    FRACTURE SURGERY      TUBAL LIGATION

## 2024-01-24 NOTE — PROGRESS NOTES
Comprehensive Nutrition Assessment    Type and Reason for Visit:  Initial, Consult    Nutrition Recommendations/Plan:   Continue Current Diet, Start Oral Nutrition Supplement     Malnutrition Assessment:  Malnutrition Status:  No malnutrition (01/24/24 1608)      Nutrition Assessment:    Pt reports fair to good appetite, but c/o disliking hospital foods, eating <50% of meals per pt. To provide diabetic supplement tid with meals. To continue to follow.    Nutrition Related Findings:    PMH-anxiety, COPD, CVA,  type II DM, HTN, asthma, PUD, HLD, CKD3; admitted with CVA. BSE 1/24-regular consistancy (previously on Soft and bite-sized diet per BSE). Labs/meds reviewed. Gluc-107-245 x last 3 days. Wound Type: None       Current Nutrition Intake & Therapies:    Average Meal Intake: 26-50%     ADULT DIET; Regular    Anthropometric Measures:  Height: 160 cm (5' 3\")  Ideal Body Weight (IBW): 115 lbs (52 kg)    Admission Body Weight: 70.8 kg (156 lb) (stated)  Current BMI (kg/m2):  27.6  Usual Body Weight: 69.9 kg (154 lb) (2/2023)                       BMI Categories: Overweight (BMI 25.0-29.9)    Estimated Daily Nutrient Needs:  Energy Requirements Based On: Kcal/kg  Weight Used for Energy Requirements: Admission  Energy (kcal/day): 1681-1292 (kg x 22-23)  Weight Used for Protein Requirements: Ideal  Protein (g/day): 62 (kg x 1.2)  Method Used for Fluid Requirements: 1 ml/kcal  Fluid (ml/day): ~1500    Nutrition Diagnosis:   Inadequate oral intake related to  (c/o disliking hospital food) as evidenced by intake 26-50%  Altered nutrition-related lab values related to endocrine dysfuntion as evidenced by lab values    Nutrition Interventions:   Food and/or Nutrient Delivery: Continue Current Diet, Start Oral Nutrition Supplement  Nutrition Education/Counseling: No recommendation at this time  Coordination of Nutrition Care: Continue to monitor while inpatient       Goals:     Goals: PO intake 75% or greater (Gluc <160)

## 2024-01-24 NOTE — PLAN OF CARE
Problem: Safety - Adult  Goal: Free from fall injury  Outcome: Progressing     Problem: Discharge Planning  Goal: Discharge to home or other facility with appropriate resources  Outcome: Progressing     Problem: Skin/Tissue Integrity  Goal: Absence of new skin breakdown  Description: 1.  Monitor for areas of redness and/or skin breakdown  2.  Assess vascular access sites hourly  3.  Every 4-6 hours minimum:  Change oxygen saturation probe site  4.  Every 4-6 hours:  If on nasal continuous positive airway pressure, respiratory therapy assess nares and determine need for appliance change or resting period.  Outcome: Progressing     Problem: Neurosensory - Adult  Goal: Achieves stable or improved neurological status  1/24/2024 1355 by Liza Portillo RN  Outcome: Progressing  1/24/2024 0335 by Ade Rodriguez RN  Outcome: Progressing  Goal: Achieves maximal functionality and self care  1/24/2024 1355 by Liza Portillo RN  Outcome: Progressing  1/24/2024 0335 by Ade Rodriguez RN  Outcome: Progressing     Problem: Skin/Tissue Integrity - Adult  Goal: Skin integrity remains intact  1/24/2024 1355 by Liza Portillo RN  Outcome: Progressing  1/24/2024 0335 by Ade Rodriguez RN  Outcome: Progressing     Problem: Musculoskeletal - Adult  Goal: Return mobility to safest level of function  1/24/2024 1355 by Liza Portillo RN  Outcome: Progressing  1/24/2024 0335 by Ade Rodriguez RN  Outcome: Progressing  Goal: Maintain proper alignment of affected body part  1/24/2024 1355 by Liza Portillo RN  Outcome: Progressing  1/24/2024 0335 by Ade Rodriguez RN  Outcome: Progressing  Goal: Return ADL status to a safe level of function  1/24/2024 1355 by Liza Portillo RN  Outcome: Progressing  1/24/2024 0335 by Ade Rodriguez RN  Outcome: Progressing     Problem: Gastrointestinal - Adult  Goal: Maintains or returns to baseline bowel function  1/24/2024 1355 by Liza Portillo RN  Outcome:  Progressing  1/24/2024 0335 by Ade Rodriguez RN  Outcome: Progressing  Goal: Maintains adequate nutritional intake  1/24/2024 1355 by Liza Portillo RN  Outcome: Progressing  1/24/2024 0335 by Ade Rodriguez RN  Outcome: Progressing     Problem: Metabolic/Fluid and Electrolytes - Adult  Goal: Electrolytes maintained within normal limits  1/24/2024 1355 by Liza Portillo RN  Outcome: Progressing  1/24/2024 0335 by Ade Rodriguez RN  Outcome: Progressing  Goal: Glucose maintained within prescribed range  1/24/2024 1355 by Liza Portillo RN  Outcome: Progressing  1/24/2024 0335 by Ade Rodriguez RN  Outcome: Progressing     Problem: Physical Therapy - Adult  Goal: By Discharge: Performs mobility at highest level of function for planned discharge setting.  See evaluation for individualized goals.  1/24/2024 1327 by Tiffany Carrington, PT  Outcome: Progressing

## 2024-01-24 NOTE — CARE COORDINATION
Case Management Initial Assessment        NAME:  Mulu Pandya  ROOM: R243/R243-01  :  1955  DATE: 2024        Social Functional:  Social/Functional History  Lives With: Spouse  Type of Home: House  Home Layout: One level  Home Access: Stairs to enter with rails  Entrance Stairs - Number of Steps: 3  Entrance Stairs - Rails: Left (up)  Bathroom Shower/Tub: Walk-in shower;Doors;Shower chair without back  Bathroom Toilet: Standard  Bathroom Equipment: Grab bars in shower;Shower chair;Hand-held shower  Bathroom Accessibility: Walker accessible;Wheelchair accessible  Home Equipment: Cane  ADL Assistance: Independent  Homemaking Assistance: Independent  Meal Prep Responsibility: No (spouse completes)  Laundry Responsibility: No (spouse completes)  Cleaning Responsibility: No (spouse completes)  Bill Paying/Finance Responsibility: No (pt completed prior but stated that spouse will do it now)  Shopping Responsibility: Secondary (completes with spouse)  Health Care Management: Primary (uses pill organizer)  Ambulation Assistance: Independent (normally without AD)  Transfer Assistance: Independent  Active : Yes  Mode of Transportation: Car  Education: high school graduate  Occupation: Retired  Type of Occupation:       Spoke with patient and explained role in the team. Patient questions answered appropriately. Explained discharge process. Patient stated understanding. Pt prefers to go by \"Kasey\". Pt is a high school graduate and retired as a . Support system consists of spouse who pt lives with, and a dtr and son in [place]. Plan is to return to her one level home, there are 3 stairs to enter with a left handrail ascending. Bathroom is walker and wheelchair accessible and has a walk in shower with doors, shower chair without back, hand held shower, and grab bars in shower. DME

## 2024-01-24 NOTE — PROGRESS NOTES
OCCUPATIONAL THERAPY  INPATIENT REHAB TREATMENT NOTE  [facility]      NAME: Mulu Pandya  : 1955 (68 y.o.)  MRN: [id number]  CODE STATUS: Full Code  Room: R243/R243-01    Date of Service: 2024    Referring Physician: Dr Stacy  Rehab Diagnosis: Impaired mobility and ADL's due to acute infart in the left superior mid parietal lobe MCA territory    Hospital course:      Restrictions  Restrictions/Precautions  Restrictions/Precautions: Fall Risk, Seizure          Patient's date of birth confirmed: Yes    SAFETY:  Safety Devices  Safety Devices in place: Yes  Type of devices: All fall risk precautions in place    SUBJECTIVE:   \"I'm tired\"    Pain at start of treatment: No    Pain at end of treatment: No    OBJECTIVE:    Patient engaged in visual perceptual and cognitive activity to increase [place] with ADL/IADL completion.    Cancel B:    Patient completed cancel B activity with sheet placed at midline. Therapist provided verbal instruction and demonstration.  Pt began at the top left corner of the paper and was required to locate all Letter “Bs” that were located throughout several lines of random letters.  Pt only missed 2 letters - both being on the L side. Pt able to find once mistakes were pointed out. Good overall ability     SLUMS- attempted to complete however not appropriate at this time d/t aphasia. May attempt again at later date     While seated, challenged strength, activity tolerance, ROM, and flexibility for improved ADL performance.    Using towels with the RUE targeted gravity eliminated/reduced movements focusing on scapular protraction/retraction, shoulder flexion/rotation/horizontal abduction  Decreased proximal movement noted with assist needed to maximize ROM for all movements     Introduced pt to brushing program with a surgical brush -Instructed her to complete outside of therapy sessions. Good ability to understand and follow instructions    Pt challenged with

## 2024-01-24 NOTE — H&P
HISTORY & PHYSICAL       DATE OF ADMISSION:  1/23/2024    DATE OF SERVICE:  1/24/24    Subjective:    Mulu Pandya, 68 y.o. female presents today with:     CHIEF COMPLAINT:   She was admitted through the emergency room on January 18, 2024 with right-sided weakness aphasia and dysphagia.  She was diagnosed with acute CVA-subacute stroke on the right parietal lob .  Her  reported she had had multiple falls recently.  She was admitted under the care of the hospitalist with neurology consulting.  She was also found to have an intracranial hemorrhage.  CT of the head revealed a 2 cm parenchymal bleed in the left basal ganglia.     Neurosurgery was consulted and they did not feel that she should have surgery.  She has been struggling with aphasia dysphagia and hypoxia since being admitte neurology has also been following.    Stroke workup as below including a GUSTABO which was unremarkable.     Neurologic Problem  The patient's primary symptoms include an altered mental status, clumsiness, focal sensory loss, focal weakness, a loss of balance, memory loss and weakness. This is a new problem. The current episode started in the past 7 days. The neurological problem developed suddenly. The problem has been gradually improving since onset. There was right-sided, upper extremity and lower extremity focality noted. Associated symptoms include confusion, dizziness, fatigue, headaches and light-headedness. Pertinent negatives include no diaphoresis, fever, nausea, palpitations or shortness of breath. Past treatments include walking and sleep. The treatment provided mild relief. Her past medical history is significant for a CVA.   Fatigue  This is a new problem. The current episode started in the past 7 days. The problem occurs constantly. Associated symptoms include fatigue, headaches, numbness and weakness. Pertinent negatives include no chills, congestion, diaphoresis, fever, joint swelling, myalgias or nausea. The  x 1.6 x 2.1 cm, producing moderate mass effect upon the right middle cerebellar peduncle and minimal mass effect upon the right pontomedullary junction.   5. Moderate, age-appropriate atrophy and mild, age-appropriate small vessel ischemic change.         Fluoroscopy modified barium swallow  1/19/2024 Dysphagia is observed.  Minimal penetration with thin liquids.        CT HEAD  1/19/2024  BRAIN/VENTRICLES: There is a stable small 2 cm left frontal lobe periventricular hematoma identified.  There is no significant surrounding edema or mass effect.  Stable encephalomalacia areas identified from old insult within the right temporal lobe with evolving insult identified within the left parietal lobe.  No abnormal extra-axial fluid collection.  Mild ex vacuole dilatation seen of the ventricular system.  Minimal chronic small vessel ischemic changes seen within the periventricular and subcortical white matter to suggest chronic small vessel ischemic change.  The known right cerebellar pontine angle mass is not well seen on this examination due to lack of intravenous contrast. ORBITS: The visualized portion of the orbits demonstrate no acute abnormality. SINUSES: The visualized paranasal sinuses and mastoid air cells demonstrate no acute abnormality. SOFT TISSUES/SKULL:  No acute abnormality of the visualized skull or soft tissues.     Stable parenchymal hemorrhage involving the left frontal periventricular white matter.  No significant surrounding edema or mass effect.  Evolving insult in the left parietal lobe with encephalomalacia change stable within the right temporal lobe.         CT HEAD  1/18/2024  BRAIN/VENTRICLES: Stable approximately 2 cm left frontal lobe periventricular intraparenchymal hematoma.  Small areas of encephalomalacia unchanged, largest area within the right temporal lobe consistent with remote ischemic infarcts.  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

## 2024-01-24 NOTE — PLAN OF CARE
Problem: Neurosensory - Adult  Goal: Achieves stable or improved neurological status  Outcome: Progressing  Goal: Achieves maximal functionality and self care  Outcome: Progressing     Problem: Skin/Tissue Integrity - Adult  Goal: Skin integrity remains intact  Outcome: Progressing     Problem: Musculoskeletal - Adult  Goal: Return mobility to safest level of function  Outcome: Progressing  Goal: Maintain proper alignment of affected body part  Outcome: Progressing  Goal: Return ADL status to a safe level of function  Outcome: Progressing     Problem: Gastrointestinal - Adult  Goal: Maintains or returns to baseline bowel function  Outcome: Progressing  Goal: Maintains adequate nutritional intake  Outcome: Progressing     Problem: Metabolic/Fluid and Electrolytes - Adult  Goal: Electrolytes maintained within normal limits  Outcome: Progressing  Goal: Glucose maintained within prescribed range  Outcome: Progressing

## 2024-01-24 NOTE — PROGRESS NOTES
[facility]   [facility]  Speech Language Pathology  Clinical Bedside Swallow Evaluation    NAME:Mulu Pandya  : 1955 (68 y.o.)   [x]   confirmed    MRN: [id number]  ROOM: [place] [address]  ADMISSION DATE: 2024  PATIENT DIAGNOSIS(ES): Impaired mobility and ADLs [Z74.09, Z78.9]  No chief complaint on file.    Patient Active Problem List    Diagnosis Date Noted    Type 2 diabetes mellitus with chronic kidney disease 11/10/2022    Impaired mobility and ADLs dt CVA-2 cm acute intraparenchymal left frontal lobe hematoma. 2024    Urinary incontinence 2024    Meningioma (HCC) 2024    Aphasia 2024    Impaired mobility and activities of daily living 2024    Hyperglycemia due to type 2 diabetes mellitus (HCC) 2024    Dysphagia, oropharyngeal phase 2024    Late effects of CVA (cerebrovascular accident) 2024    CVA (cerebral vascular accident) (HCC) 2024    Intracranial hemorrhage (HCC) 2024    Cerebral infarction due to occlusion of left middle cerebral artery (HCC) 2024    Chronic renal failure, stage 3a (HCC)     Senile osteoporosis 2020    Recurrent major depressive disorder, in full remission (HCC) 2018    Proteinuria     Asthma     Anxiety     Chronic back pain     COPD (chronic obstructive pulmonary disease) (HCC)     Depression     Mixed hyperlipidemia     Essential hypertension      Past Medical History:   Diagnosis Date    Anxiety     Asthma     Chronic back pain     Chronic renal failure, stage 3a (HCC)     COPD (chronic obstructive pulmonary disease) (HCC)     Depression     History of tinnitus     Hyperlipidemia     Hypertension     Obesity     Osteoporosis      T -3.2    Peripheral vascular disease (HCC)     Proteinuria     Type II or unspecified type diabetes mellitus without mention of complication, not stated as uncontrolled      Past Surgical History:   Procedure Laterality Date    BREAST

## 2024-01-24 NOTE — CARE COORDINATION
[facility]  INPATIENT REHABILITATION  TEAM CONFERENCE NOTE  Room: R243/R243-01  Admit Date: 2024       Date: 2024  Patient Name: Mulu Pandya        MRN: [id number]    : 1955  (68 y.o.)  Gender: female        REHAB DIAGNOSIS:   Diagnosis: Impaired mobility and ADL's due to acute infart in the left superior mid parietal lobe MCA territory    CO MORBIDITIES:      Past Medical History:   Diagnosis Date    Anxiety     Asthma     Chronic back pain     Chronic renal failure, stage 3a (HCC)     COPD (chronic obstructive pulmonary disease) (HCC)     Depression     History of tinnitus     Hyperlipidemia     Hypertension     Obesity     Osteoporosis     2019 T -3.2    Peripheral vascular disease (HCC)     Proteinuria     Type II or unspecified type diabetes mellitus without mention of complication, not stated as uncontrolled      Past Surgical History:   Procedure Laterality Date    BREAST SURGERY Left 2009    Benign bx    FRACTURE SURGERY      TUBAL LIGATION          Restrictions  Restrictions/Precautions: Fall Risk, Seizure  CASE MANAGEMENT    Social/Functional History  Social/Functional History  Lives With: Spouse  Type of Home: House  Home Layout: One level  Home Access: Stairs to enter with rails  Entrance Stairs - Number of Steps: 3  Entrance Stairs - Rails: Left (up)  Bathroom Shower/Tub: Walk-in shower, Doors, Shower chair without back  Bathroom Toilet: Standard  Bathroom Equipment: Grab bars in shower, Shower chair, Hand-held shower  Bathroom Accessibility: Walker accessible, Wheelchair accessible  Home Equipment: Cane  Has the patient had two or more falls in the past year or any fall with injury in the past year?: Yes  ADL Assistance: Independent  Homemaking Assistance: Independent  Meal Prep Responsibility: No (spouse completes)  Laundry Responsibility: No (spouse completes)  Cleaning Responsibility: No (spouse completes)  Bill Paying/Finance Responsibility: No (pt completed  (01/24/24 0947)  UE Dressing: Unable to assess(comment) (gown only) (01/24/24 0947)  LE Dressing: Dependent/Total (01/24/24 0947)  Toileting: Unable to assess(comment) (01/24/24 0947)  Skin Care: Soap and water;Protective barrier (01/25/24 6987)             CURRENT SELF CARE:           LTG:  Eating:Discharge Goal: Independent  Oral Hygiene:Discharge Goal: Independent  Shower/Bathe self: Discharge Goal: Supervision or touching assistance  Upper body dressing:Discharge Goal: Independent  Lower body dressing:Discharge Goal: Independent  Putting on taking off footwear:Discharge Goal: Independent   Toileting: Discharge Goal: Independent  Toilet transfer:Discharge Goal: Independent  OT Treatment Time: 2.0 hrs      SPEECH THERAPY       Comprehension: Exceptions (mild receptive aphasia)  Verbal Expression: Exceptions to functional limits (mild expressive aphasia)      Diet/Swallow:        Dysphagia Outcome Severity Scale: Level 5: Mild dysphagia- Distant supervision. May need one diet consistency restricted    Compensatory Swallowing Strategies : Small bites/sips, Upright as possible for all oral intake, Alternate solids and liquids  Therapeutic Interventions: Patient/Family education, Diet tolerance monitoring, Oral motor exercises      LTG:  Long Term Goals  Time Frame for Long Term Goals: 2-3 weeks  Goal 1: Pt will improve her receptive and expressive language abilities to a modified independent level for understanding and expression of complex wants, needs, feelings/ideas, and medical/safety information.  Goal 2: Pt will improve her Speech Intelligibility to modified independent for effective communication of wants, needs, feelings, ideas, and medical/safety information with familiar and unfamiliar listeners.  Long-term Goals  Timeframe for Long-term Goals: 2-3 weeks  Goal 1: Patient will tolerate least restrictive diet with no adverse outcomes.          COGNITION  OT: Cognition  Arousal/Alertness: Appropriate responses

## 2024-01-24 NOTE — PROGRESS NOTES
manager  Additional Comments: Some expressive aphasia noted. Information obtained from record and verified by pt. Pt is known by evaluating therapist from previous evaluation on [facility] floor where information was obtained from spouse. Pt's son is supportive, however lives in [facility].    OBJECTIVE:   Vision/Hearing:  Vision  Vision Exceptions: Wears glasses for distance (visuomotor screen WFL)  Hearing: Within functional limits    Cognition/Observation:  Overall Orientation Status: Within Functional Limits  Follows Commands: Within Functional Limits    Observation/Palpation  Observation: No acute distress noted. Pt pleasant and motivated. Mild flat affect.    ROM:  AROM: Generally decreased, functional (deficits noted R hemibody)    Strength:  Strength: Generally decreased, functional (deficits noted R hemibody)    Neuro:  Balance  Sitting - Static: Poor  Sitting - Dynamic: Poor                       Tone: Normal  Sensation: Intact    Bed mobility  Rolling to Left: Moderate assistance  Rolling to Right: Moderate assistance  Supine to Sit: Maximum assistance  Sit to Supine: Minimal assistance  Scooting: Stand by assistance  Bed Mobility Comments: Hand over hand cues for sequencing. Increased time and effort to complete. Pt declining further activity requesting to return to bed immediately d/t fatigue.    Transfers  Comment: Unable to assess. Pt declines    Ambulation  Comments: Pt declines                   Activity Tolerance  Activity Tolerance: Patient limited by fatigue    Patient Education  Education Given To: Patient  Education Provided: Role of Therapy;Plan of Care  Education Provided Comments: Importance of OOB activity; rehab therapy expectations.  Education Method: Verbal  Education Outcome: Verbalized understanding;Continued education needed    Quality Indicators (IRF-ADEEL):  Rolling L and R: Partial/Moderate Assistance (helper does <50%) - 3  Sit>Supine: Partial/Moderate Assistance (helper does <50%) -  procurement, Modalities, Home exercise program, Cognitive/Perceptual training  Requires PT Follow-Up: Yes    Patient's Goal:   \"I want to walk.\"    GOALS:  Long Term Goals  Long Term Goal 1: Pt to complete bed mobility with supervision  Long Term Goal 2: Pt to complete transfers with SBA  Long Term Goal 3: Pt to ambulate 50ft with LRD and SBA  Long Term Goal 4: Pt to complete 3 steps with HR and [place]    ELOS:   Therapy Duration: 3 Weeks    Therapy Time:    Individual   Time In 1100   Time Out 1124   Minutes 24        Reason:  (Missed 6min d/t fatigue. Pt declining further activity.)    Tiffany Carrington, PT, 01/24/24 at 1:32 PM

## 2024-01-24 NOTE — FLOWSHEET NOTE
Patient admission complete. Patient is resting in bed at this time watching television with no complaints of pain or discomfort. Patient admitted to room 243 this evening. Patient alert to name, birthday, place, current situation, current [place] president, and current month. Patient confused about current year, will re-orient frequently. Patient able to follow all commands and has right arm/hand/leg weakness. Patient denies any pain, numbness or tingling. Patient states she wears upper/lower dentures and glasses for both near and far sighted vision. Patient states she used to smoke and quit 3 weeks ago. Patient did not use any device at home to ambulate and states she was driving and able to care for herself completely. Patient oxygen sats were 91% on RA and she c/o moist non-productive cough with congestion. Placed patient on 2LO2 per NC. Patient blood glucose this evening is 135 and patient received scheduled Lantus 10 units subcu. Patient requested jello for HS snack. Patient has external purewick in place with depends and states she had a BM earlier today on a bedpan. Patient verbalized no further needs at this time. Bed alarm engaged and call light within reach.

## 2024-01-24 NOTE — RT PROTOCOL NOTE
RT Nebulizer Bronchodilator Protocol Note    There is a bronchodilator order in the chart from a provider indicating to follow the RT Bronchodilator Protocol and there is an “Initiate RT Bronchodilator Protocol” order as well (see protocol at bottom of note).    CXR Findings:  No results found.    The findings from the last RT Protocol Assessment were as follows:  Smoking: Chronic pulmonary disease  Respiratory Pattern: Regular pattern and RR 12-20 bpm  Breath Sounds: Slightly diminished and/or crackles  Cough: Strong, spontaneous, non-productive  Indication for Bronchodilator Therapy: Decreased or absent breath sounds  Bronchodilator Assessment Score: 4    Aerosolized bronchodilator medication orders have been revised according to the RT Nebulizer Bronchodilator Protocol below.    Respiratory Therapist to perform RT Therapy Protocol Assessment initially then follow the protocol.  Repeat RT Therapy Protocol Assessment PRN for score 0-3 or on second treatment, BID, and PRN for scores above 3.    No Indications - adjust the frequency to every 6 hours PRN wheezing or bronchospasm, if no treatments needed after 48 hours then discontinue using Per Protocol order mode.     If indication present, adjust the RT bronchodilator orders based on the Bronchodilator Assessment Score as indicated below.  If a patient is on this medication at home then do not decrease Frequency below that used at home.    0-3 - enter or revise RT bronchodilator order(s) to equivalent RT Bronchodilator order with Frequency of every 4 hours PRN for wheezing or increased work of breathing using Per Protocol order mode.       4-6 - enter or revise RT Bronchodilator order(s) to two equivalent RT bronchodilator orders with one order with BID Frequency and one order with Frequency of every 4 hours PRN wheezing or increased work of breathing using Per Protocol order mode.         7-10 - enter or revise RT Bronchodilator order(s) to two equivalent RT  bronchodilator orders with one order with TID Frequency and one order with Frequency of every 4 hours PRN wheezing or increased work of breathing using Per Protocol order mode.       11-13 - enter or revise RT Bronchodilator order(s) to one equivalent RT bronchodilator order with QID Frequency and an Albuterol order with Frequency of every 4 hours PRN wheezing or increased work of breathing using Per Protocol order mode.      Greater than 13 - enter or revise RT Bronchodilator order(s) to one equivalent RT bronchodilator order with every 4 hours Frequency and an Albuterol order with Frequency of every 2 hours PRN wheezing or increased work of breathing using Per Protocol order mode.     RT to enter RT Home Evaluation for COPD & MDI Assessment order using Per Protocol order mode.    Electronically signed by Luly Jones RCP on 1/24/2024 at 4:38 PM

## 2024-01-24 NOTE — PROGRESS NOTES
Physical Therapy Rehab Treatment Note  Facility/Department: [facility]  [facility]       NAME: Mulu Pandya  : 1955 (68 y.o.)  MRN: [id number]  CODE STATUS: Full Code    Date of Service: 2024     Restrictions:  Restrictions/Precautions: Fall Risk, Seizure      SUBJECTIVE:   Subjective: Transport reports pt was up to the bathroom with nsg aide but refused to come down to therapy.  Pt requested to go back to bed and in bed before therapist arrived.    Pain  Pain: Denies pre and post session pain.    OBJECTIVE:         Bed mobility  Rolling to Left: Moderate assistance  Rolling to Right: Minimal assistance  Supine to Sit: Maximum assistance (assist for trunk)  Sit to Supine: Minimal assistance  Bed Mobility Comments: Hand over hand cues for sequencing. Increased time and effort to complete.    Transfers  Sit to Stand: Minimal Assistance  Stand to Sit: Minimal Assistance  Bed to Chair: Unable to assess (Pt declined)  Comment: VCs and hand over hand assist for safety and technique.    Ambulation  Surface: Level tile  Device: Rolling Walker  Assistance: Minimal assistance  Quality of Gait: shuffling steps with decreased step length; downward gaze and slow paced  Gait Deviations: Slow Vianey;Decreased step length;Decreased step height  Distance: 20ft with 1 turn           PT Exercises  A/AROM Exercises: supine AP, heel slides, hip abd, SAQ x10 ea         Education  Education Given To: Patient  Education Provided Comments: Reviewed goals with pt.  Educated pt on importance of participation to progress towards goals.        ASSESSMENT/PROGRESS TOWARDS GOALS:   Assessment: Pt requires max encouragement to participate.  Increased time and effort with step by step Vcs to complete bed mobility and transfers.  Fatigued quickly with gait.    Goals:  Long Term Goals  Long Term Goal 1: Pt to complete bed mobility with supervision  Long Term Goal 2: Pt to complete transfers with SBA  Long Term Goal 3: Pt to

## 2024-01-25 LAB
GLUCOSE BLD-MCNC: 160 MG/DL (ref 70–99)
GLUCOSE BLD-MCNC: 173 MG/DL (ref 70–99)
GLUCOSE BLD-MCNC: 259 MG/DL (ref 70–99)
GLUCOSE BLD-MCNC: 290 MG/DL (ref 70–99)
PERFORMED ON: ABNORMAL

## 2024-01-25 PROCEDURE — 1180000000 HC REHAB R&B

## 2024-01-25 PROCEDURE — 97112 NEUROMUSCULAR REEDUCATION: CPT

## 2024-01-25 PROCEDURE — 92526 ORAL FUNCTION THERAPY: CPT

## 2024-01-25 PROCEDURE — 6370000000 HC RX 637 (ALT 250 FOR IP): Performed by: PHYSICAL MEDICINE & REHABILITATION

## 2024-01-25 PROCEDURE — 99233 SBSQ HOSP IP/OBS HIGH 50: CPT | Performed by: PHYSICAL MEDICINE & REHABILITATION

## 2024-01-25 PROCEDURE — 6370000000 HC RX 637 (ALT 250 FOR IP): Performed by: FAMILY MEDICINE

## 2024-01-25 PROCEDURE — 97530 THERAPEUTIC ACTIVITIES: CPT

## 2024-01-25 PROCEDURE — 97110 THERAPEUTIC EXERCISES: CPT

## 2024-01-25 PROCEDURE — 6360000002 HC RX W HCPCS: Performed by: PHYSICAL MEDICINE & REHABILITATION

## 2024-01-25 PROCEDURE — 92507 TX SP LANG VOICE COMM INDIV: CPT

## 2024-01-25 PROCEDURE — 94760 N-INVAS EAR/PLS OXIMETRY 1: CPT

## 2024-01-25 PROCEDURE — 97535 SELF CARE MNGMENT TRAINING: CPT

## 2024-01-25 PROCEDURE — 2700000000 HC OXYGEN THERAPY PER DAY

## 2024-01-25 PROCEDURE — 94640 AIRWAY INHALATION TREATMENT: CPT

## 2024-01-25 RX ORDER — POTASSIUM CHLORIDE, DEXTROSE MONOHYDRATE 150; 5 MG/100ML; G/100ML
INJECTION, SOLUTION INTRAVENOUS CONTINUOUS
Status: DISPENSED | OUTPATIENT
Start: 2024-01-25 | End: 2024-01-25

## 2024-01-25 RX ADMIN — INSULIN LISPRO 2 UNITS: 100 INJECTION, SOLUTION INTRAVENOUS; SUBCUTANEOUS at 11:47

## 2024-01-25 RX ADMIN — POTASSIUM CHLORIDE AND DEXTROSE MONOHYDRATE: 150; 5 INJECTION, SOLUTION INTRAVENOUS at 09:59

## 2024-01-25 RX ADMIN — Medication 2000 UNITS: at 17:27

## 2024-01-25 RX ADMIN — INSULIN LISPRO 5 UNITS: 100 INJECTION, SOLUTION INTRAVENOUS; SUBCUTANEOUS at 11:46

## 2024-01-25 RX ADMIN — INSULIN LISPRO 5 UNITS: 100 INJECTION, SOLUTION INTRAVENOUS; SUBCUTANEOUS at 17:27

## 2024-01-25 RX ADMIN — BUDESONIDE AND FORMOTEROL FUMARATE DIHYDRATE 2 PUFF: 80; 4.5 AEROSOL RESPIRATORY (INHALATION) at 04:16

## 2024-01-25 RX ADMIN — ALBUTEROL SULFATE 2 PUFF: 90 AEROSOL, METERED RESPIRATORY (INHALATION) at 16:10

## 2024-01-25 RX ADMIN — METOPROLOL TARTRATE 12.5 MG: 25 TABLET, FILM COATED ORAL at 20:19

## 2024-01-25 RX ADMIN — ONDANSETRON 4 MG: 4 TABLET, ORALLY DISINTEGRATING ORAL at 08:24

## 2024-01-25 RX ADMIN — ROSUVASTATIN CALCIUM 40 MG: 40 TABLET, FILM COATED ORAL at 20:19

## 2024-01-25 RX ADMIN — Medication 5 MG: at 20:19

## 2024-01-25 RX ADMIN — ALBUTEROL SULFATE 2 PUFF: 90 AEROSOL, METERED RESPIRATORY (INHALATION) at 04:15

## 2024-01-25 RX ADMIN — BUDESONIDE AND FORMOTEROL FUMARATE DIHYDRATE 2 PUFF: 80; 4.5 AEROSOL RESPIRATORY (INHALATION) at 16:10

## 2024-01-25 RX ADMIN — INSULIN GLARGINE 10 UNITS: 100 INJECTION, SOLUTION SUBCUTANEOUS at 20:26

## 2024-01-25 NOTE — PROGRESS NOTES
[facility]  Occupational Therapy      NAME:  Mulu Pandya  ROOM: R243/R243-01  :  1955  DATE: 2024    Attempted to see Mulu Pandya on this date for 60 minute session at 930 AM for   []  Initial Evaluation   [x]  Scheduled therapy    []  Make-up therapy   []  Group therapy   []  Family training    Patient was unable to be seen due to:   [] Off unit for testing/procedure   [] Patient refused, stating \"    [x] Therapy on hold due to  emesis earlier that morning.   [] Nursing deferred due to    [] Other:    Will attempt to see patient again later in day if able.    Discussed with NITZA [place]    Electronically signed by ABIGAIL Sanchez on 24 at 1:19 PM EST

## 2024-01-25 NOTE — PLAN OF CARE
Problem: Safety - Adult  Goal: Free from fall injury  1/25/2024 1245 by Liza Portillo RN  Outcome: Progressing  1/25/2024 0637 by Glory Patel RN  Outcome: Progressing     Problem: Discharge Planning  Goal: Discharge to home or other facility with appropriate resources  1/25/2024 1245 by Liza Portillo RN  Outcome: Progressing  1/25/2024 0637 by Glory Patel RN  Outcome: Progressing     Problem: Skin/Tissue Integrity  Goal: Absence of new skin breakdown  Description: 1.  Monitor for areas of redness and/or skin breakdown  2.  Assess vascular access sites hourly  3.  Every 4-6 hours minimum:  Change oxygen saturation probe site  4.  Every 4-6 hours:  If on nasal continuous positive airway pressure, respiratory therapy assess nares and determine need for appliance change or resting period.  1/25/2024 1245 by Liza Portillo RN  Outcome: Progressing  1/25/2024 0637 by Glory Patel RN  Outcome: Progressing     Problem: Neurosensory - Adult  Goal: Achieves stable or improved neurological status  1/25/2024 1245 by Liza Portillo RN  Outcome: Progressing  1/25/2024 0637 by Glory Patel RN  Outcome: Progressing  Goal: Achieves maximal functionality and self care  1/25/2024 1245 by Liza Portillo RN  Outcome: Progressing  1/25/2024 0637 by Glory Patel RN  Outcome: Progressing     Problem: Skin/Tissue Integrity - Adult  Goal: Skin integrity remains intact  1/25/2024 1245 by Liza Portillo RN  Outcome: Progressing  1/25/2024 0637 by Glory Patel RN  Outcome: Progressing     Problem: Musculoskeletal - Adult  Goal: Return mobility to safest level of function  1/25/2024 1245 by Liza Portillo RN  Outcome: Progressing  1/25/2024 0637 by Glory Patel RN  Outcome: Progressing  Goal: Maintain proper alignment of affected body part  1/25/2024 1245 by Liza Portillo RN  Outcome: Progressing  1/25/2024 0637 by Glory Patel RN  Outcome: Progressing  Goal: Return ADL status to a safe  level of function  1/25/2024 1245 by Liza Portillo RN  Outcome: Progressing  1/25/2024 0637 by Glory Paetl RN  Outcome: Progressing     Problem: Gastrointestinal - Adult  Goal: Maintains or returns to baseline bowel function  1/25/2024 1245 by Liza Portillo RN  Outcome: Progressing  1/25/2024 0637 by Glory Patel RN  Outcome: Progressing  Goal: Maintains adequate nutritional intake  1/25/2024 1245 by Liza oPrtillo RN  Outcome: Progressing  1/25/2024 0637 by Glory Patel RN  Outcome: Progressing     Problem: Metabolic/Fluid and Electrolytes - Adult  Goal: Electrolytes maintained within normal limits  1/25/2024 1245 by Liza Portillo RN  Outcome: Progressing  1/25/2024 0637 by Glory Patel RN  Outcome: Progressing  Goal: Glucose maintained within prescribed range  1/25/2024 1245 by Liza Portillo RN  Outcome: Progressing  1/25/2024 0637 by Glory Patel RN  Outcome: Progressing     Problem: Nutrition Deficit:  Goal: Optimize nutritional status  1/25/2024 1245 by Liza Portillo RN  Outcome: Progressing  1/25/2024 0637 by Glory Patel RN  Outcome: Progressing     Problem: Chronic Conditions and Co-morbidities  Goal: Patient's chronic conditions and co-morbidity symptoms are monitored and maintained or improved  Outcome: Progressing

## 2024-01-25 NOTE — CARE COORDINATION
CM spoke with the pt and  about the team meeting. Pt and  aware that the discharge is set for 2/10/24. Pt and  are agreeable. CM spoke with her  and asked if it would be  possible for him to come and be with his wife during therapy sessions. Pt's  was agreeable. CM explained that when pt is going home therapy needs to follow her whether it is outpatient therapy or home care would come to the house. Pt and  would like home care . Given freedom of choice and they choose [facility] home care.Electronically signed by Kymberly Avila RN on 1/25/2024 at 12:06 PM

## 2024-01-25 NOTE — PROGRESS NOTES
diarrhea this morning will monitor and start IV fluids at a low rate-add Zofran and monitor for infectious diarrhea.    Patient will need to spread therapy out 900 minutes over 70.  Because of her medical comorbidities and fatigue.    ROS x10:  The patient also complains of severely impaired mobility and activities of daily living.  Otherwise no new problems with vision, hearing, nose, mouth, throat, dermal, cardiovascular, GI, , pulmonary, musculoskeletal, psychiatric or neurological. See also Acute Rehab PM&R H&P.       Vital signs:  /68   Pulse 62   Temp 97.3 °F (36.3 °C) (Oral)   Resp 17   Ht 1.6 m (5' 3\")   Wt 72.8 kg (160 lb 9.6 oz)   SpO2 94%   BMI 28.45 kg/m²   I/O:   PO/Intake:  poor  PO intake,   regular with thin liquid diet-up at 90 degrees no straws    Bowel:   Continent-diarrhea 1/25/2024  Bladder: continent   no encinas  General:  Patient is well developed,   adequately nourished, and    well kempt.     HEENT:    Pupils equal, hearing intact to loud voice, external inspection of ear and nose benign.  Inspection of lips, tongue and gums benign    Musculoskeletal: No significant change in strength or tone.  All joints stable.      Inspection and palpation of digits and nails show no clubbing, cyanosis or inflammatory conditions.   Neuro/Psychiatric: Affect: flat but pleasant.  Alert and oriented to person, place and situation with  min cues.  No significant change in deep tendon reflexes or sensation  Lungs:  Diminished, CTA-B. Respiration effort is   normal at rest.     Heart:   S1 = S2,   RRR.       Abdomen:  Soft, non-tender, no enlargement of liver or spleen.  Extremities:    lower extremity edema and tenderness   Skin:   Intact to general survey,      Rehabilitation:  Physical Therapy:   Bed mobility:  Bed mobility  Rolling to Left: Moderate assistance (01/24/24 1355)  Rolling to Right: Minimal assistance (01/24/24 1355)  Supine to Sit: Maximum assistance (assist for trunk) (01/24/24

## 2024-01-25 NOTE — PROGRESS NOTES
[facility]  [facility]ility[facility]Department[facility]  Speech Language Pathology   Treatment Note          Mulu Pandya  1955  R243/R243-01  [x]   confirmed    Date: 2024      Restrictions/Precautions: Fall Risk, Seizure     ADULT DIET; Regular  ADULT ORAL NUTRITION SUPPLEMENT; Breakfast, Lunch, Dinner; Diabetic Oral Supplement     Respiratory Status: O2 Flow Rate (L/min): Room air  No active isolations    Rehab Diagnosis: Impaired mobility and ADL's due to acute infart in the left superior mid parietal lobe MCA territory     Subjective:  Alert, Cooperative, and Pleasant        Interventions used this date:  Speech Production, Expressive Language, Receptive Language, and Instruction in Compensatory Strategies    Objective/Assessment:  Patient progressing towards goals:  Short Term Goals  Time Frame for Short Term Goals: 2-3 weeks  Goal 1: Patient will read aloud and/or generate phrases-sentences using slow rate and over-articulation to promote speech intelligibility during structured tasks with SLP so that they may functionally communicate and express safety/medical concerns across all environments with 90% accuracy.  Not addressed   Goal 2: To decrease the presence of apraxia of speech and improve articulatory accuracy to adequately express wants and needs, the pt will complete structured articulation tasks following SLP model with min cues-fading cues for articulatory placement in 90% of opportunities with 1-3 syllables words at the phrase-sentence level.  Pt completed:  X10 1 syllable drills with mixed sound productions  X15 2 syllable drills with mixed sound productions  X15  3 syllable drills with mixed sound productions    Increased difficulty with articulator transitions for 3 syllable words. Difficulty with /p/ production and blends at 2-3 syllable level.   SLP educated pt and  on strategies and encouraged ongoing practice in room. Both verbalized understanding.   Goal 3: Pt will answer

## 2024-01-25 NOTE — PROGRESS NOTES
Pt called RN to room @0810, pt had vomited after eating breakfast and was laying in bed. Pt cleaned up and assisted to w/c, new gown applied, linens removed from bed and mattress wiped down with antibacterial wipes. Pt given Zofran SL see MAR, VSS, pt states prior to vomiting she had diarrhea while on the toilet. Dr. Stacy informed of pt status during team meeting, orders received. Pt resting in bed at this time, IVF infusing, spouse at bedside, call light in reach. Electronically signed by Liza Portillo RN on 1/25/2024 at 12:50 PM

## 2024-01-25 NOTE — PROGRESS NOTES
Physical Therapy Rehab Treatment Note  [facility]  [facility]       NAME: Mulu Pandya  : 1955 (68 y.o.)  MRN: [id number]  CODE STATUS: Full Code    Date of Service: 2024       Restrictions:  Restrictions/Precautions: Fall Risk, Seizure       SUBJECTIVE:   Subjective: \"what do you want to do?\"    Pain  Pain: Denies pre and post session pain.      OBJECTIVE:     Bed Mobility  Overall Assistance Level: Minimal Assistance  Additional Factors: Increased time to complete;Head of bed raised;With handrails;Verbal cues  Sit to Supine  Assistance Level: Minimal assistance  Skilled Clinical Factors: Asuncion required at LEs to assist with returning to bed, vc's for technique and sequencing with increased time to complete.  Supine to Sit  Assistance Level: Stand by assist  Skilled Clinical Factors: HOB elevated, use of handrails, able to complete without physical assist this session.  Scooting  Assistance Level: Minimal assistance  Skilled Clinical Factors: scooting towards EOB, pt requires Asuncion lifting assist to move towards EOB.    Transfers  Surface: From bed;Wheelchair;To bed  Additional Factors: Verbal cues;Hand placement cues;Increased time to complete  Device: Walker  Sit to Stand  Assistance Level: Stand by assist  Skilled Clinical Factors: WW used, able to complete without physical assist this session, vc's for hand placement and assist with R hand  on WW. increased time to stabilize in standing.  Stand to Sit  Assistance Level: Stand by assist  Skilled Clinical Factors: vc's for hand placement to reach back for controlled descent.    Ambulation  Surface: Level surface  Device: Rolling walker  Distance: 3' to and from WC  Activity: Within Room  Additional Factors: Verbal cues;Hand placement cues;Increased time to complete  Assistance Level: Minimal assistance  Gait Deviations: Slow rajendra;Unsteady gait  Skilled Clinical Factors: pt only agreeable to completing short distance  gait to/from WC. No LOB/instability noted but Asuncion for steadying required throughout.    PT Exercises  A/AROM Exercises: seated AP/LAQ/Marches x10 BLE  Functional Mobility Circuit Training: STS x3 with focus on technique and endurance  Dynamic Standing Balance Exercises: weight shifting, marching - Assist with R hand gripping and increased cuing to stay on task.     Activity Tolerance  Activity Tolerance: Patient tolerated treatment well    ASSESSMENT/PROGRESS TOWARDS GOALS:   Assessment  Assessment: Pt requires increased encouragement to participate in therapy this date, denies gait despite education and encouragement as well as declining to stay up in WC at end of session. Increased time and effort to complete all tasks, frustrated with current state of mobility but continues to lack motivation.  Activity Tolerance: Patient tolerated treatment well    Goals:  Long Term Goals  Long Term Goal 1: Pt to complete bed mobility with supervision  Long Term Goal 2: Pt to complete transfers with SBA  Long Term Goal 3: Pt to ambulate 50ft with LRD and SBA  Long Term Goal 4: Pt to complete 3 steps with HR and Asuncion    PLAN OF CARE/Safety:   Safety Devices  Type of Devices: All fall risk precautions in place;Bed alarm in place;Call light within reach;Left in bed      Therapy Time:   Individual   Time In 1330   Time Out 1400   Minutes 30      Minutes: 30  Transfer/Bed mobility training: 10  Gait trainin  Neuro re education:  Therapeutic ex: Mika Roche PTA, 24 at 2:18 PM

## 2024-01-25 NOTE — H&P
DEPARTMENT OF HOSPITAL MEDICINE    HISTORY AND PHYSICAL EXAM    PATIENT NAME:  Mulu Pandya    MRN:  [id number]  SERVICE DATE:  1/24/2024   SERVICE TIME:  8:39 PM    Primary Care Physician: Eileen Edmondson APRN - NP         SUBJECTIVE  CHIEF COMPLAINT:  No chief complaint on file.       HPI:  Mulu Pandya, 68 years old female with PMH of COPD, HTN, HLD, DM type II who presented to the ED with right-sided weakness, aphasia, dysphagia and facial droop.  Patient was found to have subacute stroke of the right parietal lobe and also 2 cm parenchymal hemorrhage in the left basal ganglia. Patient was admitted with neurosurgery consult. IM hospitalist team was consulted for physical exam to rule out any co-morbid physical condition, and medically manage acute and chronic health conditions.  Patient was seen and assessed in her room, reports having intermittent nonproductive cough. Currently on 2 L O2 supplemental.  Patient denies fever, chills, chest pain, dyspnea.  She also denies having nausea, vomiting, or diarrhea.  She is concerned about lightheadedness, weakness, facial asymmetry and gait problem.  On assessment patient was positive for confusion and decreased concentration.    PAST MEDICAL HISTORY:    Past Medical History:   Diagnosis Date    Anxiety     Asthma     Chronic back pain     Chronic renal failure, stage 3a (HCC)     COPD (chronic obstructive pulmonary disease) (HCC)     Depression     History of tinnitus     Hyperlipidemia     Hypertension     Obesity     Osteoporosis     2019 T -3.2    Peripheral vascular disease (HCC)     Proteinuria     Type II or unspecified type diabetes mellitus without mention of complication, not stated as uncontrolled      PAST SURGICAL HISTORY:    Past Surgical History:   Procedure Laterality Date    BREAST SURGERY Left 2009    Benign bx    FRACTURE SURGERY      TUBAL LIGATION       FAMILY HISTORY:    Family History   Problem Relation Age of Onset    Heart Disease Sister

## 2024-01-25 NOTE — PLAN OF CARE
Problem: Safety - Adult  Goal: Free from fall injury  Outcome: Progressing     Problem: Discharge Planning  Goal: Discharge to home or other facility with appropriate resources  Outcome: Progressing     Problem: Skin/Tissue Integrity  Goal: Absence of new skin breakdown  Description: 1.  Monitor for areas of redness and/or skin breakdown  2.  Assess vascular access sites hourly  3.  Every 4-6 hours minimum:  Change oxygen saturation probe site  4.  Every 4-6 hours:  If on nasal continuous positive airway pressure, respiratory therapy assess nares and determine need for appliance change or resting period.  Outcome: Progressing     Problem: Neurosensory - Adult  Goal: Achieves stable or improved neurological status  Outcome: Progressing  Goal: Achieves maximal functionality and self care  Outcome: Progressing     Problem: Skin/Tissue Integrity - Adult  Goal: Skin integrity remains intact  Outcome: Progressing     Problem: Musculoskeletal - Adult  Goal: Return mobility to safest level of function  Outcome: Progressing  Goal: Maintain proper alignment of affected body part  Outcome: Progressing  Goal: Return ADL status to a safe level of function  Outcome: Progressing     Problem: Gastrointestinal - Adult  Goal: Maintains or returns to baseline bowel function  Outcome: Progressing  Goal: Maintains adequate nutritional intake  Outcome: Progressing     Problem: Metabolic/Fluid and Electrolytes - Adult  Goal: Electrolytes maintained within normal limits  Outcome: Progressing  Goal: Glucose maintained within prescribed range  Outcome: Progressing     Problem: Nutrition Deficit:  Goal: Optimize nutritional status  Outcome: Progressing

## 2024-01-25 NOTE — PROGRESS NOTES
INDIVIDUALIZED OVERALL REHAB PLAN OF CARE  ADDENDUM TO REHAB PROGRESS NOTE-for audit purposes must also refer to this day's clinical note and combine the information      Date: 2024  Patient Name: Mulu Pandya   Room: R243/R243-01    MRN: [id number]    : 1955  (68 y.o.)  Gender: female       Today 2024 during weekly team meeting, I reviewed the patient Mulu Pandya in detail with the therapists and nurses involved in patient's care gathering complex physiatric data regarding current medical issues, progress in therapies, factors limiting progress, social issues, psychological issues, ongoing therapeutic plans and discharge planning.    Legend:  I= independent Im =Modified independent  S=Supervised SB=stand by PARADA=set up CG=contact primo Min= minimal Mod=Moderate Max=maximal Max of 2 =maximal assist of 2 people      CURRENT FUNCTIONAL STATUS:     68 years old female with PMH of COPD, HTN, HLD, DM type II who presented to the ED with right-sided weakness, aphasia, dysphagia and facial droop.  Patient was found to have subacute stroke of the right parietal lobe and also 2 cm parenchymal hemorrhage in the left basal ganglia. Patient was admitted with neurosurgery consult. IM hospitalist team was consulted for physical exam to rule out any co-morbid physical condition, and medically manage acute and chronic health conditions.  Patient was seen and assessed in her room, reports having intermittent nonproductive cough. Currently on 2 L O2 supplemental.  Patient denies fever, chills, chest pain, dyspnea.  She also denies having nausea, vomiting, or diarrhea.  She is concerned about lightheadedness, weakness, facial asymmetry and gait problem.  On assessment patient was positive for confusion and decreased concentration.     NURSING ISSUES:  Nausea and Vtg--with diarrhea this AM.    Pt ambulated to the bathroom and back to bed with FWW with CGA from RN. Pt had a continent BM earlier today. IV to L hand  flushed and patent. Diet changed to regular per SLP recommendation. Oxygen 2L/min via nasal cannula in place, breathing treatments ordered by hospitalist per respiratory therapist recommendation. No c/o pain. Call light in reach, bed alarm activated      Nursing will continue to focus on bowel and bladder continence transitioning toward independence by time of discharge.  Monitoring post void residuals monitoring for severe constipation and bowel obstruction.      Barriers to progress and discharge complex medical conditions and long term IV antibiotics      Bowel function- loose stools  Plans to address- teach spinal cord style bowel program     Bladder function-  continent    Plans to address- schedule voids before bed and therapy     Skin deficits- dressing changes   Plans to address- special mattress     Hydration/Nutritional deficits- monitoring for dysphagia  Plans to address-Push PO, assist with feeds as needed      BP- decline in BP intake is a concern  Plans to address- check ortho BPs before therapies-and use abdominal binder and TEDs as needed    Pt and Family training goals-  teach home technology options like Katelyn use and home assistive devices      Focus on achieving ADL goals with co-treating with OT when possible. Focus on cognition and co treat with SLP when possible.      PHYSICAL THERAPY  Bed mobility:  Bed mobility  Rolling to Left: Moderate assistance (01/24/24 1355)  Rolling to Right: Minimal assistance (01/24/24 1355)  Supine to Sit: Maximum assistance (assist for trunk) (01/24/24 1355)  Sit to Supine: Minimal assistance (01/24/24 1355)  Scooting: Stand by assistance (01/24/24 1247)  Bed Mobility Comments: Hand over hand cues for sequencing. Increased time and effort to complete. (01/24/24 1355)  Transfers:  Transfers  Sit to Stand: Minimal Assistance (01/24/24 1356)  Stand to Sit: Minimal Assistance (01/24/24 1356)  Bed to Chair: Unable to assess (Pt declined) (01/24/24 1356)  Comment: VCs

## 2024-01-25 NOTE — PROGRESS NOTES
[facility]  Occupational Therapy      NAME:  Mulu Pandya  ROOM: R243/R243-01  :  1955  DATE: 2024    Attempted to see Mulu Pandya on this date for 60 minute session for   []  Initial Evaluation   []  Scheduled therapy    [x]  Make-up therapy   []  Group therapy   []  Family training    Patient was unable to be seen due to:   [] Off unit for testing/procedure   [x] Patient declined.Upon entering room pt had a family visit. Pt stated they still were not feeling well and declined therapy.   [] Therapy on hold due to     [] Nursing deferred due to    [] Other:      Electronically signed by ABIGAIL Sanchez on 24 at 1:33 PM EST

## 2024-01-25 NOTE — PROGRESS NOTES
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    Completed gross pinch + grasp/release tasks    Pt required to retrieve small rings off of dowel and place on table. Pt then tasked with placing rings back on dowel (gross pinch). Pt with difficulty overall with completing d/t difficulty consistently extending the digits and d/t weakness with pinch strength/flexion. Technique and ability did improve as reps were completed     Completed similar task with bean bags (gross grasp). Bag held off table and pt was required to lift hand and place hand around the bag then grasp and place back into box. Initial cues needed for technique and to improve grasp. Pt also demo's very weak grasp. Occasional assistance needed to completed     Pt returned to bed at the end of the session  Asuncion for pivot w/c to bed and for bed mobility     Also Challenged pt through usage of AAROM to complete RUE exercises. 1 sets x 10 reps completed. Exercises focused on all UE joints and planes of motion including scapular protraction/retraction, shoulder flexion/extension/rotation/horizontal abduction, elbow flexion/extension,  supination/pronation, and wrist/digit flexion/extension.    Education:  Education  Education Given To: Patient  Education Provided: Safety;ADL Function;Plan of Care;Role of Therapy;Transfer Training;Precautions  Education Method: Demonstration;Verbal  Education Outcome: Continued education needed    ASSESSMENT:  Activity Tolerance: Patient tolerated treatment well    Improved overall engagement today-  present for session. Pt still fatigues easily but recovered nicely with rest breaks. RUE function remains highly limited with limited ability to pinch and grasp    PLAN OF CARE:  Strengthening, ROM, Balance training, Functional mobility training, Endurance training, Neuromuscular re-education, Cognitive/Perceptual training, Self-Care / ADL, Coordination training, Patient/Caregiver education & training, Safety education & training       Patient goals : To be able to care for herself  Time Frame for Long Term Goals : Pt within 3 weeks of evaluation will demonstrate progress to treatment goals to increase functional indpeendence for return to home at [facility]  Pt will demonstrate progress to areas of deficit as stated below  Long Term Goal 1: Pt will increase independence with ADL self care  Long Term Goal 2: Pt will increase independence with ADL transfers  Long Term Goal 3: Pt will increase bilateral UE strength and function for adl completion    Therapy Time:   Individual Group Co-Treat   Time In 1500       Time Out 1600         Minutes 60             Neuromuscular reeducation: 50 minutes  Therapeutic activities: 10 minutes     Electronically signed by:    Vicente Barrett OT,   1/25/2024, 4:09 PM

## 2024-01-25 NOTE — PROGRESS NOTES
Physical Therapy Missed Treatment   Facility/Department: [facility] MED SURG R243/R243-01    NAME: Mulu Pandya    : 1955 (68 y.o.)  MRN: [id number]    Account: [id number]  Gender: female    Chart reviewed, attempted PT at 1000. Patient unavailable 2° to:    [x] Hold per nsg request- RN requests hold pt for AM therapies this date d/t diarrhea. Will attempt to see pt in PM for scheduled therapy session.     Will attempt PT treatment again at earliest convenience.      Electronically signed by Bull Roche PTA on 24 at 10:02 AM EST

## 2024-01-26 LAB
GLUCOSE BLD-MCNC: 121 MG/DL (ref 70–99)
GLUCOSE BLD-MCNC: 178 MG/DL (ref 70–99)
GLUCOSE BLD-MCNC: 193 MG/DL (ref 70–99)
GLUCOSE BLD-MCNC: 348 MG/DL (ref 70–99)
PERFORMED ON: ABNORMAL

## 2024-01-26 PROCEDURE — 97110 THERAPEUTIC EXERCISES: CPT

## 2024-01-26 PROCEDURE — 2700000000 HC OXYGEN THERAPY PER DAY

## 2024-01-26 PROCEDURE — 97535 SELF CARE MNGMENT TRAINING: CPT

## 2024-01-26 PROCEDURE — 6370000000 HC RX 637 (ALT 250 FOR IP): Performed by: FAMILY MEDICINE

## 2024-01-26 PROCEDURE — 92507 TX SP LANG VOICE COMM INDIV: CPT

## 2024-01-26 PROCEDURE — 97530 THERAPEUTIC ACTIVITIES: CPT

## 2024-01-26 PROCEDURE — 97112 NEUROMUSCULAR REEDUCATION: CPT

## 2024-01-26 PROCEDURE — 6360000002 HC RX W HCPCS: Performed by: FAMILY MEDICINE

## 2024-01-26 PROCEDURE — 6370000000 HC RX 637 (ALT 250 FOR IP): Performed by: PHYSICAL MEDICINE & REHABILITATION

## 2024-01-26 PROCEDURE — APPSS15 APP SPLIT SHARED TIME 0-15 MINUTES: Performed by: NURSE PRACTITIONER

## 2024-01-26 PROCEDURE — 1180000000 HC REHAB R&B

## 2024-01-26 PROCEDURE — 97129 THER IVNTJ 1ST 15 MIN: CPT

## 2024-01-26 PROCEDURE — 97116 GAIT TRAINING THERAPY: CPT

## 2024-01-26 PROCEDURE — 94640 AIRWAY INHALATION TREATMENT: CPT

## 2024-01-26 PROCEDURE — 99232 SBSQ HOSP IP/OBS MODERATE 35: CPT | Performed by: PSYCHIATRY & NEUROLOGY

## 2024-01-26 PROCEDURE — 97130 THER IVNTJ EA ADDL 15 MIN: CPT

## 2024-01-26 PROCEDURE — 99232 SBSQ HOSP IP/OBS MODERATE 35: CPT | Performed by: PHYSICAL MEDICINE & REHABILITATION

## 2024-01-26 PROCEDURE — 94761 N-INVAS EAR/PLS OXIMETRY MLT: CPT

## 2024-01-26 RX ADMIN — ALBUTEROL SULFATE 2 PUFF: 90 AEROSOL, METERED RESPIRATORY (INHALATION) at 17:00

## 2024-01-26 RX ADMIN — INSULIN GLARGINE 10 UNITS: 100 INJECTION, SOLUTION SUBCUTANEOUS at 20:53

## 2024-01-26 RX ADMIN — Medication 5 MG: at 20:53

## 2024-01-26 RX ADMIN — INSULIN LISPRO 5 UNITS: 100 INJECTION, SOLUTION INTRAVENOUS; SUBCUTANEOUS at 12:55

## 2024-01-26 RX ADMIN — ALBUTEROL SULFATE 2 PUFF: 90 AEROSOL, METERED RESPIRATORY (INHALATION) at 04:54

## 2024-01-26 RX ADMIN — Medication 2000 UNITS: at 17:44

## 2024-01-26 RX ADMIN — INSULIN LISPRO 5 UNITS: 100 INJECTION, SOLUTION INTRAVENOUS; SUBCUTANEOUS at 17:44

## 2024-01-26 RX ADMIN — ESCITALOPRAM OXALATE 15 MG: 10 TABLET ORAL at 12:55

## 2024-01-26 RX ADMIN — ONDANSETRON 4 MG: 4 TABLET, ORALLY DISINTEGRATING ORAL at 11:30

## 2024-01-26 RX ADMIN — INSULIN LISPRO 4 UNITS: 100 INJECTION, SOLUTION INTRAVENOUS; SUBCUTANEOUS at 20:53

## 2024-01-26 RX ADMIN — ROSUVASTATIN CALCIUM 40 MG: 40 TABLET, FILM COATED ORAL at 20:57

## 2024-01-26 RX ADMIN — BUDESONIDE AND FORMOTEROL FUMARATE DIHYDRATE 2 PUFF: 80; 4.5 AEROSOL RESPIRATORY (INHALATION) at 17:00

## 2024-01-26 RX ADMIN — METOPROLOL TARTRATE 12.5 MG: 25 TABLET, FILM COATED ORAL at 20:54

## 2024-01-26 RX ADMIN — ONDANSETRON 4 MG: 2 INJECTION INTRAMUSCULAR; INTRAVENOUS at 04:30

## 2024-01-26 RX ADMIN — METOPROLOL TARTRATE 12.5 MG: 25 TABLET, FILM COATED ORAL at 08:28

## 2024-01-26 RX ADMIN — BUDESONIDE AND FORMOTEROL FUMARATE DIHYDRATE 2 PUFF: 80; 4.5 AEROSOL RESPIRATORY (INHALATION) at 04:55

## 2024-01-26 NOTE — PROGRESS NOTES
[facility]  Facility/Department: [facility] REHAB  Speech Language Pathology   Treatment Note          Mulu Pandya  1955  R243/R243-01  [x]   confirmed    Date: 2024      Restrictions/Precautions: Fall Risk, Seizure     ADULT DIET; Regular  ADULT ORAL NUTRITION SUPPLEMENT; Breakfast, Lunch, Dinner; Diabetic Oral Supplement     Respiratory Status: O2 Flow Rate (L/min): Room air  No active isolations    Rehab Diagnosis: Impaired mobility and ADL's due to acute infart in the left superior mid parietal lobe MCA territory     Subjective:  Alert and Cooperative      Pt required mod encouragement and rationale to engage in treatment this date. Once participating, pt did well.     Interventions used this date:  Speech Production, Expressive Language, Receptive Language, and Instruction in Compensatory Strategies    Objective/Assessment:  Patient progressing towards goals:  Short Term Goals  Time Frame for Short Term Goals: 2-3 weeks  Goal 1: Patient will read aloud and/or generate phrases-sentences using slow rate and over-articulation to promote speech intelligibility during structured tasks with SLP so that they may functionally communicate and express safety/medical concerns across all environments with 90% accuracy.  Not addressed   Goal 2: To decrease the presence of apraxia of speech and improve articulatory accuracy to adequately express wants and needs, the pt will complete structured articulation tasks following SLP model with min cues-fading cues for articulatory placement in 90% of opportunities with 1-3 syllables words at the phrase-sentence level.  Pt completed /g, p, s-blends/ drills:   X5 1 syllable drills with mixed sound productions  X10/ea 2 syllable drills with mixed sound productions  X15/ea 3 syllable drills with mixed sound productions  Goal 3: Pt will answer questions involving a short paragraph just heard/read with 90% accuracy.  Not addressed   Goal 4: Pt will complete convergent and

## 2024-01-26 NOTE — PROGRESS NOTES
EBONI, Chichi Avery     I asked patient if she could open and close her right hand, she could not fully expend her fingers. But then I asked her if she could do bicep curls and she did great with that. She says she is doing just fine.

## 2024-01-26 NOTE — PROGRESS NOTES
[facility]  Occupational Therapy      NAME:  Mulu Pandya  ROOM: R243/R243-01  :  1955  DATE: 2024    Attempted to see Mulu Pandya on this date for 60 minute session at 10:30 for   []  Initial Evaluation   [x]  Scheduled therapy    []  Make-up therapy   []  Group therapy   []  Family training    Patient was unable to be seen due to:   [] Off unit for testing/procedure   [x] Patient declined.   [] Therapy on hold due to     [] Nursing deferred due to    [] Other:      Pt laying supine in bed upon arrival.  present. ADL was scheduled to a later AM time d/t pt feeling nauseated earlier this AM. Pt reports she still feels the same way and nothing has changed. Pt was offered to wash up in bed for cleanliness, however, pt declined. Pt educated on the role and impact of therapy. Encouragement provided. Discussed with NITZA Orta.     Electronically signed by ABIGAIL Stuart on 24 at 10:44 AM EST

## 2024-01-26 NOTE — PROGRESS NOTES
01/26/24 1700   RT Protocol   History Pulmonary Disease 2   Respiratory pattern 0   Breath sounds 2   Cough 0   Indications for Bronchodilator Therapy Decreased or absent breath sounds   Bronchodilator Assessment Score 4

## 2024-01-26 NOTE — PLAN OF CARE
Problem: Safety - Adult  Goal: Free from fall injury  1/26/2024 1300 by Karishma Rincon RN  Outcome: Progressing  1/25/2024 2334 by Mary Darden RN  Outcome: Progressing     Problem: Discharge Planning  Goal: Discharge to home or other facility with appropriate resources  1/26/2024 1300 by Karishma Rincon RN  Outcome: Progressing  1/25/2024 2334 by Mary Darden RN  Outcome: Progressing     Problem: Skin/Tissue Integrity  Goal: Absence of new skin breakdown  Description: 1.  Monitor for areas of redness and/or skin breakdown  2.  Assess vascular access sites hourly  3.  Every 4-6 hours minimum:  Change oxygen saturation probe site  4.  Every 4-6 hours:  If on nasal continuous positive airway pressure, respiratory therapy assess nares and determine need for appliance change or resting period.  1/26/2024 1300 by Karishma Rincon RN  Outcome: Progressing  1/25/2024 2334 by Mary Darden RN  Outcome: Progressing     Problem: Neurosensory - Adult  Goal: Achieves stable or improved neurological status  Outcome: Progressing  Goal: Achieves maximal functionality and self care  Outcome: Progressing     Problem: Skin/Tissue Integrity - Adult  Goal: Skin integrity remains intact  Outcome: Progressing     Problem: Musculoskeletal - Adult  Goal: Return mobility to safest level of function  Outcome: Progressing  Goal: Maintain proper alignment of affected body part  Outcome: Progressing  Goal: Return ADL status to a safe level of function  Outcome: Progressing     Problem: Gastrointestinal - Adult  Goal: Maintains or returns to baseline bowel function  Outcome: Progressing  Goal: Maintains adequate nutritional intake  Outcome: Progressing     Problem: Metabolic/Fluid and Electrolytes - Adult  Goal: Electrolytes maintained within normal limits  Outcome: Progressing  Goal: Glucose maintained within prescribed range  Outcome: Progressing     Problem: Nutrition Deficit:  Goal: Optimize nutritional status  Outcome:

## 2024-01-26 NOTE — PROGRESS NOTES
Physical Therapy Rehab Treatment Note  Facility/Department: [facility] [place] [facility]  Room: [place] [address]       NAME: Mulu Pandya  : 1955 (68 y.o.)  MRN: [id number]  CODE STATUS: Full Code    Date of Service: 2024       Restrictions:  Restrictions/Precautions: Fall Risk, Seizure       SUBJECTIVE:   Subjective: Transport reports pt attempting to get out of bed to bathroom on her own upon arrival to room.  Transport reports she assisted pt to bathroom by WC.  Pt states she is \"woozy\"  Pt requests to lay back down.    Pain   Pt denies pain    OBJECTIVE:         Bed mobility  Rolling to Left: Minimal assistance  Rolling to Right: Minimal assistance  Supine to Sit: Minimal assistance  Sit to Supine: Stand by assistance  Bed Mobility Comments: Hand over hand cues for sequencing. Increased time and effort to complete.    Transfers  Sit to Stand: Minimal Assistance  Stand to Sit: Stand by assistance  Comment: VCs and hand over hand assist for safety and technique.    Ambulation  Surface: Level tile  Device: Rolling Walker  Assistance: Minimal assistance  Quality of Gait: decreased step length; downward gaze and slow paced; Cues for R hand grasp; fatigued quickly  Distance: 10ft WC to bed.  Distance limited by pt and destination           PT Exercises  PROM Exercises: Manual gastroc stretchng [id number] B  A/AROM Exercises: seated AP/LAQ/Marches x10 BLE; supine bridges, heel slides, SLR, hip abd, SAQ x10 ea  Static Standing Balance Exercises: Stood SBA 90sec at WW SBA      Vitals  Pulse: (!) 108  BP: 101/62  BP Location: Right upper arm  Patient Position: Sitting  MAP (Calculated): 75    Attempted to assess orthostatic BPs.  Supine 101/69 , seated 104/61 , attempted standing however error code x2 attempts.  Pt then fatigued needing to sit down.    Education  Education Given To: Patient  Education Provided Comments: Educated pt on importance of participation to progress towards goals.    ASSESSMENT/PROGRESS TOWARDS

## 2024-01-26 NOTE — PROGRESS NOTES
Assessment completed. A&O x4. Denies pain at this time. C/o nasuea. Medicated with PRN Zofran PO. Pt refused breakfast d/t nausea. Routine insulin held this morning. Nadien New NP notified of continued nausea via perfect serve. Dr Stacy notified during rounds. In bed with alarm activated. Call light in reach. Electronically signed by Madie Avelar LPN on 1/26/2024 at 1:33 PM      PRN Zofran effective. Pt was able to eat lunch. . Routine Humalog 5 units given. In bed with call light in reach. Electronically signed by Madie Avelar LPN on 1/26/2024 at 1:33 PM      Therapy called this nurse regarding pt's HR. Pt had got SOB. Pulse ox 92-93% on RA. HR was in 120s-130s even at rest. Pt not on tele. Notified Nadine New NP. No new orders at this time. Electronically signed by Madie Avelar LPN on 1/26/2024 at 7:33 PM

## 2024-01-26 NOTE — PROGRESS NOTES
OCCUPATIONAL THERAPY  INPATIENT REHAB TREATMENT NOTE  [facility]      NAME: Mulu Pandya  : 1955 (68 y.o.)  MRN: [id number]  CODE STATUS: Full Code  Room: R243/R243-01    Date of Service: 2024    Referring Physician: Dr Stacy  Rehab Diagnosis: Impaired mobility and ADL's due to acute infart in the left superior mid parietal lobe MCA territory    Hospital course:          Restrictions  Restrictions/Precautions  Restrictions/Precautions: Fall Risk, Seizure        Patient's date of birth confirmed: Yes    SAFETY:  Safety Devices  Safety Devices in place: Yes  Type of devices: All fall risk precautions in place    SUBJECTIVE:     Pain at start of treatment: No    Pain at end of treatment: No    COGNITION:  Orientation  Overall Orientation Status: Within Functional Limits  Orientation Level: Oriented X4  Cognition  Overall Cognitive Status: Exceptions  Arousal/Alertness: Appropriate responses to stimuli  Following Commands: Follows one step commands consistently  Attention Span: Appears intact  Initiation: Requires cues for some  Sequencing: Requires cues for some      OBJECTIVE:    Pt engaged in a fine motor tabletop activity to increase FM strength and activity tolerance for ADL participation.  Pt was tasked to fill in a small peg board with small pegs.  Pt demonstrated max difficulty to manipulate them with R hand and no difficulty manipulating them with L hand.  Pt benefits from increased time to complete tasks.  After filling in the peg board the pt was tasked to remove the pegs by color.   Pt demonstrated ability to remove pegs by color with out any errors.      Education:  Education  Education Given To: Patient  Education Provided: Plan of Care;Role of Therapy  Education Method: Verbal  Barriers to Learning: None  Education Outcome: Continued education needed      ASSESSMENT:  Assessment: Pt participated well in session.  Activity Tolerance: Patient tolerated treatment well      PLAN OF

## 2024-01-26 NOTE — PROGRESS NOTES
Subjective:  The patient complains of moderate to severe acute on chronic progressive fatigue and left-sided weakness, cognitive slowing, motor planning deficits partially relieved by rest, medications, PT,  OT,   SLP and rest and exacerbated by recent  CVA.  She was admitted through the emergency room on January 18, 2024 with right-sided weakness aphasia and dysphagia.  She was diagnosed with acute CVA-subacute stroke on the right parietal lob .  Her  reported she had had multiple falls recently.  She was admitted under the care of the hospitalist with neurology consulting.  She was also found to have an intracranial hemorrhage.  CT of the head revealed a 2 cm parenchymal bleed in the left basal ganglia.  Neurosurgery was consulted and they did not feel that she should have surgery.  She has been struggling with aphasia dysphagia and hypoxia since being admitte neurology has also been following.   Stroke workup as   including a GUSTABO which was unremarkable.    I am concerned about patient’s medical complexities and barriers to advancing in rehab goals including deficits of motor planning.        I reviewed current care and plans for further care with other rehab providers including nursing and case management.  According to recent nursing note, \" UA/Culture from [facility] and sent to lab via tube system \"  is resting in bed with no c/o pain/discomfort voiced at this time. LS diminished bilaterally, No SOB/dyspnea noted. SPO2 91% on RA. O2 @ 2L offered but pt declined. IVF completed at 2200. Diabetic snack offered but pt declined.   0430 Pt c/o nausea. IV Zofran administered and was effective. No emesis noted this shift\"    Discussed with speech and language pathology.  Bedside swallow reviewed patient okay to be upgraded to regular with thins with strict monitoring up at 90 degrees no straws.    I continue to be concerned about her nausea vomiting and diarrhea this morning will monitor and start  the left PICA territory, right thalamus, and multiple tiny infarcts in both inferior cerebellar hemispheres in the PICA territories.   4. Right CP angle meningioma measuring 2.4 x 1.6 x 2.1 cm, producing moderate mass effect upon the right middle cerebellar peduncle and minimal mass effect upon the right pontomedullary junction. 5. Moderate, age-appropriate atrophy and mild, age-appropriate small vessel ischemic change.     Fluoroscopy modified barium swallow with video  1/19/2024 Dysphagia is observed.  Minimal penetration with thin liquids.     Please see separate speech pathology report for full discussion of findings and recommendations.     CT HEAD  1/19/2024 BRAIN/VENTRICLES: There is a stable small 2 cm left frontal lobe periventricular hematoma identified.  There is no significant surrounding edema or mass effect.  Stable encephalomalacia areas identified from old insult within the right temporal lobe with evolving insult identified within the left parietal lobe.  No abnormal extra-axial fluid collection.  Mild ex vacuole dilatation seen of the ventricular system.  Minimal chronic small vessel ischemic changes seen within the periventricular and subcortical white matter to suggest chronic small vessel ischemic change.  The known right cerebellar pontine angle mass is not well seen on this examination due to lack of intravenous contrast. ORBITS: The visualized portion of the orbits demonstrate no acute abnormality. SINUSES: The visualized paranasal sinuses and mastoid air cells demonstrate no acute abnormality. SOFT TISSUES/SKULL:  No acute abnormality of the visualized skull or soft tissues.     Stable parenchymal hemorrhage involving the left frontal periventricular white matter.  No significant surrounding edema or mass effect.  Evolving insult in the left parietal lobe with encephalomalacia change stable within the right temporal lobe.     CT HEAD  1/18/2024 : BRAIN/VENTRICLES: Stable approximately 2 cm

## 2024-01-26 NOTE — PROGRESS NOTES
Physical Therapy Rehab Treatment Note  [facility]  [facility]       NAME: Mulu Pandya  : 1955 (68 y.o.)  MRN: [id number]  CODE STATUS: Full Code    Date of Service: 2024     Restrictions:  Restrictions/Precautions: Fall Risk, Seizure     SUBJECTIVE:   Subjective: Pt states she is ok.    Pain  Pain: Denies pre and post session pain.    OBJECTIVE:         Transfers  Sit to Stand: Stand by assistance;Minimal Assistance  Stand to Sit: Stand by assistance;Contact guard assistance  Bed to Chair: Minimal assistance;Contact guard assistance (SPT with WW to L and R.)  Comment: VCs and hand over hand assist for safety and technique.  Assist level varies from Min to SBA.    Ambulation  Surface: Level tile  Device: Rolling Walker  Assistance: Minimal assistance  Quality of Gait: decreased step length; downward gaze and slow paced; Cues for R hand grasp; fatigued quickly  Distance: 30ft  Comments: HR 130BPM after gait  SPO2 92% on room air           PT Exercises  A/AROM Exercises: seated AP/LAQ/Marches/hip add/hip abd with manual resistance x10ea     Education  Education Given To: Patient  Education Provided Comments: Educated pt on importance of participation to progress towards goals.        ASSESSMENT/PROGRESS TOWARDS GOALS:    Assessment: Pt with improved participation attending therapy in gym.  Pt with increased SOB after gait training.  HR elevated to 130BPM.  After seated rest remained 124-127BPM. Nsg notified.    Goals:  Long Term Goals  Long Term Goal 1: Pt to complete bed mobility with supervision  Long Term Goal 2: Pt to complete transfers with SBA  Long Term Goal 3: Pt to ambulate 50ft with LRD and SBA  Long Term Goal 4: Pt to complete 3 steps with HR and Asuncion    PLAN OF CARE/Safety: Cont per POC.       Therapy Time:   Individual   Time In 1400   Time Out 1430   Minutes 30      Minutes:  Transfer/Bed mobility training:10  Gait training:10  Neuro re education:0  Therapeutic

## 2024-01-26 NOTE — PLAN OF CARE
Problem: Safety - Adult  Goal: Free from fall injury  1/25/2024 2334 by Mary Darden, RN  Outcome: Progressing     Problem: Discharge Planning  Goal: Discharge to home or other facility with appropriate resources  1/25/2024 2334 by Mary Darden, RN  Outcome: Progressing     Problem: Skin/Tissue Integrity  Goal: Absence of new skin breakdown  Description: 1.  Monitor for areas of redness and/or skin breakdown  2.  Assess vascular access sites hourly  3.  Every 4-6 hours minimum:  Change oxygen saturation probe site  4.  Every 4-6 hours:  If on nasal continuous positive airway pressure, respiratory therapy assess nares and determine need for appliance change or resting period.  1/25/2024 2334 by Mary Darden, RN  Outcome: Progressing

## 2024-01-26 NOTE — PROGRESS NOTES
[facility] Neurology Daily Progress Note  Name: Mulu Pandya  Age: 68 y.o.  Gender: female  CodeStatus: Full Code  Allergies: Nickel    Chief Complaint:No chief complaint on file.    Primary Care Provider: Eileen Edmondson APRN - NP  InpatientTreatment Team: Treatment Team: Attending Provider: Jayne Stacy DO; Consulting Physician: Shaquille Bauer MD; Consulting Physician: Zheng Chavez MD; Registered Nurse: Mary Darden, RN; LPN: Madie Avelar LPN; Occupational Therapist: Vicente Barrett OT; Occupational Therapist Assistant: Jeri Carter ABIGAIL; : Anny Yadav, MSW, LSW; Registered Nurse: Becky Fenton, RN; Registered Nurse: Karishma Rincon, RN; Patient Care Tech: Chichi Avery  Admission Date: 1/23/2024      HPI   Pt seen and examined on rehab for neurology follow-up.  Alert.  Patient with some ongoing expressive aphasia.  Right-sided weakness has improved.  No dysarthria.  Denies headache or vision changes.  No dysphagia.  Blood pressure stable.  Patient transferred to acute medical care.  Patient shows improvement to right-sided weakness and more ambulatory    Vitals:    01/26/24 1700   BP:    Pulse: (!) 109   Resp: 16   Temp:    SpO2: 91%        Review of Systems   Constitutional:  Negative for fatigue and fever.   HENT:  Negative for hearing loss and trouble swallowing.    Eyes:  Negative for visual disturbance.   Respiratory:  Negative for cough, chest tightness, shortness of breath and wheezing.    Cardiovascular:  Negative for chest pain, palpitations and leg swelling.   Gastrointestinal:  Negative for nausea and vomiting.   Musculoskeletal:  Positive for gait problem.   Skin:  Negative for color change and rash.   Neurological:  Positive for speech difficulty and weakness. Negative for dizziness, tremors, seizures, syncope, facial asymmetry, light-headedness, numbness and headaches.   Psychiatric/Behavioral:  Positive for confusion. Negative for agitation and

## 2024-01-26 NOTE — PROGRESS NOTES
Agree with LPN assessment, continue current plan of care. Electronically signed by Karishma Rincon RN on 1/26/24 at 2:53 PM EST

## 2024-01-26 NOTE — PROGRESS NOTES
OCCUPATIONAL THERAPY  INPATIENT REHAB TREATMENT NOTE  [facility]      NAME: Mulu Pandya  : 1955 (68 y.o.)  MRN: [id number]  CODE STATUS: Full Code  Room: R243/R243-01    Date of Service: 2024    Referring Physician: Dr Stacy  Rehab Diagnosis: Impaired mobility and ADL's due to acute infart in the left superior mid parietal lobe MCA territory    Hospital course:        Restrictions  Restrictions/Precautions  Restrictions/Precautions: Fall Risk, Seizure          Patient's date of birth confirmed: Yes    SAFETY:  Safety Devices  Type of devices: All fall risk precautions in place    SUBJECTIVE:   \"He was mad\" (pt talking about  leaving for the day and being upset with her for not participating in morning therapy session)    Pain at start of treatment: No    Pain at end of treatment: No    OBJECTIVE:     Pt requested to toilet during session    Grooming/Oral Hygiene  Assistance Level: Supervision;Set-up  Toileting  Assistance Level: Moderate assistance  Skilled Clinical Factors: setup and cues needed to properly wipe anterior area after urinating. Assist also needed for clothing management  Toilet Transfers  Technique: Stand pivot  Equipment: Grab bars  Additional Factors: Verbal cues;Cues for hand placement  Assistance Level: Minimal assistance    While seated, challenged strength, activity tolerance, ROM, and flexibility for improved ADL performance.    Completed exercises using table rima with only natural resistance. Continuous movements completed with focus on scapular protraction/retraction, shoulder flexion/extension/rotation/horizontal abduction, and trunk movements. Graded up to 1# of resistance. Fair ability to maintain  on the handle of the rima. Cues needed to maintain technique and maximize ROM    Patient engaged in fine motor and cognitive/language activity     Pt presented with various wooden blocks each with a different letter on it. Pt required to place blocks

## 2024-01-26 NOTE — PROGRESS NOTES
[facility] Rehabilitation  Occupational Therapy      NAME:  Mulu Pandya  ROOM: R243/R243-01  :  1955  DATE: 2024    Attempted to see Mulu Pandya on this date for 60 minute ADL session at 08:30 for   []  Initial Evaluation   [x]  Scheduled therapy    []  Make-up therapy   []  Group therapy   []  Family training    Patient was unable to be seen due to:   [] Off unit for testing/procedure   [x] Patient declined   [] Therapy on hold due to     [] Nursing deferred due to    [] Other:      Pt laying supine in bed upon arrival. Pt was offered to shower or sponge bath, however, pt declined. Pt report she is feeling nauseated and requested pain medication. RN discussed with therapist pt received medication earlier this AM and won't be eligible for more medication until 10:30am. Pt encouraged to sit on the EOB to clean arms and face, however, pt declined stating she will feel dizzy if she sits up. Pt report she has been hydrating herself, but didn't eat a lot of her breakfast. Encouragement provided. Discussed with NITZA Orta    Electronically signed by ABIGAIL Stuart on 24 at 8:46 AM EST

## 2024-01-26 NOTE — PROGRESS NOTES
Patient is resting in bed with no c/o pain/discomfort voiced at this time. LS diminished bilaterally, No SOB/dyspnea noted. SPO2 91% on RA. O2 @ 2L offered but pt declined. IVF completed at 2200. Diabetic snack offered but pt declined.     0430 Pt c/o nausea. IV Zofran administered and was effective. No emesis noted this shift.

## 2024-01-27 ENCOUNTER — APPOINTMENT (OUTPATIENT)
Dept: GENERAL RADIOLOGY | Age: 69
End: 2024-01-27
Attending: PHYSICAL MEDICINE & REHABILITATION
Payer: MEDICARE

## 2024-01-27 LAB
ALBUMIN SERPL-MCNC: 2.6 G/DL (ref 3.5–4.6)
ALP SERPL-CCNC: 25 U/L (ref 40–130)
ALT SERPL-CCNC: 15 U/L (ref 0–33)
ANION GAP SERPL CALCULATED.3IONS-SCNC: 15 MEQ/L (ref 9–15)
AST SERPL-CCNC: 17 U/L (ref 0–35)
BASOPHILS # BLD: 0 K/UL (ref 0–0.2)
BASOPHILS NFR BLD: 0.2 %
BILIRUB SERPL-MCNC: <0.2 MG/DL (ref 0.2–0.7)
BUN SERPL-MCNC: 19 MG/DL (ref 8–23)
CALCIUM SERPL-MCNC: 8.4 MG/DL (ref 8.5–9.9)
CHLORIDE SERPL-SCNC: 99 MEQ/L (ref 95–107)
CO2 SERPL-SCNC: 21 MEQ/L (ref 20–31)
CREAT SERPL-MCNC: 1.06 MG/DL (ref 0.5–0.9)
EOSINOPHIL # BLD: 0 K/UL (ref 0–0.7)
EOSINOPHIL NFR BLD: 0.2 %
ERYTHROCYTE [DISTWIDTH] IN BLOOD BY AUTOMATED COUNT: 13.4 % (ref 11.5–14.5)
GLOBULIN SER CALC-MCNC: 4 G/DL (ref 2.3–3.5)
GLUCOSE BLD-MCNC: 130 MG/DL (ref 70–99)
GLUCOSE BLD-MCNC: 209 MG/DL (ref 70–99)
GLUCOSE BLD-MCNC: 249 MG/DL (ref 70–99)
GLUCOSE BLD-MCNC: 283 MG/DL (ref 70–99)
GLUCOSE SERPL-MCNC: 243 MG/DL (ref 70–99)
HCT VFR BLD AUTO: 37.7 % (ref 37–47)
HGB BLD-MCNC: 12.1 G/DL (ref 12–16)
LYMPHOCYTES # BLD: 1 K/UL (ref 1–4.8)
LYMPHOCYTES NFR BLD: 5.3 %
MCH RBC QN AUTO: 28.9 PG (ref 27–31.3)
MCHC RBC AUTO-ENTMCNC: 32.1 % (ref 33–37)
MCV RBC AUTO: 90 FL (ref 79.4–94.8)
MONOCYTES # BLD: 1.2 K/UL (ref 0.2–0.8)
MONOCYTES NFR BLD: 7 %
NEUTROPHILS # BLD: 15.4 K/UL (ref 1.4–6.5)
NEUTS SEG NFR BLD: 86.5 %
PERFORMED ON: ABNORMAL
PLATELET # BLD AUTO: 460 K/UL (ref 130–400)
POTASSIUM SERPL-SCNC: 4.2 MEQ/L (ref 3.4–4.9)
PROT SERPL-MCNC: 6.6 G/DL (ref 6.3–8)
RBC # BLD AUTO: 4.19 M/UL (ref 4.2–5.4)
SODIUM SERPL-SCNC: 135 MEQ/L (ref 135–144)
WBC # BLD AUTO: 17.8 K/UL (ref 4.8–10.8)

## 2024-01-27 PROCEDURE — 6370000000 HC RX 637 (ALT 250 FOR IP): Performed by: PHYSICAL MEDICINE & REHABILITATION

## 2024-01-27 PROCEDURE — 6370000000 HC RX 637 (ALT 250 FOR IP): Performed by: FAMILY MEDICINE

## 2024-01-27 PROCEDURE — 6360000002 HC RX W HCPCS: Performed by: PHYSICAL MEDICINE & REHABILITATION

## 2024-01-27 PROCEDURE — 1180000000 HC REHAB R&B

## 2024-01-27 PROCEDURE — 2580000003 HC RX 258: Performed by: PHYSICAL MEDICINE & REHABILITATION

## 2024-01-27 PROCEDURE — 36415 COLL VENOUS BLD VENIPUNCTURE: CPT

## 2024-01-27 PROCEDURE — 80053 COMPREHEN METABOLIC PANEL: CPT

## 2024-01-27 PROCEDURE — 97112 NEUROMUSCULAR REEDUCATION: CPT

## 2024-01-27 PROCEDURE — 97110 THERAPEUTIC EXERCISES: CPT

## 2024-01-27 PROCEDURE — 94761 N-INVAS EAR/PLS OXIMETRY MLT: CPT

## 2024-01-27 PROCEDURE — 87086 URINE CULTURE/COLONY COUNT: CPT

## 2024-01-27 PROCEDURE — 97535 SELF CARE MNGMENT TRAINING: CPT

## 2024-01-27 PROCEDURE — 2700000000 HC OXYGEN THERAPY PER DAY

## 2024-01-27 PROCEDURE — 94640 AIRWAY INHALATION TREATMENT: CPT

## 2024-01-27 PROCEDURE — 85025 COMPLETE CBC W/AUTO DIFF WBC: CPT

## 2024-01-27 PROCEDURE — 6370000000 HC RX 637 (ALT 250 FOR IP)

## 2024-01-27 PROCEDURE — 97140 MANUAL THERAPY 1/> REGIONS: CPT

## 2024-01-27 PROCEDURE — 97116 GAIT TRAINING THERAPY: CPT

## 2024-01-27 PROCEDURE — 94760 N-INVAS EAR/PLS OXIMETRY 1: CPT

## 2024-01-27 PROCEDURE — 71045 X-RAY EXAM CHEST 1 VIEW: CPT

## 2024-01-27 RX ORDER — AZITHROMYCIN 500 MG/1
500 TABLET, FILM COATED ORAL DAILY
Status: DISCONTINUED | OUTPATIENT
Start: 2024-01-27 | End: 2024-01-27

## 2024-01-27 RX ADMIN — INSULIN LISPRO 5 UNITS: 100 INJECTION, SOLUTION INTRAVENOUS; SUBCUTANEOUS at 18:02

## 2024-01-27 RX ADMIN — BUDESONIDE AND FORMOTEROL FUMARATE DIHYDRATE 2 PUFF: 80; 4.5 AEROSOL RESPIRATORY (INHALATION) at 15:43

## 2024-01-27 RX ADMIN — INSULIN LISPRO 5 UNITS: 100 INJECTION, SOLUTION INTRAVENOUS; SUBCUTANEOUS at 10:28

## 2024-01-27 RX ADMIN — CEFTRIAXONE SODIUM 1000 MG: 1 INJECTION, POWDER, FOR SOLUTION INTRAMUSCULAR; INTRAVENOUS at 17:47

## 2024-01-27 RX ADMIN — ROSUVASTATIN CALCIUM 40 MG: 40 TABLET, FILM COATED ORAL at 20:09

## 2024-01-27 RX ADMIN — Medication 100 MG: at 08:04

## 2024-01-27 RX ADMIN — ESCITALOPRAM OXALATE 15 MG: 10 TABLET ORAL at 08:04

## 2024-01-27 RX ADMIN — INSULIN GLARGINE 10 UNITS: 100 INJECTION, SOLUTION SUBCUTANEOUS at 20:09

## 2024-01-27 RX ADMIN — Medication 2000 UNITS: at 17:44

## 2024-01-27 RX ADMIN — INSULIN LISPRO 1 UNITS: 100 INJECTION, SOLUTION INTRAVENOUS; SUBCUTANEOUS at 12:36

## 2024-01-27 RX ADMIN — ALBUTEROL SULFATE 2 PUFF: 90 AEROSOL, METERED RESPIRATORY (INHALATION) at 15:43

## 2024-01-27 RX ADMIN — METOPROLOL TARTRATE 12.5 MG: 25 TABLET, FILM COATED ORAL at 08:04

## 2024-01-27 RX ADMIN — ALBUTEROL SULFATE 2 PUFF: 90 AEROSOL, METERED RESPIRATORY (INHALATION) at 04:29

## 2024-01-27 RX ADMIN — BUDESONIDE AND FORMOTEROL FUMARATE DIHYDRATE 2 PUFF: 80; 4.5 AEROSOL RESPIRATORY (INHALATION) at 04:30

## 2024-01-27 RX ADMIN — AZITHROMYCIN MONOHYDRATE 500 MG: 500 TABLET ORAL at 12:36

## 2024-01-27 RX ADMIN — INSULIN LISPRO 5 UNITS: 100 INJECTION, SOLUTION INTRAVENOUS; SUBCUTANEOUS at 12:35

## 2024-01-27 RX ADMIN — METOPROLOL TARTRATE 12.5 MG: 25 TABLET, FILM COATED ORAL at 20:09

## 2024-01-27 RX ADMIN — Medication 5 MG: at 20:09

## 2024-01-27 RX ADMIN — INSULIN LISPRO 2 UNITS: 100 INJECTION, SOLUTION INTRAVENOUS; SUBCUTANEOUS at 18:02

## 2024-01-27 NOTE — PROGRESS NOTES
01/27/24 0500   RT Protocol   History Pulmonary Disease 2   Respiratory pattern 0   Breath sounds 2   Cough 0   Indications for Bronchodilator Therapy Decreased or absent breath sounds   Bronchodilator Assessment Score 4

## 2024-01-27 NOTE — PROGRESS NOTES
Hospitalist Progress Note      PCP: Eileen Edmondson, APRN - NP    Date of Admission: 1/23/2024    Chief Complaint:    No chief complaint on file.      HPI:   Mulu Pandya, 68 years old female with PMH of COPD, HTN, HLD, DM type II who presented to the ED with right-sided weakness, aphasia, dysphagia and facial droop.  Patient was found to have subacute stroke of the right parietal lobe and also 2 cm parenchymal hemorrhage in the left basal ganglia. Patient was admitted with neurosurgery consult. IM hospitalist team was consulted for physical exam to rule out any co-morbid physical condition, and medically manage acute and chronic health conditions: HTN, HLD, DM2, CKD, COPD, recent CVA, altered mental status.    Subjective:    Patient is participating in the therapy during assessment.  She she still reports having moist cough and feels congested. CXR pending, CBC reveals leukocytosis with WBC 17.8.  Patient has been started on antibiotic empirically.  Patient has intermittent chills and lightheadedness. She denies chest pain, dyspnea, or headaches. Negative for fever, nausea, vomiting, diarrhea or abdominal pain.   Patient participates in PT/OT/SP and tolerates well.    12 point ROS negative other than mentioned above       Medications:  Reviewed    Infusion Medications   Scheduled Medications    Vitamin D  2,000 Units Oral Dinner    cyanocobalamin  1,000 mcg IntraMUSCular Weekly    coenzyme Q10  100 mg Oral Daily    lidocaine  3 patch TransDERmal Daily    albuterol sulfate HFA  2 puff Inhalation BID    budesonide-formoterol  2 puff Inhalation BID RT    escitalopram  15 mg Oral Daily    insulin glargine  10 Units SubCUTAneous Nightly    insulin lispro  5 Units SubCUTAneous TID WC    melatonin  5 mg Oral Nightly    metoprolol tartrate  12.5 mg Oral BID    nicotine  1 patch TransDERmal Q24H    insulin lispro  0-4 Units SubCUTAneous TID WC    insulin lispro  0-4 Units SubCUTAneous Nightly    rosuvastatin  40 mg Oral  p.o.  Continue monitoring  CXR to rule out pneumonia -pending  CBC, CMP    Leukocytosis  Mostly secondary to URI  WBC 17.8 today  VS WNL  Patient reports chills, nonproductive cough, congestion  She denies fever, chest pain, dyspnea, nausea, vomiting, diarrhea or abdominal pain.  Started on azithromycin  CXR pending results  Continue monitoring VS per unit routine  Continue monitoring CBC daily    Altered mental status, recent CVA   Symptoms include aphasia, dysphagia, weakness, balance problems and confusion  Patient reports dizziness and lightheadedness  Neurology following  Continue PT/OT/speech therapy      Additional work up or/and treatment plan may be added today or then after based on clinical progression. I am managing a portion of pt care. Some medical issues are handled by other specialists. Additional work up and treatment should be done in out pt setting by pt PCP and other out pt providers.     Time spent evaluating and intervening patient, 52 minutes.  In addition to examining and evaluating pt, I spent additional time explaining care, normal and abnormal findings, and treatment plan. All of pt questions were answered. Counseling, diet and education were  provided. Case will be discussed with nursing staff when appropriate. Family will be updated if and when appropriate.    Diet: ADULT DIET; Regular  ADULT ORAL NUTRITION SUPPLEMENT; Breakfast, Lunch, Dinner; Diabetic Oral Supplement    Code Status: Full Code    PT/OT Eval           Electronically signed by BALTAZAR Mckeon CNP on 1/27/2024 at 8:44 AM

## 2024-01-27 NOTE — PROGRESS NOTES
Physical Therapy Rehab Treatment Note  [facility]  [facility]       NAME: Mulu Pandya  : 1955 (68 y.o.)  MRN: [id number]  CODE STATUS: Full Code    Date of Service: 2024  Chart Reviewed: Yes  Patient assessed for rehabilitation services?: Yes  Family / Caregiver Present: Yes    Restrictions:  Restrictions/Precautions: Fall Risk, Seizure       SUBJECTIVE:   Subjective: \"im feeling okay\"    Pain   0/10 reported       OBJECTIVE:    Bed mobility  Bridging: Stand by assistance  Rolling to Left: Minimal assistance  Rolling to Right: Minimal assistance  Sit to Supine: Stand by assistance  Scooting: Stand by assistance  Bed Mobility Comments: Hand over hand cues for sequencing. Increased time and effort to complete.    Transfers  Sit to Stand: Stand by assistance  Stand to Sit: Stand by assistance;Minimal Assistance  Comment: VC for approach to chair and for hand placement with poor carryover    Ambulation  Surface: Level tile  Device: Rolling Walker  Other Apparatus: O2 (2L)  Assistance: Minimal assistance  Quality of Gait: decreased step length; downward gaze and slow paced; VC for bigger steps and to raise gaze, slow rajendra, no LOB, fatigued post  Distance: 30'x2 with rest break  Comments: SP02 88%  post gait with rest break and VC for breathing interventions. pt able to return to 94%.                   PT Exercises  Exercise Treatment: seated: AP, LAQ, marching, side kicks , hip add ball squeeze, hip abd YTB , ham curls YTB x20. standing: stepping up onto 4\" block x10 LLE, x10 RLE  PROM Exercises: Manual gastroc stretchng [id number] B                        ASSESSMENT/PROGRESS TOWARDS GOALS: During standing stepping exercise pt was able to perform with fair posture. She needed VC for breathing interventions and to raise her foot completely. Following this intervention pt was very fatigued and required a seated rest break.        Goals:  Long Term Goals  Long Term Goal 1: Pt to

## 2024-01-27 NOTE — FLOWSHEET NOTE
Assessment as charted. Denies discomfort at this time. Coughing, congested, crackles and ronchi. SAO2 charted as 91%, rechecked 97% on 2L/NC.  Bed alarm on, call light within reach.

## 2024-01-27 NOTE — PROGRESS NOTES
OCCUPATIONAL THERAPY  INPATIENT REHAB TREATMENT NOTE  [facility]      NAME: Mulu Pandya  : 1955 (68 y.o.)  MRN: [id number]  CODE STATUS: Full Code  Room: R243/R243-01    Date of Service: 2024    Referring Physician: Dr Stacy  Rehab Diagnosis: Impaired mobility and ADL's due to acute infart in the left superior mid parietal lobe MCA territory    Hospital course:          Restrictions  Restrictions/Precautions  Restrictions/Precautions: Fall Risk, Seizure         Patient's date of birth confirmed: Yes    SAFETY:  Safety Devices  Safety Devices in place: Yes  Type of devices: All fall risk precautions in place    SUBJECTIVE:  Subjective: \"I got ready before you got here.\"  Pain: 0/10 pain reported pre and post session    Pain at start of treatment: No    Pain at end of treatment: No    OBJECTIVE:     Patient engaged in tabletop therapeutic activities to increase RUE ROM, endurance, fine motor coordination skills, and RUE strength to promote independence with ADL and IADL tasks.     Patient completed 2x10 towel exercises on tabletop using R arm including side to side, forward and backward, diagonal to left and to right, and clockwise and ocunter clockwise motions. Patient required minimal verbal cueing to move RUE as close to end range as possible.     Therapist then provided retrograde massage to patient's RUE including digits, hand, wrist, forearm, and upper arm to decrease edema in order to improve movement during ADL and IADL-related tasks. Patient tolerated massage well. Therapist provided massage x10 min.     After massage, patient engaged in 1x10 R digit extension with palm flat on table with a 10 s hold at end range. Patient was able to initiate the exercise, but required max A to continue lifting and hold at end range. Patient tolerated well. Patient then engaged in composite fist formation with digit extension with max A to form fist as well as to extend all digits straight.

## 2024-01-27 NOTE — PROGRESS NOTES
[facility]  Occupational Therapy        NAME:  Mulu Pandya  ROOM: R243/R243-01  :  1955  DATE: 2024    Attempted to see Mulu Pandya on this date for:   []  Initial Evaluation   [x]  Treatment       Patient was unable to be seen due to:   [] Off unit for testing/procedure    [x] Patient refused OT tech, reporting fatigue and was asleep during therapist's attempt.   [] Therapy on hold due to     [] Nursing deferred due to    [] Other:      Discussed with NITZA Holman    Electronically signed by ABIGAIL John on 24 at 2:16 PM EST

## 2024-01-27 NOTE — PLAN OF CARE
Problem: Safety - Adult  Goal: Free from fall injury  1/27/2024 0004 by Mary Darden, RN  Outcome: Progressing     Problem: Discharge Planning  Goal: Discharge to home or other facility with appropriate resources  1/27/2024 0004 by Mary Darden, RN  Outcome: Progressing     Problem: Skin/Tissue Integrity  Goal: Absence of new skin breakdown  Description: 1.  Monitor for areas of redness and/or skin breakdown  2.  Assess vascular access sites hourly  3.  Every 4-6 hours minimum:  Change oxygen saturation probe site  4.  Every 4-6 hours:  If on nasal continuous positive airway pressure, respiratory therapy assess nares and determine need for appliance change or resting period.  1/27/2024 0004 by Mary aDrden, RN  Outcome: Progressing     Problem: Neurosensory - Adult  Goal: Achieves stable or improved neurological status  1/27/2024 0004 by Mary Darden, RN  Outcome: Progressing

## 2024-01-27 NOTE — PROGRESS NOTES
Physical Therapy Missed Treatment   Facility/Department: [facility] MED SURG R243/R243-01    NAME: Mulu Pandya    : 1955 (68 y.o.)  MRN: [id number]    Account: [id number]  Gender: female    Chart reviewed, attempted PT at 1330.  Patient unavailable 2° to:        [x] Pt declined due to pt being too lethargic. Upon arrival pt sleeping in bed. I woke pt up and she stated \"no I just want to sleep.\" I explained the importance of physical therapy and I offered bedside treatment, but pt closed her eyes and did not continue to speak with this PTA.  was in the room and I told him that I would talk to OT as they are supposed to come see the pt at 1400.       30 minutes of extra therapy missed this date.      Will attempt PT treatment again at earliest convenience.      Electronically signed by Rossana Colon PTA on 24 at 1:40 PM EST

## 2024-01-28 LAB
ANION GAP SERPL CALCULATED.3IONS-SCNC: 13 MEQ/L (ref 9–15)
BASOPHILS # BLD: 0 K/UL (ref 0–0.2)
BASOPHILS NFR BLD: 0.2 %
BNP BLD-MCNC: 353 PG/ML
BUN SERPL-MCNC: 20 MG/DL (ref 8–23)
CALCIUM SERPL-MCNC: 8.7 MG/DL (ref 8.5–9.9)
CHLORIDE SERPL-SCNC: 98 MEQ/L (ref 95–107)
CO2 SERPL-SCNC: 26 MEQ/L (ref 20–31)
CREAT SERPL-MCNC: 1.04 MG/DL (ref 0.5–0.9)
ECHO BSA: 1.75 M2
EOSINOPHIL # BLD: 0 K/UL (ref 0–0.7)
EOSINOPHIL NFR BLD: 0.2 %
ERYTHROCYTE [DISTWIDTH] IN BLOOD BY AUTOMATED COUNT: 13.3 % (ref 11.5–14.5)
GLUCOSE BLD-MCNC: 107 MG/DL (ref 70–99)
GLUCOSE BLD-MCNC: 132 MG/DL (ref 70–99)
GLUCOSE BLD-MCNC: 226 MG/DL (ref 70–99)
GLUCOSE BLD-MCNC: 242 MG/DL (ref 70–99)
GLUCOSE SERPL-MCNC: 76 MG/DL (ref 70–99)
HCT VFR BLD AUTO: 37.5 % (ref 37–47)
HGB BLD-MCNC: 12 G/DL (ref 12–16)
LYMPHOCYTES # BLD: 1.1 K/UL (ref 1–4.8)
LYMPHOCYTES NFR BLD: 6.7 %
MCH RBC QN AUTO: 29.2 PG (ref 27–31.3)
MCHC RBC AUTO-ENTMCNC: 32 % (ref 33–37)
MCV RBC AUTO: 91.2 FL (ref 79.4–94.8)
MONOCYTES # BLD: 1.4 K/UL (ref 0.2–0.8)
MONOCYTES NFR BLD: 8.3 %
NEUTROPHILS # BLD: 13.7 K/UL (ref 1.4–6.5)
NEUTS SEG NFR BLD: 84.1 %
PERFORMED ON: ABNORMAL
PLATELET # BLD AUTO: 469 K/UL (ref 130–400)
POTASSIUM SERPL-SCNC: 3.9 MEQ/L (ref 3.4–4.9)
RBC # BLD AUTO: 4.11 M/UL (ref 4.2–5.4)
SODIUM SERPL-SCNC: 137 MEQ/L (ref 135–144)
WBC # BLD AUTO: 16.3 K/UL (ref 4.8–10.8)

## 2024-01-28 PROCEDURE — 94640 AIRWAY INHALATION TREATMENT: CPT

## 2024-01-28 PROCEDURE — 6370000000 HC RX 637 (ALT 250 FOR IP)

## 2024-01-28 PROCEDURE — 6370000000 HC RX 637 (ALT 250 FOR IP): Performed by: FAMILY MEDICINE

## 2024-01-28 PROCEDURE — 1180000000 HC REHAB R&B

## 2024-01-28 PROCEDURE — 6370000000 HC RX 637 (ALT 250 FOR IP): Performed by: PHYSICAL MEDICINE & REHABILITATION

## 2024-01-28 PROCEDURE — 97110 THERAPEUTIC EXERCISES: CPT

## 2024-01-28 PROCEDURE — 2700000000 HC OXYGEN THERAPY PER DAY

## 2024-01-28 PROCEDURE — 97535 SELF CARE MNGMENT TRAINING: CPT

## 2024-01-28 PROCEDURE — 6360000002 HC RX W HCPCS: Performed by: PHYSICAL MEDICINE & REHABILITATION

## 2024-01-28 PROCEDURE — 85025 COMPLETE CBC W/AUTO DIFF WBC: CPT

## 2024-01-28 PROCEDURE — 2580000003 HC RX 258: Performed by: PHYSICAL MEDICINE & REHABILITATION

## 2024-01-28 PROCEDURE — 36415 COLL VENOUS BLD VENIPUNCTURE: CPT

## 2024-01-28 PROCEDURE — 80048 BASIC METABOLIC PNL TOTAL CA: CPT

## 2024-01-28 PROCEDURE — 83880 ASSAY OF NATRIURETIC PEPTIDE: CPT

## 2024-01-28 PROCEDURE — 94761 N-INVAS EAR/PLS OXIMETRY MLT: CPT

## 2024-01-28 RX ORDER — AZITHROMYCIN 500 MG/1
250 TABLET, FILM COATED ORAL DAILY
Status: DISCONTINUED | OUTPATIENT
Start: 2024-01-29 | End: 2024-01-28

## 2024-01-28 RX ORDER — AZITHROMYCIN 500 MG/1
500 TABLET, FILM COATED ORAL DAILY
Status: COMPLETED | OUTPATIENT
Start: 2024-01-28 | End: 2024-01-28

## 2024-01-28 RX ADMIN — ROSUVASTATIN CALCIUM 40 MG: 40 TABLET, FILM COATED ORAL at 20:17

## 2024-01-28 RX ADMIN — ALBUTEROL SULFATE 2 PUFF: 90 AEROSOL, METERED RESPIRATORY (INHALATION) at 15:37

## 2024-01-28 RX ADMIN — METOPROLOL TARTRATE 12.5 MG: 25 TABLET, FILM COATED ORAL at 07:23

## 2024-01-28 RX ADMIN — INSULIN LISPRO 5 UNITS: 100 INJECTION, SOLUTION INTRAVENOUS; SUBCUTANEOUS at 08:34

## 2024-01-28 RX ADMIN — AZITHROMYCIN 500 MG: 500 TABLET, FILM COATED ORAL at 10:51

## 2024-01-28 RX ADMIN — CEFTRIAXONE SODIUM 1000 MG: 1 INJECTION, POWDER, FOR SOLUTION INTRAMUSCULAR; INTRAVENOUS at 16:16

## 2024-01-28 RX ADMIN — METOPROLOL TARTRATE 12.5 MG: 25 TABLET, FILM COATED ORAL at 10:52

## 2024-01-28 RX ADMIN — BUDESONIDE AND FORMOTEROL FUMARATE DIHYDRATE 2 PUFF: 80; 4.5 AEROSOL RESPIRATORY (INHALATION) at 15:36

## 2024-01-28 RX ADMIN — Medication 100 MG: at 08:33

## 2024-01-28 RX ADMIN — BUDESONIDE AND FORMOTEROL FUMARATE DIHYDRATE 2 PUFF: 80; 4.5 AEROSOL RESPIRATORY (INHALATION) at 04:42

## 2024-01-28 RX ADMIN — ALBUTEROL SULFATE 2 PUFF: 90 AEROSOL, METERED RESPIRATORY (INHALATION) at 04:41

## 2024-01-28 RX ADMIN — ESCITALOPRAM OXALATE 15 MG: 10 TABLET ORAL at 08:33

## 2024-01-28 RX ADMIN — INSULIN LISPRO 5 UNITS: 100 INJECTION, SOLUTION INTRAVENOUS; SUBCUTANEOUS at 16:51

## 2024-01-28 RX ADMIN — INSULIN GLARGINE 10 UNITS: 100 INJECTION, SOLUTION SUBCUTANEOUS at 20:20

## 2024-01-28 RX ADMIN — Medication 5 MG: at 20:17

## 2024-01-28 RX ADMIN — METOPROLOL TARTRATE 25 MG: 25 TABLET, FILM COATED ORAL at 20:17

## 2024-01-28 RX ADMIN — Medication 2000 UNITS: at 18:24

## 2024-01-28 RX ADMIN — INSULIN LISPRO 1 UNITS: 100 INJECTION, SOLUTION INTRAVENOUS; SUBCUTANEOUS at 16:51

## 2024-01-28 ASSESSMENT — PAIN SCALES - GENERAL: PAINLEVEL_OUTOF10: 0

## 2024-01-28 NOTE — PROGRESS NOTES
Physical Therapy  Physical Therapy Rehab Treatment Note  [facility]  [facility]       NAME: Mulu Pandya  : 1955 (68 y.o.)  MRN: [id number]  CODE STATUS: Full Code    Date of Service: 2024       Restrictions:  Restrictions/Precautions: Fall Risk, Seizure       SUBJECTIVE:    Pt stated she is tired today.   Pain   0/10      OBJECTIVE:       PT Exercises  Exercise Treatment: supine ankle pums, saq, glut set , quad sets, hip abduction (AAROM) knee flexion, SLR (AAROM ) x10-15  Static Standing Balance Exercises: stood at ww wile bed was being changed              ASSESSMENT/PROGRESS TOWARDS GOALS:   Body Structures, Functions, Activity Limitations Requiring Skilled Therapeutic Intervention: Decreased functional mobility ;Decreased ADL status;Decreased tolerance to work activity;Decreased balance;Decreased endurance;Decreased ROM;Decreased body mechanics;Decreased safe awareness;Decreased strength;Decreased fine motor control;Decreased coordination;Decreased sensation  Assessment: Pt refused to get out of bed for make up minutes. She stated that she is fatigued and doesnt feel like herself. Tried encouraging pt to get up for at [employer] lunch but she declined.  Assessment  Assessment: AM session pt wanst feeling well. Pt stood x1 at EOB so tnsg could change sheets. Pt requested to lay back down. Session ended early pt needing changed due to being soiled.    Goals:  Long Term Goals  Long Term Goal 1: Pt to complete bed mobility with supervision  Long Term Goal 2: Pt to complete transfers with SBA  Long Term Goal 3: Pt to ambulate 50ft with LRD and SBA  Long Term Goal 4: Pt to complete 3 steps with HR and Asuncion    PLAN OF CARE/Safety:   Safety Devices  Type of Devices: All fall risk precautions in place;Bed alarm in place;Call light within reach      Therapy Time:   Individual   Time In 1105   Time Out 1120   Minutes 15   Timed Code Treatment Minutes: 15 Minutes  Minutes:  Therapeutic

## 2024-01-28 NOTE — PROGRESS NOTES
01/28/24 0400   RT Protocol   History Pulmonary Disease 2   Respiratory pattern 0   Breath sounds 2   Cough 0   Indications for Bronchodilator Therapy Decreased or absent breath sounds   Bronchodilator Assessment Score 4

## 2024-01-28 NOTE — PROGRESS NOTES
Subjective:  The patient complains of moderate to severe acute on chronic progressive fatigue and left-sided weakness, cognitive slowing, motor planning deficits partially relieved by rest, medications, PT,  OT,   SLP and rest and exacerbated by recent  CVA.  She was admitted through the emergency room on January 18, 2024 with right-sided weakness aphasia and dysphagia.  She was diagnosed with acute CVA-subacute stroke on the right parietal lob .  Her  reported she had had multiple falls recently.  She was admitted under the care of the hospitalist with neurology consulting.  She was also found to have an intracranial hemorrhage.  CT of the head revealed a 2 cm parenchymal bleed in the left basal ganglia.  Neurosurgery was consulted and they did not feel that she should have surgery.  She has been struggling with aphasia dysphagia and hypoxia since being admitte neurology has also been following.   Stroke workup as   including a GUSTABO which was unremarkable.    I am concerned about patient’s medical complexities and barriers to advancing in rehab goals including deficits of motor planning.        I reviewed current care and plans for further care with other rehab providers including nursing and case management.  According to recent nursing note, \" UA/Culture from [facility] and sent to lab via tube system \"  is resting in bed with no c/o pain/discomfort voiced at this time. LS diminished bilaterally, No SOB/dyspnea noted. SPO2 91% on RA. O2 @ 2L offered but pt declined. IVF completed at 2200. Diabetic snack offered but pt declined.   0430 Pt c/o nausea. IV Zofran administered and was effective. No emesis noted this shift\"    Discussed with speech and language pathology.  Bedside swallow reviewed patient okay to be upgraded to regular with thins with strict monitoring up at 90 degrees no straws.    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  Volume loss, ex vacuo ventricular dilatation and chronic white matter change ORBITS: The visualized portion of the orbits demonstrate no acute abnormality. SINUSES: The visualized paranasal sinuses and mastoid air cells demonstrate no acute abnormality. SOFT TISSUES/SKULL:  No acute abnormality of the visualized skull or soft tissues.     Stable approximately 2 cm acute right frontal lobe intraparenchymal hematoma Other chronic findings, as noted.     XR CHEST  1/18/2024  The lungs are without acute focal process.  There is no effusion or pneumothorax. The cardiomediastinal silhouette is without acute process. The osseous structures are without acute process.     No acute process.     CTA NECK  and HEAD 1/18/2024   1. Occluded left MCA distal M2 segment.   2. Development venous anomaly (DVA) present within the left basal ganglia region.  Nearby intraparenchymal hemorrhage is better appreciated on recent noncontrast CT evaluation.   3. 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  Can correlate with non-emergent contrast enhanced MR IAC evaluation.   4. Small bubbly air-fluid level present within the left maxillary sinus. Finding raises the possibility of acute sinusitis in the appropriate clinical setting.   5. Incidental 1.7 cm left thyroid nodule.          CT HEAD  1/18/2024 BRAIN/VENTRICLES: In approximately 2.0 by 1.8 x 1.5 cm acute intraparenchymal hematoma is present within the corona radiata inferolateral to the left frontal horn of the lateral ventricle.  There is no significant mass effect or midline shift.  No abnormal extra-axial fluid collection.  Ex vacuo dilatation of the lateral and 3rd ventricles appears appropriate for generalized volume loss.  A small to moderate-sized area of

## 2024-01-28 NOTE — PROGRESS NOTES
Physical Therapy  Physical Therapy Rehab Treatment Note  Facility/Department: [facility]  Room: [place] [address]-       NAME: Mulu Pandya  : 1955 (68 y.o.)  MRN: [id number]  CODE STATUS: Full Code    Date of Service: 2024       Restrictions:  Restrictions/Precautions: Fall Risk, Seizure       SUBJECTIVE:    Pt reports that she feels like a rag doll this morning .     Pain   0/10 just not feeling well       OBJECTIVE:       Bed Mobility  Additional Factors: Increased time to complete;Head of bed raised;With handrails;Verbal cues  Sit to Supine  Assistance Level: Minimal assistance  Skilled Clinical Factors: Asuncion required at LEs to assist with returning to bed, vc's for technique and sequencing with increased time to complete.  Supine to Sit  Assistance Level: Minimal assistance  Skilled Clinical Factors: HOB elevated, use of handrails, able to complete without physical assist this session.  Scooting  Assistance Level: Stand by assist    Transfers  Additional Factors: Verbal cues;Hand placement cues;Increased time to complete  Sit to Stand  Assistance Level: Minimal assistance  Skilled Clinical Factors: pt not feeling well this am. needing more assist to stand from bed.  Stand to Sit  Assistance Level: Minimal assistance         PT Exercises  Static Standing Balance Exercises: stood at ww wile bed was being changed             ASSESSMENT/PROGRESS TOWARDS GOALS:   Assessment  Assessment: AM session pt wanst feeling well. Pt stood x1 at EOB so tnsg could change sheets. Pt requested to lay back down. Session ended early pt needing changed due to being soiled.    Goals:  Long Term Goals  Long Term Goal 1: Pt to complete bed mobility with supervision  Long Term Goal 2: Pt to complete transfers with SBA  Long Term Goal 3: Pt to ambulate 50ft with LRD and SBA  Long Term Goal 4: Pt to complete 3 steps with HR and Asuncion    PLAN OF CARE/Safety:   Safety Devices  Type of Devices: All fall risk precautions in place (nsg  in room getting pt changed)      Therapy Time:   Individual   Time In 855   Time Out 0910   Minutes 15   Timed Code Treatment Minutes: 15 Minutes  Minutes:  Transfer/Bed mobility trainin  Neuro re education:3        Kelly Lopez PTA, 24 at 9:58 AM

## 2024-01-28 NOTE — PROGRESS NOTES
Assessment completed. A&O x4. Denies pain at this time. Pt given all their medications per MAR. Pt denies any needs at this time. Bed is in lowest position, bed locked, and call light is within reach.

## 2024-01-28 NOTE — PROGRESS NOTES
01/28/24 1541   RT Protocol   History Pulmonary Disease 2   Respiratory pattern 0   Breath sounds 4   Cough 0   Indications for Bronchodilator Therapy Wheezing associated with pulm disorder   Bronchodilator Assessment Score 6

## 2024-01-28 NOTE — PROGRESS NOTES
Discharge plans were discussed along with barriers to progress and strategies to address these barriers, patient encouraged to continue to discuss discharge plans with .       Complex Physical Medicine & Rehab Issues Assess & Plan:   Severe abnormality of gait and mobility and impaired self-care and ADL's secondary to  acute CVA -infarction in the left superior mid parietal lobe MCA territory-2 cm acute intraparenchymal left frontal lobe hematoma  .  Functional and medical status reassessed regarding patient’s ability to participate in therapies and patient found to be able to participate in acute intensive comprehensive inpatient rehabilitation program including PT/OT to improve balance, ambulation, ADL’s, and to improve the P/AROM.  Therapeutic modifications regarding activities in therapies, place, amount of time per day and intensity of therapy made daily.  In bed therapies or bedside therapies prn.   Bowel occasional diarrhea and Bladder dysfunction, Neurogenic bowel and bladder:  frequent toileting, ambulate to bathroom with assistance, check post void residuals.  Check for C.difficile x1 if >2 loose stools in 24 hours, continue bowel & bladder program.  Monitor bowel and bladder function.  Lactinex 2 PO every AC.  MOM prn, Brown Bomb prn, Glycerin suppository prn, enema prn.  Encourage therapy and nursing to co-treat and problem solve re continence.  IV fluids as needed  Mild to moderate back pain,  , as well as generalized OA pain: reassess pain every shift and prior to and after each therapy session, give prn Tylenol and consider scheduled Tylenol, modalities prn in therapy, masage, Lidoderm, K-pad prn. Consider scheduled AM pain meds.  Skin healing   and breakdown risk:  continue pressure relief program.  Daily skin exams and reports from nursing.  Fatigue due to nutritional and hydration deficiency:   titrate vitamin B12 vitamin D and CoQ10 continue to monitor I&O’s, calorie counts prn,

## 2024-01-29 LAB
BACTERIA UR CULT: NORMAL
BASOPHILS # BLD: 0 K/UL (ref 0–0.2)
BASOPHILS NFR BLD: 0.1 %
EOSINOPHIL # BLD: 0.1 K/UL (ref 0–0.7)
EOSINOPHIL NFR BLD: 0.4 %
ERYTHROCYTE [DISTWIDTH] IN BLOOD BY AUTOMATED COUNT: 13.5 % (ref 11.5–14.5)
GLUCOSE BLD-MCNC: 110 MG/DL (ref 70–99)
GLUCOSE BLD-MCNC: 124 MG/DL (ref 70–99)
GLUCOSE BLD-MCNC: 207 MG/DL (ref 70–99)
GLUCOSE BLD-MCNC: 298 MG/DL (ref 70–99)
HCT VFR BLD AUTO: 34.5 % (ref 37–47)
HGB BLD-MCNC: 11.4 G/DL (ref 12–16)
LYMPHOCYTES # BLD: 1.2 K/UL (ref 1–4.8)
LYMPHOCYTES NFR BLD: 7.9 %
MCH RBC QN AUTO: 29.5 PG (ref 27–31.3)
MCHC RBC AUTO-ENTMCNC: 33 % (ref 33–37)
MCV RBC AUTO: 89.1 FL (ref 79.4–94.8)
MONOCYTES # BLD: 1.3 K/UL (ref 0.2–0.8)
MONOCYTES NFR BLD: 9.1 %
NEUTROPHILS # BLD: 12.1 K/UL (ref 1.4–6.5)
NEUTS SEG NFR BLD: 81.6 %
PERFORMED ON: ABNORMAL
PLATELET # BLD AUTO: 444 K/UL (ref 130–400)
RBC # BLD AUTO: 3.87 M/UL (ref 4.2–5.4)
WBC # BLD AUTO: 14.8 K/UL (ref 4.8–10.8)

## 2024-01-29 PROCEDURE — 99232 SBSQ HOSP IP/OBS MODERATE 35: CPT | Performed by: PHYSICAL MEDICINE & REHABILITATION

## 2024-01-29 PROCEDURE — 2580000003 HC RX 258: Performed by: PHYSICAL MEDICINE & REHABILITATION

## 2024-01-29 PROCEDURE — 2700000000 HC OXYGEN THERAPY PER DAY

## 2024-01-29 PROCEDURE — 97112 NEUROMUSCULAR REEDUCATION: CPT

## 2024-01-29 PROCEDURE — 92526 ORAL FUNCTION THERAPY: CPT

## 2024-01-29 PROCEDURE — 97116 GAIT TRAINING THERAPY: CPT

## 2024-01-29 PROCEDURE — 6370000000 HC RX 637 (ALT 250 FOR IP)

## 2024-01-29 PROCEDURE — 94761 N-INVAS EAR/PLS OXIMETRY MLT: CPT

## 2024-01-29 PROCEDURE — 6370000000 HC RX 637 (ALT 250 FOR IP): Performed by: FAMILY MEDICINE

## 2024-01-29 PROCEDURE — 97535 SELF CARE MNGMENT TRAINING: CPT

## 2024-01-29 PROCEDURE — 97129 THER IVNTJ 1ST 15 MIN: CPT

## 2024-01-29 PROCEDURE — 36415 COLL VENOUS BLD VENIPUNCTURE: CPT

## 2024-01-29 PROCEDURE — 97530 THERAPEUTIC ACTIVITIES: CPT

## 2024-01-29 PROCEDURE — 85025 COMPLETE CBC W/AUTO DIFF WBC: CPT

## 2024-01-29 PROCEDURE — 92507 TX SP LANG VOICE COMM INDIV: CPT

## 2024-01-29 PROCEDURE — 94640 AIRWAY INHALATION TREATMENT: CPT

## 2024-01-29 PROCEDURE — 1180000000 HC REHAB R&B

## 2024-01-29 PROCEDURE — 99231 SBSQ HOSP IP/OBS SF/LOW 25: CPT | Performed by: NURSE PRACTITIONER

## 2024-01-29 PROCEDURE — 6360000002 HC RX W HCPCS: Performed by: PHYSICAL MEDICINE & REHABILITATION

## 2024-01-29 PROCEDURE — 97110 THERAPEUTIC EXERCISES: CPT

## 2024-01-29 PROCEDURE — 6370000000 HC RX 637 (ALT 250 FOR IP): Performed by: PHYSICAL MEDICINE & REHABILITATION

## 2024-01-29 RX ADMIN — ALBUTEROL SULFATE 2 PUFF: 90 AEROSOL, METERED RESPIRATORY (INHALATION) at 17:27

## 2024-01-29 RX ADMIN — INSULIN LISPRO 5 UNITS: 100 INJECTION, SOLUTION INTRAVENOUS; SUBCUTANEOUS at 12:26

## 2024-01-29 RX ADMIN — Medication 5 MG: at 20:07

## 2024-01-29 RX ADMIN — ALBUTEROL SULFATE 2 PUFF: 90 AEROSOL, METERED RESPIRATORY (INHALATION) at 04:16

## 2024-01-29 RX ADMIN — ESCITALOPRAM OXALATE 15 MG: 10 TABLET ORAL at 09:01

## 2024-01-29 RX ADMIN — Medication 2000 UNITS: at 17:43

## 2024-01-29 RX ADMIN — INSULIN LISPRO 5 UNITS: 100 INJECTION, SOLUTION INTRAVENOUS; SUBCUTANEOUS at 09:01

## 2024-01-29 RX ADMIN — METOPROLOL TARTRATE 25 MG: 25 TABLET, FILM COATED ORAL at 09:01

## 2024-01-29 RX ADMIN — ROSUVASTATIN CALCIUM 40 MG: 40 TABLET, FILM COATED ORAL at 20:07

## 2024-01-29 RX ADMIN — INSULIN LISPRO 1 UNITS: 100 INJECTION, SOLUTION INTRAVENOUS; SUBCUTANEOUS at 12:26

## 2024-01-29 RX ADMIN — CEFTRIAXONE SODIUM 1000 MG: 1 INJECTION, POWDER, FOR SOLUTION INTRAMUSCULAR; INTRAVENOUS at 17:49

## 2024-01-29 RX ADMIN — Medication 100 MG: at 09:01

## 2024-01-29 RX ADMIN — METOPROLOL TARTRATE 25 MG: 25 TABLET, FILM COATED ORAL at 20:12

## 2024-01-29 RX ADMIN — INSULIN LISPRO 2 UNITS: 100 INJECTION, SOLUTION INTRAVENOUS; SUBCUTANEOUS at 09:01

## 2024-01-29 RX ADMIN — INSULIN GLARGINE 10 UNITS: 100 INJECTION, SOLUTION SUBCUTANEOUS at 20:08

## 2024-01-29 RX ADMIN — INSULIN LISPRO 5 UNITS: 100 INJECTION, SOLUTION INTRAVENOUS; SUBCUTANEOUS at 17:43

## 2024-01-29 RX ADMIN — BUDESONIDE AND FORMOTEROL FUMARATE DIHYDRATE 2 PUFF: 80; 4.5 AEROSOL RESPIRATORY (INHALATION) at 17:28

## 2024-01-29 RX ADMIN — BUDESONIDE AND FORMOTEROL FUMARATE DIHYDRATE 2 PUFF: 80; 4.5 AEROSOL RESPIRATORY (INHALATION) at 04:16

## 2024-01-29 ASSESSMENT — PAIN SCALES - GENERAL: PAINLEVEL_OUTOF10: 0

## 2024-01-29 NOTE — PROGRESS NOTES
Physical Therapy Missed Treatment   Facility/Department: [facility] R243/R243-01    NAME: Mulu Pandya    : 1955 (68 y.o.)  MRN: [id number]    Account: [id number]  Gender: female      Make up minutes attempted this PM @ 1330. Pt in room with SLP.      Tiffany Carrington PT, 24 at 1:37 PM

## 2024-01-29 NOTE — PLAN OF CARE
Problem: Safety - Adult  Goal: Free from fall injury  1/28/2024 2247 by Mary Darden, RN  Outcome: Progressing     Problem: Discharge Planning  Goal: Discharge to home or other facility with appropriate resources  Outcome: Progressing     Problem: Skin/Tissue Integrity  Goal: Absence of new skin breakdown  Description: 1.  Monitor for areas of redness and/or skin breakdown  2.  Assess vascular access sites hourly  3.  Every 4-6 hours minimum:  Change oxygen saturation probe site  4.  Every 4-6 hours:  If on nasal continuous positive airway pressure, respiratory therapy assess nares and determine need for appliance change or resting period.  1/28/2024 2247 by Mary Darden, RN  Outcome: Progressing  1/28/2024 1748 by Caryn Mcgowan, RN  Outcome: Progressing

## 2024-01-29 NOTE — FLOWSHEET NOTE
0718: This nurse was notified by the night rehab that patient was shaking uncontrollably. This nurse elevated the patient's head of bed. This nurse checked glucose level 132, /87, Pulse 128. I notified NP. Metoprolol was given.    0747: Patient felt better and no shaking.     0835: Assessment Completed. AXOX4. Medication given. Patient denies pain. LBM 1/28. Incontinent of bladder and bowel. Patient in bed, awake, call light within reach qand bed alarm on.

## 2024-01-29 NOTE — PROGRESS NOTES
[facility]  Occupational Therapy      NAME:  Mulu Pandya  ROOM: R243/R243-01  :  1955  DATE: 2024    Attempted to see Mulu Pandya on this date for 60 minute session for   []  Initial Evaluation   [x]  Scheduled therapy    []  Make-up therapy   []  Group therapy   []  Family training    Patient was unable to be seen due to:   [] Off unit for testing/procedure   [x] Patient refused, stating \" I'm exhausted.\"   [] Therapy on hold due to     [] Nursing deferred due to    [] Other:      Pt was laying supine with O2 in place in bed upon entering room. Pt refused tx due to being exhausted from PT session. Pt was encouraged to participate in tx and was offered tx at bedside level. Pt refused tx. When replying to therapist pt spoke softly. Pt was offered to be provided with anything else for comfort. Pt denied the need. Will attempt to see pt again if able.    Electronically signed by ABIGAIL Sanchez on 24 at 11:41 AM EST

## 2024-01-29 NOTE — PROGRESS NOTES
Assessment completed. A&O x4. Denies pain at this time. No c/o nausea. Denies SOB. 2L of O2 via NC. In bed with alarm activated. Call light in reach. Electronically signed by Madie Avelar LPN on 1/29/2024 at 2:47 PM

## 2024-01-29 NOTE — PROGRESS NOTES
01/29/24 0400   RT Protocol   History Pulmonary Disease 2   Respiratory pattern 0   Breath sounds 4   Cough 0   Bronchodilator Assessment Score 6

## 2024-01-29 NOTE — PROGRESS NOTES
[facility]  Occupational Therapy      NAME:  Mulu Pandya  ROOM: R243/R243-01  :  1955  DATE: 2024    Attempted to see Mulu Pandya on this date for 60 minute session for   []  Initial Evaluation   []  Scheduled therapy    [x]  Make-up therapy   []  Group therapy   []  Family training    Patient was unable to be seen due to:   [] Off unit for testing/procedure   [] Patient refused, stating \"    [] Therapy on hold due to     [] Nursing deferred due to    [x] Other:  Pt observed to have family visit during session. Pt encouraged to participate in therapy session. Pt stated \"no\" several times and shook her head. Pt asked if she wanted anything for comfort. Pt declined. After leaving pt room her  came into therapy room and told therapist that she preferred a female therapist to do her ADLs with.      Electronically signed by ABIGAIL Sanchez on 24 at 2:29 PM EST

## 2024-01-29 NOTE — PROGRESS NOTES
OCCUPATIONAL THERAPY  INPATIENT REHAB TREATMENT NOTE  [facility]      NAME: Mulu Pandya  : 1955 (68 y.o.)  MRN: [id number]  CODE STATUS: Full Code  Room: R243/R243-01    Date of Service: 2024    Referring Physician: Dr Stacy  Rehab Diagnosis: Impaired mobility and ADL's due to acute infart in the left superior mid parietal lobe MCA territory    Hospital course:          Restrictions  Restrictions/Precautions  Restrictions/Precautions: Fall Risk, Seizure               Patient's date of birth confirmed: Yes    SAFETY:  Safety Devices  Safety Devices in place: Yes  Type of devices: All fall risk precautions in place    SUBJECTIVE:       Pain at start of treatment: No    Pain at end of treatment: No      COGNITION:  Orientation  Orientation Level: Oriented X4  Cognition  Overall Cognitive Status: Exceptions  Arousal/Alertness: Appropriate responses to stimuli  Following Commands: Follows one step commands consistently  Attention Span: Appears intact  Memory: Appears intact  Safety Judgement: Decreased awareness of need for assistance  Initiation: Requires cues for some  Sequencing: Requires cues for some      OBJECTIVE:     Provided card sequencing activity supine in bed with HOB elevated.  Pt adis card off pile and placed in appropriate place according to suit amongst 4 cards of each suit laid out.  Patient was able to place the cards with 100% accuracy.  Pt then was instructed to place each suit pile in correct numeric order and was able to do so with Min  A. Pt provided with suggestion for use of RUE d/t deficits with movement and  to stabilize cards and use as able during task to improve functional use and incorporate BUE integration.  Pt able to cog sequence but had some cards out of order, allowing pt complete task again in 1 pile with assisting pt by holding cards already placed in order d/t getting cards slightly mixed up attempting to place in order with 1 hand.  Pt completed

## 2024-01-29 NOTE — PROGRESS NOTES
[facility]      NAME:  Mulu Pandya  ROOM: R243/R243-01  :  1955  DATE: 2024    Attempted to see Mulu Pandya on this date for:   []  Initial Evaluation   []  Scheduled therapy    [x]  Make-up therapy (36min)   []  Group therapy   []  Family training    Patient was unable to be seen due to:  [x] Attempted to make up missed time as pt is day 7 and 36 minutes short of 900 minutes. Patient refused, stating \"No, I'm tired\". Attempted to encourage pt in bed level activities, pt continues to politely decline.           Electronically signed by TAQUERIA HORTON PTA on 24 at 4:29 PM EST

## 2024-01-29 NOTE — PROGRESS NOTES
with situational adjustment disorder:  prn Ambien, monitor for day time sedation.  Falls risk elevated:  patient to use call light to get nursing assistance to get up, bed and chair alarm.  Elevated DVT risk: progressive activities in PT, continue prophylaxis ARAMIS hose, elevation and meds-see MAR.   Oral pharyngeal dysphagia-up in a degrees of feeds-modified diet as needed.Discussed with speech and language pathology.  Bedside swallow reviewed patient okay to be upgraded to regular with thins with strict monitoring up at 90 degrees no straws.  Complex discharge planning: Discharge 2/10/2024 home with her  and home health care.  Until then continue weekly team meeting every Thursday to re-assess progress towards goals, discuss and address social, psychological and medical comorbidities and to address difficulties they may be having progressing in therapy.  Patient and family education is in progress.  The patient is to follow-up with their family physician after discharge.        Complex Active General Medical Issues that complicate care Assess & Plan:     Type 2 diabetes mellitus with chronic kidney disease-Continue blood sugar checks every shift, diet, add diabetic add dietary education, restrict carbohydrates to lowest effective and safe carb count per meal advising 4 carbs per meal, add at bedtime snack to prevent a.m. hypoglycemia, adjust/add medications.  Chronic insomnia, depression, anxiety-emotional support provided daily, vitamin B12, encourage participation in rehabilitation support group and recreational therapy, adjust/add medications.  Acute exacerbation of COPD (chronic obstructive pulmonary disease) with acute bronchitis-Acute rehab for endurance traing with Pulse Ox to monitoring oxygen saturation and heart rate with O2 titration to lowest effective dose. Pulse oximeter checks to shift and at HS to dose and titrate oxygen and aerosol treatments monitor for nocturnal hypoxemia, monitor vital  signs, oxygen prn.  Focus on energy conservation-IV Rocephin    Mixed hyperlipidemia,  Essential hypertension-Acute rehab to monitor heart rate and rhythm with the option of telemetry and the effects of chronotropic medication with respect to increasing physical activity and exercise in PT, OT, ADLs with medication titration to lowest effective dosing.  Continue blood signs every shift focusing on heart rate, rhythm and blood pressure checks with orthostatic checks-monitoring the effect of exercise, therapy and posture.  Consult hospitalist for backup medical and adjust/add medications.  Monitor heart rate and blood pressure as well as medications effects on vital signs before during and after therapy with especial focus on preventing orthostasis and falls risk.    Chronic renal failure, stage 3a-Eliminate toxic medications, monitor I's and O's focusing on urine output, recheck BMP.    Intracranial hemorrhage sp acute infarction in the left superior mid parietal lobe MCA territory    Cerebral infarction due to occlusion of left middle cerebral artery with 2 cm acute intraparenchymal left frontal lobe hematoma-mitigate stroke risk factors and consult neurology    Dysphagia, oropharyngeal phase--SLP  Tobacco addiction-NicoDerm titration stop smoking counseling    Late effects of CVA (cerebrovascular accident)-stroke risk mitigation    Urinary incontinence-UA/Culture from AMEE and sent to lab via tube system  Active chronic nausea and vomiting-IV fluids as needed as needed Zofran consult medical consider GI consult monitor for GI cause versus central cause-scatter a.m. medications Zofran versus Compazine as needed     Focus on endurance, activity pacing, reassessing rehab goals and discharge planning.          Electronically signed by Jayne Stacy DO on 1/25/24 at 8:25 AM BLANCA Stacy D.O., PM&R     Attending    [phone]   [facility]

## 2024-01-29 NOTE — PROGRESS NOTES
Physical Therapy Rehab Treatment Note  [facility]  [facility]       NAME: Mulu Pandya  : 1955 (68 y.o.)  MRN: [id number]  CODE STATUS: Full Code    Date of Service: 2024     Restrictions:  Restrictions/Precautions: Fall Risk, Seizure     SUBJECTIVE:   Subjective: Pt states she is ok.    Pain  Pain: Denies pre and post session pain.    OBJECTIVE:         Bed mobility  Rolling to Left: Stand by assistance  Rolling to Right: Stand by assistance  Supine to Sit: Minimal assistance  Sit to Supine: Stand by assistance  Bed Mobility Comments: VCs for sequencing. Increased time and effort to complete.    Transfers  Sit to Stand: Stand by assistance  Stand to Sit: Stand by assistance  Bed to Chair: Stand by assistance  Comment: VCs for handplacement to improve safety and technique.    Ambulation  Surface: Level tile  Device: Rolling Walker  Other Apparatus: O2 (2L)  Assistance: Stand by assistance  Gait Deviations: Slow Vianey;Decreased step length;Decreased step height  Distance: 30ft  Comments: SPO2 94% HR 108BPM at rest.  SPO2 94% HR 120BPM after gait.           PT Exercises  Exercise Treatment: SPO2 97% HR 101bpm with seated/supine therex.  PROM Exercises: Manual gastroc stretchng [id number] B  A/AROM Exercises: supine bridges x10, hooklying hip add ball/abd RTB x20, SAQ x20, hooklying march x10; seated AP, LAQ, march x10 ea.  Functional Mobility Circuit Training: Scooting EOM laterally/fwd/retro  Vcs for sequencing.             ASSESSMENT/PROGRESS TOWARDS GOALS:   Assessment: Improved participation this AM.  Pt requires increased time and Vcs to complete mobility.  Fatigues quickly with elevated HR with gait.    Goals:  Long Term Goals  Long Term Goal 1: Pt to complete bed mobility with supervision  Long Term Goal 2: Pt to complete transfers with SBA  Long Term Goal 3: Pt to ambulate 50ft with LRD and SBA  Long Term Goal 4: Pt to complete 3 steps with HR and Asuncion    PLAN OF

## 2024-01-29 NOTE — PROGRESS NOTES
[facility]  Facility/Department: [facility]  Speech Language Pathology   Treatment Note          Mulu Pandya  1955  R243/R243-01  [x]   confirmed    Date: 2024      Restrictions/Precautions: Fall Risk, Seizure     ADULT DIET; Regular  ADULT ORAL NUTRITION SUPPLEMENT; Breakfast, Lunch, Dinner; Diabetic Oral Supplement     Respiratory Status: O2 Flow Rate (L/min): 2 L/min (24 0710)   No active isolations    Rehab Diagnosis: Impaired mobility and ADL's due to acute infart in the left superior mid parietal lobe MCA territory     Subjective:  Alert and Cooperative     Flat affect with decreased speech initiation, given one word responses. Spouse observed the session.     Interventions used this date:  Speech Production, Expressive Language, Dysphagia Treatment, and Oral Motor Treatment    Objective/Assessment:  Patient progressing towards goals:  Short Term Goals  Goal 1: Patient will read aloud and/or generate phrases-sentences using slow rate and over-articulation to promote speech intelligibility during structured tasks with SLP so that they may functionally communicate and express safety/medical concerns across all environments with 90% accuracy.  Goal 2: To decrease the presence of apraxia of speech and improve articulatory accuracy to adequately express wants and needs, the pt will complete structured articulation tasks following SLP model with min cues-fading cues for articulatory placement in 90% of opportunities with 1-3 syllables words at the phrase-sentence level.  Pt was able to produce 1-3 syllable words at the phrase-sentence level with 64% accuracy increasing to 78% given a model and min-mod cueing. Pt self corrected at times. As noted, pt was consistent with the following substitution errors (I.e. m/n, t/k, and had difficulties producing /f/). However, pt voice was judged to be louder with improved intelligibility compared to when last seen by therapist.    Goal 3: Pt will answer  Individual Minutes  Time In: 1320  Time Out: 1400  Minutes: 40      Dysphagia: 10  Speech: 30  Session started late due to assisting patient to bathroom and need for nursing care.      Signature: Electronically signed by Chhaya Lui on 1/29/2024 at 3:35 PM

## 2024-01-29 NOTE — PROGRESS NOTES
Physical Therapy Rehab Treatment Note  [facility]  [facility]       NAME: Mulu Pandya  : 1955 (68 y.o.)  MRN: [id number]  CODE STATUS: Full Code    Date of Service: 2024     Restrictions:  Restrictions/Precautions: Fall Risk, Seizure     SUBJECTIVE:   Subjective: Pt states she is ok.    Pain  Pain: Denies pre and post session pain.    OBJECTIVE:         Bed mobility  Rolling to Left: Stand by assistance  Rolling to Right: Stand by assistance  Supine to Sit: Minimal assistance  Sit to Supine: Stand by assistance  Bed Mobility Comments: VCs for sequencing. Increased time and effort to complete.    Transfers  Sit to Stand: Stand by assistance  Stand to Sit: Stand by assistance  Bed to Chair: Stand by assistance  Comment: VCs for handplacement to improve safety and technique.    Ambulation  Surface: Level tile  Device: Rolling Walker  Other Apparatus: O2  Assistance: Stand by assistance  Quality of Gait: VCs to ambu;ate within KRISHNA of WW with upright posture.  Gait Deviations: Slow Vianey;Decreased step length;Decreased step height  Distance: 30ft  Comments: SPO2 97% HR 118BPM after gait.           PT Exercises  PROM Exercises: Manual gastroc stretchng [id number] B  A/AROM Exercises: supine AP, ankle circles CW/CCW, heel slides, hip abd, SAQ 2x10 ea  Functional Mobility Circuit Training: Scooting EOM laterally/fwd/retro  Vcs for sequencing.        Education  Education Provided Comments: Increasing time spent out of bed.  Encouraged ot to eat meals in chair.    ASSESSMENT/PROGRESS TOWARDS GOALS:     Assessment: Max encouragement to participate.  Pt ambulated to door and back and declined further out of bed treatment.  Pt with increased SOB and elevated HR to 118BPM after gait.  NP notified.    Goals:  Long Term Goals  Long Term Goal 1: Pt to complete bed mobility with supervision  Long Term Goal 2: Pt to complete transfers with SBA  Long Term Goal 3: Pt to ambulate 50ft with LRD

## 2024-01-30 ENCOUNTER — APPOINTMENT (OUTPATIENT)
Dept: GENERAL RADIOLOGY | Age: 69
End: 2024-01-30
Attending: PHYSICAL MEDICINE & REHABILITATION
Payer: MEDICARE

## 2024-01-30 LAB
BASOPHILS # BLD: 0.1 K/UL (ref 0–0.2)
BASOPHILS NFR BLD: 0.2 %
EOSINOPHIL # BLD: 0 K/UL (ref 0–0.7)
EOSINOPHIL NFR BLD: 0 %
ERYTHROCYTE [DISTWIDTH] IN BLOOD BY AUTOMATED COUNT: 13.3 % (ref 11.5–14.5)
GLUCOSE BLD-MCNC: 132 MG/DL (ref 70–99)
GLUCOSE BLD-MCNC: 176 MG/DL (ref 70–99)
GLUCOSE BLD-MCNC: 179 MG/DL (ref 70–99)
GLUCOSE BLD-MCNC: 280 MG/DL (ref 70–99)
HCT VFR BLD AUTO: 36.7 % (ref 37–47)
HGB BLD-MCNC: 11.9 G/DL (ref 12–16)
INFLUENZA A BY PCR: NEGATIVE
INFLUENZA B BY PCR: NEGATIVE
LACTATE BLDV-SCNC: 1.3 MMOL/L (ref 0.5–2.2)
LYMPHOCYTES # BLD: 0.4 K/UL (ref 1–4.8)
LYMPHOCYTES NFR BLD: 1 %
MCH RBC QN AUTO: 29 PG (ref 27–31.3)
MCHC RBC AUTO-ENTMCNC: 32.4 % (ref 33–37)
MCV RBC AUTO: 89.5 FL (ref 79.4–94.8)
MONOCYTES # BLD: 0.7 K/UL (ref 0.2–0.8)
MONOCYTES NFR BLD: 1.8 %
NEUTROPHILS # BLD: 34.5 K/UL (ref 1.4–6.5)
NEUTS SEG NFR BLD: 95.3 %
PERFORMED ON: ABNORMAL
PLATELET # BLD AUTO: 426 K/UL (ref 130–400)
PLATELET BLD QL SMEAR: ADEQUATE
PROCALCITONIN SERPL IA-MCNC: 78.35 NG/ML (ref 0–0.15)
RBC # BLD AUTO: 4.1 M/UL (ref 4.2–5.4)
SARS-COV-2 RDRP RESP QL NAA+PROBE: NOT DETECTED
SLIDE REVIEW: ABNORMAL
WBC # BLD AUTO: 36.2 K/UL (ref 4.8–10.8)

## 2024-01-30 PROCEDURE — 87040 BLOOD CULTURE FOR BACTERIA: CPT

## 2024-01-30 PROCEDURE — 81001 URINALYSIS AUTO W/SCOPE: CPT

## 2024-01-30 PROCEDURE — 99233 SBSQ HOSP IP/OBS HIGH 50: CPT | Performed by: PHYSICAL MEDICINE & REHABILITATION

## 2024-01-30 PROCEDURE — 85025 COMPLETE CBC W/AUTO DIFF WBC: CPT

## 2024-01-30 PROCEDURE — 94640 AIRWAY INHALATION TREATMENT: CPT

## 2024-01-30 PROCEDURE — 6360000002 HC RX W HCPCS: Performed by: REGISTERED NURSE

## 2024-01-30 PROCEDURE — 97535 SELF CARE MNGMENT TRAINING: CPT

## 2024-01-30 PROCEDURE — 71046 X-RAY EXAM CHEST 2 VIEWS: CPT

## 2024-01-30 PROCEDURE — 6360000002 HC RX W HCPCS: Performed by: PHYSICAL MEDICINE & REHABILITATION

## 2024-01-30 PROCEDURE — 97530 THERAPEUTIC ACTIVITIES: CPT

## 2024-01-30 PROCEDURE — 6370000000 HC RX 637 (ALT 250 FOR IP): Performed by: PHYSICAL MEDICINE & REHABILITATION

## 2024-01-30 PROCEDURE — 2580000003 HC RX 258: Performed by: PHYSICAL MEDICINE & REHABILITATION

## 2024-01-30 PROCEDURE — 97112 NEUROMUSCULAR REEDUCATION: CPT

## 2024-01-30 PROCEDURE — 2580000003 HC RX 258: Performed by: REGISTERED NURSE

## 2024-01-30 PROCEDURE — 84145 PROCALCITONIN (PCT): CPT

## 2024-01-30 PROCEDURE — 83605 ASSAY OF LACTIC ACID: CPT

## 2024-01-30 PROCEDURE — 97116 GAIT TRAINING THERAPY: CPT

## 2024-01-30 PROCEDURE — 1180000000 HC REHAB R&B

## 2024-01-30 PROCEDURE — 92507 TX SP LANG VOICE COMM INDIV: CPT

## 2024-01-30 PROCEDURE — 36415 COLL VENOUS BLD VENIPUNCTURE: CPT

## 2024-01-30 PROCEDURE — 99223 1ST HOSP IP/OBS HIGH 75: CPT | Performed by: INTERNAL MEDICINE

## 2024-01-30 PROCEDURE — 2500000003 HC RX 250 WO HCPCS: Performed by: PHYSICAL MEDICINE & REHABILITATION

## 2024-01-30 PROCEDURE — 6370000000 HC RX 637 (ALT 250 FOR IP): Performed by: FAMILY MEDICINE

## 2024-01-30 PROCEDURE — 87635 SARS-COV-2 COVID-19 AMP PRB: CPT

## 2024-01-30 PROCEDURE — 99222 1ST HOSP IP/OBS MODERATE 55: CPT | Performed by: INTERNAL MEDICINE

## 2024-01-30 PROCEDURE — 87502 INFLUENZA DNA AMP PROBE: CPT

## 2024-01-30 PROCEDURE — 6370000000 HC RX 637 (ALT 250 FOR IP)

## 2024-01-30 RX ORDER — DEXTROSE MONOHYDRATE, SODIUM CHLORIDE, AND POTASSIUM CHLORIDE 50; 1.49; 9 G/1000ML; G/1000ML; G/1000ML
INJECTION, SOLUTION INTRAVENOUS CONTINUOUS
Status: DISCONTINUED | OUTPATIENT
Start: 2024-01-30 | End: 2024-01-30

## 2024-01-30 RX ORDER — 0.9 % SODIUM CHLORIDE 0.9 %
1000 INTRAVENOUS SOLUTION INTRAVENOUS ONCE
Status: COMPLETED | OUTPATIENT
Start: 2024-01-30 | End: 2024-01-30

## 2024-01-30 RX ORDER — SODIUM CHLORIDE 9 MG/ML
INJECTION, SOLUTION INTRAVENOUS CONTINUOUS
Status: DISPENSED | OUTPATIENT
Start: 2024-01-30 | End: 2024-01-31

## 2024-01-30 RX ADMIN — Medication 5 MG: at 21:46

## 2024-01-30 RX ADMIN — INSULIN LISPRO 5 UNITS: 100 INJECTION, SOLUTION INTRAVENOUS; SUBCUTANEOUS at 17:30

## 2024-01-30 RX ADMIN — METOPROLOL TARTRATE 25 MG: 25 TABLET, FILM COATED ORAL at 09:00

## 2024-01-30 RX ADMIN — POTASSIUM CHLORIDE, DEXTROSE MONOHYDRATE AND SODIUM CHLORIDE: 150; 5; 900 INJECTION, SOLUTION INTRAVENOUS at 09:58

## 2024-01-30 RX ADMIN — SODIUM CHLORIDE: 9 INJECTION, SOLUTION INTRAVENOUS at 21:55

## 2024-01-30 RX ADMIN — INSULIN GLARGINE 10 UNITS: 100 INJECTION, SOLUTION SUBCUTANEOUS at 21:30

## 2024-01-30 RX ADMIN — BUDESONIDE AND FORMOTEROL FUMARATE DIHYDRATE 2 PUFF: 80; 4.5 AEROSOL RESPIRATORY (INHALATION) at 16:04

## 2024-01-30 RX ADMIN — SODIUM CHLORIDE: 9 INJECTION, SOLUTION INTRAVENOUS at 15:38

## 2024-01-30 RX ADMIN — Medication 2000 UNITS: at 17:30

## 2024-01-30 RX ADMIN — ESCITALOPRAM OXALATE 15 MG: 10 TABLET ORAL at 09:00

## 2024-01-30 RX ADMIN — INSULIN LISPRO 5 UNITS: 100 INJECTION, SOLUTION INTRAVENOUS; SUBCUTANEOUS at 12:05

## 2024-01-30 RX ADMIN — Medication 100 MG: at 09:00

## 2024-01-30 RX ADMIN — AZITHROMYCIN DIHYDRATE 500 MG: 500 INJECTION, POWDER, LYOPHILIZED, FOR SOLUTION INTRAVENOUS at 15:39

## 2024-01-30 RX ADMIN — CEFTRIAXONE SODIUM 1000 MG: 1 INJECTION, POWDER, FOR SOLUTION INTRAMUSCULAR; INTRAVENOUS at 18:21

## 2024-01-30 RX ADMIN — SODIUM CHLORIDE 1000 ML: 9 INJECTION, SOLUTION INTRAVENOUS at 17:32

## 2024-01-30 RX ADMIN — INSULIN LISPRO 2 UNITS: 100 INJECTION, SOLUTION INTRAVENOUS; SUBCUTANEOUS at 12:05

## 2024-01-30 RX ADMIN — DOXYCYCLINE 100 MG: 100 INJECTION, POWDER, LYOPHILIZED, FOR SOLUTION INTRAVENOUS at 10:00

## 2024-01-30 RX ADMIN — ALBUTEROL SULFATE 2 PUFF: 90 AEROSOL, METERED RESPIRATORY (INHALATION) at 16:04

## 2024-01-30 RX ADMIN — ROSUVASTATIN CALCIUM 40 MG: 40 TABLET, FILM COATED ORAL at 21:46

## 2024-01-30 NOTE — PROGRESS NOTES
Physical Therapy Missed Treatment   Facility/Department: [facility] R243/R243-01    NAME: Mulu Pandya    : 1955 (68 y.o.)  MRN: [id number]    Account: [id number]  Gender: female      30 min PT missed.  RN verbally reports pt is on hold per Dr Stacy d/t diony.         Melissa Swan, PTA, 24 at 10:02 AM

## 2024-01-30 NOTE — PLAN OF CARE
Problem: Safety - Adult  Goal: Free from fall injury  Outcome: Progressing     Problem: Discharge Planning  Goal: Discharge to home or other facility with appropriate resources  Outcome: Progressing     Problem: Skin/Tissue Integrity  Goal: Absence of new skin breakdown  Description: 1.  Monitor for areas of redness and/or skin breakdown  2.  Assess vascular access sites hourly  3.  Every 4-6 hours minimum:  Change oxygen saturation probe site  4.  Every 4-6 hours:  If on nasal continuous positive airway pressure, respiratory therapy assess nares and determine need for appliance change or resting period.  Outcome: Progressing     Problem: Neurosensory - Adult  Goal: Achieves stable or improved neurological status  Outcome: Progressing     Problem: Skin/Tissue Integrity - Adult  Goal: Skin integrity remains intact  Outcome: Progressing

## 2024-01-30 NOTE — PLAN OF CARE
Problem: Safety - Adult  Goal: Free from fall injury  Outcome: Progressing     Problem: Discharge Planning  Goal: Discharge to home or other facility with appropriate resources  Outcome: Progressing     Problem: Skin/Tissue Integrity  Goal: Absence of new skin breakdown  Description: 1.  Monitor for areas of redness and/or skin breakdown  2.  Assess vascular access sites hourly  3.  Every 4-6 hours minimum:  Change oxygen saturation probe site  4.  Every 4-6 hours:  If on nasal continuous positive airway pressure, respiratory therapy assess nares and determine need for appliance change or resting period.  Outcome: Progressing     Problem: Neurosensory - Adult  Goal: Achieves stable or improved neurological status  Outcome: Progressing  Goal: Achieves maximal functionality and self care  Outcome: Progressing     Problem: Skin/Tissue Integrity - Adult  Goal: Skin integrity remains intact  Outcome: Progressing     Problem: Musculoskeletal - Adult  Goal: Return mobility to safest level of function  Outcome: Progressing  Goal: Maintain proper alignment of affected body part  Outcome: Progressing  Goal: Return ADL status to a safe level of function  Outcome: Progressing     Problem: Gastrointestinal - Adult  Goal: Maintains or returns to baseline bowel function  Outcome: Progressing  Goal: Maintains adequate nutritional intake  Outcome: Progressing     Problem: Metabolic/Fluid and Electrolytes - Adult  Goal: Electrolytes maintained within normal limits  Outcome: Progressing  Goal: Glucose maintained within prescribed range  Outcome: Progressing     Problem: Nutrition Deficit:  Goal: Optimize nutritional status  Outcome: Progressing     Problem: Chronic Conditions and Co-morbidities  Goal: Patient's chronic conditions and co-morbidity symptoms are monitored and maintained or improved  Outcome: Progressing

## 2024-01-30 NOTE — PROGRESS NOTES
OCCUPATIONAL THERAPY  INPATIENT REHAB TREATMENT NOTE  [facility]      NAME: Muul Pandya  : 1955 (68 y.o.)  MRN: [id number]  CODE STATUS: Full Code  Room: [facility]/R243-01    Date of Service: 2024    Referring Physician: Dr Stacy  Rehab Diagnosis: Impaired mobility and ADL's due to acute infart in the left superior mid parietal lobe MCA territory    Hospital course:          Restrictions  Restrictions/Precautions  Restrictions/Precautions: Fall Risk, Seizure                 Patient's date of birth confirmed: Yes    SAFETY:  Safety Devices  Safety Devices in place: Yes  Type of devices: All fall risk precautions in place    SUBJECTIVE:       Pain at start of treatment: No    Pain at end of treatment: No    COGNITION:  Orientation  Overall Orientation Status: Within Functional Limits  Orientation Level: Oriented X4  Cognition  Overall Cognitive Status: Exceptions  Arousal/Alertness: Appropriate responses to stimuli  Following Commands: Follows one step commands consistently  Attention Span: Appears intact  Memory: Appears intact  Safety Judgement: Decreased awareness of need for assistance  Problem Solving: Assistance required to generate solutions  Insights: Fully aware of deficits  Initiation: Requires cues for some  Sequencing: Requires cues for some      OBJECTIVE:         Grooming/Oral Hygiene  Assistance Level: Moderate assistance  Skilled Clinical Factors: needing assist with s/u of toothbrush, TD (A) brushing dentures but able to don/doff them with mod Ind, Mod A to brush hair     Pt completed peg board task seated with hand over hand (TD (A)) for RUE, and SBA for LUE.  Pt demonstrated maxiumum  difficulty d/t RUE impairments of gross motor grasp/fine motor skills therefore provided TD (A) completing a small amount of pegs at a time ~ 4 pegs each time.  Pt demonstrated slight shortness of breath/fatigue placing pegs in.  Pt req extended time to complete d/t decreased functional act

## 2024-01-30 NOTE — PROGRESS NOTES
[facility]  Occupational Therapy      NAME:  Mulu Pandya  ROOM: R243/R243-01  :  1955  DATE: 2024    Attempted to see Mulu Pandya on this date for    []  Initial Evaluation   []  Scheduled therapy    []  Make-up therapy   []  Group therapy   [x]  Get up and go    Patient was unable to be seen due to:   [] Off unit for testing/procedure   [] Patient refused, stating \"    [] Therapy on hold due to     [] Nursing deferred due to    [x] Other:  Pt eating breakfast in bed. Pt declines to get in w/c at this time or a need to use the restroom.       Electronically signed by Vicente Barrett OT on 24 at 8:25 AM EST

## 2024-01-30 NOTE — PROGRESS NOTES
Pt able to eat a few bites for lunch. Covid and flu tests are negative. Dr. Stacy in to see pt, orders received. Pt has oxygen at 2L/min via nasal cannula, denies pain. IVF infusing well, IV doxycyline infusion complete. Plan of care reviewed with patient. Call light in reach. Electronically signed by Liza Portillo RN on 1/30/2024 at 12:23 PM    Pt to bathroom with PT, had a small BM with no urination. Bladder scan shows 261 ml, OT working with pt, will have pt attempt to void after therapy session. Electronically signed by Liza Portillo RN on 1/30/2024 at 3:33 PM    Pt voided on the toilet but urine did not collect in the collection container in place. Urine appears light yellow and hazy. Purewick now in place to collect a specimen, pt has not yet voided again. Call light in reach, IVF infusing. Electronically signed by Liza Portillo RN on 1/30/2024 at 6:43 PM

## 2024-01-30 NOTE — PROGRESS NOTES
[facility] Occupational Therapy      Date: 2024  Patient Name: Mulu Pandya        MRN: [id number]  Account: [id number]   : 1955  (68 y.o.)  Room: [place] [address]    Chart reviewed, Patient not seen 2° to:    Hold per nursing request due to: Per Dr Stacy request for morning therapies     NITZA corrigan. Will attempt again when able.    Electronically signed by ABIGAIL Jones on 2024 at 11:54 AM

## 2024-01-30 NOTE — FLOWSHEET NOTE
Patient assessment complete. Denies pain at this time. On 2L NC O2. Patient on Rocephin q24 hrs until 01/31/24 for UTI/URI. Bed locked and low with alarm engaged. Call light within reach. Electronically signed by Abby Carson RN on 1/29/2024 at 8:14 PM    Patient's heart rate looks like it has consistently been elevated. Will ask Dr. Stacy if cardio consult is needed.    Patient had a incontinent bowel episode. Bed bath given by this nurse and PCA. Full bed change. Electronically signed by Abby Carson RN on 1/30/2024 at 6:58 AM

## 2024-01-30 NOTE — PROGRESS NOTES
[facility] Neurology Daily Progress Note  Name: Mulu Pandya  Age: 68 y.o.  Gender: female  CodeStatus: Full Code  Allergies: Nickel    Chief Complaint:No chief complaint on file.    Primary Care Provider: Eileen Edmondson APRN - NP  InpatientTreatment Team: Treatment Team: Attending Provider: Jayne Stacy DO; Consulting Physician: Shaquille Bauer MD; Consulting Physician: Zheng Chavez MD; Occupational Therapist: Vicente Barrett OT; Registered Nurse: Liza Portillo RN; Occupational Therapist Assistant: Jeri Carter OTA; Consulting Physician: Elliot Harris MD; Consulting Physician: Jin Gill DO; : Anny Yadav MSW, LSW  Admission Date: 1/23/2024      HPI   Pt seen and examined on rehab for neurology follow-up.  Alert.  Patient with some ongoing expressive aphasia.  Right-sided weakness has improved.  No dysarthria.  Denies headache or vision changes.  No dysphagia.  Blood pressure stable.  Tachycardic at times.  Vitals:    01/30/24 0709   BP: (!) 95/58   Pulse: 86   Resp: 17   Temp: 98.6 °F (37 °C)   SpO2: 91%        Review of Systems   Constitutional:  Negative for fatigue and fever.   HENT:  Negative for hearing loss and trouble swallowing.    Eyes:  Negative for visual disturbance.   Respiratory:  Negative for cough, chest tightness, shortness of breath and wheezing.    Cardiovascular:  Negative for chest pain, palpitations and leg swelling.   Gastrointestinal:  Negative for nausea and vomiting.   Musculoskeletal:  Positive for gait problem.   Skin:  Negative for color change and rash.   Neurological:  Positive for speech difficulty and weakness. Negative for dizziness, tremors, seizures, syncope, facial asymmetry, light-headedness, numbness and headaches.   Psychiatric/Behavioral:  Positive for confusion. Negative for agitation and hallucinations. The patient is not nervous/anxious.          Physical Exam  Vitals and nursing note reviewed.   Constitutional:       General:  meningioma measuring 2.4 x 1.6 x 2.1 cm producing moderate mass effect upon the right middle cerebral appendical and right pontomedullary junction.  CTA of the head neck shows occluded left MCA and distal M2 segment.  Developmental venous anomaly present within the left basal ganglia region with nearby intraparenchymal hemorrhage.  No significant carotid stenosis identified.  No antiplatelets or anticoagulation at this time given intracranial hemorrhage.  HDL low at 28, LDL 85  Hemoglobin A1c 8.0  Blood pressure currently stable  GUSTABO negative  Neurosurgery recommends follow-up MRI of the brain with and without contrast to further evaluate right CP angle mass, as an outpatient when renal function has improved. No surgical intervention required at this time.   Blood pressure stable  Will need ongoing attention to risk factor modification  Continue PT/OT/ST.      Collaborating physicians: Dr Chavez    Electronically signed by BALTAZAR Toro CNP on 1/30/2024 at 2:34 PM

## 2024-01-30 NOTE — CONSULTS
Patient: Mulu Pandya  Unit/Bed: W268/W268-01  YOB: 1955  MRN: [id number]  Acct: [id number]   Admitting Diagnosis: Intracranial hemorrhage (HCC) [I62.9]  Basal ganglia hemorrhage (HCC) [I61.0]  Cerebral infarction due to occlusion of left middle cerebral artery (HCC) [I63.512]  Date:  1/18/2024  Hospital Day: 5     Chief Complaint:  CVA     Subjective   Patient is a 68 y.o. female who presents with a chief complaint of MS change/CVA type symptoms. . Patient is followed on a regular basis by Eileen Capps, BALTAZAR - NP.  Patient with past medical history of diabetes, hypertension, hyperlipidemia, tobacco abuse who presented with CVA type symptoms.  .  No prior cardiac history.  No prior history of atrial fibrillation  Carotid ultrasound is negative for any significant stenosis.   Now seen in rehab.      S/p GUSTABO   No mass or thrombus  No PFO with bubble study  Normal LVF EF of 55%  Mild MR  Mild TR  Mild to mod aorta plaque          Review of Systems:   Review of Systems   Constitutional: Negative.    HENT: Negative.     Eyes: Negative.    Respiratory: Negative.     Cardiovascular: Negative.    Gastrointestinal: Negative.    Endocrine: Negative.    Genitourinary: Negative.    Musculoskeletal: Negative.    Skin: Negative.    Allergic/Immunologic: Negative.    Neurological:  Positive for speech difficulty and weakness.   Hematological: Negative.    Psychiatric/Behavioral: Negative.              Physical Examination:    /70   Pulse 98   Temp 98.2 °F (36.8 °C) (Oral)   Resp 18   Ht 1.6 m (5' 3\")   Wt 68.7 kg (151 lb 7.3 oz)   SpO2 91%   BMI 26.83 kg/m²    Physical Exam  Constitutional:       Appearance: Normal appearance.   Cardiovascular:      Rate and Rhythm: Normal rate and regular rhythm.      Heart sounds: Normal heart sounds.   Pulmonary:      Effort: Pulmonary effort is normal.      Breath sounds: Normal breath sounds.   Abdominal:      General: Bowel sounds are normal.  01/18/2024 08:30 AM     AST 38 01/18/2024 08:30 AM     ALT 37 01/18/2024 08:30 AM      BMP:          Lab Results   Component Value Date/Time      01/23/2024 04:42 AM     K 3.8 01/23/2024 04:42 AM     CL 98 01/23/2024 04:42 AM     CO2 24 01/23/2024 04:42 AM     BUN 22 01/23/2024 04:42 AM     LABALBU 2.4 01/23/2024 04:42 AM     LABALBU 4.4 12/13/2011 10:46 AM     CREATININE 1.06 01/23/2024 04:42 AM     CALCIUM 7.9 01/23/2024 04:42 AM     GFRAA >60.0 07/21/2021 02:01 PM     LABGLOM 56.3 01/23/2024 04:42 AM     GLUCOSE 107 01/23/2024 04:42 AM     GLUCOSE 132 12/13/2011 10:46 AM      Magnesium:          Lab Results   Component Value Date/Time     MG 1.7 01/18/2024 08:30 AM      Troponin:  No results found for: \"TROPONINI\"     Radiology:  No results found.                ASSESSMENT:               Active Hospital Problems     Diagnosis Date Noted    Intracranial hemorrhage (HCC) [I62.9] 01/18/2024       Priority: Low    Cerebral infarction due to occlusion of left middle cerebral artery (HCC) [I63.512] 01/18/2024       Priority: Low      Acute CVA. S/P NEGATIVE GUSTABO.   Hypertension  Tobacco abuse  Diabetes mellitus  Hyperlipidemia     PLAN:   As always, aggressive risk factor modification is strongly recommended. We should adhere to the JNC VIII guidelines for HTN management and the NCEPATP III guidelines for LDL-C management.  Recommend event monitor/implantable loop recorder in near future.  Patient high risk for atrial fibrillation.  Coronary evaluation future as outpatient.  Check periodic EKG  GI/DVT prophylaxis       Electronically signed by Jin Gill DO on 1/30/2024 at 3:54 PM

## 2024-01-30 NOTE — PROGRESS NOTES
[facility]  Facility/Department: [facility]  Speech Language Pathology   Treatment Note          Mulu Pandya  1955  R243/R243-01  [x]   confirmed    Date: 2024      Restrictions/Precautions: Fall Risk, Seizure     ADULT DIET; Regular  ADULT ORAL NUTRITION SUPPLEMENT; Breakfast, Lunch, Dinner; Diabetic Oral Supplement     Respiratory Status: O2 Flow Rate (L/min): 3 L/min (24 0709)   No active isolations    Rehab Diagnosis: Impaired mobility and ADL's due to acute infart in the left superior mid parietal lobe MCA territory     Subjective:  Alert and Cooperative       Per chart review, pt on hold for morning therapies. However, Miroslava GODDARD stated okay to see patient bedside. After explanation to patient, pt agreed to participate.    Interventions used this date:  Speech Production and Expressive Language    Objective/Assessment:  Patient progressing towards goals:  Short Term Goals    Goal 1: Patient will read aloud and/or generate phrases-sentences using slow rate and over-articulation to promote speech intelligibility during structured tasks with SLP so that they may functionally communicate and express safety/medical concerns across all environments with 90% accuracy.   Given pictures, pt was able to independently generate sentences with 46% accuracy increasing to 85% accuracy utilizing min-mod cueing.   Goal 2: To decrease the presence of apraxia of speech and improve articulatory accuracy to adequately express wants and needs, the pt will complete structured articulation tasks following SLP model with min cues-fading cues for articulatory placement in 90% of opportunities with 1-3 syllables words at the phrase-sentence level.  Pt read aloud 1-3 syllable words increasing into phrase-sentence level. Pt was able to produce 1 syllable words independently with 90% increasing to 100% accuracy given min-mod cueing. Pt was able to produce 2 syllable words with 100% accuracy. Pt was able to produce 3

## 2024-01-30 NOTE — PROGRESS NOTES
Subjective:  The patient complains of moderate to severe acute on chronic progressive fatigue and left-sided weakness, cognitive slowing, motor planning deficits partially relieved by rest, medications, PT,  OT,   SLP and rest and exacerbated by recent  CVA.  She was admitted through the emergency room on January 18, 2024 with right-sided weakness aphasia and dysphagia.  She was diagnosed with acute CVA-subacute stroke on the right parietal lob .  Her  reported she had had multiple falls recently.  She was admitted under the care of the hospitalist with neurology consulting.  She was also found to have an intracranial hemorrhage.  CT of the head revealed a 2 cm parenchymal bleed in the left basal ganglia.  Neurosurgery was consulted and they did not feel that she should have surgery.  She has been struggling with aphasia dysphagia and hypoxia since being admitte neurology has also been following.   Stroke workup as   including a GUSTABO which was unremarkable.    I am concerned about patient’s medical complexities and barriers to advancing in rehab goals including deficits of motor planning.        I reviewed current care and plans for further care with other rehab providers including nursing and case management.  According to recent nursing note, \"Denies pain at this time. On 2L NC O2. Patient on Rocephin q24 hrs until 01/31/24 for UTI/URI. Bed locked and low with alarm engaged. Call light within reach.      Patient's heart rate looks like it has consistently been elevated. Will ask Dr. Stacy if cardio consult is needed.     Patient had a incontinent bowel episode. Bed bath given by this nurse and PCA. Full bed change.\"    I continue to be concerned about her elevated HR-will check CBC CMP and consult pulmonology--  She is currently on IV Rocephin for acute bronchitis.  I am concerned that she may have pneumonia.  I will repeat check a stat chest x-ray add doxycycline and consult pulmonology.  Her stat CBC  progressing in therapy.  Patient and family education is in progress.  The patient is to follow-up with their family physician after discharge.        Complex Active General Medical Issues that complicate care Assess & Plan:     Type 2 diabetes mellitus with chronic kidney disease-Continue blood sugar checks every shift, diet, add diabetic add dietary education, restrict carbohydrates to lowest effective and safe carb count per meal advising 4 carbs per meal, add at bedtime snack to prevent a.m. hypoglycemia, adjust/add medications.  Chronic insomnia, depression, anxiety-emotional support provided daily, vitamin B12, encourage participation in rehabilitation support group and recreational therapy, adjust/add medications.  Acute exacerbation of COPD (chronic obstructive pulmonary disease) with acute bronchitis-rule out pneumonia -acute rehab for endurance traing with Pulse Ox to monitoring oxygen saturation and heart rate with O2 titration to lowest effective dose. Pulse oximeter checks to shift and at HS to dose and titrate oxygen and aerosol treatments monitor for nocturnal hypoxemia, monitor vital signs, oxygen prn.  Focus on energy conservation-IV Rocephin-add IV doxycycline consult pulmonology   Sinus Tach,  Mixed hyperlipidemia,  Essential hypertension-Acute rehab to monitor heart rate and rhythm with the option of telemetry and the effects of chronotropic medication with respect to increasing physical activity and exercise in PT, OT, ADLs with medication titration to lowest effective dosing.  Continue blood signs every shift focusing on heart rate, rhythm and blood pressure checks with orthostatic checks-monitoring the effect of exercise, therapy and posture.  Consult hospitalist for backup medical and adjust/add medications.  Monitor heart rate and blood pressure as well as medications effects on vital signs before during and after therapy with especial focus on preventing orthostasis and falls risk.  Check

## 2024-01-30 NOTE — PROGRESS NOTES
01/30/24 0400   RT Protocol   History Pulmonary Disease 2   Respiratory pattern 0   Breath sounds 2   Cough 0   Bronchodilator Assessment Score 4

## 2024-01-30 NOTE — CONSULTS
Pulmonary Medicine  Consult Note      Reason for consultation: Pneumonia    Consulting physician: Dr. Stacy    HISTORY OF PRESENT ILLNESS:      This is 68-year-old who was presented with chief complaint of change in mental status, severe type symptoms.  She has right-sided weakness, aphasia and dysphagia.  She has multiple falls recently.  CT of the head shows 2 cm parenchymal bleed in the left basal ganglia.  She has past medical history significant for anxiety, asthma, COPD, chronic back pain, chronic renal failure stage IIIa, diabetes mellitus, hypertension, hyperlipidemia, tobacco abuse.  She has complaint of shortness of breath and low O2 saturation.  She has been on Symbicort twice daily and nebulizer with albuterol she has been on Rocephin for UTI and started on doxycycline 100 mg twice daily.  She had chest x-ray done on 1/30/2024 showed no active cardiopulmonary disease.  She has moist cough.  She is not having any chest pain or pleuritic pain.  No fever or chills.        Past Medical History:        Diagnosis Date    Anxiety     Asthma     Chronic back pain     Chronic renal failure, stage 3a (HCC)     COPD (chronic obstructive pulmonary disease) (HCC)     Depression     History of tinnitus     Hyperlipidemia     Hypertension     Obesity     Osteoporosis     2019 T -3.2    Peripheral vascular disease (HCC)     Proteinuria     Type II or unspecified type diabetes mellitus without mention of complication, not stated as uncontrolled        Past Surgical History:        Procedure Laterality Date    BREAST SURGERY Left 2009    Benign bx    FRACTURE SURGERY      TUBAL LIGATION         Social History:     reports that she has been smoking cigarettes. She has a 49.0 pack-year smoking history. She has never used smokeless tobacco. She reports that she does not drink alcohol and does not use drugs.    Family History:       Problem Relation Age of Onset    Heart Disease Sister     High Blood Pressure Other

## 2024-01-30 NOTE — PROGRESS NOTES
following  -On statin  -PT, OT, ST    *  Asthma/ COPD  -On 2 L; complains of shortness of breath; SpO2 91%  -On albuterol twice daily, Symbicort with as needed nebulizer treatments and albuterol  -Chest x-ray now  -Started on doxycycline every 12 hours x 14 doses; on Rocephin for UTI  -Pulmonology has been consulted; appreciate recommendations  -Will add Acapella  -Rapid COVID pending    *HTN, HLD  -BP 95/58; denies headache, dizziness  -On IV dextrose 5% half saline with potassium 20 meq at 75 continuous  -Monitor labs    *DM II  -Accu-Cheks ACHS; ISS; hypoglycemic protocol; Lantus nightly  -Humalog 5 units 3 times daily with meals    *CKD III  -Avoid nephrotoxic agents  -Kidney function 1/28; BUN 20, creatinine 1.04, EGFR 57.6    Hospital medicine managing acute needs.  Patient will need to follow-up with PCP for chronic disease management.      Time spent with patient 35 minutes. Greater than 70% of time  spent focused exclusively on this patient ,reviewing  chart,  reconciling medications, &  answering questions with patient and discussing plan.      Subjective   Interval History Status: .    Patient seen at bedside for reports of change in respiratory status, shortness of breath.  Patient reports shortness of breath that started yesterday.  Currently on 2 L via nasal cannula.  Family member at bedside report patient is not on oxygen at home.  SpO2 91%.  Rhonchi/congestion noted throughout lungs.  Patient denies fever, chills, headache, dizziness, chest pain, cough, nausea, vomiting, change in appetite      Review of Systems   12 point review of systems reviewed with patient with pertinent positives listed in HPI otherwise ROS negative    Medications   Scheduled Meds:    doxycycline (VIBRAMYCIN) IV  100 mg IntraVENous Q12H    metoprolol tartrate  25 mg Oral BID    cefTRIAXone (ROCEPHIN) IV  1,000 mg IntraVENous Q24H    Vitamin D  2,000 Units Oral Dinner    cyanocobalamin  1,000 mcg IntraMUSCular Weekly     1005  Last data filed at 1/30/2024 0711  Gross per 24 hour   Intake --   Output 400 ml   Net -400 ml         CONSTITUTIONAL:  Awake, alert, no apparent distress, and appears stated age  EYES: pupils equal, round and reactive to light   NECK:  Supple   LUNGS: 2 L NC; no increased work of breathing, good air exchange, rhonchi throughout  CARDIOVASCULAR:  Normal apical impulse, regular rate and rhythm  ABDOMEN:  No scars, normal bowel sounds, soft, non-distended, non-tender  MUSCULOSKELETAL:  There is no redness, warmth, or swelling of the joints.  Full range of motion noted  NEUROLOGIC:  Awake, alert.  No focal deficits noted  SKIN:  No bruising or bleeding, normal skin color, texture, turgor, no redness, warmth, or swelling, no rashes, and no lesions    Labs/Imaging/Diagnostics   Labs:  CBC:  Recent Labs     01/28/24  0423 01/29/24  0622 01/30/24  0819   WBC 16.3* 14.8* 36.2*   RBC 4.11* 3.87* 4.10*   HGB 12.0 11.4* 11.9*   HCT 37.5 34.5* 36.7*   MCV 91.2 89.1 89.5   RDW 13.3 13.5 13.3   * 444* 426*     CHEMISTRIES:  Recent Labs     01/28/24  0423      K 3.9   CL 98   CO2 26   BUN 20   CREATININE 1.04*   GLUCOSE 76     PT/INR:No results for input(s): \"PROTIME\", \"INR\" in the last 72 hours.  APTT:No results for input(s): \"APTT\" in the last 72 hours.  LIVER PROFILE:No results for input(s): \"AST\", \"ALT\", \"BILIDIR\", \"BILITOT\", \"ALKPHOS\" in the last 72 hours.    Imaging Last 24 Hours:  No results found.    Electronically signed by BALTAZAR Perea CNP on 1/30/24 at 10:05 AM EST

## 2024-01-30 NOTE — PROGRESS NOTES
Physical Therapy Rehab Treatment Note  Facility/Department: [facility]  Room: [place] [address]       NAME: Mulu Pandya  : 1955 (68 y.o.)  MRN: [id number]  CODE STATUS: Full Code    Date of Service: 2024     Restrictions:  Restrictions/Precautions: Fall Risk, Seizure     SUBJECTIVE:      Pain   Pt denies    OBJECTIVE:         Bed mobility  Rolling to Right: Moderate assistance  Supine to Sit: Moderate assistance  Bed Mobility Comments: VCs for sequencing. Increased time and effort to complete.    Transfers  Sit to Stand: Moderate Assistance  Stand to Sit: Contact guard assistance  Comment: VCs for handplacement to improve safety and technique.    Ambulation  Surface: Level tile  Device: Rolling Walker  Other Apparatus: O2  Assistance: Minimal assistance  Quality of Gait: Very slow rajendra with decreased step length, Fwd flexed posture;VCs to ambulate within KRISHNA of WW with upright posture.  Distance: 15ftx2  hospital bed to bathroom  Comments: SPO2 95% HR 113BPM after gait.  Pt fatigued with increased SOB.           PT Exercises  A/AROM Exercises: seated AP, LAQ, march x10 ea           ASSESSMENT/PROGRESS TOWARDS GOALS:   Assessment: Pt fatigued quickly with increased SOB limiting pt.  Required increased time to complete mobility.    Goals:  Long Term Goals  Long Term Goal 1: Pt to complete bed mobility with supervision  Long Term Goal 2: Pt to complete transfers with SBA  Long Term Goal 3: Pt to ambulate 50ft with LRD and SBA  Long Term Goal 4: Pt to complete 3 steps with HR and [place]ion    PLAN OF CARE/Safety: Cont per POC     Therapy Time:   Individual   Time In 1430   Time Out 1500   Minutes 30      Minutes:  Transfer/Bed mobility training:10  Gait training:10  Neuro re education:0  Therapeutic ex:10    Melissa Swan PTA, 24 at 3:30 PM

## 2024-01-31 ENCOUNTER — APPOINTMENT (OUTPATIENT)
Dept: CT IMAGING | Age: 69
End: 2024-01-31
Attending: PHYSICAL MEDICINE & REHABILITATION
Payer: MEDICARE

## 2024-01-31 PROBLEM — R09.02 HYPOXIA: Status: ACTIVE | Noted: 2024-01-31

## 2024-01-31 PROBLEM — R00.0 TACHYCARDIA: Status: ACTIVE | Noted: 2024-01-31

## 2024-01-31 LAB
BACTERIA URNS QL MICRO: NEGATIVE /HPF
BASOPHILS # BLD: 0 K/UL (ref 0–0.2)
BASOPHILS NFR BLD: 0.2 %
BILIRUB UR QL STRIP: NEGATIVE
CLARITY UR: ABNORMAL
COLOR UR: YELLOW
EOSINOPHIL # BLD: 0.1 K/UL (ref 0–0.7)
EOSINOPHIL NFR BLD: 0.7 %
EPI CELLS #/AREA URNS AUTO: ABNORMAL /HPF (ref 0–5)
ERYTHROCYTE [DISTWIDTH] IN BLOOD BY AUTOMATED COUNT: 13.4 % (ref 11.5–14.5)
GLUCOSE BLD-MCNC: 138 MG/DL (ref 70–99)
GLUCOSE BLD-MCNC: 211 MG/DL (ref 70–99)
GLUCOSE BLD-MCNC: 212 MG/DL (ref 70–99)
GLUCOSE BLD-MCNC: 260 MG/DL (ref 70–99)
GLUCOSE UR STRIP-MCNC: NEGATIVE MG/DL
HCT VFR BLD AUTO: 32.8 % (ref 37–47)
HGB BLD-MCNC: 10.4 G/DL (ref 12–16)
HGB UR QL STRIP: ABNORMAL
HYALINE CASTS #/AREA URNS AUTO: ABNORMAL /HPF (ref 0–5)
KETONES UR STRIP-MCNC: NEGATIVE MG/DL
LEUKOCYTE ESTERASE UR QL STRIP: ABNORMAL
LYMPHOCYTES # BLD: 0.9 K/UL (ref 1–4.8)
LYMPHOCYTES NFR BLD: 5.8 %
MCH RBC QN AUTO: 28.8 PG (ref 27–31.3)
MCHC RBC AUTO-ENTMCNC: 31.7 % (ref 33–37)
MCV RBC AUTO: 90.9 FL (ref 79.4–94.8)
MONOCYTES # BLD: 1 K/UL (ref 0.2–0.8)
MONOCYTES NFR BLD: 6.4 %
NEUTROPHILS # BLD: 13.9 K/UL (ref 1.4–6.5)
NEUTS SEG NFR BLD: 86.2 %
NITRITE UR QL STRIP: NEGATIVE
PERFORMED ON: ABNORMAL
PH UR STRIP: 6 [PH] (ref 5–9)
PLATELET # BLD AUTO: 379 K/UL (ref 130–400)
PROCALCITONIN SERPL IA-MCNC: 71.98 NG/ML (ref 0–0.15)
PROT UR STRIP-MCNC: 100 MG/DL
RBC # BLD AUTO: 3.61 M/UL (ref 4.2–5.4)
RBC #/AREA URNS AUTO: ABNORMAL /HPF (ref 0–5)
SP GR UR STRIP: 1.02 (ref 1–1.03)
URINE REFLEX TO CULTURE: YES
UROBILINOGEN UR STRIP-ACNC: 0.2 E.U./DL
WBC # BLD AUTO: 16.1 K/UL (ref 4.8–10.8)
WBC #/AREA URNS AUTO: >100 /HPF (ref 0–5)
YEAST URNS QL MICRO: PRESENT /HPF

## 2024-01-31 PROCEDURE — 6360000002 HC RX W HCPCS: Performed by: REGISTERED NURSE

## 2024-01-31 PROCEDURE — 70450 CT HEAD/BRAIN W/O DYE: CPT

## 2024-01-31 PROCEDURE — 36415 COLL VENOUS BLD VENIPUNCTURE: CPT

## 2024-01-31 PROCEDURE — 94640 AIRWAY INHALATION TREATMENT: CPT

## 2024-01-31 PROCEDURE — 6370000000 HC RX 637 (ALT 250 FOR IP): Performed by: PHYSICAL MEDICINE & REHABILITATION

## 2024-01-31 PROCEDURE — 87086 URINE CULTURE/COLONY COUNT: CPT

## 2024-01-31 PROCEDURE — 1180000000 HC REHAB R&B

## 2024-01-31 PROCEDURE — 85025 COMPLETE CBC W/AUTO DIFF WBC: CPT

## 2024-01-31 PROCEDURE — 2580000003 HC RX 258: Performed by: REGISTERED NURSE

## 2024-01-31 PROCEDURE — 97535 SELF CARE MNGMENT TRAINING: CPT

## 2024-01-31 PROCEDURE — 84145 PROCALCITONIN (PCT): CPT

## 2024-01-31 PROCEDURE — 97129 THER IVNTJ 1ST 15 MIN: CPT

## 2024-01-31 PROCEDURE — 97112 NEUROMUSCULAR REEDUCATION: CPT

## 2024-01-31 PROCEDURE — 99233 SBSQ HOSP IP/OBS HIGH 50: CPT | Performed by: PHYSICAL MEDICINE & REHABILITATION

## 2024-01-31 PROCEDURE — 97116 GAIT TRAINING THERAPY: CPT

## 2024-01-31 PROCEDURE — 6370000000 HC RX 637 (ALT 250 FOR IP): Performed by: REGISTERED NURSE

## 2024-01-31 PROCEDURE — 92526 ORAL FUNCTION THERAPY: CPT

## 2024-01-31 PROCEDURE — 97110 THERAPEUTIC EXERCISES: CPT

## 2024-01-31 PROCEDURE — 2580000003 HC RX 258: Performed by: PHYSICAL MEDICINE & REHABILITATION

## 2024-01-31 PROCEDURE — 97530 THERAPEUTIC ACTIVITIES: CPT

## 2024-01-31 PROCEDURE — 71275 CT ANGIOGRAPHY CHEST: CPT

## 2024-01-31 PROCEDURE — 6370000000 HC RX 637 (ALT 250 FOR IP): Performed by: FAMILY MEDICINE

## 2024-01-31 PROCEDURE — 92507 TX SP LANG VOICE COMM INDIV: CPT

## 2024-01-31 PROCEDURE — 2700000000 HC OXYGEN THERAPY PER DAY

## 2024-01-31 PROCEDURE — 99232 SBSQ HOSP IP/OBS MODERATE 35: CPT | Performed by: NURSE PRACTITIONER

## 2024-01-31 PROCEDURE — 6360000004 HC RX CONTRAST MEDICATION: Performed by: NURSE PRACTITIONER

## 2024-01-31 PROCEDURE — 99232 SBSQ HOSP IP/OBS MODERATE 35: CPT | Performed by: INTERNAL MEDICINE

## 2024-01-31 PROCEDURE — 6360000002 HC RX W HCPCS: Performed by: PHYSICAL MEDICINE & REHABILITATION

## 2024-01-31 RX ORDER — PREDNISONE 20 MG/1
20 TABLET ORAL DAILY
Status: COMPLETED | OUTPATIENT
Start: 2024-01-31 | End: 2024-02-04

## 2024-01-31 RX ADMIN — Medication 5 MG: at 20:11

## 2024-01-31 RX ADMIN — ALBUTEROL SULFATE 2 PUFF: 90 AEROSOL, METERED RESPIRATORY (INHALATION) at 07:55

## 2024-01-31 RX ADMIN — BUDESONIDE AND FORMOTEROL FUMARATE DIHYDRATE 2 PUFF: 80; 4.5 AEROSOL RESPIRATORY (INHALATION) at 16:00

## 2024-01-31 RX ADMIN — INSULIN GLARGINE 10 UNITS: 100 INJECTION, SOLUTION SUBCUTANEOUS at 20:17

## 2024-01-31 RX ADMIN — IOPAMIDOL 75 ML: 612 INJECTION, SOLUTION INTRAVENOUS at 18:40

## 2024-01-31 RX ADMIN — ESCITALOPRAM OXALATE 15 MG: 10 TABLET ORAL at 07:52

## 2024-01-31 RX ADMIN — CEFTRIAXONE SODIUM 1000 MG: 1 INJECTION, POWDER, FOR SOLUTION INTRAMUSCULAR; INTRAVENOUS at 16:37

## 2024-01-31 RX ADMIN — ROSUVASTATIN CALCIUM 40 MG: 40 TABLET, FILM COATED ORAL at 20:11

## 2024-01-31 RX ADMIN — INSULIN LISPRO 5 UNITS: 100 INJECTION, SOLUTION INTRAVENOUS; SUBCUTANEOUS at 12:39

## 2024-01-31 RX ADMIN — INSULIN LISPRO 1 UNITS: 100 INJECTION, SOLUTION INTRAVENOUS; SUBCUTANEOUS at 12:39

## 2024-01-31 RX ADMIN — AZITHROMYCIN DIHYDRATE 500 MG: 500 INJECTION, POWDER, LYOPHILIZED, FOR SOLUTION INTRAVENOUS at 15:18

## 2024-01-31 RX ADMIN — INSULIN LISPRO 5 UNITS: 100 INJECTION, SOLUTION INTRAVENOUS; SUBCUTANEOUS at 16:40

## 2024-01-31 RX ADMIN — INSULIN LISPRO 5 UNITS: 100 INJECTION, SOLUTION INTRAVENOUS; SUBCUTANEOUS at 07:53

## 2024-01-31 RX ADMIN — PREDNISONE 20 MG: 20 TABLET ORAL at 11:08

## 2024-01-31 RX ADMIN — ALBUTEROL SULFATE 2 PUFF: 90 AEROSOL, METERED RESPIRATORY (INHALATION) at 15:59

## 2024-01-31 RX ADMIN — INSULIN LISPRO 1 UNITS: 100 INJECTION, SOLUTION INTRAVENOUS; SUBCUTANEOUS at 16:40

## 2024-01-31 RX ADMIN — BUDESONIDE AND FORMOTEROL FUMARATE DIHYDRATE 2 PUFF: 80; 4.5 AEROSOL RESPIRATORY (INHALATION) at 07:57

## 2024-01-31 RX ADMIN — Medication 2000 UNITS: at 18:01

## 2024-01-31 RX ADMIN — Medication 100 MG: at 07:53

## 2024-01-31 RX ADMIN — CYANOCOBALAMIN 1000 MCG: 1000 INJECTION, SOLUTION INTRAMUSCULAR; SUBCUTANEOUS at 07:53

## 2024-01-31 ASSESSMENT — PAIN SCALES - GENERAL: PAINLEVEL_OUTOF10: 0

## 2024-01-31 NOTE — PROGRESS NOTES
[facility] Occupational Therapy      Date: 2024  Patient Name: Mulu Pandya        MRN: [id number]  Account: [id number]   : 1955  (68 y.o.)  Room: [place] [address]    Chart reviewed, attempted OT at 7:09 for OT Get Up and Go program. Patient not seen 2° to:    Other: Patient already up in chair eating breakfast with no needs at this time.    Will attempt again when able.    Electronically signed by ABIGAIL Dumont on 2024 at 11:46 AM

## 2024-01-31 NOTE — PROGRESS NOTES
OCCUPATIONAL THERAPY  INPATIENT REHAB TREATMENT NOTE  [facility]      NAME: Mulu Pandya  : 1955 (68 y.o.)  MRN: [id number]  CODE STATUS: Full Code  Room: R243/R243-01    Date of Service: 2024    Referring Physician: Dr Stacy  Rehab Diagnosis: Impaired mobility and ADL's due to acute infart in the left superior mid parietal lobe MCA territory    Hospital course:        Restrictions  Restrictions/Precautions  Restrictions/Precautions: Fall Risk, Seizure          Patient's date of birth confirmed: Yes    SAFETY:  Safety Devices  Type of devices: All fall risk precautions in place    SUBJECTIVE:  Subjective: \"Oh I feel better today, I was bad yesterday\"    Pain at start of treatment: No    Pain at end of treatment: No    OBJECTIVE:    Patient engaged in BUE fine/gross motor and cognitive activity to increase Roberts with ADL/IADL completion.    Block assembly    Challenged pt to assemble blocks of different colors into various 3D structures according to a visual model presented on a piece of paper.   Pt completed 5 patters.     Started with difficult asymmetrical designs  Encouraged pt to engage the RUE in the activity as able. Pt with difficulty grasping/pinching blocks but she did use the RUE to reach across the table and a gross partial grasp to slide blocks of the middle of the table    Pieces were mostly neatly and correctly placed. Minimal difficulty noted with visual perceptual skills, only a few cues needed throughout session to improve accuracy. Overall pt with good ability to correctly follow pattern    While seated, completed RUE gross motor task with focus on coordination and strength in order to improve general function.    Pt challenged with grasping cone among stack of cones- pt required to carefully retrieve cones and place one on top of the other on the other side of the table.   Pt reaching RUE across body to the left to retrieve cones and place on table to

## 2024-01-31 NOTE — PLAN OF CARE
Nutrition Problem #1: Inadequate oral intake  Intervention: Food and/or Nutrient Delivery: Discontinue Oral Nutrition Supplement, Modify Current Diet (Recommend Carb Control 3)

## 2024-01-31 NOTE — PROGRESS NOTES
[facility]  Facility/Department: [facility]  Speech Language Pathology   Treatment Note          Mulu Pandya  1955  R243/R243-01  [x]   confirmed    Date: 2024      Restrictions/Precautions: Fall Risk, Seizure     ADULT DIET; Regular; Mildly Thick (Nectar)     Respiratory Status: 3 L/m    Rehab Diagnosis: Impaired mobility and ADL's due to acute infart in the left superior mid parietal lobe MCA territory     Subjective:  Alert, Cooperative, and Pleasant      Spouse was present    Interventions used this date:  Speech Production, Expressive Language, and Dysphagia Treatment    Objective/Assessment:  Patient progressing towards goals:  Goal 1: Patient will read aloud and/or generate phrases-sentences using slow rate and over-articulation to promote speech intelligibility during structured tasks with SLP so that they may functionally communicate and express safety/medical concerns across all environments with 90% accuracy.  Given pictures, pt generated sentences independently with 85% accuracy increasing to 92% accuracy given min cueing. Pt's speech level of intelligibility and tone was judged to increase compared to previous session. However, speech was consistent with apraxic errors.   Goal 2: To decrease the presence of apraxia of speech and improve articulatory accuracy to adequately express wants and needs, the pt will complete structured articulation tasks following SLP model with min cues-fading cues for articulatory placement in 90% of opportunities with 1-3 syllables words at the phrase-sentence level.  Pt read aloud 1-3 syllable words at the phrase level. Pt was able to produce 1 and 2 syllable words with 90% accuracy increasing to 100% given min cueing. Pt was able to produce 3 syllable words independently with 80% accuracy increasing to 100% with min cueing. Pt demonstrated difficulties producing /sh/ and /ch/ phonemes. Pt would benefit from labial motor exercises to increase  diarrhea send stool for C. difficile. Add Acapella. Continue bronchodilator therapy with albuterol and Symbicort 2 puff twice daily. Aspiration precaution. O2 to keep saturation 90% or above. Will follow     Educated pt and spouse regarding silent aspiration, signs of aspiration, and swallow strategies of upright postioning during any oral intake and taking small bites and sips.  Encouraged pt and spouse benefits of repeat MBS to further assess current swallow function and to assess if changes occurred since previous MBS.  Patient declines to complete MBS despite encouragement and education.  Informed Dr. Stacy and Caryn GODDARD.      Treatment/Activity Tolerance:  Patient tolerated treatment well    Plan:  Continue per POC    Pain Assessment:  Patient does not c/o pain.    Pain Re-assessment:  Patient does not c/o pain.    Patient/Caregiver Education:  Patient educated on session and progression towards goals.    Safety Devices:  All fall risk precautions in place      Dysphagia Outcome Severity Scale    SWALLOWING  Ratin      Speech Therapy Level of Assistance Scale    AUDITORY COMPREHENSION  Rating:  Supervised Assistance    VERBAL EXPRESSION  Rating:  Minimal Assistance    MOTOR SPEECH  Rating: Minimal Assistance        Therapy Time   Time in: 1030  Time out: 1100  Speech: 20 min   Dysphagia:10 min            Signature: Electronically signed by Chhaya Lui on 2024 at 1:28 PM   Electronically signed by Moy Kim, SLP on 2024 at 2:12 PM

## 2024-01-31 NOTE — PROGRESS NOTES
Physical Therapy Rehab Treatment Note  Facility/Department: [facility]  [facility]       NAME: Mulu Pandya  : 1955 (68 y.o.)  MRN: [id number]  CODE STATUS: Full Code    Date of Service: 2024     Restrictions:  Restrictions/Precautions: Fall Risk, Seizure     SUBJECTIVE:   Subjective: Pt states she is feeling better today.    Pain  Pain: Denies pre and post session pain.    OBJECTIVE:         Bed mobility  Rolling to Left: Stand by assistance  Rolling to Right: Stand by assistance  Supine to Sit: Stand by assistance  Sit to Supine: Stand by assistance    Transfers  Sit to Stand: Stand by assistance  Stand to Sit: Stand by assistance  Bed to Chair: Stand by assistance  Comment: VCs for handplacement to improve safety and technique.    Ambulation  Surface: Level tile  Device: Rolling Walker  Other Apparatus: O2  Assistance: Stand by assistance  Gait Deviations: Slow Vianey;Decreased step length  Distance: 50ft  Comments: SPO2 99% HR 109BPM after gait.        PT Exercises  PROM Exercises: Manual gastroc stretchng 25mbmc1 B  A/AROM Exercises: seated AP, LAQ, march, hip add with ball, hip abd RTB, ham curl RTB  x20ea           ASSESSMENT/PROGRESS TOWARDS GOALS:   Assessment: Pt with overall improved participation and tolerance to treatment this date.  Pt attended therapy in gym.  Tolerated increased gait distance and initiated stair training.  Maintained SPO2 on 3L.  HR elevated with walking to 109BPM.    Goals:  Long Term Goals  Long Term Goal 1: Pt to complete bed mobility with supervision  Long Term Goal 2: Pt to complete transfers with SBA  Long Term Goal 3: Pt to ambulate 50ft with LRD and SBA  Long Term Goal 4: Pt to complete 3 steps with HR and Asuncion    PLAN OF CARE/Safety: Cont per POC.       Therapy Time:   Individual   Time In 1340   Time Out 1400   Minutes 20    Therapy Time   Individual      Time In 1440         Time Out 1500         Minutes 20           Minutes:40  Transfer/Bed mobility

## 2024-01-31 NOTE — PROGRESS NOTES
Progress Note    Date:1/31/2024       [facility]  Patient Name:Mulu Pandya     YOB: 1955     Age:68 y.o.    Assessment        Hospital Problems             Last Modified POA    * (Principal) Impaired mobility and ADLs dt CVA-2 cm acute intraparenchymal left frontal lobe hematoma. 1/24/2024 Yes    Type 2 diabetes mellitus with chronic kidney disease 1/24/2024 Yes    Anxiety 1/24/2024 Yes    Chronic back pain 1/24/2024 Yes    COPD (chronic obstructive pulmonary disease) (HCC) 1/24/2024 Yes    Mixed hyperlipidemia 1/24/2024 Yes    Essential hypertension 1/24/2024 Yes    Chronic renal failure, stage 3a (HCC) 1/24/2024 Yes    Intracranial hemorrhage (HCC) 1/24/2024 Yes    Cerebral infarction due to occlusion of left middle cerebral artery (HCC) 1/24/2024 Yes    Dysphagia, oropharyngeal phase 1/24/2024 Yes    Late effects of CVA (cerebrovascular accident) 1/24/2024 Yes    Overview Addendum 1/24/2024 12:04 PM by Jayne Stacy,      Old infarcts in the right mid-posterior MCA territory, left inferior cerebellar hemisphere in the left PICA territory, right thalamus, and multiple tiny infarcts in both inferior cerebellar hemispheres in the PICA territories.--left parietal lobe with encephalomalacia change stable within the right temporal lobe.          Urinary incontinence 1/24/2024 Yes    Meningioma (HCC) 1/24/2024 Yes    Overview Signed 1/24/2024 12:04 PM by Jayne Stacy,       Right CP angle meningioma measuring 2.4 x 1.6 x 2.1 cm, producing moderate mass effect upon the right middle cerebellar peduncle and minimal mass effect upon the right pontomedullary junction.         Nontraumatic intracerebral hemorrhage (HCC) 1/24/2024 Yes       Plan:      * Impaired mobility and ADLs due to CVA-2 cm acute intraparenchymal left frontal lobe hematoma  -Dr. Stacy managing  -PT, OT, ST  -Pain management     *CVA  -With aphasia, dysphagia, right-sided facial droop right-sided weakness  -Neurology  or output data in the 24 hours ending 01/31/24 1055      CONSTITUTIONAL:  Awake, alert, no apparent distress, and appears stated age  EYES: pupils equal, round and reactive to light   NECK:  Supple   LUNGS:  No increased work of breathing, good air exchange, clear to auscultation bilaterally, no crackles or wheezing  CARDIOVASCULAR:  Normal apical impulse, regular rate and rhythm  ABDOMEN:  No scars, normal bowel sounds, soft, non-distended, non-tender  MUSCULOSKELETAL:  There is no redness, warmth, or swelling of the joints.  Full range of motion noted  NEUROLOGIC:  Awake, alert.  No focal deficits noted  SKIN:  No bruising or bleeding, normal skin color, texture, turgor, no redness, warmth, or swelling, no rashes, and no lesions    Labs/Imaging/Diagnostics   Labs:  CBC:  Recent Labs     01/29/24  0622 01/30/24  0819 01/31/24  0456   WBC 14.8* 36.2* 16.1*   RBC 3.87* 4.10* 3.61*   HGB 11.4* 11.9* 10.4*   HCT 34.5* 36.7* 32.8*   MCV 89.1 89.5 90.9   RDW 13.5 13.3 13.4   * 426* 379     CHEMISTRIES:No results for input(s): \"NA\", \"K\", \"CL\", \"CO2\", \"BUN\", \"CREATININE\", \"GLUCOSE\", \"CA\", \"PHOS\", \"MG\" in the last 72 hours.  PT/INR:No results for input(s): \"PROTIME\", \"INR\" in the last 72 hours.  APTT:No results for input(s): \"APTT\" in the last 72 hours.  LIVER PROFILE:No results for input(s): \"AST\", \"ALT\", \"BILIDIR\", \"BILITOT\", \"ALKPHOS\" in the last 72 hours.    Imaging Last 24 Hours:  XR CHEST (2 VW)    Result Date: 1/30/2024  EXAMINATION: TWO XRAY VIEWS OF THE CHEST 1/30/2024 9:24 am COMPARISON: None. HISTORY: ORDERING SYSTEM PROVIDED HISTORY: low sat TECHNOLOGIST PROVIDED HISTORY: Reason for exam:->low sat What reading provider will be dictating this exam?->CRC FINDINGS: Cardiopericardial silhouette normal.  Pulmonary vasculature normal.  Lungs clear.  Remote internal fixation lower thoracic and lumbar spine.     No acute cardiopulmonary disease.       Electronically signed by BALTAZAR Perea CNP on 1/31/24 at

## 2024-01-31 NOTE — PROGRESS NOTES
Physical Therapy Rehab Treatment Note  Facility/Department: [facility]  [facility]       NAME: Mulu Pandya  : 1955 (68 y.o.)  MRN: [id number]  CODE STATUS: Full Code    Date of Service: 2024     Restrictions:  Restrictions/Precautions: Fall Risk, Seizure     SUBJECTIVE:   Subjective: Pt states she is feeling better today.  Pain  Pain: Denies pre and post session pain.    OBJECTIVE:         Bed mobility  Rolling to Left: Stand by assistance  Rolling to Right: Stand by assistance  Supine to Sit: Stand by assistance  Bed Mobility Comments: VCs for sequencing. Increased time and effort to complete.  HOB elevated with use of rail per pt.    Transfers  Sit to Stand: Stand by assistance  Stand to Sit: Stand by assistance  Bed to Chair: Stand by assistance    Ambulation  Surface: Level tile  Device: Rolling Walker  Other Apparatus: O2  Assistance: Stand by assistance  Gait Deviations: Slow Vianey;Decreased step length  Distance: 50ft  Comments: SPO2 99% HR 121BPM after gait.  HR decreased to 112BPM after seated rest.    Stairs/Curb  Stairs?: Yes  Stairs  # Steps : 3  Stairs Height: 6\"  Rails: Bilateral  Assistance: Minimal assistance  Comment: VCs for sequencing.        PT Exercises  A/AROM Exercises: seated AP, LAQ, march, hip add with ball, hip abd RTB, ham curl RTB  x20ea           ASSESSMENT/PROGRESS TOWARDS GOALS:   Assessment: Pt with overall improved tolerance to mobility this AM.  Pt attended therapy in gym.  Tolerated increased gait distance and initiated stair training.  Maintained SPO2 on 3L.  HR elevated with walking and stairs to 120BPM.    Goals:  Long Term Goals  Long Term Goal 1: Pt to complete bed mobility with supervision  Long Term Goal 2: Pt to complete transfers with SBA  Long Term Goal 3: Pt to ambulate 50ft with LRD and SBA  Long Term Goal 4: Pt to complete 3 steps with HR and Asuncion    PLAN OF CARE/Safety: Cont per POC.       Therapy Time:   Individual   Time In 0900   Time Out

## 2024-01-31 NOTE — PROGRESS NOTES
hemorrhage in the left anterior parietal  periventricular white matter adjacent to the left frontal horn, with no sign  of any underlying mass or any surrounding edema.  This is not associated with  any restricted diffusion to suggest hemorrhagic conversion of an infarct.  This may be from amyloid angiopathy or hypertension.  There is no mass effect  from this hemorrhage.    There is a moderate-sized area of encephalomalacia of the cortex and  subcortical white matter of the right mid-posterior temporoparietal lobe,  with no restricted diffusion.  This is consistent with an old infarct in the  inferior right mid MCA territory.    An old small infarction is seen in the left inferior cerebellar hemisphere in  the left PICA territory.  Multiple tiny infarcts are scattered throughout  both inferior cerebellar hemispheres in the PICA territories.    An old lacunar infarct is present in the right thalamus.    There is a the right CP angle mass measuring 2.4 x 1.6 x 2.1 cm, poorly seen  on the CT, consistent with a meningioma.  It produces moderate mass effect  upon the right middle cerebellar peduncle and minimal mass effect upon the  right pontomedullary junction.    No midline shift.    There is moderate dilatation of the ventricles and sulci representing  age-appropriate atrophy. There is mild periventricular and subcortical white  matter T2 signal abnormality representing age-appropriate small vessel  ischemia. There is moderate patchy T2 signal abnormality in the brett from  small vessel ischemia.    The sellar/suprasellar regions appear unremarkable.    The normal signal voids within the major intracranial vessels appear  maintained.    ORBITS: The visualized portion of the orbits demonstrate no acute abnormality.    SINUSES: The visualized paranasal sinuses and mastoid air cells demonstrate  no acute abnormality.    BONES/SOFT TISSUES: The bone marrow signal intensity appears normal. The soft  tissues demonstrate no  acute abnormality.    Impression  1. Moderate-sized acute infarction in the left superior mid parietal lobe MCA  territory with minimal mass effect.  2. No change in small acute intraparenchymal hemorrhage in the left anterior  parietal periventricular white matter, with no sign of any underlying mass or  surrounding edema. This may be from amyloid angiopathy or hypertension.  3. Old infarcts in the right mid-posterior MCA territory, left inferior  cerebellar hemisphere in the left PICA territory, right thalamus, and  multiple tiny infarcts in both inferior cerebellar hemispheres in the PICA  territories.  4. Right CP angle meningioma measuring 2.4 x 1.6 x 2.1 cm, producing moderate  mass effect upon the right middle cerebellar peduncle and minimal mass effect  upon the right pontomedullary junction.  5. Moderate, age-appropriate atrophy and mild, age-appropriate small vessel  ischemic change.                            MRA of the Head and Neck: No results found for this or any previous visit.  No results found for this or any previous visit.  No results found for this or any previous visit.                            CT of the Head: Results for orders placed during the hospital encounter of 01/18/24    CT HEAD WO CONTRAST    Narrative  EXAMINATION:  CT OF THE HEAD WITHOUT CONTRAST  1/19/2024 6:06 am    TECHNIQUE:  CT of the head was performed without the administration of intravenous  contrast. Automated exposure control, iterative reconstruction, and/or weight  based adjustment of the mA/kV was utilized to reduce the radiation dose to as  low as reasonably achievable.    COMPARISON:  01/18/2024    HISTORY:  ORDERING SYSTEM PROVIDED HISTORY: ICH  TECHNOLOGIST PROVIDED HISTORY:  Reason for exam:->ICH  Has a \"code stroke\" or \"stroke alert\" been called?->No  What reading provider will be dictating this exam?->CRC    FINDINGS:  BRAIN/VENTRICLES: There is a stable small 2 cm left frontal lobe  periventricular hematoma

## 2024-01-31 NOTE — FLOWSHEET NOTE
Assessment Completed. AXOX4. Medication given. Patient denies pain at this time. BP 96/70. BP medication not given. NP notified. LBM 1/31. Incontinent of urine and continent of bowel. Patient on wheelchair, calm, call light within reach and bed alarm on.

## 2024-01-31 NOTE — CARE COORDINATION
LSW called Shaq (spouse) and provided an update on pt's progress, Shaq is pleased. He noted pt had a difficult time participating yesterday but that pt appears to be doing better today. No concerns or questions voiced. Electronically signed by ELEAZAR Friend, KASSANDRA on 1/31/2024 at 11:20 AM

## 2024-01-31 NOTE — PROGRESS NOTES
Subjective:  The patient complains of moderate to severe acute on chronic progressive fatigue and left-sided weakness, cognitive slowing, motor planning deficits partially relieved by rest, medications, PT,  OT,   SLP and rest and exacerbated by recent  CVA.  She was admitted through the emergency room on January 18, 2024 with right-sided weakness aphasia and dysphagia.  She was diagnosed with acute CVA-subacute stroke on the right parietal lob .  Her  reported she had had multiple falls recently.  She was admitted under the care of the hospitalist with neurology consulting.  She was also found to have an intracranial hemorrhage.  CT of the head revealed a 2 cm parenchymal bleed in the left basal ganglia.  Neurosurgery was consulted and they did not feel that she should have surgery.  She has been struggling with aphasia dysphagia and hypoxia since being admitte neurology has also been following.   Stroke workup as   including a GUSTABO which was unremarkable.    I am concerned about patient’s medical complexities and barriers to advancing in rehab goals including deficits of motor planning.        I reviewed current care and plans for further care with other rehab providers including nursing and case management.  According to recent nursing note, \"able to eat a few bites for lunch. Covid and flu tests are negative. Dr. Stacy in to see pt, orders received. Pt has oxygen at 2L/min via nasal cannula, denies pain. IVF infusing well, IV doxycyline infusion complete. Plan of care reviewed with patient. Call light in reach.   Pt to bathroom with PT, had a small BM with no urination. Bladder scan shows 261 ml, OT working with pt, will have pt attempt to void after therapy session.      Pt voided on the toilet but urine did not collect in the collection container in place. Urine appears light yellow and hazy. Purewick now in place to collect a specimen, pt has not yet voided again. Call light in reach, IVF  oximeter checks to shift and at HS to dose and titrate oxygen and aerosol treatments monitor for nocturnal hypoxemia, monitor vital signs, oxygen prn.  Focus on energy conservation-IV Rocephin-add IV Zithromax  SP  pulmonology   Sinus Tach,  Mixed hyperlipidemia,  Essential hypertension-Acute rehab to monitor heart rate and rhythm with the option of telemetry and the effects of chronotropic medication with respect to increasing physical activity and exercise in PT, OT, ADLs with medication titration to lowest effective dosing.  Continue blood signs every shift focusing on heart rate, rhythm and blood pressure checks with orthostatic checks-monitoring the effect of exercise, therapy and posture.  Consult hospitalist for backup medical and adjust/add medications.  Monitor heart rate and blood pressure as well as medications effects on vital signs before during and after therapy with especial focus on preventing orthostasis and falls risk.  Check CBC CMP and consult cardiology.    Chronic renal failure, stage 3a-Eliminate toxic medications, monitor I's and O's focusing on urine output, recheck BMP.    Intracranial hemorrhage sp acute infarction in the left superior mid parietal lobe MCA territory    Cerebral infarction due to occlusion of left middle cerebral artery with 2 cm acute intraparenchymal left frontal lobe hematoma-mitigate stroke risk factors and consult neurology    Dysphagia, oropharyngeal phase--SLP  Tobacco addiction-NicoDerm titration stop smoking counseling    Late effects of CVA (cerebrovascular accident)-stroke risk mitigation    Urinary incontinence-UA/Culture from [facility] and sent to lab via tube system  Active chronic nausea and vomiting-IV fluids as needed as needed Zofran consult medical consider GI consult monitor for GI cause versus central cause-scatter a.m. medications Zofran versus Compazine as needed  Aspiration pneumonia-IV antibiotics, titrate O2, IV normal saline,

## 2024-01-31 NOTE — PROGRESS NOTES
Comprehensive Nutrition Assessment    Type and Reason for Visit:  Reassess    Nutrition Recommendations/Plan:   Discontinue Oral Nutrition Supplement, Modify Current Diet (Recommend Carb Control 3)     Malnutrition Assessment:  Malnutrition Status:  No malnutrition (01/24/24 1608)      Nutrition Assessment:    Pt reports fair to good appetite, but c/o disliking hospital foods, eating ~50% of meals per pt. Pt requests discontinue diabetic supplement provided as she dislikes it. To continue to follow.    Nutrition Related Findings:    PMH-anxiety, COPD, CVA,  type II DM, HTN, asthma, PUD, HLD, CKD3; admitted with CVA. BSE 1/24-regular consistancy (previously on Soft and bite-sized diet per BSE). Labs/meds reviewed. Gluc- x last 3 days,(1/31)-211. Prednisone started 1/31. Pt receiving insulin. Last BM noted 1/31. Mildly thick liquids added 1/31 per nsg. Wound Type: None       Current Nutrition Intake & Therapies:    Average Meal Intake:  (~50%)  Average Supplements Intake: Refusing to take  ADULT DIET; Regular; Mildly Thick (Nectar)    Anthropometric Measures:  Height: 160 cm (5' 3\")  Ideal Body Weight (IBW): 115 lbs (52 kg)    Admission Body Weight: 70.8 kg (156 lb) (stated)  Current Body Weight: 72.8 kg (160 lb 8 oz) (1/25),  Weight Source: Not Specified  Current BMI (kg/m2): 28.4  Usual Body Weight: 69.9 kg (154 lb) (2/2023)                       BMI Categories: Overweight (BMI 25.0-29.9)    Estimated Daily Nutrient Needs:  Energy Requirements Based On: Kcal/kg  Weight Used for Energy Requirements: Admission  Energy (kcal/day): 6977-9881 (kg x 22-23)  Weight Used for Protein Requirements: Ideal  Protein (g/day): 62 (kg x 1.2)  Method Used for Fluid Requirements: 1 ml/kcal  Fluid (ml/day): ~1500    Nutrition Diagnosis:   Inadequate oral intake related to  (c/o disliking hospital food) as evidenced by intake 26-50%  Altered nutrition-related lab values related to endocrine dysfuntion as evidenced by lab  values    Nutrition Interventions:   Food and/or Nutrient Delivery: Discontinue Oral Nutrition Supplement, Modify Current Diet (Recommend Carb Control 3)  Nutrition Education/Counseling: No recommendation at this time  Coordination of Nutrition Care: Continue to monitor while inpatient       Goals:     Goals: PO intake 75% or greater (Gluc <160)       Nutrition Monitoring and Evaluation:      Food/Nutrient Intake Outcomes: Food and Nutrient Intake, Supplement Intake  Physical Signs/Symptoms Outcomes: Weight, Biochemical Data    Discharge Planning:    Too soon to determine     Denisa Brush RD, LD

## 2024-01-31 NOTE — PROGRESS NOTES
The osseous structures are without acute process.     No acute process.     CTA NECK W WO CONTRAST    Result Date: 1/18/2024  EXAMINATION: CTA OF THE HEAD WITHOUT AND WITH CONTRAST; CTA OF THE NECK 1/18/2024 9:11 am: TECHNIQUE: CTA of the head/brain was performed without and with the administration of intravenous contrast. Multiplanar reformatted images are provided for review. MIP images are provided for review. Automated exposure control, iterative reconstruction, and/or weight based adjustment of the mA/kV was utilized to reduce the radiation dose to as low as reasonably achievable.; CTA of the neck was performed with the administration of intravenous contrast. Multiplanar reformatted images are provided for review.  MIP images are provided for review. Stenosis of the internal carotid arteries measured using NASCET criteria. Automated exposure control, iterative reconstruction, and/or weight based adjustment of the mA/kV was utilized to reduce the radiation dose to as low as reasonably achievable. 3D reconstructed images were performed on a separate workstation and provided for review. COMPARISON: None. HISTORY: ORDERING SYSTEM PROVIDED HISTORY: R SIDED FACIAL DROOP, R SIDED WEAKNESS, SLURRED SPEECH TECHNOLOGIST PROVIDED HISTORY: Reason for exam:->R SIDED FACIAL DROOP, R SIDED WEAKNESS, SLURRED SPEECH Additional Contrast?->1 What reading provider will be dictating this exam?->CRC FINDINGS: CTA NECK: AORTIC ARCH/ARCH VESSELS: No dissection or arterial injury.  No significant stenosis of the brachiocephalic or subclavian arteries. CAROTID ARTERIES: No dissection, arterial injury, or hemodynamically significant stenosis by NASCET criteria. VERTEBRAL ARTERIES: No dissection, arterial injury, or significant stenosis. SOFT TISSUES: The lung apices are clear.  Mild emphysematous changes of the visualized lungs.  No cervical or superior mediastinal lymphadenopathy.  The larynx and pharynx are unremarkable.  No acute  COPD with exacerbation  Leukocytosis?  Etiology  Hypertension  Hyperlipidemia  Diabetes mellitus  CKD stage III    She is risk for aspiration due to CVA with right-sided weakness and aphasia and dysphagia.  He is having moist cough.  Chest x-ray showing normal infiltration.  She is on doxycycline for bronchitis and Rocephin for UTI.  She is afebrile.  WBC came down to 16 K.  At this time continue O2, bronchodilator therapy, aspiration precaution.      NOTE: This report was transcribed using voice recognition software. Every effort was made to ensure accuracy; however, inadvertent computerized transcription errors may be present.      Electronically signed by Elliot Harris MD, FCCP on 1/31/2024 at 4:39 PM

## 2024-01-31 NOTE — PROGRESS NOTES
OCCUPATIONAL THERAPY  INPATIENT REHAB TREATMENT NOTE  [facility]      NAME: Mulu Pandya  : 1955 (68 y.o.)  MRN: [id number]  CODE STATUS: Full Code  Room: R243/R243-01    Date of Service: 2024    Referring Physician: Dr Stacy  Rehab Diagnosis: Impaired mobility and ADL's due to acute infart in the left superior mid parietal lobe MCA territory    Hospital course:          Restrictions  Restrictions/Precautions  Restrictions/Precautions: Fall Risk, Seizure                 Patient's date of birth confirmed: Yes    SAFETY:  Safety Devices  Safety Devices in place: Yes  Type of devices: All fall risk precautions in place    SUBJECTIVE:  Subjective: \"I'll just do a sponge bath today.\"    Pain at start of treatment: No    Pain at end of treatment: No    COGNITION:  Orientation  Overall Orientation Status: Within Functional Limits  Orientation Level: Oriented X4  Cognition  Overall Cognitive Status: Exceptions  Arousal/Alertness: Appropriate responses to stimuli  Following Commands: Follows one step commands consistently  Attention Span: Appears intact  Memory: Appears intact  Safety Judgement: Decreased awareness of need for safety  Problem Solving: Assistance required to generate solutions;Assistance required to implement solutions  Insights: Fully aware of deficits  Initiation: Requires cues for some  Sequencing: Requires cues for some      Pt's current cognitive status is:  Comprehension: Supervision  Expression: Min A  Social Interaction: Supervision  Problem Solving: Min A  Memory: Independent    OBJECTIVE:       Morning ADL  Grooming/Oral Hygiene  Assistance Level: Moderate assistance  Skilled Clinical Factors: assist with applying toothpaste to toothbrush and holding dentures steady while patient brushed them.  Upper Extremity Bathing  Assistance Level: Minimal assistance  Skilled Clinical Factors: assist to wash proximal LUE  Lower Extremity Bathing  Assistance Level: Moderate

## 2024-02-01 ENCOUNTER — APPOINTMENT (OUTPATIENT)
Dept: ULTRASOUND IMAGING | Age: 69
End: 2024-02-01
Attending: PHYSICAL MEDICINE & REHABILITATION
Payer: MEDICARE

## 2024-02-01 PROBLEM — I26.99 PULMONARY EMBOLISM (HCC): Status: ACTIVE | Noted: 2024-02-01

## 2024-02-01 LAB
BACTERIA UR CULT: NORMAL
BASOPHILS # BLD: 0 K/UL (ref 0–0.2)
BASOPHILS NFR BLD: 0.2 %
EOSINOPHIL # BLD: 0 K/UL (ref 0–0.7)
EOSINOPHIL NFR BLD: 0.2 %
ERYTHROCYTE [DISTWIDTH] IN BLOOD BY AUTOMATED COUNT: 13.2 % (ref 11.5–14.5)
GLUCOSE BLD-MCNC: 101 MG/DL (ref 70–99)
GLUCOSE BLD-MCNC: 201 MG/DL (ref 70–99)
GLUCOSE BLD-MCNC: 233 MG/DL (ref 70–99)
GLUCOSE BLD-MCNC: 271 MG/DL (ref 70–99)
HCT VFR BLD AUTO: 32.4 % (ref 37–47)
HGB BLD-MCNC: 10.3 G/DL (ref 12–16)
LYMPHOCYTES # BLD: 1.3 K/UL (ref 1–4.8)
LYMPHOCYTES NFR BLD: 11.3 %
MCH RBC QN AUTO: 29.1 PG (ref 27–31.3)
MCHC RBC AUTO-ENTMCNC: 31.8 % (ref 33–37)
MCV RBC AUTO: 91.5 FL (ref 79.4–94.8)
MONOCYTES # BLD: 1.1 K/UL (ref 0.2–0.8)
MONOCYTES NFR BLD: 9.6 %
NEUTROPHILS # BLD: 9.1 K/UL (ref 1.4–6.5)
NEUTS SEG NFR BLD: 77.9 %
PERFORMED ON: ABNORMAL
PLATELET # BLD AUTO: 353 K/UL (ref 130–400)
RBC # BLD AUTO: 3.54 M/UL (ref 4.2–5.4)
WBC # BLD AUTO: 11.7 K/UL (ref 4.8–10.8)

## 2024-02-01 PROCEDURE — 1180000000 HC REHAB R&B

## 2024-02-01 PROCEDURE — 6360000002 HC RX W HCPCS: Performed by: PHYSICAL MEDICINE & REHABILITATION

## 2024-02-01 PROCEDURE — 2580000003 HC RX 258: Performed by: PHYSICAL MEDICINE & REHABILITATION

## 2024-02-01 PROCEDURE — 36415 COLL VENOUS BLD VENIPUNCTURE: CPT

## 2024-02-01 PROCEDURE — 99233 SBSQ HOSP IP/OBS HIGH 50: CPT | Performed by: PHYSICAL MEDICINE & REHABILITATION

## 2024-02-01 PROCEDURE — 6370000000 HC RX 637 (ALT 250 FOR IP): Performed by: NURSE PRACTITIONER

## 2024-02-01 PROCEDURE — 85025 COMPLETE CBC W/AUTO DIFF WBC: CPT

## 2024-02-01 PROCEDURE — 6370000000 HC RX 637 (ALT 250 FOR IP): Performed by: FAMILY MEDICINE

## 2024-02-01 PROCEDURE — 94761 N-INVAS EAR/PLS OXIMETRY MLT: CPT

## 2024-02-01 PROCEDURE — 99232 SBSQ HOSP IP/OBS MODERATE 35: CPT | Performed by: INTERNAL MEDICINE

## 2024-02-01 PROCEDURE — 6360000002 HC RX W HCPCS: Performed by: REGISTERED NURSE

## 2024-02-01 PROCEDURE — 2700000000 HC OXYGEN THERAPY PER DAY

## 2024-02-01 PROCEDURE — 93970 EXTREMITY STUDY: CPT

## 2024-02-01 PROCEDURE — 2580000003 HC RX 258: Performed by: REGISTERED NURSE

## 2024-02-01 PROCEDURE — 6370000000 HC RX 637 (ALT 250 FOR IP): Performed by: PHYSICAL MEDICINE & REHABILITATION

## 2024-02-01 PROCEDURE — 6370000000 HC RX 637 (ALT 250 FOR IP): Performed by: REGISTERED NURSE

## 2024-02-01 PROCEDURE — 94640 AIRWAY INHALATION TREATMENT: CPT

## 2024-02-01 RX ORDER — SODIUM CHLORIDE 9 MG/ML
INJECTION, SOLUTION INTRAVENOUS CONTINUOUS
Status: DISCONTINUED | OUTPATIENT
Start: 2024-02-01 | End: 2024-02-03

## 2024-02-01 RX ORDER — DIPHENHYDRAMINE HCL 25 MG
50 TABLET ORAL ONCE
Status: CANCELLED | OUTPATIENT
Start: 2024-02-01 | End: 2024-02-01

## 2024-02-01 RX ORDER — PREDNISONE 50 MG/1
50 TABLET ORAL ONCE
Status: CANCELLED | OUTPATIENT
Start: 2024-02-01 | End: 2024-02-01

## 2024-02-01 RX ADMIN — Medication 2000 UNITS: at 17:26

## 2024-02-01 RX ADMIN — Medication 5 MG: at 21:47

## 2024-02-01 RX ADMIN — ALBUTEROL SULFATE 2 PUFF: 90 AEROSOL, METERED RESPIRATORY (INHALATION) at 04:01

## 2024-02-01 RX ADMIN — INSULIN LISPRO 5 UNITS: 100 INJECTION, SOLUTION INTRAVENOUS; SUBCUTANEOUS at 09:55

## 2024-02-01 RX ADMIN — INSULIN LISPRO 1 UNITS: 100 INJECTION, SOLUTION INTRAVENOUS; SUBCUTANEOUS at 09:54

## 2024-02-01 RX ADMIN — CEFTRIAXONE SODIUM 1000 MG: 1 INJECTION, POWDER, FOR SOLUTION INTRAMUSCULAR; INTRAVENOUS at 15:17

## 2024-02-01 RX ADMIN — SODIUM CHLORIDE: 9 INJECTION, SOLUTION INTRAVENOUS at 10:33

## 2024-02-01 RX ADMIN — ROSUVASTATIN CALCIUM 40 MG: 40 TABLET, FILM COATED ORAL at 21:47

## 2024-02-01 RX ADMIN — INSULIN GLARGINE 10 UNITS: 100 INJECTION, SOLUTION SUBCUTANEOUS at 21:47

## 2024-02-01 RX ADMIN — METOPROLOL TARTRATE 25 MG: 25 TABLET, FILM COATED ORAL at 09:55

## 2024-02-01 RX ADMIN — AZITHROMYCIN DIHYDRATE 500 MG: 500 INJECTION, POWDER, LYOPHILIZED, FOR SOLUTION INTRAVENOUS at 12:18

## 2024-02-01 RX ADMIN — INSULIN LISPRO 1 UNITS: 100 INJECTION, SOLUTION INTRAVENOUS; SUBCUTANEOUS at 12:16

## 2024-02-01 RX ADMIN — ESCITALOPRAM OXALATE 15 MG: 10 TABLET ORAL at 09:55

## 2024-02-01 RX ADMIN — INSULIN LISPRO 5 UNITS: 100 INJECTION, SOLUTION INTRAVENOUS; SUBCUTANEOUS at 12:15

## 2024-02-01 RX ADMIN — Medication 100 MG: at 09:55

## 2024-02-01 RX ADMIN — PREDNISONE 20 MG: 20 TABLET ORAL at 09:55

## 2024-02-01 RX ADMIN — BUDESONIDE AND FORMOTEROL FUMARATE DIHYDRATE 2 PUFF: 80; 4.5 AEROSOL RESPIRATORY (INHALATION) at 04:01

## 2024-02-01 NOTE — PROGRESS NOTES
every Thursday to re-assess progress towards goals, discuss and address social, psychological and medical comorbidities and to address difficulties they may be having progressing in therapy.  Patient and family education is in progress.  The patient is to follow-up with their family physician after discharge.        Complex Active General Medical Issues that complicate care Assess & Plan:     Type 2 diabetes mellitus with chronic kidney disease-Continue blood sugar checks every shift, diet, add diabetic add dietary education, restrict carbohydrates to lowest effective and safe carb count per meal advising 4 carbs per meal, add at bedtime snack to prevent a.m. hypoglycemia, adjust/add medications.  Chronic insomnia, depression, anxiety-emotional support provided daily, vitamin B12, encourage participation in rehabilitation support group and recreational therapy, adjust/add medications.  Acute exacerbation of COPD (chronic obstructive pulmonary disease) with acute bronchitis-rule out pneumonia -acute rehab for endurance traing with Pulse Ox to monitoring oxygen saturation and heart rate with O2 titration to lowest effective dose. Pulse oximeter checks to shift and at HS to dose and titrate oxygen and aerosol treatments monitor for nocturnal hypoxemia, monitor vital signs, oxygen prn.  Focus on energy conservation-IV Rocephin-add IV Zithromax  SP  pulmonology   Sinus Tach,  Mixed hyperlipidemia,  Essential hypertension-Acute rehab to monitor heart rate and rhythm with the option of telemetry and the effects of chronotropic medication with respect to increasing physical activity and exercise in PT, OT, ADLs with medication titration to lowest effective dosing.  Continue blood signs every shift focusing on heart rate, rhythm and blood pressure checks with orthostatic checks-monitoring the effect of exercise, therapy and posture.  Consult hospitalist for backup medical and adjust/add medications.  Monitor heart rate and  blood pressure as well as medications effects on vital signs before during and after therapy with especial focus on preventing orthostasis and falls risk.  Check CBC CMP and consult cardiology.    Chronic renal failure, stage 3a-Eliminate toxic medications, monitor I's and O's focusing on urine output, recheck BMP.    Intracranial hemorrhage sp acute infarction in the left superior mid parietal lobe MCA territory    Cerebral infarction due to occlusion of left middle cerebral artery with 2 cm acute intraparenchymal left frontal lobe hematoma-mitigate stroke risk factors and consult neurology    Dysphagia, oropharyngeal phase--SLP-up at 90 degrees when eating recheck modified barium swallow was refused by patient.  Tobacco addiction-NicoDerm titration stop smoking counseling    Late effects of CVA (cerebrovascular accident)-stroke risk mitigation    Urinary incontinence-UA/Culture from [employer] and sent to lab via tube system  Active chronic nausea and vomiting-IV fluids as needed as needed Zofran consult medical consider GI consult monitor for GI cause versus central cause-scatter a.m. medications Zofran versus Compazine as needed  Aspiration pneumonia-IV antibiotics, titrate O2, IV normal saline, Acapella, aerosol treatments, monitor heart rate and blood pressure.-Pulmonology is on case  Bilateral PEs with contraindication for anticoagulation due to hemorrhagic CVAs-IVC filter will be placed pulmonology is on case.  Hospitalist involved.    Await IVC filter placement for bilateral PEs with contraindication for anticoagulation.   Note that patient will be on a medical hold for 72 hours for therapy with medical exemption for Alost therapy time because of her PEs and awaiting IVC filter placement.        Electronically signed by Jayne Stacy DO on 1/25/24 at 8:25 AM BLANCA Stacy D.O., PM&R     Attending    [phone]   [facility] [place]

## 2024-02-01 NOTE — PROGRESS NOTES
Critical Care Progress Note    2024 7:36 AM    Subjective:   Admit Date: 2024  PCP: Eileen Edmnodson, APRN - NP    No chief complaint on file.    Interval History: Events from last night noted.  This is my first encounter with the patient.  Apparently she was having worsening shortness of breath and tachycardia.  Underwent a CTA chest which I reviewed personally interpreted the results independently.  Noted bilateral pulmonary emboli in addition to abnormalities in the pack-years in the kidney.    12 points review of systems has been obtained and negative except to was mentioned in HPI.     Medications:   Scheduled Meds:   predniSONE  20 mg Oral Daily    azithromycin  500 mg IntraVENous Q24H    metoprolol tartrate  25 mg Oral BID    cefTRIAXone (ROCEPHIN) IV  1,000 mg IntraVENous Q24H    Vitamin D  2,000 Units Oral Dinner    cyanocobalamin  1,000 mcg IntraMUSCular Weekly    coenzyme Q10  100 mg Oral Daily    lidocaine  3 patch TransDERmal Daily    albuterol sulfate HFA  2 puff Inhalation BID    budesonide-formoterol  2 puff Inhalation BID RT    escitalopram  15 mg Oral Daily    insulin glargine  10 Units SubCUTAneous Nightly    insulin lispro  5 Units SubCUTAneous TID WC    melatonin  5 mg Oral Nightly    nicotine  1 patch TransDERmal Q24H    insulin lispro  0-4 Units SubCUTAneous TID WC    insulin lispro  0-4 Units SubCUTAneous Nightly    rosuvastatin  40 mg Oral Nightly     Continuous Infusions:      Objective:   Vitals:   Temp (24hrs), Av.6 °F (36.4 °C), Min:97.5 °F (36.4 °C), Max:97.6 °F (36.4 °C)    /66   Pulse 89   Temp 97.6 °F (36.4 °C) (Oral)   Resp 16   Ht 1.6 m (5' 3\")   Wt 72.8 kg (160 lb 9.6 oz)   SpO2 98%   BMI 28.45 kg/m²   I/O:24HR INTAKE/OUTPUT:    Intake/Output Summary (Last 24 hours) at 2024 0736  Last data filed at 2024 0628  Gross per 24 hour   Intake 477 ml   Output 750 ml   Net -273 ml      0701 -  0700  In: 477 [P.O.:477]  Out: 750 [Urine:750]  CVP:

## 2024-02-01 NOTE — PROGRESS NOTES
02/01/24 0400   RT Protocol   History Pulmonary Disease 2   Respiratory pattern 0   Breath sounds 2   Cough 0   Indications for Bronchodilator Therapy Decreased or absent breath sounds   Bronchodilator Assessment Score 4

## 2024-02-01 NOTE — PROGRESS NOTES
INDIVIDUALIZED OVERALL REHAB PLAN OF CARE  ADDENDUM TO REHAB PROGRESS NOTE-for audit purposes must also refer to this day's clinical note and combine the information      Date: 2024  Patient Name: Mulu Pandya   Room: R243/R243-01    MRN: [id number]    : 1955  (68 y.o.)  Gender: female       Today 2024 during weekly team meeting, I reviewed the patient Mulu Pandya in detail with the therapists and nurses involved in patient's care gathering complex physiatric data regarding current medical issues, progress in therapies, factors limiting progress, social issues, psychological issues, ongoing therapeutic plans and discharge planning.    Legend:  I= independent Im =Modified independent  S=Supervised SB=stand by PARADA=set up CG=contact primo Min= minimal Mod=Moderate Max=maximal Max of 2 =maximal assist of 2 people      CURRENT FUNCTIONAL STATUS:     68 years old female with PMH of COPD, HTN, HLD, DM type II who presented to the ED with right-sided weakness, aphasia, dysphagia and facial droop.  Patient was found to have subacute stroke of the right parietal lobe and also 2 cm parenchymal hemorrhage in the left basal ganglia. Patient was admitted with neurosurgery consult. IM hospitalist team was consulted for physical exam to rule out any co-morbid physical condition, and medically manage acute and chronic health conditions.  Patient was seen and assessed in her room, reports having intermittent nonproductive cough. Currently on 2 L O2 supplemental.  Patient denies fever, chills, chest pain, dyspnea.  She also denies having nausea, vomiting, or diarrhea.  She is concerned about lightheadedness, weakness, facial asymmetry and gait problem.  On assessment patient was positive for confusion and decreased concentration.     She has anticoagulation contraindication because of the hemorrhagic conversion of her CVAs.  She developed PEs and will need an IVC filter.    NURSING ISSUES:Denies pain at this time.     Vaping Use: Never used   Substance and Sexual Activity    Alcohol use: No     Comment: denies    Drug use: No    Sexual activity: Not Currently   Other Topics Concern    Not on file   Social History Narrative         Lives With: Spouse Shaq     Type of Home: [address] [place]         Home Layout: One level    Home Access: Stairs to enter with rails - Number of Steps: 3 - Rails: Left (up)    Bathroom Shower/Tub: Walk-in shower,  Toilet: Standard, Equipment: Grab bars in shower, Shower chair, Hand-held shower,  Accessibility: Accessible    Home Equipment: Cane    Receives Help From: Family    ADL Assistance: Independent    Homemaking Assistance: Independent,  Responsibilities: Yes    Ambulation Assistance: Independent (normally without AD)    Transfer Assistance: Independent    Active : Yes -Car    Occupation: Retired    Leisure & Hobbies: TV, music, reading    Additional Comments: pt with aphasia,         Social Determinants of Health     Financial Resource Strain: Low Risk  (2/10/2023)    Overall Financial Resource Strain (CARDIA)     Difficulty of Paying Living Expenses: Not hard at all   Food Insecurity: No Food Insecurity (1/23/2024)    Hunger Vital Sign     Worried About Running Out of Food in the Last Year: Never true     Ran Out of Food in the Last Year: Never true   Transportation Needs: No Transportation Needs (1/23/2024)    PRAPARE - Transportation     Lack of Transportation (Medical): No     Lack of Transportation (Non-Medical): No   Physical Activity: Inactive (5/11/2023)    Exercise Vital Sign     Days of Exercise per Week: 0 days     Minutes of Exercise per Session: 0 min   Stress: Not on file   Social Connections: Not on file   Intimate Partner Violence: Not on file   Housing Stability: Low Risk  (1/23/2024)    Housing Stability Vital Sign     Unable to Pay for Housing in the Last Year: No     Number of Places Lived in the Last Year: 1     Unstable Housing in the Last Year: No

## 2024-02-01 NOTE — FLOWSHEET NOTE
Patient assessment complete. Denies pain at this time. Patient had a CT done today. Radiology called asking for name of doctor on call to give results to. Dr. Sanchez name given. Nadia Vega NP messaged as well regarding CT results. Electronically signed by Abby Carson RN on 1/31/2024 at 8:25 PM    Nadia Vega NP messaged requesting parameters be put on for Metoprolol. New parameters received for this med. Electronically signed by Abby Carson RN on 1/31/2024 at 8:34 PM    Nadia Vega NP called this nurse and states patient is positive for a PE, to please reach out to Dr. Sanchez.    Dr. Gordon called this nurse stating patient is positive for bilateral PEs. This nurse informed Dr. Sanchez that patient did have recent ICH, he informs me to please reach out to whoever is on call for neurology to make them aware of the findings since we can not start her on anticoagulation at this time. Electronically signed by Abby Carson RN on 1/31/2024 at 8:47 PM    Dr. Chavez paged requesting a call back. Electronically signed by Abby Carson RN on 1/31/2024 at 8:53 PM    New orders from Dr. Chavez. CT paged for the new STAT CT of head order. Electronically signed by Abby Carson RN on 1/31/2024 at 9:11 PM    Pulmonology perfect served per Dr. Chavez's request. Electronically signed by Abby Carson RN on 1/31/2024 at 9:29 PM    Ame Echeverria MD with pulmonology ask if this nurse could put IR consult on for patient to have a IVC filter placed tomorrow.     Transport here to take patient down for her CT. Electronically signed by Abby Carson RN on 1/31/2024 at 10:28 PM    Patient back from CT scan. Electronically signed by Abby Carson RN on 1/31/2024 at 11:02 PM    CT results of the head is back. Radiology called and made Dr. Sanchez aware of results at this time. Electronically signed by Abby Carson RN on 2/1/2024 at 12:35 AM    Left voicemail with Dr. Stacy of above.      Patient insisted on going to bathroom even after  education on her current status. Had large BM.

## 2024-02-01 NOTE — PROGRESS NOTES
Signed         Patient: Mulu Pandya  Unit/Bed: W268/W268-01  YOB: 1955  MRN: [id number]  Acct: [id number]   Admitting Diagnosis: Intracranial hemorrhage (HCC) [I62.9]  Basal ganglia hemorrhage (HCC) [I61.0]  Cerebral infarction due to occlusion of left middle cerebral artery (HCC) [I63.512]  Date:  1/18/2024  Hospital Day: 5     Chief Complaint:  CVA     Subjective   Patient is a 68 y.o. female who presents with a chief complaint of MS change/CVA type symptoms. . Patient is followed on a regular basis by Eileen Capps, APRN - NP.  Patient with past medical history of diabetes, hypertension, hyperlipidemia, tobacco abuse who presented with CVA type symptoms.  .  No prior cardiac history.  No prior history of atrial fibrillation  Carotid ultrasound is negative for any significant stenosis.   Now seen in rehab.      S/p GUSTABO   No mass or thrombus  No PFO with bubble study  Normal LVF EF of 55%  Mild MR  Mild TR  Mild to mod aorta plaque         2-1-24: Status post CTA of the chest with thrombus seen in the right lower lobe pulmonary artery.  No evidence of any RV strain.  Discussed case with pulmonary as well as neurology.  Requesting IVC filter placement.  Awaiting CT of the brain    Review of Systems:   Review of Systems   Constitutional: Negative.    HENT: Negative.     Eyes: Negative.    Respiratory: Negative.     Cardiovascular: Negative.    Gastrointestinal: Negative.    Endocrine: Negative.    Genitourinary: Negative.    Musculoskeletal: Negative.    Skin: Negative.    Allergic/Immunologic: Negative.    Neurological:  Positive for speech difficulty and weakness.   Hematological: Negative.    Psychiatric/Behavioral: Negative.              Physical Examination:    /70   Pulse 98   Temp 98.2 °F (36.8 °C) (Oral)   Resp 18   Ht 1.6 m (5' 3\")   Wt 68.7 kg (151 lb 7.3 oz)   SpO2 91%   BMI 26.83 kg/m²    Physical Exam  Constitutional:       Appearance: Normal appearance.   GLUCOSE 107 01/23/2024 04:42 AM     GLUCOSE 132 12/13/2011 10:46 AM     PROT 5.3 01/18/2024 08:30 AM     LABALBU 2.4 01/23/2024 04:42 AM     LABALBU 4.4 12/13/2011 10:46 AM     CALCIUM 7.9 01/23/2024 04:42 AM     BILITOT <0.2 01/18/2024 08:30 AM     ALKPHOS 16 01/18/2024 08:30 AM     AST 38 01/18/2024 08:30 AM     ALT 37 01/18/2024 08:30 AM      BMP:              Lab Results   Component Value Date/Time      01/23/2024 04:42 AM     K 3.8 01/23/2024 04:42 AM     CL 98 01/23/2024 04:42 AM     CO2 24 01/23/2024 04:42 AM     BUN 22 01/23/2024 04:42 AM     LABALBU 2.4 01/23/2024 04:42 AM     LABALBU 4.4 12/13/2011 10:46 AM     CREATININE 1.06 01/23/2024 04:42 AM     CALCIUM 7.9 01/23/2024 04:42 AM     GFRAA >60.0 07/21/2021 02:01 PM     LABGLOM 56.3 01/23/2024 04:42 AM     GLUCOSE 107 01/23/2024 04:42 AM     GLUCOSE 132 12/13/2011 10:46 AM      Magnesium:              Lab Results   Component Value Date/Time     MG 1.7 01/18/2024 08:30 AM      Troponin:  No results found for: \"TROPONINI\"     Radiology:  No results found.                  ASSESSMENT:               Active Hospital Problems     Diagnosis Date Noted    Intracranial hemorrhage (HCC) [I62.9] 01/18/2024       Priority: Low    Cerebral infarction due to occlusion of left middle cerebral artery (HCC) [I63.512] 01/18/2024       Priority: Low      Acute CVA. S/P NEGATIVE GUSTABO.   Right lower lobe pulmonary embolism  Hypertension  Tobacco abuse  Diabetes mellitus  Hyperlipidemia     PLAN:   As always, aggressive risk factor modification is strongly recommended. We should adhere to the JNC VIII guidelines for HTN management and the NCEPATP III guidelines for LDL-C management.  Recommend event monitor/implantable loop recorder in near future.  Patient high risk for atrial fibrillation.  Coronary evaluation future as outpatient.  Check periodic EKG  GI/DVT prophylaxis  Check bilateral extremity venous with ultrasound examination.  Await neurology recommendations

## 2024-02-01 NOTE — CARE COORDINATION
Called by [facility] at 0026hrs on 2/1/24.      Dr. Gen Munoz, regarding a CT Head withouth contrast ordered on dayshift:    Hemorrhagic conversion of left infarct has occurred, but no midline shift, no mass effect.    Electronically signed by Taylor Sanchez DO on 2/1/2024 at 12:27 AM

## 2024-02-01 NOTE — CARE COORDINATION
Received [employer] to callback radiologist regarding critical radiology report for this patient.    Radiologist Dr. Tobi Pennington regarding CTA chest done this afternoon:    Small bilateral PE burden.  No evidence of right heart strain.  Probable large pseudocyst of pancreas.  Non-specific inflammation upper pole of left kidney.  He recommends dedicated abdomen imaging with CT ABD/Pelvis with Contrast.    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  Anticoagulation is contraindicated in setting of ICH, despite current bilateral PEs now being identified.  I've spoken with the room RN and asked her to message Neurology about the results of their scan, and see if they have any other specific recommendations at this time.      Electronically signed by Taylor Sacnhez DO on 1/31/2024 at 8:42 PM

## 2024-02-01 NOTE — PLAN OF CARE
Problem: Safety - Adult  Goal: Free from fall injury  1/31/2024 2026 by Abby Carson, RN  Outcome: Progressing     Problem: Skin/Tissue Integrity  Goal: Absence of new skin breakdown  Description: 1.  Monitor for areas of redness and/or skin breakdown  2.  Assess vascular access sites hourly  3.  Every 4-6 hours minimum:  Change oxygen saturation probe site  4.  Every 4-6 hours:  If on nasal continuous positive airway pressure, respiratory therapy assess nares and determine need for appliance change or resting period.  1/31/2024 2026 by Abby Carson, RN  Outcome: Progressing     Problem: Skin/Tissue Integrity - Adult  Goal: Skin integrity remains intact  Outcome: Progressing     Problem: Nutrition Deficit:  Goal: Optimize nutritional status  Outcome: Progressing

## 2024-02-01 NOTE — PROGRESS NOTES
PROVIDED HISTORY: Reason for exam:->r/o bleed, wet reading, has possible ICH Has a \"code stroke\" or \"stroke alert\" been called?->No What reading provider will be dictating this exam?->CRC FINDINGS: BRAIN/VENTRICLES: Stable area of increased density identified within the left basal ganglia.  There is new subarachnoid hemorrhage identified in the left frontal parietal region in the area of evolving infarction.  Chronic insult identified within the right temporoparietal lobe.  Stable right cerebellar pontine angle mass.  Stable atrophy and chronic changes again seen within the brain. ORBITS: The visualized portion of the orbits demonstrate no acute abnormality. SINUSES: The visualized paranasal sinuses and mastoid air cells demonstrate no acute abnormality. SOFT TISSUES/SKULL:  No acute abnormality of the visualized skull or soft tissues.     1. New subarachnoid hemorrhage identified in the left frontal parietal region in the area of evolving infarction. 2. Stable area of increased density identified within the left basal ganglia. 3. Stable right cerebellar pontine angle mass. 4. Chronic insult identified within the right temporoparietal lobe. 5. Stable atrophy and chronic changes again seen within the brain.     CTA CHEST W WO CONTRAST    Addendum Date: 1/31/2024    ADDENDUM: I discussed the findings with Dr. Sanchez at 8:40 p.m. on 01/31/2024.     Result Date: 1/31/2024  EXAMINATION: CTA OF THE CHEST WITH AND WITHOUT CONTRAST 1/31/2024 3:40 pm TECHNIQUE: CTA of the chest was performed before and after the administration of intravenous contrast.  Multiplanar reformatted images are provided for review.  MIP images are provided for review. Automated exposure control, iterative reconstruction, and/or weight based adjustment of the mA/kV was utilized to reduce the radiation dose to as low as reasonably achievable. COMPARISON: Low-dose CT of the chest from 11/25/2022. HISTORY: ORDERING SYSTEM PROVIDED HISTORY: Shortness of  with no sign of mass. Soft Tissues/Bones: No acute bone or soft tissue abnormality.     1. Mild amount of thrombus seen in the distal right lower lobe pulmonary artery. A small amount of thrombus is seen in the subsegmental arteries of the posterior basilar segment in the left lower lobe. Clot burden is low. The RV/LV ratio is 1.03, just above top normal. There is no sign of flattening of the interventricular septum. 2. There is a large low-density region extending inferiorly from the pancreatic body, measuring 9.8 x 6.4 cm in cross-section, which may be a pseudocyst. A low-density mass cannot be excluded however. 3. There is multilobular heterogeneous low-density filling the upper pole of the left kidney measuring 5.7 x 4.0 cm. This could be a necrotic mass within the upper pole of the left kidney, and does appear to connect to the pancreatic process inferiorly. These regions are incompletely examined on today's CT angiogram of the chest. Suggest dedicated CT of the abdomen and pelvis with contrast. 4. Fatty infiltration of the liver. RECOMMENDATIONS: Recommend CT of the abdomen and pelvis with contrast to evaluate abnormalities involving the pancreas and left kidney.       Electronically signed by BALTAZAR Perea CNP on 2/1/24 at 10:51 AM EST

## 2024-02-01 NOTE — PROGRESS NOTES
[facility]  Occupational Therapy      NAME:  Mulu Pandya  ROOM: R243/R243-01  :  1955  DATE: 2024    Attempted to see Mulu Pandya on this date for   []  Initial Evaluation   [x]  Scheduled therapy    []  Make-up therapy   []  Group therapy   []  Family training    Patient was unable to be seen due to:   [] Off unit for testing/procedure   [] Patient refused, stating \"    [x] Therapy on hold all day due to Pt with new bilateral PE   [] Nursing deferred due to    [] Other:        Electronically signed by Vicente Barrett OT on 24 at 8:31 AM EST

## 2024-02-01 NOTE — PROGRESS NOTES
Secure message from Dr. Gill regarding IVC filter placement received, pt given a late breakfast d/t procedure most likely will be tomorrow AM. Printed patient education on IVC filters given to pt and spouse. Oxygen in place via nasal cannula, respirations unlabored, no c/o pain, AM meds given with mildly thick juice. Video monitoring continues for patient safety. Electronically signed by Liza Portillo RN on 2/1/2024 at 10:03 AM    Pt resting in bed, IVF infusing, plan of care for tomorrow reviewed. Orders received from Dr. Gill. Electronically signed by Liza Portillo RN on 2/1/2024 at 6:17 PM

## 2024-02-02 LAB
BASOPHILS # BLD: 0 K/UL (ref 0–0.2)
BASOPHILS NFR BLD: 0.3 %
EOSINOPHIL # BLD: 0.1 K/UL (ref 0–0.7)
EOSINOPHIL NFR BLD: 1 %
ERYTHROCYTE [DISTWIDTH] IN BLOOD BY AUTOMATED COUNT: 13.4 % (ref 11.5–14.5)
GLUCOSE BLD-MCNC: 135 MG/DL (ref 70–99)
GLUCOSE BLD-MCNC: 214 MG/DL (ref 70–99)
GLUCOSE BLD-MCNC: 299 MG/DL (ref 70–99)
GLUCOSE BLD-MCNC: 75 MG/DL (ref 70–99)
GLUCOSE BLD-MCNC: 83 MG/DL (ref 70–99)
HCT VFR BLD AUTO: 36.2 % (ref 37–47)
HGB BLD-MCNC: 11.2 G/DL (ref 12–16)
LYMPHOCYTES # BLD: 1.5 K/UL (ref 1–4.8)
LYMPHOCYTES NFR BLD: 13.7 %
MCH RBC QN AUTO: 28.9 PG (ref 27–31.3)
MCHC RBC AUTO-ENTMCNC: 30.9 % (ref 33–37)
MCV RBC AUTO: 93.3 FL (ref 79.4–94.8)
MONOCYTES # BLD: 0.9 K/UL (ref 0.2–0.8)
MONOCYTES NFR BLD: 8.2 %
NEUTROPHILS # BLD: 8.5 K/UL (ref 1.4–6.5)
NEUTS SEG NFR BLD: 75.7 %
PERFORMED ON: ABNORMAL
PERFORMED ON: NORMAL
PERFORMED ON: NORMAL
PLATELET # BLD AUTO: 342 K/UL (ref 130–400)
RBC # BLD AUTO: 3.88 M/UL (ref 4.2–5.4)
WBC # BLD AUTO: 11.2 K/UL (ref 4.8–10.8)

## 2024-02-02 PROCEDURE — 2700000000 HC OXYGEN THERAPY PER DAY

## 2024-02-02 PROCEDURE — 99152 MOD SED SAME PHYS/QHP 5/>YRS: CPT | Performed by: INTERNAL MEDICINE

## 2024-02-02 PROCEDURE — 2580000003 HC RX 258: Performed by: REGISTERED NURSE

## 2024-02-02 PROCEDURE — 99232 SBSQ HOSP IP/OBS MODERATE 35: CPT | Performed by: NURSE PRACTITIONER

## 2024-02-02 PROCEDURE — 2580000003 HC RX 258: Performed by: INTERNAL MEDICINE

## 2024-02-02 PROCEDURE — 6370000000 HC RX 637 (ALT 250 FOR IP): Performed by: FAMILY MEDICINE

## 2024-02-02 PROCEDURE — 2709999900 HC NON-CHARGEABLE SUPPLY: Performed by: INTERNAL MEDICINE

## 2024-02-02 PROCEDURE — 99232 SBSQ HOSP IP/OBS MODERATE 35: CPT | Performed by: PHYSICAL MEDICINE & REHABILITATION

## 2024-02-02 PROCEDURE — 1180000000 HC REHAB R&B

## 2024-02-02 PROCEDURE — 6370000000 HC RX 637 (ALT 250 FOR IP): Performed by: REGISTERED NURSE

## 2024-02-02 PROCEDURE — 2580000003 HC RX 258: Performed by: PHYSICAL MEDICINE & REHABILITATION

## 2024-02-02 PROCEDURE — 6370000000 HC RX 637 (ALT 250 FOR IP): Performed by: PHYSICAL MEDICINE & REHABILITATION

## 2024-02-02 PROCEDURE — 2500000003 HC RX 250 WO HCPCS: Performed by: INTERNAL MEDICINE

## 2024-02-02 PROCEDURE — 6360000004 HC RX CONTRAST MEDICATION: Performed by: INTERNAL MEDICINE

## 2024-02-02 PROCEDURE — C1769 GUIDE WIRE: HCPCS | Performed by: INTERNAL MEDICINE

## 2024-02-02 PROCEDURE — 6360000002 HC RX W HCPCS: Performed by: INTERNAL MEDICINE

## 2024-02-02 PROCEDURE — 37191 INS ENDOVAS VENA CAVA FILTR: CPT | Performed by: INTERNAL MEDICINE

## 2024-02-02 PROCEDURE — C1880 VENA CAVA FILTER: HCPCS | Performed by: INTERNAL MEDICINE

## 2024-02-02 PROCEDURE — 36415 COLL VENOUS BLD VENIPUNCTURE: CPT

## 2024-02-02 PROCEDURE — 06H03DZ INSERTION OF INTRALUMINAL DEVICE INTO INFERIOR VENA CAVA, PERCUTANEOUS APPROACH: ICD-10-PCS | Performed by: INTERNAL MEDICINE

## 2024-02-02 PROCEDURE — 94640 AIRWAY INHALATION TREATMENT: CPT

## 2024-02-02 PROCEDURE — 85025 COMPLETE CBC W/AUTO DIFF WBC: CPT

## 2024-02-02 PROCEDURE — C1894 INTRO/SHEATH, NON-LASER: HCPCS | Performed by: INTERNAL MEDICINE

## 2024-02-02 PROCEDURE — 94761 N-INVAS EAR/PLS OXIMETRY MLT: CPT

## 2024-02-02 PROCEDURE — 6360000002 HC RX W HCPCS: Performed by: PHYSICAL MEDICINE & REHABILITATION

## 2024-02-02 PROCEDURE — 99232 SBSQ HOSP IP/OBS MODERATE 35: CPT | Performed by: INTERNAL MEDICINE

## 2024-02-02 PROCEDURE — 6370000000 HC RX 637 (ALT 250 FOR IP): Performed by: NURSE PRACTITIONER

## 2024-02-02 PROCEDURE — 7100000010 HC PHASE II RECOVERY - FIRST 15 MIN: Performed by: INTERNAL MEDICINE

## 2024-02-02 PROCEDURE — 6360000002 HC RX W HCPCS: Performed by: REGISTERED NURSE

## 2024-02-02 PROCEDURE — 7100000011 HC PHASE II RECOVERY - ADDTL 15 MIN: Performed by: INTERNAL MEDICINE

## 2024-02-02 DEVICE — FILTER VASC L49MM DIA30MM INTRO 7FR L65CM CATH L79CM VENA: Type: IMPLANTABLE DEVICE | Status: FUNCTIONAL

## 2024-02-02 RX ORDER — FENTANYL CITRATE 0.05 MG/ML
25 INJECTION, SOLUTION INTRAMUSCULAR; INTRAVENOUS
Status: CANCELLED | OUTPATIENT
Start: 2024-02-02 | End: 2024-02-03

## 2024-02-02 RX ORDER — MORPHINE SULFATE 2 MG/ML
2 INJECTION, SOLUTION INTRAMUSCULAR; INTRAVENOUS
Status: CANCELLED | OUTPATIENT
Start: 2024-02-02 | End: 2024-02-03

## 2024-02-02 RX ORDER — MIDAZOLAM HYDROCHLORIDE 1 MG/ML
INJECTION INTRAMUSCULAR; INTRAVENOUS PRN
Status: DISCONTINUED | OUTPATIENT
Start: 2024-02-02 | End: 2024-02-02 | Stop reason: HOSPADM

## 2024-02-02 RX ORDER — LIDOCAINE HYDROCHLORIDE 10 MG/ML
INJECTION, SOLUTION INFILTRATION; PERINEURAL PRN
Status: DISCONTINUED | OUTPATIENT
Start: 2024-02-02 | End: 2024-02-02 | Stop reason: HOSPADM

## 2024-02-02 RX ORDER — DEXTROSE MONOHYDRATE 50 MG/ML
INJECTION, SOLUTION INTRAVENOUS CONTINUOUS
Status: DISCONTINUED | OUTPATIENT
Start: 2024-02-02 | End: 2024-02-03

## 2024-02-02 RX ORDER — MIDAZOLAM HYDROCHLORIDE 2 MG/2ML
2 INJECTION, SOLUTION INTRAMUSCULAR; INTRAVENOUS
Status: CANCELLED | OUTPATIENT
Start: 2024-02-02 | End: 2024-02-03

## 2024-02-02 RX ADMIN — INSULIN LISPRO 1 UNITS: 100 INJECTION, SOLUTION INTRAVENOUS; SUBCUTANEOUS at 17:59

## 2024-02-02 RX ADMIN — BUDESONIDE AND FORMOTEROL FUMARATE DIHYDRATE 2 PUFF: 80; 4.5 AEROSOL RESPIRATORY (INHALATION) at 04:41

## 2024-02-02 RX ADMIN — METOPROLOL TARTRATE 25 MG: 25 TABLET, FILM COATED ORAL at 20:08

## 2024-02-02 RX ADMIN — METOPROLOL TARTRATE 25 MG: 25 TABLET, FILM COATED ORAL at 08:43

## 2024-02-02 RX ADMIN — AZITHROMYCIN DIHYDRATE 500 MG: 500 INJECTION, POWDER, LYOPHILIZED, FOR SOLUTION INTRAVENOUS at 12:52

## 2024-02-02 RX ADMIN — ESCITALOPRAM OXALATE 15 MG: 10 TABLET ORAL at 08:44

## 2024-02-02 RX ADMIN — DEXTROSE MONOHYDRATE: 50 INJECTION, SOLUTION INTRAVENOUS at 11:20

## 2024-02-02 RX ADMIN — CEFTRIAXONE SODIUM 1000 MG: 1 INJECTION, POWDER, FOR SOLUTION INTRAMUSCULAR; INTRAVENOUS at 18:24

## 2024-02-02 RX ADMIN — Medication 5 MG: at 20:06

## 2024-02-02 RX ADMIN — INSULIN LISPRO 5 UNITS: 100 INJECTION, SOLUTION INTRAVENOUS; SUBCUTANEOUS at 17:59

## 2024-02-02 RX ADMIN — INSULIN LISPRO 5 UNITS: 100 INJECTION, SOLUTION INTRAVENOUS; SUBCUTANEOUS at 08:51

## 2024-02-02 RX ADMIN — SODIUM CHLORIDE: 9 INJECTION, SOLUTION INTRAVENOUS at 04:05

## 2024-02-02 RX ADMIN — ALBUTEROL SULFATE 2 PUFF: 90 AEROSOL, METERED RESPIRATORY (INHALATION) at 04:42

## 2024-02-02 RX ADMIN — PREDNISONE 20 MG: 20 TABLET ORAL at 08:43

## 2024-02-02 RX ADMIN — ROSUVASTATIN CALCIUM 40 MG: 40 TABLET, FILM COATED ORAL at 20:07

## 2024-02-02 RX ADMIN — INSULIN GLARGINE 10 UNITS: 100 INJECTION, SOLUTION SUBCUTANEOUS at 20:05

## 2024-02-02 RX ADMIN — Medication 2000 UNITS: at 18:00

## 2024-02-02 ASSESSMENT — PAIN SCALES - GENERAL
PAINLEVEL_OUTOF10: 0
PAINLEVEL_OUTOF10: 0

## 2024-02-02 NOTE — PROGRESS NOTES
Assessment completed. A&O x4. Denies pain at this time. Pt given all their medications per MAR. Pt denies any needs at this time. Bed is in lowest position, bed locked, and call light is within reach. Monitored patient closely due to tele monitor room informing us about pt having 14 beats of ventricular tachycardia. Pt vitals were taken and patient denies any pain, SOB, or discomfort.

## 2024-02-02 NOTE — PROGRESS NOTES
02/02/24 0400   RT Protocol   History Pulmonary Disease 2   Respiratory pattern 0   Breath sounds 2   Cough 0   Indications for Bronchodilator Therapy None   Bronchodilator Assessment Score 4

## 2024-02-02 NOTE — BRIEF OP NOTE
Section of Cardiology  Adult Brief Procedure Note        Procedure(s):  IVC filter placement.     Pre-operative Diagnosis:  dvt, pe, c.I TO OAC    H&P Status: Completed and reviewed.     Post-operative Diagnosis:      NORMAL IVC VENOGRAM    Findings:  See full report    Complications:  none    Primary Proceduralist:   Dr.Wes Gill DO    PLAN    POST PROCEDURE CARE AS USUAL   OAC WHEN OK WITH NEURO.   CAN REMOVE IVCF 120 DAYS POST INSERTION.   Full procedure note to follow

## 2024-02-02 NOTE — PROGRESS NOTES
Physical Therapy Missed Treatment   Facility/Department: [facility] R243/R243-01    NAME: Mulu Pandya    : 1955 (68 y.o.)  MRN: [id number]    Account: [id number]  Gender: female    Pt on hold for therapies d/t B PEs and awaiting cardiac procedure today.        Melissa Swan, PTA, 24 at 10:25 AM

## 2024-02-02 NOTE — PROGRESS NOTES
Taking food and fluids.  Right groin stable along with vitals.  Report called to [facility] on Rehab.  Monitor attached and transport notified

## 2024-02-02 NOTE — FLOWSHEET NOTE
0844: Assessment completed. AXOX4. Medication given with sips of water. Pt is NPO going for a cardiac procedure. Glucose this morning 135. LBM 2/2/2024. Diarrhea. Incontinent of bladder and bowel. She is in bed, call light within reach and bed alarm on.  Avasys on safety.    1057: Patient NPO. Glucose level 75. Ordered received. Dextrose 5% Infusing.      1730: Patient came back from procedure. AXOX4. Vitals taken by this nurse. Glucose 214 . Dextrose stopped. NP notified. 6 units of insulin given. Patient denies nausea, pain, SOB, or dizziness. Patient is eating her dinner in bed, bed alarm on, and call light within reach.

## 2024-02-02 NOTE — CARE COORDINATION
Sitting    Results/Findings:   Labs:   Recent Labs     01/31/24  0456 02/01/24  0356 02/02/24  0409   WBC 16.1* 11.7* 11.2*   HGB 10.4* 10.3* 11.2*   HCT 32.8* 32.4* 36.2*    353 342     No results for input(s): \"NA\", \"K\", \"CL\", \"CO2\", \"BUN\", \"CREATININE\", \"CALCIUM\", \"PHOS\" in the last 72 hours.    Invalid input(s): \"MAGNES\"  No results for input(s): \"AST\", \"ALT\", \"BILIDIR\", \"BILITOT\", \"ALKPHOS\" in the last 72 hours.  No results for input(s): \"INR\" in the last 72 hours.  No results for input(s): \"CKTOTAL\", \"TROPONINI\" in the last 72 hours.    Urinalysis:      Lab Results   Component Value Date/Time    NITRU Negative 01/30/2024 12:45 PM    WBCUA >100 01/30/2024 12:45 PM    BACTERIA Negative 01/30/2024 12:45 PM    RBCUA 3-5 01/30/2024 12:45 PM    BLOODU MODERATE 01/30/2024 12:45 PM    SPECGRAV 1.021 01/30/2024 12:45 PM    GLUCOSEU Negative 01/30/2024 12:45 PM       Radiology:  Vascular duplex lower extremity venous bilateral         CT HEAD WO CONTRAST   Final Result   1. New subarachnoid hemorrhage identified in the left frontal parietal region   in the area of evolving infarction.   2. Stable area of increased density identified within the left basal ganglia.   3. Stable right cerebellar pontine angle mass.   4. Chronic insult identified within the right temporoparietal lobe.   5. Stable atrophy and chronic changes again seen within the brain.         CTA CHEST W WO CONTRAST   Final Result   Addendum (preliminary) 1 of 1   ADDENDUM:   I discussed the findings with Dr. Sanchez at 8:40 p.m. on 01/31/2024.         Final   1. Mild amount of thrombus seen in the distal right lower lobe pulmonary   artery. A small amount of thrombus is seen in the subsegmental arteries of   the posterior basilar segment in the left lower lobe. Clot burden is low. The   RV/LV ratio is 1.03, just above top normal. There is no sign of flattening of   the interventricular septum.   2. There is a large low-density region extending  1255)  Equipment: Standard toilet;Grab bars (24 1255)  Additional Factors: Verbal cues;Cues for hand placement;Increased time to complete (24 1255)  Assistance Level: Stand by assist (24 1255)  Tub/Shower Transfers  Type:  (sponge bath seated at sink) (24 125)    Short term goals for one week:    Patient will complete self care as followed using the recommended adaptive equipment and/or adaptive techniques as instructed:  Feeding: Independent  Grooming: Setup  Bathing: Min A  UE Dressing: Min A  LE Dressing: Min A  Footwear: Min A  Toileting: Min A  Toilet Transfer: Mod I  Shower/Tub Transfer: Mod I    Signature: Electronically signed by AKI James/L on 24 at 11:57 AM EST     SPEECH THERAPY    Initial Status:  Diet:   Regular with thin liquids  Dysphagia Outcome Severity Scale:  Ratin    Speech Therapy Level of Assistance Scale:  Auditory Comprehension:  Rating: Supervised Assistance  Verbal Expression:  Rating:Minimal Assistance  Motor Speech:  Rating: Moderate Assistance  Problem Solving:      Long Term Goals:  Long Term Goals  Time Frame for Long Term Goals: 2-3 weeks  Goal 1: Pt will improve her receptive and expressive language abilities to a modified independent level for understanding and expression of complex wants, needs, feelings/ideas, and medical/safety information.  Goal 2: Pt will improve her Speech Intelligibility to modified independent for effective communication of wants, needs, feelings, ideas, and medical/safety information with familiar and unfamiliar listeners.  Long-term Goals  Timeframe for Long-term Goals: 2-3 weeks  Goal 1: Patient will tolerate least restrictive diet with no adverse outcomes.        Patient's Response to Therapy:  Patient has been participating in speech therapy sessions targeting speech and swallowing.  Patient has been tolerating a regular diet with thin liquids, however needs reminders to use swallow strategies of upright

## 2024-02-02 NOTE — PROGRESS NOTES
Subjective:  The patient complains of moderate to severe acute on chronic progressive fatigue and left-sided weakness, cognitive slowing, motor planning deficits partially relieved by rest, medications, PT,  OT,   SLP and rest and exacerbated by recent  CVA.  She was admitted through the emergency room on January 18, 2024 with right-sided weakness aphasia and dysphagia.  She was diagnosed with acute CVA-subacute stroke on the right parietal lob .  Her  reported she had had multiple falls recently.  She was admitted under the care of the hospitalist with neurology consulting.  She was also found to have an intracranial hemorrhage.  CT of the head revealed a 2 cm parenchymal bleed in the left basal ganglia.  Neurosurgery was consulted and they did not feel that she should have surgery.  She has been struggling with aphasia dysphagia and hypoxia since being admitte neurology has also been following.   Stroke workup as   including a GUSTABO which was unremarkable.    I am concerned about patient’s medical complexities and barriers to advancing in rehab goals including deficits of motor planning.        I reviewed current care and plans for further care with other rehab providers including nursing and case management.  According to recent  note, \"  A&O x4. Denies pain at this time. Pt given all their medications per MAR. Pt denies any needs at this time. Bed is in lowest position, bed locked, and call light is within reach. Monitored patient closely due to tele monitor room informing us about pt having 14 beats of ventricular tachycardia. Pt vitals were taken and patient denies any pain, SOB, or discomfort. \"    She is still on bedrest awaiting IVC filter for prevention of further PEs.  She cannot have anticoagulation because of the hemorrhagic transformation of her CVA.    Note that patient will be on a medical hold for 72 hours for therapy with medical exemption for Alost therapy time because of her PEs and  saline, Acapella, aerosol treatments, monitor heart rate and blood pressure.-Pulmonology is on case  Bilateral PEs with contraindication for anticoagulation due to hemorrhagic CVAs-IVC filter will be placed pulmonology is on case.  Hospitalist involved.  Left kidney mass- multilobular heterogeneous low-density filling the upper pole of  the left kidney measuring 5.7 x 4.0 cm. This could be a necrotic mass within  the upper pole of the left kidney, -workup status post IVC filter placement.  For now white blood counts are improving.     Focus on emotional health and caregiver involvement in care.        Electronically signed by Jayne Stacy DO on 1/25/24 at 8:25 AM BLANCA Stacy D.O., PM&R     Attending    [phone]   [facility]

## 2024-02-02 NOTE — PROGRESS NOTES
Benzoyl Peroxide Counseling: Patient counseled that medicine may cause skin irritation and bleach clothing.  In the event of skin irritation, the patient was advised to reduce the amount of the drug applied or use it less frequently.   The patient verbalized understanding of the proper use and possible adverse effects of benzoyl peroxide.  All of the patient's questions and concerns were addressed. Progress Note    Date:2/2/2024       [facility]  Patient Name:Mulu Pandya     YOB: 1955     Age:68 y.o.    Assessment        Hospital Problems             Last Modified POA    * (Principal) Impaired mobility and ADLs dt CVA-2 cm acute intraparenchymal left frontal lobe hematoma. 1/24/2024 Yes    Type 2 diabetes mellitus with chronic kidney disease 1/24/2024 Yes    Anxiety 1/24/2024 Yes    Chronic back pain 1/24/2024 Yes    COPD (chronic obstructive pulmonary disease) (HCC) 1/24/2024 Yes    Mixed hyperlipidemia 1/24/2024 Yes    Essential hypertension 1/24/2024 Yes    Chronic renal failure, stage 3a (HCC) 1/24/2024 Yes    Intracranial hemorrhage (HCC) 1/24/2024 Yes    Cerebral infarction due to occlusion of left middle cerebral artery (HCC) 1/24/2024 Yes    Dysphagia, oropharyngeal phase 1/24/2024 Yes    Late effects of CVA (cerebrovascular accident) 1/24/2024 Yes    Overview Addendum 1/24/2024 12:04 PM by Jayne Stacy,      Old infarcts in the right mid-posterior MCA territory, left inferior cerebellar hemisphere in the left PICA territory, right thalamus, and multiple tiny infarcts in both inferior cerebellar hemispheres in the PICA territories.--left parietal lobe with encephalomalacia change stable within the right temporal lobe.          Urinary incontinence 1/24/2024 Yes    Meningioma (HCC) 1/24/2024 Yes    Overview Signed 1/24/2024 12:04 PM by Jayne Stacy,       Right CP angle meningioma measuring 2.4 x 1.6 x 2.1 cm, producing moderate mass effect upon the right middle cerebellar peduncle and minimal mass effect upon the right pontomedullary junction.         Nontraumatic intracerebral hemorrhage (HCC) 1/24/2024 Yes    Tachycardia 1/31/2024 Yes    Hypoxia 1/31/2024 Yes    Pulmonary embolism (HCC) 2/1/2024 Yes       Plan:      * Impaired mobility and ADLs due to CVA-2 cm acute intraparenchymal left frontal lobe hematoma  -Dr. Stacy managing  -PT, OT, ST  -Pain management       *CVA  -With aphasia, dysphagia, right-sided facial droop right-sided weakness  -Neurology following  -On statin  -PT, OT, ST     *Shortness of breath asthma/ COPD  -Denies of shortness of breath; SpO2 97 % On 3 Liters  -On albuterol twice daily, Symbicort with as needed nebulizer treatments and albuterol  -CTA revealed bilateral PE on 1/31  -on azithromycin x 7 days; on Rocephin for UTI  -IVC filter placement scheduled today w/ Dr. Gill  - Acapella  - on prednisone      *HTN, HLD  -/63  -On NS 0.9% @ 75 continuous  -Dextrose 5% cont @ 75     *DM II  -Accu-Cheks ACHS; ISS; hypoglycemic protocol; Lantus nightly  -Humalog 5 units 3 times daily with meals     *CKD III  -Avoid nephrotoxic agents    Hospital medicine managing acute needs.  Patient will need to follow-up with PCP for chronic disease management.      Time spent with patient 23 minutes. Greater than 70% of time  spent focused exclusively on this patient ,reviewing  chart,  reconciling medications, &  answering questions with patient and discussing plan.      Subjective   Interval History Status: Seen at bedside denies shortness of breath or chest pain remains on 3 L via nasal cannula scheduled for IVC filter placement for PE per Dr. White no complaints voiced.         Review of Systems   12 point review of systems reviewed with patient with pertinent positives listed in HPI otherwise ROS negative    Medications   Scheduled Meds:    predniSONE  20 mg Oral Daily    azithromycin  500 mg IntraVENous Q24H    metoprolol tartrate  25 mg Oral BID    cefTRIAXone (ROCEPHIN) IV  1,000 mg IntraVENous Q24H    Vitamin D  2,000 Units Oral Dinner    cyanocobalamin  1,000 mcg IntraMUSCular Weekly    coenzyme Q10  100 mg Oral Daily    lidocaine  3 patch TransDERmal Daily    albuterol sulfate HFA  2 puff Inhalation BID    budesonide-formoterol  2 puff Inhalation BID RT    escitalopram  15 mg Oral Daily    insulin glargine  10 Units SubCUTAneous Nightly     Tetracycline Pregnancy And Lactation Text: This medication is Pregnancy Category D and not consider safe during pregnancy. It is also excreted in breast milk. Topical Clindamycin Pregnancy And Lactation Text: This medication is Pregnancy Category B and is considered safe during pregnancy. It is unknown if it is excreted in breast milk. Doxycycline Pregnancy And Lactation Text: This medication is Pregnancy Category D and not consider safe during pregnancy. It is also excreted in breast milk but is considered safe for shorter treatment courses. Topical Retinoid counseling:  Patient advised to apply a pea-sized amount only at bedtime and wait 30 minutes after washing their face before applying.  If too drying, patient may add a non-comedogenic moisturizer. The patient verbalized understanding of the proper use and possible adverse effects of retinoids.  All of the patient's questions and concerns were addressed. Birth Control Pills Counseling: Birth Control Pill Counseling: I discussed with the patient the potential side effects of OCPs including but not limited to increased risk of stroke, heart attack, thrombophlebitis, deep venous thrombosis, hepatic adenomas, breast changes, GI upset, headaches, and depression.  The patient verbalized understanding of the proper use and possible adverse effects of OCPs. All of the patient's questions and concerns were addressed. Erythromycin Counseling:  I discussed with the patient the risks of erythromycin including but not limited to GI upset, allergic reaction, drug rash, diarrhea, increase in liver enzymes, and yeast infections. Doxycycline Counseling:  Patient counseled regarding possible photosensitivity and increased risk for sunburn.  Patient instructed to avoid sunlight, if possible.  When exposed to sunlight, patients should wear protective clothing, sunglasses, and sunscreen.  The patient was instructed to call the office immediately if the following severe adverse effects occur:  hearing changes, easy bruising/bleeding, severe headache, or vision changes.  The patient verbalized understanding of the proper use and possible adverse effects of doxycycline.  All of the patient's questions and concerns were addressed. Spironolactone Pregnancy And Lactation Text: This medication can cause feminization of the male fetus and should be avoided during pregnancy. The active metabolite is also found in breast milk. Tetracycline Counseling: Patient counseled regarding possible photosensitivity and increased risk for sunburn.  Patient instructed to avoid sunlight, if possible.  When exposed to sunlight, patients should wear protective clothing, sunglasses, and sunscreen.  The patient was instructed to call the office immediately if the following severe adverse effects occur:  hearing changes, easy bruising/bleeding, severe headache, or vision changes.  The patient verbalized understanding of the proper use and possible adverse effects of tetracycline.  All of the patient's questions and concerns were addressed. Patient understands to avoid pregnancy while on therapy due to potential birth defects. Topical Sulfur Applications Counseling: Topical Sulfur Counseling: Patient counseled that this medication may cause skin irritation or allergic reactions.  In the event of skin irritation, the patient was advised to reduce the amount of the drug applied or use it less frequently.   The patient verbalized understanding of the proper use and possible adverse effects of topical sulfur application.  All of the patient's questions and concerns were addressed. Azithromycin Counseling:  I discussed with the patient the risks of azithromycin including but not limited to GI upset, allergic reaction, drug rash, diarrhea, and yeast infections. High Dose Vitamin A Counseling: Side effects reviewed, pt to contact office should one occur. Dapsone Counseling: I discussed with the patient the risks of dapsone including but not limited to hemolytic anemia, agranulocytosis, rashes, methemoglobinemia, kidney failure, peripheral neuropathy, headaches, GI upset, and liver toxicity.  Patients who start dapsone require monitoring including baseline LFTs and weekly CBCs for the first month, then every month thereafter.  The patient verbalized understanding of the proper use and possible adverse effects of dapsone.  All of the patient's questions and concerns were addressed. Azithromycin Pregnancy And Lactation Text: This medication is considered safe during pregnancy and is also secreted in breast milk. Birth Control Pills Pregnancy And Lactation Text: This medication should be avoided if pregnant and for the first 30 days post-partum. Isotretinoin Counseling: Patient should get monthly blood tests, not donate blood, not drive at night if vision affected, not share medication, and not undergo elective surgery for 6 months after tx completed. Side effects reviewed, pt to contact office should one occur. Topical Sulfur Applications Pregnancy And Lactation Text: This medication is Pregnancy Category C and has an unknown safety profile during pregnancy. It is unknown if this topical medication is excreted in breast milk. Minocycline Counseling: Patient advised regarding possible photosensitivity and discoloration of the teeth, skin, lips, tongue and gums.  Patient instructed to avoid sunlight, if possible.  When exposed to sunlight, patients should wear protective clothing, sunglasses, and sunscreen.  The patient was instructed to call the office immediately if the following severe adverse effects occur:  hearing changes, easy bruising/bleeding, severe headache, or vision changes.  The patient verbalized understanding of the proper use and possible adverse effects of minocycline.  All of the patient's questions and concerns were addressed. High Dose Vitamin A Pregnancy And Lactation Text: High dose vitamin A therapy is contraindicated during pregnancy and breast feeding. Topical Clindamycin Counseling: Patient counseled that this medication may cause skin irritation or allergic reactions.  In the event of skin irritation, the patient was advised to reduce the amount of the drug applied or use it less frequently.   The patient verbalized understanding of the proper use and possible adverse effects of clindamycin.  All of the patient's questions and concerns were addressed. Bactrim Pregnancy And Lactation Text: This medication is Pregnancy Category D and is known to cause fetal risk.  It is also excreted in breast milk. Erythromycin Pregnancy And Lactation Text: This medication is Pregnancy Category B and is considered safe during pregnancy. It is also excreted in breast milk. Dapsone Pregnancy And Lactation Text: This medication is Pregnancy Category C and is not considered safe during pregnancy or breast feeding. Bactrim Counseling:  I discussed with the patient the risks of sulfa antibiotics including but not limited to GI upset, allergic reaction, drug rash, diarrhea, dizziness, photosensitivity, and yeast infections.  Rarely, more serious reactions can occur including but not limited to aplastic anemia, agranulocytosis, methemoglobinemia, blood dyscrasias, liver or kidney failure, lung infiltrates or desquamative/blistering drug rashes. Spironolactone Counseling: Patient advised regarding risks of diarrhea, abdominal pain, hyperkalemia, birth defects (for female patients), liver toxicity and renal toxicity. The patient may need blood work to monitor liver and kidney function and potassium levels while on therapy. The patient verbalized understanding of the proper use and possible adverse effects of spironolactone.  All of the patient's questions and concerns were addressed. Detail Level: Zone Benzoyl Peroxide Pregnancy And Lactation Text: This medication is Pregnancy Category C. It is unknown if benzoyl peroxide is excreted in breast milk. Topical Retinoid Pregnancy And Lactation Text: This medication is Pregnancy Category C. It is unknown if this medication is excreted in breast milk. Tazorac Counseling:  Patient advised that medication is irritating and drying.  Patient may need to apply sparingly and wash off after an hour before eventually leaving it on overnight.  The patient verbalized understanding of the proper use and possible adverse effects of tazorac.  All of the patient's questions and concerns were addressed. Tazorac Pregnancy And Lactation Text: This medication is not safe during pregnancy. It is unknown if this medication is excreted in breast milk. Include Pregnancy/Lactation Warning?: No Isotretinoin Pregnancy And Lactation Text: This medication is Pregnancy Category X and is considered extremely dangerous during pregnancy. It is unknown if it is excreted in breast milk. Sarecycline Counseling: Patient advised regarding possible photosensitivity and discoloration of the teeth, skin, lips, tongue and gums.  Patient instructed to avoid sunlight, if possible.  When exposed to sunlight, patients should wear protective clothing, sunglasses, and sunscreen.  The patient was instructed to call the office immediately if the following severe adverse effects occur:  hearing changes, easy bruising/bleeding, severe headache, or vision changes.  The patient verbalized understanding of the proper use and possible adverse effects of sarecycline.  All of the patient's questions and concerns were addressed. Azelaic Acid Counseling: Patient counseled that medicine may cause skin irritation and to avoid applying near the eyes.  In the event of skin irritation, the patient was advised to reduce the amount of the drug applied or use it less frequently.   The patient verbalized understanding of the proper use and possible adverse effects of azelaic acid.  All of the patient's questions and concerns were addressed. Winlevi Pregnancy And Lactation Text: This medication is considered safe during pregnancy and breastfeeding. Winlevi Counseling:  I discussed with the patient the risks of topical clascoterone including but not limited to erythema, scaling, itching, and stinging. Patient voiced their understanding. Azelaic Acid Pregnancy And Lactation Text: This medication is considered safe during pregnancy and breast feeding.

## 2024-02-02 NOTE — PROGRESS NOTES
Physical Therapy Missed Treatment   Facility/Department: [facility] R243/R243-01    NAME: Mulu Pandya    : 1955 (68 y.o.)  MRN: [id number]    Account: [id number]  Gender: female      Per Dr Stacy's note:  \"patient will be on a medical hold for 72 hours for therapy with medical exemption for Alost therapy time because of her PEs and awaiting IVC filter placement.\"      Spoke with pt's RN and charge RN to clarify start of 72 hour hold.        Melissa Swan, PTA, 24 at 1:20 PM

## 2024-02-02 NOTE — PROGRESS NOTES
Arrived to pre/post from the cath lab and report from Oral GODDARD.  Right groin venous stick is soft and dry.  Attached to monitor and vitals are stable.  Resting comfortably

## 2024-02-02 NOTE — PROGRESS NOTES
[facility]  Occupational Therapy      NAME:  Mulu Pandya  ROOM: R243/R243-01  :  1955  DATE: 2024    Attempted to see Mulu Pandya at 1430 on this date for 60 minute session for   []  Initial Evaluation   [x]  Scheduled therapy    []  Make-up therapy   []  Group therapy   []  Family training    Patient was unable to be seen due to:   [x] Off unit for testing/procedure - IVC Filter Placement   [] Patient refused, stating \"    [] Therapy on hold due to     [] Nursing deferred due to    [] Other:      Electronically signed by ABIGAIL Goff on 24 at 2:37 PM EST

## 2024-02-02 NOTE — PROGRESS NOTES
[facility]  Occupational Therapy      NAME:  Mulu Pandya  ROOM: R243/R243-01  :  1955  DATE: 2024    Attempted to see Mulu Pandya on this date for 60 minute session for   []  Initial Evaluation   [x]  Scheduled ADL session    []  Make-up therapy   []  Group therapy   []  Family training    Patient was unable to be seen due to:   [] Off unit for testing/procedure   [] Patient refused, stating \"    [x] Therapy on hold    [] Nursing deferred due to    [] Other:      Bilateral PEs and awaiting IVC filter placement.     Electronically signed by ABIGAIL Goff on 24 at 11:08 AM EST

## 2024-02-02 NOTE — PROGRESS NOTES
[facility] Neurology Daily Progress Note  Name: Mulu Pandya  Age: 68 y.o.  Gender: female  CodeStatus: Full Code  Allergies: Nickel    Chief Complaint:No chief complaint on file.    Primary Care Provider: Eileen Edmondson APRN - NP  InpatientTreatment Team: Treatment Team: Attending Provider: Jayne Stacy DO; Consulting Physician: Zheng Chavez MD; Consulting Physician: Elliot Harris MD; Consulting Physician: Jin Gill DO; Consulting Physician: Ame Echeverria MD; Cardiologist: Jin Gill DO; : Anny Yadav MSW, LSW; Registered Nurse: Caryn Mcgowan RN  Admission Date: 1/23/2024      HPI   Pt seen and examined on rehab for neurology follow-up.  Alert.  Patient with some ongoing expressive aphasia.  Right-sided weakness has improved.  No dysarthria.  Denies headache or vision changes.  No dysphagia.  Blood pressure stable.  Tachycardic at times.  Bilateral pulmonary embolisms identified on CTA of the chest  Vitals:    02/02/24 1536   BP:    Pulse:    Resp:    Temp:    SpO2: 94%        Review of Systems   Constitutional:  Negative for fatigue and fever.   HENT:  Negative for hearing loss and trouble swallowing.    Eyes:  Negative for visual disturbance.   Respiratory:  Negative for cough, chest tightness, shortness of breath and wheezing.    Cardiovascular:  Negative for chest pain, palpitations and leg swelling.   Gastrointestinal:  Negative for nausea and vomiting.   Musculoskeletal:  Positive for gait problem.   Skin:  Negative for color change and rash.   Neurological:  Positive for speech difficulty and weakness. Negative for dizziness, tremors, seizures, syncope, facial asymmetry, light-headedness, numbness and headaches.   Psychiatric/Behavioral:  Positive for confusion. Negative for agitation and hallucinations. The patient is not nervous/anxious.          Physical Exam  Vitals and nursing note reviewed.   Constitutional:       General: She is not in acute distress.      lobe  periventricular hematoma identified.  There is no significant surrounding  edema or mass effect.  Stable encephalomalacia areas identified from old  insult within the right temporal lobe with evolving insult identified within  the left parietal lobe.  No abnormal extra-axial fluid collection.  Mild ex  vacuole dilatation seen of the ventricular system.  Minimal chronic small  vessel ischemic changes seen within the periventricular and subcortical white  matter to suggest chronic small vessel ischemic change.  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    ORBITS: The visualized portion of the orbits demonstrate no acute abnormality.    SINUSES: The visualized paranasal sinuses and mastoid air cells demonstrate  no acute abnormality.    SOFT TISSUES/SKULL:  No acute abnormality of the visualized skull or soft  tissues.    Impression  Stable parenchymal hemorrhage involving the left frontal periventricular  white matter.  No significant surrounding edema or mass effect.  Evolving  insult in the left parietal lobe with encephalomalacia change stable within  the right temporal lobe.  No results found for this or any previous visit.  No results found for this or any previous visit.      Carotid duplex: No results found for this or any previous visit.  No results found for this or any previous visit.  No results found for this or any previous visit.      Echo No results found for this or any previous visit.            Assessment/Plan:    Acute infarct in the left superior mid parietal lobe MCA territory with minimal mass effect.  There is no change in small acute intraparenchymal hemorrhage in the left anterior parietal periventricular white matter.  No mass effect or edema.  There are old infarcts in the right mid posterior MCA territory, left inferior cerebellar hemisphere in the PICA territory, right thalamus and multiple tiny infarcts

## 2024-02-02 NOTE — PROGRESS NOTES
Pulmonary progress Note    2024 2:02 PM    Subjective:   Admit Date: 2024  PCP: Eileen Edmondson, APRN - NP    No chief complaint on file.    Interval History: No major issues overnight.  Evaluated by cardiology.  Going for an IVC filter later today.    12 points review of systems has been obtained and negative except to was mentioned in HPI.     Medications:   Scheduled Meds:   predniSONE  20 mg Oral Daily    azithromycin  500 mg IntraVENous Q24H    metoprolol tartrate  25 mg Oral BID    cefTRIAXone (ROCEPHIN) IV  1,000 mg IntraVENous Q24H    Vitamin D  2,000 Units Oral Dinner    cyanocobalamin  1,000 mcg IntraMUSCular Weekly    coenzyme Q10  100 mg Oral Daily    lidocaine  3 patch TransDERmal Daily    albuterol sulfate HFA  2 puff Inhalation BID    budesonide-formoterol  2 puff Inhalation BID RT    escitalopram  15 mg Oral Daily    insulin glargine  10 Units SubCUTAneous Nightly    insulin lispro  5 Units SubCUTAneous TID WC    melatonin  5 mg Oral Nightly    nicotine  1 patch TransDERmal Q24H    insulin lispro  0-4 Units SubCUTAneous TID WC    insulin lispro  0-4 Units SubCUTAneous Nightly    rosuvastatin  40 mg Oral Nightly     Continuous Infusions:   dextrose 75 mL/hr at 24 1347    sodium chloride Stopped (24 1121)         Objective:   Vitals:   Temp (24hrs), Av.8 °F (36.6 °C), Min:97.5 °F (36.4 °C), Max:98.1 °F (36.7 °C)    /63   Pulse 87   Temp 98.1 °F (36.7 °C) (Oral)   Resp 17   Ht 1.6 m (5' 3\")   Wt 72.8 kg (160 lb 9.6 oz)   SpO2 97%   BMI 28.45 kg/m²   I/O:24HR INTAKE/OUTPUT:  No intake or output data in the 24 hours ending 24 1402      Physical Exam:  General appearance - alert, ill appearing, and in mild distress  Mental status - alert, oriented to person, place, and time  Eyes - pupils equal and reactive, extraocular eye movements intact  Nose - normal and patent, nasal cannula oxygen.  Neck - supple, no significant adenopathy  Chest - clear to auscultation,

## 2024-02-03 LAB
GLUCOSE BLD-MCNC: 150 MG/DL (ref 70–99)
GLUCOSE BLD-MCNC: 203 MG/DL (ref 70–99)
GLUCOSE BLD-MCNC: 264 MG/DL (ref 70–99)
GLUCOSE BLD-MCNC: 318 MG/DL (ref 70–99)
PERFORMED ON: ABNORMAL

## 2024-02-03 PROCEDURE — 94761 N-INVAS EAR/PLS OXIMETRY MLT: CPT

## 2024-02-03 PROCEDURE — 6370000000 HC RX 637 (ALT 250 FOR IP): Performed by: FAMILY MEDICINE

## 2024-02-03 PROCEDURE — 99232 SBSQ HOSP IP/OBS MODERATE 35: CPT | Performed by: INTERNAL MEDICINE

## 2024-02-03 PROCEDURE — 2580000003 HC RX 258: Performed by: INTERNAL MEDICINE

## 2024-02-03 PROCEDURE — 6370000000 HC RX 637 (ALT 250 FOR IP): Performed by: PHYSICAL MEDICINE & REHABILITATION

## 2024-02-03 PROCEDURE — 2700000000 HC OXYGEN THERAPY PER DAY

## 2024-02-03 PROCEDURE — 99233 SBSQ HOSP IP/OBS HIGH 50: CPT | Performed by: PHYSICAL MEDICINE & REHABILITATION

## 2024-02-03 PROCEDURE — 94640 AIRWAY INHALATION TREATMENT: CPT

## 2024-02-03 PROCEDURE — 1180000000 HC REHAB R&B

## 2024-02-03 PROCEDURE — 6370000000 HC RX 637 (ALT 250 FOR IP): Performed by: REGISTERED NURSE

## 2024-02-03 PROCEDURE — 6370000000 HC RX 637 (ALT 250 FOR IP): Performed by: NURSE PRACTITIONER

## 2024-02-03 RX ORDER — LIDOCAINE 4 G/G
3 PATCH TOPICAL DAILY PRN
Status: DISCONTINUED | OUTPATIENT
Start: 2024-02-03 | End: 2024-02-09

## 2024-02-03 RX ORDER — CINNAMON
1 OIL (ML) MISCELLANEOUS 4 TIMES DAILY
Status: DISCONTINUED | OUTPATIENT
Start: 2024-02-03 | End: 2024-02-09

## 2024-02-03 RX ADMIN — BUDESONIDE AND FORMOTEROL FUMARATE DIHYDRATE 2 PUFF: 80; 4.5 AEROSOL RESPIRATORY (INHALATION) at 04:13

## 2024-02-03 RX ADMIN — ESCITALOPRAM OXALATE 15 MG: 10 TABLET ORAL at 09:02

## 2024-02-03 RX ADMIN — ROSUVASTATIN CALCIUM 40 MG: 40 TABLET, FILM COATED ORAL at 21:17

## 2024-02-03 RX ADMIN — Medication 1 DROP: at 18:03

## 2024-02-03 RX ADMIN — INSULIN LISPRO 5 UNITS: 100 INJECTION, SOLUTION INTRAVENOUS; SUBCUTANEOUS at 09:03

## 2024-02-03 RX ADMIN — ALBUTEROL SULFATE 2 PUFF: 90 AEROSOL, METERED RESPIRATORY (INHALATION) at 04:12

## 2024-02-03 RX ADMIN — INSULIN LISPRO 5 UNITS: 100 INJECTION, SOLUTION INTRAVENOUS; SUBCUTANEOUS at 12:24

## 2024-02-03 RX ADMIN — Medication 1 DROP: at 21:18

## 2024-02-03 RX ADMIN — PREDNISONE 20 MG: 20 TABLET ORAL at 09:02

## 2024-02-03 RX ADMIN — INSULIN LISPRO 2 UNITS: 100 INJECTION, SOLUTION INTRAVENOUS; SUBCUTANEOUS at 18:02

## 2024-02-03 RX ADMIN — Medication 5 MG: at 21:17

## 2024-02-03 RX ADMIN — Medication 100 MG: at 09:02

## 2024-02-03 RX ADMIN — Medication 1 DROP: at 12:24

## 2024-02-03 RX ADMIN — Medication 2000 UNITS: at 18:02

## 2024-02-03 RX ADMIN — INSULIN LISPRO 4 UNITS: 100 INJECTION, SOLUTION INTRAVENOUS; SUBCUTANEOUS at 21:22

## 2024-02-03 RX ADMIN — DEXTROSE MONOHYDRATE: 50 INJECTION, SOLUTION INTRAVENOUS at 12:26

## 2024-02-03 RX ADMIN — ALBUTEROL SULFATE 2 PUFF: 90 AEROSOL, METERED RESPIRATORY (INHALATION) at 14:36

## 2024-02-03 RX ADMIN — INSULIN LISPRO 1 UNITS: 100 INJECTION, SOLUTION INTRAVENOUS; SUBCUTANEOUS at 12:24

## 2024-02-03 RX ADMIN — INSULIN GLARGINE 10 UNITS: 100 INJECTION, SOLUTION SUBCUTANEOUS at 21:17

## 2024-02-03 RX ADMIN — INSULIN LISPRO 5 UNITS: 100 INJECTION, SOLUTION INTRAVENOUS; SUBCUTANEOUS at 18:03

## 2024-02-03 RX ADMIN — METOPROLOL TARTRATE 25 MG: 25 TABLET, FILM COATED ORAL at 09:02

## 2024-02-03 RX ADMIN — METOPROLOL TARTRATE 25 MG: 25 TABLET, FILM COATED ORAL at 21:17

## 2024-02-03 RX ADMIN — BUDESONIDE AND FORMOTEROL FUMARATE DIHYDRATE 2 PUFF: 80; 4.5 AEROSOL RESPIRATORY (INHALATION) at 14:36

## 2024-02-03 ASSESSMENT — PAIN SCALES - GENERAL
PAINLEVEL_OUTOF10: 0
PAINLEVEL_OUTOF10: 0

## 2024-02-03 NOTE — PROGRESS NOTES
Patient alert and oriented pleasant and cooperative follows commands speech improving can say 3 to 4 word sentences  02 at 3 litters per nasal cannula maintained Clear dressing to right groin dry and intact . External catheter on due to   incontinence.Tele monitor on.

## 2024-02-03 NOTE — PROGRESS NOTES
Subjective:  The patient complains of moderate to severe acute on chronic progressive fatigue and left-sided weakness, cognitive slowing, motor planning deficits partially relieved by rest, medications, PT,  OT,   SLP and rest and exacerbated by recent  CVA.  She was admitted through the emergency room on January 18, 2024 with right-sided weakness aphasia and dysphagia.  She was diagnosed with acute CVA-subacute stroke on the right parietal lob .  Her  reported she had had multiple falls recently.  She was admitted under the care of the hospitalist with neurology consulting.  She was also found to have an intracranial hemorrhage.  CT of the head revealed a 2 cm parenchymal bleed in the left basal ganglia.  Neurosurgery was consulted and they did not feel that she should have surgery.  She has been struggling with aphasia dysphagia and hypoxia since being admitte neurology has also been following.   Stroke workup as   including a GUSTABO which was unremarkable.    I am concerned about patient’s medical complexities and barriers to advancing in rehab goals including deficits of motor planning.        I reviewed current care and plans for further care with other rehab providers including nursing and case management.  According to recent  note, \" oriented pleasant and cooperative follows commands speech improving can say 3 to 4 word sentences 02 at 3 litters per nasal cannula maintained Clear dressing to right groin dry and intact . External catheter on due to incontinence.Tele monitor on \"    She was on bedrest awaiting IVC filter for prevention of further PEs.  She cannot have anticoagulation because of the hemorrhagic transformation of her CVA.    Note that patient  was be on a medical hold for 72 hours for therapy with medical exemption for Alost therapy time because of her PEs and  SP IVC filter placement.  She is okay to be back in therapy.  I discussed her IV antibiotics at length with pulmonology.  They  swallow with video  1/19/2024 Dysphagia is observed.  Minimal penetration with thin liquids.     Please see separate speech pathology report for full discussion of findings and recommendations.     CT HEAD  1/19/2024 BRAIN/VENTRICLES: There is a stable small 2 cm left frontal lobe periventricular hematoma identified.  There is no significant surrounding edema or mass effect.  Stable encephalomalacia areas identified from old insult within the right temporal lobe with evolving insult identified within the left parietal lobe.  No abnormal extra-axial fluid collection.  Mild ex vacuole dilatation seen of the ventricular system.  Minimal chronic small vessel ischemic changes seen within the periventricular and subcortical white matter to suggest chronic small vessel ischemic change.  The known right cerebellar pontine angle mass is not well seen on this examination due to lack of intravenous contrast. ORBITS: The visualized portion of the orbits demonstrate no acute abnormality. SINUSES: The visualized paranasal sinuses and mastoid air cells demonstrate no acute abnormality. SOFT TISSUES/SKULL:  No acute abnormality of the visualized skull or soft tissues.     Stable parenchymal hemorrhage involving the left frontal periventricular white matter.  No significant surrounding edema or mass effect.  Evolving insult in the left parietal lobe with encephalomalacia change stable within the right temporal lobe.     CT HEAD  1/18/2024 : BRAIN/VENTRICLES: Stable approximately 2 cm left frontal lobe periventricular intraparenchymal hematoma.  Small areas of encephalomalacia unchanged, largest area within the right temporal lobe consistent with remote ischemic infarcts.  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  Volume loss, ex vacuo ventricular dilatation and chronic white matter change ORBITS: The visualized portion of the orbits demonstrate no

## 2024-02-03 NOTE — PROGRESS NOTES
Progress Note    Date:2/3/2024       [facility]  Patient Name:Mulu Pandya     YOB: 1955     Age:68 y.o.    Assessment        Hospital Problems             Last Modified POA    * (Principal) Impaired mobility and ADLs dt CVA-2 cm acute intraparenchymal left frontal lobe hematoma. 1/24/2024 Yes    Type 2 diabetes mellitus with chronic kidney disease 1/24/2024 Yes    Anxiety 1/24/2024 Yes    Chronic back pain 1/24/2024 Yes    COPD (chronic obstructive pulmonary disease) (HCC) 1/24/2024 Yes    Mixed hyperlipidemia 1/24/2024 Yes    Essential hypertension 1/24/2024 Yes    Chronic renal failure, stage 3a (HCC) 1/24/2024 Yes    Intracranial hemorrhage (HCC) 1/24/2024 Yes    Cerebral infarction due to occlusion of left middle cerebral artery (HCC) 1/24/2024 Yes    Dysphagia, oropharyngeal phase 1/24/2024 Yes    Late effects of CVA (cerebrovascular accident) 1/24/2024 Yes    Overview Addendum 1/24/2024 12:04 PM by Jayne Stacy,      Old infarcts in the right mid-posterior MCA territory, left inferior cerebellar hemisphere in the left PICA territory, right thalamus, and multiple tiny infarcts in both inferior cerebellar hemispheres in the PICA territories.--left parietal lobe with encephalomalacia change stable within the right temporal lobe.          Urinary incontinence 1/24/2024 Yes    Meningioma (HCC) 1/24/2024 Yes    Overview Signed 1/24/2024 12:04 PM by Jayne Stacy,       Right CP angle meningioma measuring 2.4 x 1.6 x 2.1 cm, producing moderate mass effect upon the right middle cerebellar peduncle and minimal mass effect upon the right pontomedullary junction.         Nontraumatic intracerebral hemorrhage (HCC) 1/24/2024 Yes    Tachycardia 1/31/2024 Yes    Hypoxia 1/31/2024 Yes    Pulmonary embolism (HCC) 2/1/2024 Yes       Plan:      * Impaired mobility and ADLs due to CVA-2 cm acute intraparenchymal left frontal lobe hematoma  -Dr. Stacy managing  -PT, OT, ST  -Pain management

## 2024-02-03 NOTE — PROGRESS NOTES
INPATIENT PROGRESS NOTES    PATIENT NAME: uMlu Pandya  MRN: [id number]  SERVICE DATE:  February 3, 2024   SERVICE TIME:  10:07 AM      PRIMARY SERVICE: Pulmonary Disease    CHIEF COMPLAINTS: PE    INTERVAL HPI: Patient seen and examined at bedside, Interval Notes, orders reviewed. Nursing notes noted  Patient report no chest pain, no coughing, no shortness of breath and no hemoptysis, no worsening lower extremity edema.  She is on 3 L O2 saturation 98%      New information updated in the note today, rest of the examination did not change compared to yesterday.      Review of system:     GI Abdominal pain No  Skin Rash No    Social History     Tobacco Use    Smoking status: Some Days     Current packs/day: 1.00     Average packs/day: 1 pack/day for 49.0 years (49.0 ttl pk-yrs)     Types: Cigarettes    Smokeless tobacco: Never    Tobacco comments:     12/21/17 started age 13   Substance Use Topics    Alcohol use: No     Comment: denies         Problem Relation Age of Onset    Heart Disease Sister     High Blood Pressure Other     Cancer Other     High Blood Pressure Mother     Cancer Brother          OBJECTIVE    Body mass index is 28.45 kg/m².    PHYSICAL EXAM:  Vitals:  /64   Pulse 87   Temp 97.7 °F (36.5 °C) (Oral)   Resp 18   Ht 1.6 m (5' 3\")   Wt 72.8 kg (160 lb 9.6 oz)   SpO2 98%   BMI 28.45 kg/m²     General: alert, cooperative, no distress  Head: normocephalic, atraumatic  Eyes:No gross abnormalities.  ENT:  MMM no lesions  Neck:  supple and no masses  Chest : clear to auscultation bilaterally- no wheezes, rales or rhonchi, normal air movement, no respiratory distress  Heart:: Heart sounds are normal.  Regular rate and rhythm without murmur, gallop or rub.  ABD:  symmetric, soft, non-tender  Musculoskeletal : no cyanosis, no clubbing, and no edema  Neuro:  Grossly normal  Skin: No rashes or nodules noted.  Lymph node:  no cervical nodes  Urology: No Small   Psychiatric: Calm    DATA:   Recent

## 2024-02-03 NOTE — PLAN OF CARE
Problem: Safety - Adult  Goal: Free from fall injury  Outcome: Progressing     Problem: Discharge Planning  Goal: Discharge to home or other facility with appropriate resources  Outcome: Progressing  Flowsheets (Taken 2/3/2024 1539)  Discharge to home or other facility with appropriate resources:   Identify barriers to discharge with patient and caregiver   Identify discharge learning needs (meds, wound care, etc)     Problem: Skin/Tissue Integrity  Goal: Absence of new skin breakdown  Description: 1.  Monitor for areas of redness and/or skin breakdown  2.  Assess vascular access sites hourly  3.  Every 4-6 hours minimum:  Change oxygen saturation probe site  4.  Every 4-6 hours:  If on nasal continuous positive airway pressure, respiratory therapy assess nares and determine need for appliance change or resting period.  Outcome: Progressing     Problem: Neurosensory - Adult  Goal: Achieves stable or improved neurological status  Outcome: Progressing  Goal: Achieves maximal functionality and self care  Outcome: Progressing     Problem: Skin/Tissue Integrity - Adult  Goal: Skin integrity remains intact  Outcome: Progressing  Flowsheets (Taken 2/3/2024 1539)  Skin Integrity Remains Intact: Monitor for areas of redness and/or skin breakdown     Problem: Musculoskeletal - Adult  Goal: Return mobility to safest level of function  Outcome: Progressing  Goal: Maintain proper alignment of affected body part  Outcome: Progressing  Goal: Return ADL status to a safe level of function  Outcome: Progressing     Problem: Gastrointestinal - Adult  Goal: Maintains or returns to baseline bowel function  Outcome: Progressing  Goal: Maintains adequate nutritional intake  Outcome: Progressing     Problem: Metabolic/Fluid and Electrolytes - Adult  Goal: Electrolytes maintained within normal limits  Outcome: Progressing  Goal: Glucose maintained within prescribed range  Outcome: Progressing     Problem: Nutrition Deficit:  Goal:

## 2024-02-03 NOTE — PROGRESS NOTES
02/03/24 0412   RT Protocol   History Pulmonary Disease 2   Respiratory pattern 0   Breath sounds 2   Cough 0   Indications for Bronchodilator Therapy Decreased or absent breath sounds   Bronchodilator Assessment Score 4

## 2024-02-03 NOTE — FLOWSHEET NOTE
Patient assessment is complete. No complaints of pain. All IV antibiotics and fluids are stopped. Emerita is still in her left forearm.

## 2024-02-04 ENCOUNTER — APPOINTMENT (OUTPATIENT)
Dept: CT IMAGING | Age: 69
End: 2024-02-04
Attending: PHYSICAL MEDICINE & REHABILITATION
Payer: MEDICARE

## 2024-02-04 LAB
BACTERIA BLD CULT ORG #2: NORMAL
BACTERIA BLD CULT: NORMAL
CREAT SERPL-MCNC: 1.24 MG/DL (ref 0.5–0.9)
GLUCOSE BLD-MCNC: 265 MG/DL (ref 70–99)
GLUCOSE BLD-MCNC: 266 MG/DL (ref 70–99)
GLUCOSE BLD-MCNC: 389 MG/DL (ref 70–99)
GLUCOSE BLD-MCNC: 79 MG/DL (ref 70–99)
PERFORMED ON: ABNORMAL
PERFORMED ON: NORMAL

## 2024-02-04 PROCEDURE — 97112 NEUROMUSCULAR REEDUCATION: CPT

## 2024-02-04 PROCEDURE — 97535 SELF CARE MNGMENT TRAINING: CPT

## 2024-02-04 PROCEDURE — 6370000000 HC RX 637 (ALT 250 FOR IP): Performed by: FAMILY MEDICINE

## 2024-02-04 PROCEDURE — 97116 GAIT TRAINING THERAPY: CPT

## 2024-02-04 PROCEDURE — 94761 N-INVAS EAR/PLS OXIMETRY MLT: CPT

## 2024-02-04 PROCEDURE — 36415 COLL VENOUS BLD VENIPUNCTURE: CPT

## 2024-02-04 PROCEDURE — 82565 ASSAY OF CREATININE: CPT

## 2024-02-04 PROCEDURE — 2700000000 HC OXYGEN THERAPY PER DAY

## 2024-02-04 PROCEDURE — 97110 THERAPEUTIC EXERCISES: CPT

## 2024-02-04 PROCEDURE — 99232 SBSQ HOSP IP/OBS MODERATE 35: CPT | Performed by: INTERNAL MEDICINE

## 2024-02-04 PROCEDURE — 6360000004 HC RX CONTRAST MEDICATION: Performed by: NURSE PRACTITIONER

## 2024-02-04 PROCEDURE — 6370000000 HC RX 637 (ALT 250 FOR IP): Performed by: REGISTERED NURSE

## 2024-02-04 PROCEDURE — 94640 AIRWAY INHALATION TREATMENT: CPT

## 2024-02-04 PROCEDURE — 74177 CT ABD & PELVIS W/CONTRAST: CPT

## 2024-02-04 PROCEDURE — 1180000000 HC REHAB R&B

## 2024-02-04 PROCEDURE — 6370000000 HC RX 637 (ALT 250 FOR IP): Performed by: PHYSICAL MEDICINE & REHABILITATION

## 2024-02-04 PROCEDURE — 6370000000 HC RX 637 (ALT 250 FOR IP): Performed by: NURSE PRACTITIONER

## 2024-02-04 RX ADMIN — Medication 1 DROP: at 12:11

## 2024-02-04 RX ADMIN — INSULIN LISPRO 2 UNITS: 100 INJECTION, SOLUTION INTRAVENOUS; SUBCUTANEOUS at 12:11

## 2024-02-04 RX ADMIN — INSULIN GLARGINE 10 UNITS: 100 INJECTION, SOLUTION SUBCUTANEOUS at 21:01

## 2024-02-04 RX ADMIN — ALBUTEROL SULFATE 2 PUFF: 90 AEROSOL, METERED RESPIRATORY (INHALATION) at 04:08

## 2024-02-04 RX ADMIN — PREDNISONE 20 MG: 20 TABLET ORAL at 08:32

## 2024-02-04 RX ADMIN — METOPROLOL TARTRATE 25 MG: 25 TABLET, FILM COATED ORAL at 21:01

## 2024-02-04 RX ADMIN — IOPAMIDOL 75 ML: 612 INJECTION, SOLUTION INTRAVENOUS at 16:40

## 2024-02-04 RX ADMIN — ROSUVASTATIN CALCIUM 40 MG: 40 TABLET, FILM COATED ORAL at 21:01

## 2024-02-04 RX ADMIN — ESCITALOPRAM OXALATE 15 MG: 10 TABLET ORAL at 08:31

## 2024-02-04 RX ADMIN — Medication 100 MG: at 08:32

## 2024-02-04 RX ADMIN — Medication 5 MG: at 21:02

## 2024-02-04 RX ADMIN — Medication 1 DROP: at 21:05

## 2024-02-04 RX ADMIN — Medication 1 DROP: at 17:23

## 2024-02-04 RX ADMIN — INSULIN LISPRO 5 UNITS: 100 INJECTION, SOLUTION INTRAVENOUS; SUBCUTANEOUS at 12:11

## 2024-02-04 RX ADMIN — Medication 1 DROP: at 08:33

## 2024-02-04 RX ADMIN — BUDESONIDE AND FORMOTEROL FUMARATE DIHYDRATE 2 PUFF: 80; 4.5 AEROSOL RESPIRATORY (INHALATION) at 15:28

## 2024-02-04 RX ADMIN — ALBUTEROL SULFATE 2 PUFF: 90 AEROSOL, METERED RESPIRATORY (INHALATION) at 15:28

## 2024-02-04 RX ADMIN — INSULIN LISPRO 5 UNITS: 100 INJECTION, SOLUTION INTRAVENOUS; SUBCUTANEOUS at 17:23

## 2024-02-04 RX ADMIN — INSULIN LISPRO 2 UNITS: 100 INJECTION, SOLUTION INTRAVENOUS; SUBCUTANEOUS at 17:23

## 2024-02-04 RX ADMIN — BUDESONIDE AND FORMOTEROL FUMARATE DIHYDRATE 2 PUFF: 80; 4.5 AEROSOL RESPIRATORY (INHALATION) at 04:09

## 2024-02-04 RX ADMIN — Medication 2000 UNITS: at 17:23

## 2024-02-04 RX ADMIN — INSULIN LISPRO 4 UNITS: 100 INJECTION, SOLUTION INTRAVENOUS; SUBCUTANEOUS at 21:01

## 2024-02-04 ASSESSMENT — PAIN SCALES - GENERAL
PAINLEVEL_OUTOF10: 0
PAINLEVEL_OUTOF10: 0

## 2024-02-04 NOTE — PROGRESS NOTES
Physical Therapy   Facility/Department: [facility] R243/R243-01    NAME: Mulu Pandya    : 1955 (68 y.o.)  MRN: [id number]    Account: [id number]  Gender: female    Attempted to see patient for additional PT in attempts to make up missed minutes from previous days. Patient reports increased fatigue this PM and politely declined.          Electronically signed by Dilia Kim PTA on 24 at 2:48 PM EST

## 2024-02-04 NOTE — PROGRESS NOTES
Physical Therapy Rehab Treatment Note  [facility]Department[facility]  Room: [place] [address]       NAME: Mulu Pandya  : 1955 (68 y.o.)  MRN: [id number]  CODE STATUS: Full Code    Date of Service: 2024       Restrictions:  Restrictions/Precautions: Fall Risk, Seizure       SUBJECTIVE:   Subjective: \"I'm better, I want to leave on the 10th\"    Pain   None      OBJECTIVE:             Bed mobility  Bridging: Stand by assistance  Rolling to Right: Stand by assistance  Sit to Supine: Stand by assistance  Scooting: Stand by assistance    Transfers  Sit to Stand: Stand by assistance  Stand to Sit: Stand by assistance  Car Transfer: Minimal Assistance  Comment: Cues for safe technique with car transfer    Ambulation  Surface: Level tile  Device: Rolling Walker  Other Apparatus: O2  Assistance: Stand by assistance  Quality of Gait: Decreased step length and heel strike, downward gaze  Distance: 65ft    Stairs/Curb  Stairs?: Yes  Stairs  # Steps : 4  Stairs Height: 6\"  Assistance: Minimal assistance  Comment: VCs for sequencing              PT Exercises  PROM Exercises: Manual gastroc stretchng [id number] B  Functional Mobility Circuit Training: Figure 8 around bolster- cues for proximity  Dynamic Sitting Balance Exercises: Tband loops in seated and standing (Reaching below knee level)  Motor Control/Coordination: Single stepping over Tband with focus on foot clearance  Standing Open/Closed Kinetic Chain Exercises: STS from W/C x5                        ASSESSMENT/PROGRESS TOWARDS GOALS:  Assessment: Patient with good participation throughout though increased fatigue at the end of the session. VCs for proximity to WW while completing Figure 8 around bolsters. Patient completing Tband loops up to waist, increased time to complete.    Goals:  Long Term Goals  Long Term Goal 1: Pt to complete bed mobility with supervision  Long Term Goal 2: Pt to complete transfers with SBA  Long Term Goal 3: Pt to ambulate 50ft with LRD

## 2024-02-04 NOTE — PROGRESS NOTES
Subjective:  The patient complains of moderate to severe acute on chronic progressive fatigue and left-sided weakness, cognitive slowing, motor planning deficits partially relieved by rest, medications, PT,  OT,   SLP and rest and exacerbated by recent  CVA.  She was admitted through the emergency room on January 18, 2024 with right-sided weakness aphasia and dysphagia.  She was diagnosed with acute CVA-subacute stroke on the right parietal lob .  Her  reported she had had multiple falls recently.  She was admitted under the care of the hospitalist with neurology consulting.  She was also found to have an intracranial hemorrhage.  CT of the head revealed a 2 cm parenchymal bleed in the left basal ganglia.  Neurosurgery was consulted and they did not feel that she should have surgery.  She has been struggling with aphasia dysphagia and hypoxia since being admitte neurology has also been following.   Stroke workup as   including a GUSTABO which was unremarkable.    I am concerned about patient’s medical complexities and barriers to advancing in rehab goals including deficits of motor planning.        I reviewed current care and plans for further care with other rehab providers including nursing and case management.  According to recent  note, \" oriented pleasant and cooperative follows commands speech improving can say 3 to 4 word sentences 02 at 3 litters per nasal cannula maintained Clear dressing to right groin dry and intact . External catheter on due to incontinence.Tele monitor on \"    She was on bedrest awaiting IVC filter for prevention of further PEs.  She cannot have anticoagulation because of the hemorrhagic transformation of her CVA.    Note that patient  was be on a medical hold for 72 hours for therapy with medical exemption for Alost therapy time because of her PEs and  SP IVC filter placement.  She is okay to be back in therapy.  I discussed her IV antibiotics at length with pulmonology.  They

## 2024-02-04 NOTE — PROGRESS NOTES
02/02/24  0409   WBC 11.2*   HGB 11.2*   HCT 36.2*   MCV 93.3          No results for input(s): \"NA\", \"K\", \"CL\", \"CO2\", \"BUN\", \"CREATININE\", \"GLUCOSE\", \"CALCIUM\", \"PROT\", \"LABALBU\", \"BILITOT\", \"ALKPHOS\", \"AST\", \"ALT\", \"LABGLOM\", \"GFRAA\", \"AGRATIO\", \"GLOB\" in the last 72 hours.    MV Settings:          No results for input(s): \"PHART\", \"QXK1USC\", \"PO2ART\", \"IQV8BKM\", \"BEART\", \"V1BIHEQT\" in the last 72 hours.    O2 Device: Nasal cannula  O2 Flow Rate (L/min): 2 L/min    ADULT DIET; Regular     MEDICATIONS during current hospitalization:    Continuous Infusions:        Scheduled Meds:   cinnamon oil  1 drop Oral 4x Daily    metoprolol tartrate  25 mg Oral BID    Vitamin D  2,000 Units Oral Dinner    cyanocobalamin  1,000 mcg IntraMUSCular Weekly    coenzyme Q10  100 mg Oral Daily    albuterol sulfate HFA  2 puff Inhalation BID    budesonide-formoterol  2 puff Inhalation BID RT    escitalopram  15 mg Oral Daily    insulin glargine  10 Units SubCUTAneous Nightly    insulin lispro  5 Units SubCUTAneous TID WC    melatonin  5 mg Oral Nightly    nicotine  1 patch TransDERmal Q24H    insulin lispro  0-4 Units SubCUTAneous TID WC    insulin lispro  0-4 Units SubCUTAneous Nightly    rosuvastatin  40 mg Oral Nightly       PRN Meds:lidocaine, acetaminophen, bisacodyl, sodium phosphate, albuterol, albuterol sulfate HFA, glucagon (rDNA), glucose, ondansetron **OR** ondansetron    Radiology  CT chest imaging studies reviewed by me, shows right lower and left lower lobe PE,      IMPRESSION AND SUGGESTION:  Patient is at risk due to   PE, bilateral, provoked  Recent stroke with hemorrhagic transformation  Dysphagia  Abnormal lesion on pancreas noted on CT chest, needs further workup,    Recommendation  O2 to keep sat 90 to 92%, wean as tolerated   status post IVC filter  PT OT  Aspiration precaution      Discussed with Dr Stacy Yesterday,   discussed with patient family        Electronically signed by Shannon Pavon MD,

## 2024-02-04 NOTE — PROGRESS NOTES
feeding utensils; educated on dressing technique starting with affected side first.  Education Method: Demonstration;Verbal  Barriers to Learning: Cognition  Education Outcome: Verbalized understanding;Demonstrated understanding;Continued education needed    Equipment recommendations:  OT Equipment Recommendations  Other: continue to assess      ASSESSMENT:  Assessment: Pt presents with weakness in B hands, more so in the RUE limiting IND with ADLs requiring FM skills.  Pt educated on adaptive strategies for dressing and grooming.  Pt needed touch assistance for transfers for balance  Activity Tolerance: Patient tolerated treatment well      PLAN OF CARE:  Strengthening, ROM, Balance training, Functional mobility training, Endurance training, Neuromuscular re-education, Cognitive/Perceptual training, Self-Care / ADL, Coordination training, Patient/Caregiver education & training, Safety education & training  Con't OT POC until discharge    Patient goals : To be able to care for herself  Time Frame for Long Term Goals : Pt within 3 weeks of evaluation will demonstrate progress to treatment goals to increase functional indpeendence for return to home at [facility]  Pt will demonstrate progress to areas of deficit as stated below  Long Term Goal 1: Pt will increase independence with ADL self care  Long Term Goal 2: Pt will increase independence with ADL transfers  Long Term Goal 3: Pt will increase bilateral UE strength and function for adl completion        Therapy Time:   Individual Group Co-Treat   Time In 943       Time Out 1043         Minutes 60                   ADL/IADL trainin minutes     Electronically signed by:    RIC Faria,   2024, 10:02 AM

## 2024-02-04 NOTE — PROGRESS NOTES
02/04/24 0400   RT Protocol   History Pulmonary Disease 2   Respiratory pattern 0   Breath sounds 2   Cough 0   Indications for Bronchodilator Therapy Decreased or absent breath sounds   Bronchodilator Assessment Score 4

## 2024-02-04 NOTE — PROGRESS NOTES
Pt assessment completed. VSS. Accu check at 318. Lantus given per MAR 10 units and Humalog coverage 4 units per MAR. Pt is on 2L per NC. Lungs are very diminished kaveh, also upon auscultation  L upper lung exp wheeze. Pt denies any pain. Pt is on teley monitor- SR without ectopy 2135 strip. Kaveh LE edema trace only.  Pt with IVC filter placed by Dr. Gill 2/2. Dressing to R groin intact and dry. Pt asking for \"apple\" none available-pudding for snack. Pt very weak RUE and bends fingers slightly for . Pt speech at times difficult to understand, pt doesn't articulate- speech difficulty.  Therapies to resume Monday-have been on hold secondary to procedure. LBM 2/3. Pt with pure wic in place from day RN. HL #20 L FA intact, and flushes easily. No S/S of infection. Call light with in reach, bed low, locked and alarm activated. Pt also with AVASYS for pt safety.    Electronically signed by Janet Larsen RN on 2/4/2024 at 2:14 AM pt asleep, easily aroused. Battery changed in teley monitor. Call light with in reach. AVASYS in use for pt safety. Pure wic suctioning.     Electronically signed by Janet Larsen RN on 2/4/2024 at 5:59 AM accu check 79-pt without s/s. Pt drank 120ml orange juice now. Pure wic emptied for yellow cldy \"sour\" like urine. Call light with in reach.     emergent statusElectronically signed by Janet Larsen RN on 2/4/2024 at 7:12 AM pt assisted to BR-CGA to mod assist x1- incontinent some stool. Large soft formed BM khadra-care completed, zinc applied. Raissed rash noted.

## 2024-02-04 NOTE — PROGRESS NOTES
Progress Note    Date:2/4/2024       [facility]  Patient Name:Mulu Pandya     YOB: 1955     Age:68 y.o.    Assessment        Hospital Problems             Last Modified POA    * (Principal) Impaired mobility and ADLs dt CVA-2 cm acute intraparenchymal left frontal lobe hematoma. 1/24/2024 Yes    Type 2 diabetes mellitus with chronic kidney disease 1/24/2024 Yes    Anxiety 1/24/2024 Yes    Chronic back pain 1/24/2024 Yes    COPD (chronic obstructive pulmonary disease) (HCC) 1/24/2024 Yes    Mixed hyperlipidemia 1/24/2024 Yes    Essential hypertension 1/24/2024 Yes    Chronic renal failure, stage 3a (HCC) 1/24/2024 Yes    Intracranial hemorrhage (HCC) 1/24/2024 Yes    Cerebral infarction due to occlusion of left middle cerebral artery (HCC) 1/24/2024 Yes    Dysphagia, oropharyngeal phase 1/24/2024 Yes    Late effects of CVA (cerebrovascular accident) 1/24/2024 Yes    Overview Addendum 1/24/2024 12:04 PM by Jayne Stacy,      Old infarcts in the right mid-posterior MCA territory, left inferior cerebellar hemisphere in the left PICA territory, right thalamus, and multiple tiny infarcts in both inferior cerebellar hemispheres in the PICA territories.--left parietal lobe with encephalomalacia change stable within the right temporal lobe.          Urinary incontinence 1/24/2024 Yes    Meningioma (HCC) 1/24/2024 Yes    Overview Signed 1/24/2024 12:04 PM by Jayne Stacy,       Right CP angle meningioma measuring 2.4 x 1.6 x 2.1 cm, producing moderate mass effect upon the right middle cerebellar peduncle and minimal mass effect upon the right pontomedullary junction.         Nontraumatic intracerebral hemorrhage (HCC) 1/24/2024 Yes    Tachycardia 1/31/2024 Yes    Hypoxia 1/31/2024 Yes    Pulmonary embolism (HCC) 2/1/2024 Yes       Plan:      * Impaired mobility and ADLs due to CVA-2 cm acute intraparenchymal left frontal lobe hematoma  -Dr. Stacy managing  -PT, OT, ST  -Pain management

## 2024-02-04 NOTE — PLAN OF CARE
Problem: Safety - Adult  Goal: Free from fall injury  2/4/2024 0206 by Janet Larsen, RN  Outcome: Progressing  2/3/2024 1547 by Becky Fenton, RN  Outcome: Progressing

## 2024-02-04 NOTE — FLOWSHEET NOTE
Patient assessment is complete. No complaints of pain.   The patient is now on 1 Liter of oxygen at 94%.  The EMILY is now out of the room.  The telemetry is discontinued.

## 2024-02-04 NOTE — CONSULTS
Inpatient consult to GI  Consult performed by: Lala Garcia, BALTAZAR - CNP  Consult ordered by: Jayne Stacy DO          Patient Name: Mulu Pandya  Admit Date: 2024  6:51 PM  MR #: [id number]  : 1955    Attending Physician: Jayne Stacy DO  Reason for consult: diarrhea    History of Presenting Illness:      Mulu Pandya is a 68 y.o. female on hospital day 12 with a history of hypertension, hyperlipidemia, COPD, PVD, diabetes, chronic pain, anxiety and depression. History Obtained From:  patient, electronic medical record    GI consult for diarrhea-patient is on rehab for recent stroke with hemorrhagic effect, patient developed shortness of breath tachycardia and had CT of the chest notable for bilateral PEs with incidental finding of pancreatic pseudocyst, unable to be fully characterized with recommendations for CT of the abdomen and pelvis with IV contrast.  CT imaging was notable for large low-density pancreatic body mass measuring 9.8 x 6.4 cm possibly a pseudocyst, patient has no history of chronic pancreatitis, did drink daily alcohol most of her adult life.  She has no abdominal pain, no nausea or vomiting, no diarrhea, no unintentional weight loss.  Most recent liver enzymes are normal.    History:      Past Medical History:   Diagnosis Date    Anxiety     Asthma     Chronic back pain     Chronic renal failure, stage 3a (HCC)     COPD (chronic obstructive pulmonary disease) (HCC)     Depression     History of tinnitus     Hyperlipidemia     Hypertension     Obesity     Osteoporosis     2019 T -3.2    Peripheral vascular disease (HCC)     Proteinuria     Type II or unspecified type diabetes mellitus without mention of complication, not stated as uncontrolled      Past Surgical History:   Procedure Laterality Date    BREAST SURGERY Left 2009    Benign bx    FRACTURE SURGERY      TUBAL LIGATION       Family History  Family History   Problem Relation Age of Onset    Heart Disease  orbits demonstrate no acute abnormality. SINUSES: The visualized paranasal sinuses and mastoid air cells demonstrate no acute abnormality. SOFT TISSUES/SKULL:  No acute abnormality of the visualized skull or soft tissues.     1. New subarachnoid hemorrhage identified in the left frontal parietal region in the area of evolving infarction. 2. Stable area of increased density identified within the left basal ganglia. 3. Stable right cerebellar pontine angle mass. 4. Chronic insult identified within the right temporoparietal lobe. 5. Stable atrophy and chronic changes again seen within the brain.     CTA CHEST W WO CONTRAST    Addendum Date: 1/31/2024    ADDENDUM: I discussed the findings with Dr. Sanchez at 8:40 p.m. on 01/31/2024.     Result Date: 1/31/2024  EXAMINATION: CTA OF THE CHEST WITH AND WITHOUT CONTRAST 1/31/2024 3:40 pm TECHNIQUE: CTA of the chest was performed before and after the administration of intravenous contrast.  Multiplanar reformatted images are provided for review.  MIP images are provided for review. Automated exposure control, iterative reconstruction, and/or weight based adjustment of the mA/kV was utilized to reduce the radiation dose to as low as reasonably achievable. COMPARISON: Low-dose CT of the chest from 11/25/2022. HISTORY: ORDERING SYSTEM PROVIDED HISTORY: Shortness of breath, tachycardia, hypoxia, unable to receive DVT prophylaxis TECHNOLOGIST PROVIDED HISTORY: Reason for exam:->Shortness of breath, tachycardia, hypoxia, unable to receive DVT prophylaxis Additional Contrast?->1 What reading provider will be dictating this exam?->CRC FINDINGS: Pulmonary arteries: Mild amount of thrombus seen in the distal right lower lobe pulmonary artery.  A small amount of thrombus is seen in the subsegmental arteries of the posterior basilar segment in the left lower lobe. Clot burden is low.  The RV/LV ratio is 1.03, just above top normal.  There is no sign of flattening of the interventricular

## 2024-02-04 NOTE — PLAN OF CARE
Problem: Safety - Adult  Goal: Free from fall injury  2/4/2024 1251 by Becky Fenton RN  Outcome: Progressing  2/4/2024 0206 by Janet Larsen RN  Outcome: Progressing     Problem: Discharge Planning  Goal: Discharge to home or other facility with appropriate resources  Outcome: Progressing  Flowsheets (Taken 2/4/2024 1241)  Discharge to home or other facility with appropriate resources: Identify barriers to discharge with patient and caregiver     Problem: Skin/Tissue Integrity  Goal: Absence of new skin breakdown  Description: 1.  Monitor for areas of redness and/or skin breakdown  2.  Assess vascular access sites hourly  3.  Every 4-6 hours minimum:  Change oxygen saturation probe site  4.  Every 4-6 hours:  If on nasal continuous positive airway pressure, respiratory therapy assess nares and determine need for appliance change or resting period.  2/4/2024 1251 by Becky Fenton RN  Outcome: Progressing  2/4/2024 0206 by Janet Larsen RN  Outcome: Progressing     Problem: Neurosensory - Adult  Goal: Achieves stable or improved neurological status  2/4/2024 1251 by Becky Fenton RN  Outcome: Progressing  2/4/2024 0206 by Janet Larsen RN  Outcome: Progressing  Flowsheets (Taken 2/3/2024 1950)  Achieves stable or improved neurological status: Assess for and report changes in neurological status  Goal: Achieves maximal functionality and self care  Outcome: Progressing     Problem: Skin/Tissue Integrity - Adult  Goal: Skin integrity remains intact  2/4/2024 1251 by Becky Fenton RN  Outcome: Progressing  Flowsheets (Taken 2/4/2024 1239)  Skin Integrity Remains Intact: Monitor for areas of redness and/or skin breakdown  2/4/2024 0206 by Janet Larsen RN  Outcome: Progressing  Flowsheets (Taken 2/3/2024 1950)  Skin Integrity Remains Intact: Monitor for areas of redness and/or skin breakdown     Problem: Musculoskeletal - Adult  Goal: Return mobility to safest level of  function  2/4/2024 1251 by Becky Fenton RN  Outcome: Progressing  2/4/2024 0206 by Janet Larsen RN  Outcome: Progressing  Flowsheets (Taken 2/3/2024 1950)  Return Mobility to Safest Level of Function: Assess patient stability and activity tolerance for standing, transferring and ambulating with or without assistive devices  Goal: Maintain proper alignment of affected body part  2/4/2024 1251 by Becky Fenton RN  Outcome: Progressing  2/4/2024 0206 by Janet Larsen RN  Outcome: Progressing  Flowsheets (Taken 2/3/2024 1950)  Maintain proper alignment of affected body part: Support and protect limb and body alignment per provider's orders  Goal: Return ADL status to a safe level of function  Outcome: Progressing     Problem: Gastrointestinal - Adult  Goal: Maintains or returns to baseline bowel function  2/4/2024 1251 by Becky Fenton RN  Outcome: Progressing  2/4/2024 0206 by Janet Larsen RN  Outcome: Progressing  Goal: Maintains adequate nutritional intake  2/4/2024 1251 by Becky Fenton RN  Outcome: Progressing  2/4/2024 0206 by Janet Larsen RN  Outcome: Progressing     Problem: Metabolic/Fluid and Electrolytes - Adult  Goal: Electrolytes maintained within normal limits  2/4/2024 1251 by Becky Fenton RN  Outcome: Progressing  2/4/2024 0206 by Janet Larsen RN  Outcome: Progressing  Goal: Glucose maintained within prescribed range  2/4/2024 1251 by Becky Fenton RN  Outcome: Progressing  2/4/2024 0206 by Janet Larsen RN  Outcome: Progressing     Problem: Nutrition Deficit:  Goal: Optimize nutritional status  Outcome: Progressing     Problem: Chronic Conditions and Co-morbidities  Goal: Patient's chronic conditions and co-morbidity symptoms are monitored and maintained or improved  Outcome: Progressing     Problem: Pain  Goal: Verbalizes/displays adequate comfort level or baseline comfort level  Outcome: Progressing

## 2024-02-05 LAB
ECHO BSA: 1.77 M2
GLUCOSE BLD-MCNC: 106 MG/DL (ref 70–99)
GLUCOSE BLD-MCNC: 111 MG/DL (ref 70–99)
GLUCOSE BLD-MCNC: 128 MG/DL (ref 70–99)
GLUCOSE BLD-MCNC: 342 MG/DL (ref 70–99)
GLUCOSE BLD-MCNC: 85 MG/DL (ref 70–99)
PERFORMED ON: ABNORMAL
PERFORMED ON: NORMAL

## 2024-02-05 PROCEDURE — 97530 THERAPEUTIC ACTIVITIES: CPT

## 2024-02-05 PROCEDURE — 99232 SBSQ HOSP IP/OBS MODERATE 35: CPT | Performed by: INTERNAL MEDICINE

## 2024-02-05 PROCEDURE — 97110 THERAPEUTIC EXERCISES: CPT

## 2024-02-05 PROCEDURE — 93970 EXTREMITY STUDY: CPT | Performed by: INTERNAL MEDICINE

## 2024-02-05 PROCEDURE — 94640 AIRWAY INHALATION TREATMENT: CPT

## 2024-02-05 PROCEDURE — 6370000000 HC RX 637 (ALT 250 FOR IP): Performed by: FAMILY MEDICINE

## 2024-02-05 PROCEDURE — 97032 APPL MODALITY 1+ESTIM EA 15: CPT

## 2024-02-05 PROCEDURE — 1180000000 HC REHAB R&B

## 2024-02-05 PROCEDURE — 6370000000 HC RX 637 (ALT 250 FOR IP): Performed by: PHYSICAL MEDICINE & REHABILITATION

## 2024-02-05 PROCEDURE — 97116 GAIT TRAINING THERAPY: CPT

## 2024-02-05 PROCEDURE — 2700000000 HC OXYGEN THERAPY PER DAY

## 2024-02-05 PROCEDURE — 97535 SELF CARE MNGMENT TRAINING: CPT

## 2024-02-05 PROCEDURE — 97112 NEUROMUSCULAR REEDUCATION: CPT

## 2024-02-05 PROCEDURE — 92507 TX SP LANG VOICE COMM INDIV: CPT

## 2024-02-05 PROCEDURE — 94761 N-INVAS EAR/PLS OXIMETRY MLT: CPT

## 2024-02-05 PROCEDURE — 99233 SBSQ HOSP IP/OBS HIGH 50: CPT | Performed by: PHYSICAL MEDICINE & REHABILITATION

## 2024-02-05 PROCEDURE — 99232 SBSQ HOSP IP/OBS MODERATE 35: CPT | Performed by: NURSE PRACTITIONER

## 2024-02-05 RX ADMIN — ESCITALOPRAM OXALATE 15 MG: 10 TABLET ORAL at 08:22

## 2024-02-05 RX ADMIN — Medication 1 DROP: at 08:23

## 2024-02-05 RX ADMIN — ROSUVASTATIN CALCIUM 40 MG: 40 TABLET, FILM COATED ORAL at 19:58

## 2024-02-05 RX ADMIN — BUDESONIDE AND FORMOTEROL FUMARATE DIHYDRATE 2 PUFF: 80; 4.5 AEROSOL RESPIRATORY (INHALATION) at 04:13

## 2024-02-05 RX ADMIN — Medication 5 MG: at 19:58

## 2024-02-05 RX ADMIN — Medication 100 MG: at 08:22

## 2024-02-05 RX ADMIN — INSULIN LISPRO 4 UNITS: 100 INJECTION, SOLUTION INTRAVENOUS; SUBCUTANEOUS at 19:59

## 2024-02-05 RX ADMIN — BUDESONIDE AND FORMOTEROL FUMARATE DIHYDRATE 2 PUFF: 80; 4.5 AEROSOL RESPIRATORY (INHALATION) at 16:52

## 2024-02-05 RX ADMIN — Medication 2000 UNITS: at 18:25

## 2024-02-05 RX ADMIN — INSULIN LISPRO 5 UNITS: 100 INJECTION, SOLUTION INTRAVENOUS; SUBCUTANEOUS at 11:56

## 2024-02-05 RX ADMIN — INSULIN GLARGINE 10 UNITS: 100 INJECTION, SOLUTION SUBCUTANEOUS at 19:58

## 2024-02-05 RX ADMIN — Medication 1 DROP: at 19:59

## 2024-02-05 RX ADMIN — Medication 1 DROP: at 18:00

## 2024-02-05 RX ADMIN — ALBUTEROL SULFATE 2 PUFF: 90 AEROSOL, METERED RESPIRATORY (INHALATION) at 16:52

## 2024-02-05 RX ADMIN — ALBUTEROL SULFATE 2 PUFF: 90 AEROSOL, METERED RESPIRATORY (INHALATION) at 04:13

## 2024-02-05 ASSESSMENT — ENCOUNTER SYMPTOMS
COLOR CHANGE: 0
NAUSEA: 0
VOMITING: 0
WHEEZING: 0
COUGH: 0
CHEST TIGHTNESS: 0
TROUBLE SWALLOWING: 0
SHORTNESS OF BREATH: 0

## 2024-02-05 ASSESSMENT — PAIN SCALES - GENERAL: PAINLEVEL_OUTOF10: 0

## 2024-02-05 NOTE — PROGRESS NOTES
02/05/24 0400   RT Protocol   History Pulmonary Disease 2   Respiratory pattern 0   Breath sounds 2   Cough 0   Indications for Bronchodilator Therapy Decreased or absent breath sounds   Bronchodilator Assessment Score 4

## 2024-02-05 NOTE — PROGRESS NOTES
[facility] Neurology Daily Progress Note  Name: Mulu Pandya  Age: 68 y.o.  Gender: female  CodeStatus: Full Code  Allergies: Nickel    Chief Complaint:No chief complaint on file.    Primary Care Provider: Eileen Edmondson APRN - NP  InpatientTreatment Team: Treatment Team: Attending Provider: Jayne Stacy DO; Consulting Physician: Zheng Chavez MD; Consulting Physician: Elliot Harris MD; Consulting Physician: Jin Gill DO; Consulting Physician: Ame Echeverria MD; Cardiologist: Jin Gill DO; Consulting Physician: Ki De Los Santos MD; Registered Nurse: Caryn Mcgowan, RN; Occupational Therapist: Vicente Barrett OT; : Anny Yadav MSW, LSW; LPN: Dinah Brito LPN  Admission Date: 1/23/2024      HPI   Pt seen and examined on rehab for neurology follow-up.  Alert.  Patient with some ongoing expressive aphasia.  Right-sided weakness has improved.  No dysarthria.  Denies headache or vision changes.  No dysphagia.  Blood pressure stable.  Respiratory stable.  Hypotensive today.  Vitals:    02/05/24 0808   BP: (!) 103/58   Pulse: 82   Resp: 17   Temp: 98.2 °F (36.8 °C)   SpO2: 90%        Review of Systems   Constitutional:  Negative for fatigue and fever.   HENT:  Negative for hearing loss and trouble swallowing.    Eyes:  Negative for visual disturbance.   Respiratory:  Negative for cough, chest tightness, shortness of breath and wheezing.    Cardiovascular:  Negative for chest pain, palpitations and leg swelling.   Gastrointestinal:  Negative for nausea and vomiting.   Musculoskeletal:  Positive for gait problem.   Skin:  Negative for color change and rash.   Neurological:  Positive for speech difficulty and weakness. Negative for dizziness, tremors, seizures, syncope, facial asymmetry, light-headedness, numbness and headaches.   Psychiatric/Behavioral:  Positive for confusion. Negative for agitation and hallucinations. The patient is not nervous/anxious.          Physical  extra-axial fluid collection.  Mild ex  vacuole dilatation seen of the ventricular system.  Minimal chronic small  vessel ischemic changes seen within the periventricular and subcortical white  matter to suggest chronic small vessel ischemic change.  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    ORBITS: The visualized portion of the orbits demonstrate no acute abnormality.    SINUSES: The visualized paranasal sinuses and mastoid air cells demonstrate  no acute abnormality.    SOFT TISSUES/SKULL:  No acute abnormality of the visualized skull or soft  tissues.    Impression  Stable parenchymal hemorrhage involving the left frontal periventricular  white matter.  No significant surrounding edema or mass effect.  Evolving  insult in the left parietal lobe with encephalomalacia change stable within  the right temporal lobe.  No results found for this or any previous visit.  No results found for this or any previous visit.      Carotid duplex: No results found for this or any previous visit.  No results found for this or any previous visit.  No results found for this or any previous visit.      Echo No results found for this or any previous visit.            Assessment/Plan:    Acute infarct in the left superior mid parietal lobe MCA territory with minimal mass effect.  There is no change in small acute intraparenchymal hemorrhage in the left anterior parietal periventricular white matter.  No mass effect or edema.  There are old infarcts in the right mid posterior MCA territory, left inferior cerebellar hemisphere in the PICA territory, right thalamus and multiple tiny infarcts in both inferior cerebellar hemispheres in the PICA territories.  Patient also noted to have right CP angle meningioma measuring 2.4 x 1.6 x 2.1 cm producing moderate mass effect upon the right middle cerebral appendical and right pontomedullary junction.  CTA of the

## 2024-02-05 NOTE — PROGRESS NOTES
Physical Therapy Rehab Treatment Note  [place] [facility]       NAME: Mulu Pandya  : 1955 (68 y.o.)  MRN: [id number]  CODE STATUS: Full Code    Date of Service: 2024       Restrictions:  Restrictions/Precautions: Fall Risk, Seizure       SUBJECTIVE:   Subjective: Pt states she is feeling better today.    Pain  Pain: Denies pre and post session pain.    OBJECTIVE:         Bed mobility  Rolling to Left: Stand by assistance  Rolling to Right: Stand by assistance  Supine to Sit: Stand by assistance    Transfers  Sit to Stand: Stand by assistance  Stand to Sit: Stand by assistance  Bed to Chair: Stand by assistance  Car Transfer: Stand by assistance  Comment: Vcs for safe approach to chair.    Ambulation  Surface: Level tile;Uneven;Carpet  Device: Rolling Walker  Other Apparatus: O2  Assistance: Stand by assistance  Gait Deviations: Slow Vianey  Distance: 150ft  Comments: SPO2 95% HR 90BPM after gait.        PT Exercises  Dynamic Standing Balance Exercises: Gait drills with L UE on kristen rail support F/R/L SBA.  VCs for posture.          Education Provided: Family Education  Education  Education Given To: Patient;Family  Education Provided: Family Education  Education Provided Comments: Pt's  observed and educated on goals.          ASSESSMENT/PROGRESS TOWARDS GOALS:   Assessment: Pt tolerated increased gait distance. Maintained SPO2 and hR WNL on 1L O2.  Pt has met current LTGs.  Spoke with PT.  Pt to update goals.    Goals:  Long Term Goals  Long Term Goal 1: Pt to complete bed mobility with supervision  Long Term Goal 2: Pt to complete transfers with SBA  Long Term Goal 3: Pt to ambulate 50ft with LRD and SBA  Long Term Goal 4: Pt to complete 3 steps with HR and [place]    PLAN OF CARE/Safety: Cont per      Therapy Time:   Individual   Time In 1300   Time Out 1330   Minutes 30      Minutes:  Transfer/Bed mobility trainin  Gait training:10  Neuro re

## 2024-02-05 NOTE — PROGRESS NOTES
OCCUPATIONAL THERAPY  INPATIENT REHAB TREATMENT NOTE  [facility]      NAME: Mulu Pandya  : 1955 (68 y.o.)  MRN: [id number]  CODE STATUS: Full Code  Room: R243/R243-01    Date of Service: 2024    Referring Physician: Dr Stacy  Rehab Diagnosis: Impaired mobility and ADL's due to acute infart in the left superior mid parietal lobe MCA territory    Hospital course:        Restrictions  Restrictions/Precautions  Restrictions/Precautions: Fall Risk, Seizure         Patient's date of birth confirmed: Yes    SAFETY:  Safety Devices  Safety Devices in place: Yes  Type of devices: All fall risk precautions in place    SUBJECTIVE:  Subjective: Pt stated to be excited to discharge home in 5 days.    Pain at start of treatment: No    Pain at end of treatment: No    COGNITION:  Orientation  Overall Orientation Status: Within Functional Limits  Orientation Level: Oriented X4  Cognition  Overall Cognitive Status: Exceptions  Arousal/Alertness: Appropriate responses to stimuli  Following Commands: Follows one step commands consistently  Attention Span: Appears intact  Memory: Appears intact  Safety Judgement: Decreased awareness of need for safety  Problem Solving: Assistance required to generate solutions;Assistance required to implement solutions  Insights: Fully aware of deficits  Initiation: Requires cues for some  Sequencing: Requires cues for some      OBJECTIVE:    FM Coordination and Strengthening:  Patient engaged in a FM coordination and strengthening to increase I with fasteners and small objects for ADL's and IADL's.   Patient able to remove pink resistive theraputty from container with good ability.  Patient able to roll theraputty into log shape with increased time to complete due to decreased function in R hand.   Patient able to place 10 small beads 1 at a time in theraputty with min difficulty. Pt utilized L hand to help place beads into putty.  Patient able to roll theraputty into ball

## 2024-02-05 NOTE — PROGRESS NOTES
Physical Therapy Rehab Treatment Note  [facility]  [facility]       NAME: Mulu Pandya  : 1955 (68 y.o.)  MRN: [id number]  CODE STATUS: Full Code    Date of Service: 2024     Restrictions:  Restrictions/Precautions: Fall Risk, Seizure    SUBJECTIVE:   Subjective: Pt states she is feeling better today.    Pain  Pain: Denies pre and post session pain.    OBJECTIVE:         Bed mobility  Rolling to Left: Stand by assistance  Rolling to Right: Stand by assistance  Sit to Supine: Stand by assistance  Bed Mobility Comments: VCs for technique to align self in bed once supine.    Transfers  Sit to Stand: Stand by assistance  Stand to Sit: Stand by assistance  Bed to Chair: Stand by assistance  Comment: Vcs for safe approach to chair.    Ambulation  Surface: Level tile;Uneven;Carpet  Device: Rolling Walker  Other Apparatus: O2 (1L)  Assistance: Stand by assistance  Distance: 75ft  Comments: SPO2 95% HR 89BPM after gait.    Stairs/Curb  Stairs?: Yes  Stairs  # Steps : 4  Stairs Height: 6\"  Rails: Left ascending (B grasp)  Curbs: 4\"  Device: Rolling walker  Assistance: Minimal assistance  Comment: Instructed pt and  on B grasp on one rail technique and threshold step with WW technique.        PT Exercises  Exercise Treatment: supine bridges, heel slides, hip abd, SLR, x10ea  Functional Mobility Circuit Training: Figure 8 around bolster- cues for proximity; STS from arm chair with focus on sequencing x5        Education Provided: Family Education  Education  Education Given To: Patient;Family  Education Provided: Family Education  Education Provided Comments: Pt's  observed and educated on assisting pt with bed mobility, transfers, gait with WW, and stairs.   ASSESSMENT/PROGRESS TOWARDS GOALS:   Assessment: Pt participated well and tolerated increased activity.  Requires VCs for safety with all mobility.  Pt maintain HR and SPO2 WNL throughout treatment.    Goals:  Long

## 2024-02-05 NOTE — PROGRESS NOTES
Signed         Patient: Mulu Pandya  Unit/Bed: W268/W268-01  YOB: 1955  MRN: [id number]  Acct: [id number]   Admitting Diagnosis: Intracranial hemorrhage (HCC) [I62.9]  Basal ganglia hemorrhage (HCC) [I61.0]  Cerebral infarction due to occlusion of left middle cerebral artery (HCC) [I63.512]  Date:  1/18/2024  Hospital Day: 5     Chief Complaint:  CVA     Subjective   Patient is a 68 y.o. female who presents with a chief complaint of MS change/CVA type symptoms. . Patient is followed on a regular basis by Eileen Capps, APRN - NP.  Patient with past medical history of diabetes, hypertension, hyperlipidemia, tobacco abuse who presented with CVA type symptoms.  .  No prior cardiac history.  No prior history of atrial fibrillation  Carotid ultrasound is negative for any significant stenosis.   Now seen in rehab.      S/p GUSTABO   No mass or thrombus  No PFO with bubble study  Normal LVF EF of 55%  Mild MR  Mild TR  Mild to mod aorta plaque         2-1-24: Status post CTA of the chest with thrombus seen in the right lower lobe pulmonary artery.  No evidence of any RV strain.  Discussed case with pulmonary as well as neurology.  Requesting IVC filter placement.  Awaiting CT of the brain    2-5-24: doing ok. No complaints. Discussed with neuro.         Review of Systems:   Review of Systems   Constitutional: Negative.    HENT: Negative.     Eyes: Negative.    Respiratory: Negative.     Cardiovascular: Negative.    Gastrointestinal: Negative.    Endocrine: Negative.    Genitourinary: Negative.    Musculoskeletal: Negative.    Skin: Negative.    Allergic/Immunologic: Negative.    Neurological:  Positive for speech difficulty and weakness.   Hematological: Negative.    Psychiatric/Behavioral: Negative.              Physical Examination:    /70   Pulse 98   Temp 98.2 °F (36.8 °C) (Oral)   Resp 18   Ht 1.6 m (5' 3\")   Wt 68.7 kg (151 lb 7.3 oz)   SpO2 91%   BMI 26.83 kg/m²    Physical

## 2024-02-05 NOTE — PROGRESS NOTES
Subjective:  The patient complains of moderate to severe acute on chronic progressive fatigue and left-sided weakness, cognitive slowing, motor planning deficits partially relieved by rest, medications, PT,  OT,   SLP and rest and exacerbated by recent  CVA.  She was admitted through the emergency room on January 18, 2024 with right-sided weakness aphasia and dysphagia.  She was diagnosed with acute CVA-subacute stroke on the right parietal lob .  Her  reported she had had multiple falls recently.  She was admitted under the care of the hospitalist with neurology consulting.  She was also found to have an intracranial hemorrhage.  CT of the head revealed a 2 cm parenchymal bleed in the left basal ganglia.  Neurosurgery was consulted and they did not feel that she should have surgery.  She has been struggling with aphasia dysphagia and hypoxia since being admitte neurology has also been following.   Stroke workup as   including a GUSTABO which was unremarkable.    I am concerned about patient’s medical complexities and barriers to advancing in rehab goals including deficits of motor planning.        I reviewed current care and plans for further care with other rehab providers including nursing and case management.  According to recent  note, \"after assisting pt to and from BR to void. Pt is unsteady with W/W, flexed trunk, not safety aware.CGA. pt attached to pure wic per request. VSS> pt is down to 1L per NC from days. Pulse ox at 97% . Will spot check. Blood sugar at 389-notified NP for hospitalist \"to recheck BS at 0000\"-gave per MAR Lantus 10 units @ HS per orderers and 4 units of humalog per SS. Pt declined snack. Just asked for diet pepsi-given. Call light with in reach, bed low locked and bed alarm activated. AVASYS was removed from room on days.     Electronically signed by Janet Larsen RN on 2/5/2024 at 12:09 AM pt asleep, easily awakened-BS retaken @ 111. Pt on 1L oxygen per NC-pulse ox @ 95%. Call  effective dose. Pulse oximeter checks to shift and at HS to dose and titrate oxygen and aerosol treatments monitor for nocturnal hypoxemia, monitor vital signs, oxygen prn.  Focus on energy conservation-IV Rocephin- SP  IV Zithromax  SP  pulmonology   Sinus Tach,  Mixed hyperlipidemia,  Essential hypertension-Acute rehab to monitor heart rate and rhythm with the option of telemetry and the effects of chronotropic medication with respect to increasing physical activity and exercise in PT, OT, ADLs with medication titration to lowest effective dosing.  Continue blood signs every shift focusing on heart rate, rhythm and blood pressure checks with orthostatic checks-monitoring the effect of exercise, therapy and posture.  Consult hospitalist for backup medical and adjust/add medications.  Monitor heart rate and blood pressure as well as medications effects on vital signs before during and after therapy with especial focus on preventing orthostasis and falls risk.  Check CBC CMP and consult cardiology.    Chronic renal failure, stage 3a-Eliminate toxic medications, monitor I's and O's focusing on urine output, recheck BMP.    Intracranial hemorrhage sp acute infarction in the left superior mid parietal lobe MCA territory    Cerebral infarction due to occlusion of left middle cerebral artery with 2 cm acute intraparenchymal left frontal lobe hematoma-mitigate stroke risk factors and consult neurology    Dysphagia, oropharyngeal phase--SLP-up at 90 degrees when eating recheck modified barium swallow was refused by patient.  Tobacco addiction-NicoDerm titration stop smoking counseling    Late effects of CVA (cerebrovascular accident)-stroke risk mitigation    Urinary incontinence-UA/Culture from [place] and sent to lab via tube system  Active chronic nausea and vomiting-IV fluids as needed as needed Zofran consult medical consider GI consult monitor for GI cause versus central cause-scatter a.m. medications

## 2024-02-05 NOTE — PROGRESS NOTES
[facility]  Facility/Department: [facility] RE[facility]AB  Speech Language Pathology   Treatment Note          Mulu Pandya  1955  [id number]  [x]   confirmed    Date: 2024      Restrictions/Precautions: Fall Risk, Seizure     ADULT DIET; Regular     Respiratory Status: O2 Flow Rate (L/min): (S) 1 L/min (Pt on 1L) (24 6003)   No active isolations    Rehab Diagnosis: Impaired mobility and ADL's due to acute infart in the left superior mid parietal lobe MCA territory     Subjective:  Alert, Cooperative, and Pleasant        Interventions used this date:  Speech Production, Expressive Language, and Receptive Language    Objective/Assessment:  Patient progressing towards goals:  Short Term Goals    Goal 1: Patient will read aloud and/or generate phrases-sentences using slow rate and over-articulation to promote speech intelligibility during structured tasks with SLP so that they may functionally communicate and express safety/medical concerns across all environments with 90% accuracy.  Pt read aloud 5-7 word sentences independently with 56% accuracy increasing to 87% accuracy given min-mod cueing for slow rate and over articulation. Pt demonstrated difficulties with the following phonemes (I.e. /p/, /l/, /t/, /k/). However, pt frequently self corrected words within the sentence. Pt would benefit from phrasing (I.e. pausing) within sentences to increase slow rate and overall speech intelligibility.   Goal 2: To decrease the presence of apraxia of speech and improve articulatory accuracy to adequately express wants and needs, the pt will complete structured articulation tasks following SLP model with min cues-fading cues for articulatory placement in 90% of opportunities with 1-3 syllables words at the phrase-sentence level.  Goal 3: Pt will answer questions involving a short paragraph just heard/read with 90% accuracy.  Pt independently answered questions after being read aloud a short paragraph with 85%  accuracy increasing to 92% accuracy given min cueing.   Goal 4: Pt will complete convergent and divergent naming tasks with 90% accuracy to promote semantic organization and the overall effectiveness and efficiency for expression of their wants, needs, feelings, and ideas.  Given items within a picture, pt was able to independently state the items category with 80% accuracy increasing to 93% accuracy given min cueing. Given verbal direction, pt was able to independently name 3 items within a category with 100% accuracy.   Short-term Goals    Goal 1: Patient will tolerate regular solids and thin liquids with adequate mastication, timely A-P, with no evidence of pocketing or s/s of aspiration during a therapeutic meal monitor.  Goal 2: Patient will demonstrate recommended swallow strategies for safe and efficient swallow at modified independent level.  Goal 3: Patient will complete lingual ROM/strengthening/coordination exercises 10x/each with min cues, in order to strengthen the muscles of the swallow and to safely handle the least restrictive diet level.      Treatment/Activity Tolerance:  Patient tolerated treatment well    Plan:  Continue per POC    Pain Assessment:  Patient does not c/o pain.    Pain Re-assessment:  Patient does not c/o pain.    Patient/Caregiver Education:  Patient educated on session and progression towards goals.    Safety Devices:  All fall risk precautions in place      Dysphagia Outcome Severity Scale    SWALLOWING  Ratin      Speech Therapy Level of Assistance Scale    AUDITORY COMPREHENSION  Rating:  Supervised Assistance    VERBAL EXPRESSION  Rating:  Minimal Assistance    MOTOR SPEECH  Rating: Minimal Assistance        Therapy Time  SLP Individual Minutes  Time In: 1000  Time Out: 1030  Minutes: 30            Signature: Electronically signed by Chhaya Lui on 2024 at 3:25 PM

## 2024-02-05 NOTE — PROGRESS NOTES
INPATIENT PROGRESS NOTES    PATIENT NAME: Mulu Pandya  MRN: [id number]  SERVICE DATE:  February 5, 2024   SERVICE TIME:  5:05 PM      PRIMARY SERVICE: Pulmonary Disease    CHIEF COMPLAIN: Pulmonary embolism      INTERVAL HPI: Patient seen and examined at bedside, Interval Notes, orders reviewed. Nursing notes noted  Patient is feeling better.  No complaint of chest pain.  No cough.  No shortness of breath.  She is on 1 L O2 via nasal cannula and O2 saturation 95%.         OBJECTIVE   I/O:24HR INTAKE/OUTPUT:    Intake/Output Summary (Last 24 hours) at 2/5/2024 1705  Last data filed at 2/5/2024 0823  Gross per 24 hour   Intake 237 ml   Output --   Net 237 ml     No intake/output data recorded.  Body mass index is 28.72 kg/m².    PHYSICAL EXAM:  Vitals:  BP (!) 103/58   Pulse 82   Temp 98.2 °F (36.8 °C) (Oral)   Resp 17   Ht 1.6 m (5' 3\")   Wt 73.5 kg (162 lb 2.2 oz)   SpO2 95%   BMI 28.72 kg/m²   General: Alert, awake .comfortable in bed, No distress.  Head: Atraumatic , Normocephalic   Eyes: PERRL. No sclera icterus. No conjunctival injection. No discharge   ENT: No nasal  discharge. Pharynx clear.  Neck:  Trachea midline. No thyromegaly, no JVD, No cervical adenopathy.  Chest : Bilaterally symmetrical ,Normal effort,  No accessory muscle use  Lung : Diminished breath sound bilaterally No Rales. No wheezing. No rhonchi.   Heart:: Normal rate. Regular rhythm. No mumur ,  Rub or gallop  ABD: Non-tender. Non-distended. No masses. No organmegaly. Normal bowel sounds. No hernia.  Ext : No Pitting both leg , No Cyanosis No clubbing  Neuro: Right-sided weakness    Labs:  No results for input(s): \"WBC\", \"HGB\", \"PLT\" in the last 72 hours.  Recent Labs     02/04/24  1427   CREATININE 1.24*     No results for input(s): \"AST\", \"ALT\", \"ALB\", \"BILITOT\", \"ALKPHOS\" in the last 72 hours.      MV Settings:          No results for input(s): \"PHART\", \"LQV9WPZ\", \"PO2ART\", \"QPE9AEH\", \"BEART\", \"R4VDKVEH\" in the last 72

## 2024-02-05 NOTE — PROGRESS NOTES
Pt assignment completed after assisting pt to and from BR to void. Pt is unsteady with W/W, flexed trunk, not safety aware.CGA. pt attached to pure wic per request. VSS> pt is down to 1L per NC from days. Pulse ox at 97% . Will spot check. Blood sugar at 389-notified NP for hospitalist \"to recheck BS at 0000\"-gave per MAR Lantus 10 units @ HS per orderers and 4 units of humalog per SS. Pt declined snack. Just asked for diet pepsi-given. Call light with in reach, bed low locked and bed alarm activated. AVASYS was removed from room on days.    Electronically signed by Janet Larsen RN on 2/5/2024 at 12:09 AM pt asleep, easily awakened-BS retaken @ 111. Pt on 1L oxygen per NC-pulse ox @ 95%. Call light with in reach and alarm activated.    Electronically signed by Janet Larsen RN on 2/5/2024 at 6:27 AM pure wic off at 0615 emptied 700 cldy, \"sour\"like urine, no odor. Then now pt assisted to BR to void-unsteady, no safety awareness flexed trunk-much cueing.  Spot pulse ox RA 89%- placed on 2L per NC. Call light with in reach.

## 2024-02-05 NOTE — PROGRESS NOTES
Hospitalist Progress Note      PCP: Eileen Edmondson, APRN - NP    Date of Admission: 1/23/2024    Chief Complaint:    No chief complaint on file.      HPI:   Pt is s/p IVC filter placement day #3.  Patient patient is sitting in the chair and watching TV.  No concerns overnight, patient reports feeling good today, pleasant and cooperative.  Patient denies chest pain, dyspnea, dizziness, or headache; 2 L supplemental NC.  She also denies fever, chills, abdominal pain, nausea/ vomiting, and changes in appetite.    Subjective:  12 point ROS negative other than mentioned above       Medications:  Reviewed    Infusion Medications   Scheduled Medications    cinnamon oil  1 drop Oral 4x Daily    metoprolol tartrate  25 mg Oral BID    Vitamin D  2,000 Units Oral Dinner    cyanocobalamin  1,000 mcg IntraMUSCular Weekly    coenzyme Q10  100 mg Oral Daily    albuterol sulfate HFA  2 puff Inhalation BID    budesonide-formoterol  2 puff Inhalation BID RT    escitalopram  15 mg Oral Daily    insulin glargine  10 Units SubCUTAneous Nightly    insulin lispro  5 Units SubCUTAneous TID WC    melatonin  5 mg Oral Nightly    nicotine  1 patch TransDERmal Q24H    insulin lispro  0-4 Units SubCUTAneous TID WC    insulin lispro  0-4 Units SubCUTAneous Nightly    rosuvastatin  40 mg Oral Nightly     PRN Meds: lidocaine, acetaminophen, bisacodyl, sodium phosphate, albuterol, albuterol sulfate HFA, glucagon (rDNA), glucose, ondansetron **OR** ondansetron    No intake or output data in the 24 hours ending 02/05/24 0849    Exam:    BP (!) 103/58   Pulse 82   Temp 98.2 °F (36.8 °C) (Oral)   Resp 17   Ht 1.6 m (5' 3\")   Wt 73.5 kg (162 lb 2.2 oz)   SpO2 90%   BMI 28.72 kg/m²     General appearance: No apparent distress, appears stated age and cooperative.  HEENT: Pupils equal, round, and reactive to light. Conjunctivae/corneas clear.  Neck: Supple, with full range of motion. No jugular venous distention. Trachea midline.  Respiratory:   stable  VS per unit routine  Up with assist, fall precaution  Compression devices    Shortness of breath  History of asthma/COPD  Albuterol twice daily  Symbicort  Nebulizer treatments as needed  IVC filter placed on 2/2/2024 by Dr. Gill  SpO2 90% on room air  O2 therapy protocol  Smoking cessation - NicoDerm patch    HTN, HLD  Latest /58, HR 82  Denies chest pain, palpitation, dizziness, or headache  On metoprolol and Crestor  Continue PT/OT  VS per unit routine    DM type II  HbA1c 8.0 on 1/19/2024  Latest blood glucose 85 and 111  Accu-Cheks ACHS  On insulin sliding scale coverage  With hypoglycemia treatment protocol  Diabetic and dietary education  Snack at bedtime to prevent hypoglycemia in the morning  Adjust and add medication as needed    CKD stage III  Kidney function: Creatinine 1.24 and GFR 46.7 on 2/4/2024  Baseline kidney function: 1.0  Avoid nephrotoxic agents  CBC, BMP in a.m.    Anxiety and depression  emotional support has been provided  vitamin B12  encourage participation in rehabilitation support group and recreational therapy  Review and adjust/add medications.      Additional work up or/and treatment plan may be added today or then after based on clinical progression. I am managing a portion of pt care. Some medical issues are handled by other specialists. Additional work up and treatment should be done in out pt setting by pt PCP and other out pt providers.      Time spent evaluating and intervening patient, 38 minutes. Greater than 70% of time spent focused exclusively on this patient, reviewing chart, reconciling medications, and answering questions and discussing treatment plan.    Diet: ADULT DIET; Regular    Code Status: Full Code    PT/OT Eval           Electronically signed by BALTAZAR Mckeon CNP on 2/5/2024 at 8:49 AM

## 2024-02-05 NOTE — PROGRESS NOTES
maintain technique /pinch on rings with R hand     Estim- targeting RUE digit flexion/extension x 10 minutes. No adverse reactions noted. Pt with good tolerance and + reaction. Pt with good response. Demos some movement in all joints however remains weak. Guided movements completed during estim    Intensity: adjusted to setting 5-7 on NMES device  Duty duration:  On time 20 sec  Off time 10 sec  Frequency  40 Hz  Pulse width  200 uS    Education:  Education  Education Given To: Patient  Education Provided: ADL Function;Plan of Care;Safety;Role of Therapy  Education Method: Demonstration;Verbal  Education Outcome: Continued education needed    ASSESSMENT:  Activity Tolerance: Patient tolerated treatment well    Pt continues to demo significant coordination deficits in the R hand however transfers and fx mobility continue to improve     PLAN OF CARE:  Strengthening, ROM, Balance training, Functional mobility training, Endurance training, Neuromuscular re-education, Cognitive/Perceptual training, Self-Care / ADL, Coordination training, Patient/Caregiver education & training, Safety education & training  Con't OT POC until discharge    Patient goals : To be able to care for herself  Time Frame for Long Term Goals : Pt within 3 weeks of evaluation will demonstrate progress to treatment goals to increase functional indpeendence for return to home at [facility]  Pt will demonstrate progress to areas of deficit as stated below  Long Term Goal 1: Pt will increase independence with ADL self care  Long Term Goal 2: Pt will increase independence with ADL transfers  Long Term Goal 3: Pt will increase bilateral UE strength and function for adl completion    Therapy Time:   Individual Group Co-Treat   Time In 0830       Time Out 0930         Minutes 60             ADL/IADL training: 15 minutes  Neuromuscular reeducation: 30 minutes  Other (specify): 15 minutes estim    Electronically signed by:    Vicente Barrett OT,   2/5/2024, 9:24 AM

## 2024-02-05 NOTE — PLAN OF CARE
Problem: Safety - Adult  Goal: Free from fall injury  2/5/2024 1118 by Caryn Mcgowan, RN  Outcome: Progressing  2/5/2024 0031 by Janet Larsen, RN  Outcome: Progressing

## 2024-02-05 NOTE — PROGRESS NOTES
1: Pt will improve her receptive and expressive language abilities to a modified independent level for understanding and expression of complex wants, needs, feelings/ideas, and medical/safety information.  Goal 2: Pt will improve her Speech Intelligibility to modified independent for effective communication of wants, needs, feelings, ideas, and medical/safety information with familiar and unfamiliar listeners.  Long-term Goals  Timeframe for Long-term Goals: 2-3 weeks  Goal 1: Patient will tolerate least restrictive diet with no adverse outcomes.           From a cognitive standpoint they will need:        24 hr vs daily transitioning to supervision  -->progress to occasional             Significant problems/ barriers to functional progress include: Pt is at a high risk for functional loss,      [x]  Acute infection/pneumonia   []  Low BP's     []  COPD flare-up   []  Uncontrolled blood sugar     []  Progressive anemia     [x]  poor endurance    []  Severe pain     [x]  Impaired mental status    [x]  Urinary incontinence    [x]  Bowel incontinence           Plan to correct barriers to functional progress:   Add scheduled rest breaks, CoQ 10 and Vit B 12, control pain by using ice Lidoderm rest and massage as well as pain medications prior to therapy.  Spread therapy of 15 hours out over a 7 day window to accommodate rest breaks and medical interventions.  Patient seems to be making fair to good response to these interventions.    Patient's therapy will be on hold today and she will get an IVC filter.  When she resumes therapy we will again spread therapy out over a 7-day window to make accommodations for her complicated medical including her PE with high risk of bleeding and therefore IVC filter placement.  We have discussed the risks benefits of transferring her off the unit and for now we feel that there will be less disruption in her care by keeping her on the unit getting the IVC filter placed and getting right  back into the therapy program once cleared by interventional radiology.  Again patient will have a medical exemption for mist therapy over the next day for medical comorbidity of bilateral PE.         Based on a comprehensive evaluation of the above, the individualized therapy and Discharge plan will be:    -Times stated are an average that will be varied based on the patient's daily need.        PT   1 1/2  hrs/day 5-7 days per wk      OT   1 1/2 hrs per day 5-7 days per wk     ST  1/2    hrs /day 3-5 days per week       Estimated LOS   2-3 week(s)    -Overall functional prognosis:     [x]  Good    []  Fair    []  Poor     -Medical Prognosis:   [x]  Good    []  Fair    []  Poor    This patient was made aware of the discussion of Plan of Care, their projected dicharge date and their projected function at discharge.         Jayne Stacy, DO

## 2024-02-05 NOTE — PLAN OF CARE
Problem: Safety - Adult  Goal: Free from fall injury  2/5/2024 0031 by Janet Larsen, RN  Outcome: Progressing  2/4/2024 1251 by Becky Fenton, RN  Outcome: Progressing

## 2024-02-06 PROBLEM — K86.2 PANCREATIC CYST: Status: ACTIVE | Noted: 2024-02-06

## 2024-02-06 LAB
CANCER AG19-9 SERPL IA-ACNC: 309 U/ML (ref 0–35)
CARCINOEMBRYONIC ANTIGEN: 4 NG/ML
GLUCOSE BLD-MCNC: 101 MG/DL (ref 70–99)
GLUCOSE BLD-MCNC: 146 MG/DL (ref 70–99)
GLUCOSE BLD-MCNC: 177 MG/DL (ref 70–99)
GLUCOSE BLD-MCNC: 190 MG/DL (ref 70–99)
PERFORMED ON: ABNORMAL

## 2024-02-06 PROCEDURE — 97110 THERAPEUTIC EXERCISES: CPT

## 2024-02-06 PROCEDURE — 82378 CARCINOEMBRYONIC ANTIGEN: CPT

## 2024-02-06 PROCEDURE — 94761 N-INVAS EAR/PLS OXIMETRY MLT: CPT

## 2024-02-06 PROCEDURE — 6370000000 HC RX 637 (ALT 250 FOR IP): Performed by: FAMILY MEDICINE

## 2024-02-06 PROCEDURE — 6370000000 HC RX 637 (ALT 250 FOR IP): Performed by: PHYSICAL MEDICINE & REHABILITATION

## 2024-02-06 PROCEDURE — 86301 IMMUNOASSAY TUMOR CA 19-9: CPT

## 2024-02-06 PROCEDURE — 97530 THERAPEUTIC ACTIVITIES: CPT

## 2024-02-06 PROCEDURE — 97535 SELF CARE MNGMENT TRAINING: CPT

## 2024-02-06 PROCEDURE — 99232 SBSQ HOSP IP/OBS MODERATE 35: CPT | Performed by: INTERNAL MEDICINE

## 2024-02-06 PROCEDURE — 94640 AIRWAY INHALATION TREATMENT: CPT

## 2024-02-06 PROCEDURE — 6370000000 HC RX 637 (ALT 250 FOR IP): Performed by: NURSE PRACTITIONER

## 2024-02-06 PROCEDURE — 97116 GAIT TRAINING THERAPY: CPT

## 2024-02-06 PROCEDURE — 2700000000 HC OXYGEN THERAPY PER DAY

## 2024-02-06 PROCEDURE — 99232 SBSQ HOSP IP/OBS MODERATE 35: CPT | Performed by: NURSE PRACTITIONER

## 2024-02-06 PROCEDURE — 99232 SBSQ HOSP IP/OBS MODERATE 35: CPT | Performed by: PHYSICAL MEDICINE & REHABILITATION

## 2024-02-06 PROCEDURE — 1180000000 HC REHAB R&B

## 2024-02-06 PROCEDURE — 92507 TX SP LANG VOICE COMM INDIV: CPT

## 2024-02-06 PROCEDURE — 92526 ORAL FUNCTION THERAPY: CPT

## 2024-02-06 RX ADMIN — ESCITALOPRAM OXALATE 15 MG: 10 TABLET ORAL at 07:45

## 2024-02-06 RX ADMIN — Medication 1 DROP: at 07:52

## 2024-02-06 RX ADMIN — INSULIN GLARGINE 10 UNITS: 100 INJECTION, SOLUTION SUBCUTANEOUS at 21:14

## 2024-02-06 RX ADMIN — BUDESONIDE AND FORMOTEROL FUMARATE DIHYDRATE 2 PUFF: 80; 4.5 AEROSOL RESPIRATORY (INHALATION) at 04:25

## 2024-02-06 RX ADMIN — INSULIN LISPRO 5 UNITS: 100 INJECTION, SOLUTION INTRAVENOUS; SUBCUTANEOUS at 12:14

## 2024-02-06 RX ADMIN — ALBUTEROL SULFATE 2 PUFF: 90 AEROSOL, METERED RESPIRATORY (INHALATION) at 04:25

## 2024-02-06 RX ADMIN — METOPROLOL TARTRATE 25 MG: 25 TABLET, FILM COATED ORAL at 21:14

## 2024-02-06 RX ADMIN — Medication 1 DROP: at 21:14

## 2024-02-06 RX ADMIN — Medication 2000 UNITS: at 17:14

## 2024-02-06 RX ADMIN — Medication 5 MG: at 21:14

## 2024-02-06 RX ADMIN — ROSUVASTATIN CALCIUM 40 MG: 40 TABLET, FILM COATED ORAL at 21:14

## 2024-02-06 RX ADMIN — Medication 100 MG: at 07:45

## 2024-02-06 RX ADMIN — INSULIN LISPRO 5 UNITS: 100 INJECTION, SOLUTION INTRAVENOUS; SUBCUTANEOUS at 17:14

## 2024-02-06 RX ADMIN — METOPROLOL TARTRATE 25 MG: 25 TABLET, FILM COATED ORAL at 07:45

## 2024-02-06 RX ADMIN — ALBUTEROL SULFATE 2 PUFF: 90 AEROSOL, METERED RESPIRATORY (INHALATION) at 16:35

## 2024-02-06 RX ADMIN — BUDESONIDE AND FORMOTEROL FUMARATE DIHYDRATE 2 PUFF: 80; 4.5 AEROSOL RESPIRATORY (INHALATION) at 16:36

## 2024-02-06 ASSESSMENT — PAIN SCALES - GENERAL
PAINLEVEL_OUTOF10: 0
PAINLEVEL_OUTOF10: 0

## 2024-02-06 NOTE — PROGRESS NOTES
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      Assessment/Plan:      [] Patient would like to have music therapy, just not today. [facility] will try to see pt again, if time allows.      [] Patient would like to have music therapy another time, however their D/C is before [facility] will be back on unit.        *If patient is still on rehab unit, or comes back to rehab unit, [facility] will attempt to see patient then if time allows.      [x] Patient does not want music therapy. [facility] will not attempt to see pt again unless pt specifically requests.       GRISELDA Ibrahim    2/6/2024  Electronically signed by Pauline Amaya on 2/6/2024 at 10:48 AM

## 2024-02-06 NOTE — PLAN OF CARE
Nutrition Problem #1: Altered nutrition-related lab values  Intervention: Food and/or Nutrient Delivery:  (Recommend Carb Control 3)

## 2024-02-06 NOTE — PLAN OF CARE
Problem: Safety - Adult  Goal: Free from fall injury  2/6/2024 1121 by Caryn Mcgowan, RN  Outcome: Progressing  2/5/2024 2138 by Tiffanie Morrow, RN  Outcome: Progressing     Problem: Skin/Tissue Integrity  Goal: Absence of new skin breakdown  Description: 1.  Monitor for areas of redness and/or skin breakdown  2.  Assess vascular access sites hourly  3.  Every 4-6 hours minimum:  Change oxygen saturation probe site  4.  Every 4-6 hours:  If on nasal continuous positive airway pressure, respiratory therapy assess nares and determine need for appliance change or resting period.  2/6/2024 1121 by Caryn Mcgowan, RN  Outcome: Progressing  2/5/2024 2138 by Tiffanie Morrow, RN  Outcome: Progressing

## 2024-02-06 NOTE — CARE COORDINATION
STEVENW spoke with Shaq (spouse) and scheduled family training for Thursday 2/8 at 10AM. Electronically signed by ELEAZAR Friend, KASSANDRA on 2/6/2024 at 2:53 PM

## 2024-02-06 NOTE — PROGRESS NOTES
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    Hospital course:      Restrictions  Restrictions/Precautions  Restrictions/Precautions: Fall Risk, Seizure                 Patient's date of birth confirmed: Yes    SAFETY:  Safety Devices  Safety Devices in place: Yes  Type of devices: All fall risk precautions in place    SUBJECTIVE: \"I am ready to go.\"       Pain at start of treatment: No    Pain at end of treatment: No    COGNITION:  Orientation  Overall Orientation Status: Within Functional Limits  Orientation Level: Oriented X4  Cognition  Overall Cognitive Status: Exceptions  Arousal/Alertness: Appropriate responses to stimuli  Following Commands: Follows one step commands consistently  Attention Span: Appears intact  Memory: Appears intact  Safety Judgement: Decreased awareness of need for safety  Problem Solving: Assistance required to generate solutions;Assistance required to implement solutions  Insights: Fully aware of deficits  Initiation: Requires cues for some  Sequencing: Requires cues for some    OBJECTIVE: Pt scheduled for an ADL shower this morning. Pt declined, however, requested to put on new clothes and complete grooming at bathroom sink.      Grooming/Oral Hygiene  Assistance Level: Moderate assistance  Skilled Clinical Factors: pt place toothpaste onto counter and use RUE to hold it down. LUE to stabilize toothbrush. assist to push onto toothpaste bottle. set up for hair care. pt stabilize denture with RUE while scrubbing it with the LUE.  Upper Extremity Dressing  Assistance Level: Minimal assistance  Skilled Clinical Factors: pt sitting EOB to complete dressing tasks. pt

## 2024-02-06 NOTE — PROGRESS NOTES
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  Given pictures, pt independently generated sentences with 70% accuracy increasing to 88% accuracy given min cueing. Pt was recalled and implemented phrasing strategy during speech production.    Goal 2: To decrease the presence of apraxia of speech and improve articulatory accuracy to adequately express wants and needs, the pt will complete structured articulation tasks following SLP model with min cues-fading cues for articulatory placement in 90% of opportunities with 1-3 syllables words at the phrase-sentence level.  Pt read aloud 3 syllable word phrases independently with 56% accuracy increasing to 81% accuracy given min-mod cueing. Pt implemented phrasing strategy during speech production. Pt's demonstrated difficulties with the following phonemes (I.e. /p/, /b/, /l/, /k/, /t/). However, frequently self-corrected. Pt benefits from repetition of sentences to increase overall speech intelligibility.   Goal 3: Pt will answer questions

## 2024-02-06 NOTE — PLAN OF CARE
Problem: Safety - Adult  Goal: Free from fall injury  2/5/2024 2138 by Tiffanie Morrow, RN  Outcome: Progressing  2/5/2024 1118 by Caryn Mcgowan, RN  Outcome: Progressing     Problem: Discharge Planning  Goal: Discharge to home or other facility with appropriate resources  Outcome: Progressing     Problem: Skin/Tissue Integrity  Goal: Absence of new skin breakdown  Description: 1.  Monitor for areas of redness and/or skin breakdown  2.  Assess vascular access sites hourly  3.  Every 4-6 hours minimum:  Change oxygen saturation probe site  4.  Every 4-6 hours:  If on nasal continuous positive airway pressure, respiratory therapy assess nares and determine need for appliance change or resting period.  Outcome: Progressing     Problem: Neurosensory - Adult  Goal: Achieves stable or improved neurological status  Outcome: Progressing  Goal: Achieves maximal functionality and self care  Outcome: Progressing     Problem: Skin/Tissue Integrity - Adult  Goal: Skin integrity remains intact  Outcome: Progressing     Problem: Musculoskeletal - Adult  Goal: Return mobility to safest level of function  Outcome: Progressing  Goal: Maintain proper alignment of affected body part  Outcome: Progressing  Goal: Return ADL status to a safe level of function  Outcome: Progressing     Problem: Gastrointestinal - Adult  Goal: Maintains or returns to baseline bowel function  Outcome: Progressing  Goal: Maintains adequate nutritional intake  Outcome: Progressing     Problem: Metabolic/Fluid and Electrolytes - Adult  Goal: Electrolytes maintained within normal limits  Outcome: Progressing  Goal: Glucose maintained within prescribed range  Outcome: Progressing     Problem: Nutrition Deficit:  Goal: Optimize nutritional status  Outcome: Progressing     Problem: Chronic Conditions and Co-morbidities  Goal: Patient's chronic conditions and co-morbidity symptoms are monitored and maintained or improved  Outcome: Progressing     Problem:  Pain  Goal: Verbalizes/displays adequate comfort level or baseline comfort level  Outcome: Progressing

## 2024-02-06 NOTE — PROGRESS NOTES
Physical Therapy Rehab Treatment Note  [place] [facility]  Room: [place] [address]       NAME: Mulu Pandya  : 1955 (68 y.o.)  MRN: [id number]  CODE STATUS: Full Code    Date of Service: 2024     Restrictions:  Restrictions/Precautions: Fall Risk, Seizure    SUBJECTIVE:   Subjective: Pt states she is doing ok.  No new reports.    Pain  Pain: Denies pre and post session pain.    OBJECTIVE:         Bed mobility  Supine to Sit: Supervision    Transfers  Sit to Stand: Supervision  Stand to Sit: Stand by assistance  Bed to Chair: Stand by assistance  Comment: Vcs for safe approach to chair.    Ambulation  Surface: Level tile;Uneven;Carpet  Device: Rolling Walker  Other Apparatus: O2 (1L)  Assistance: Stand by assistance  Gait Deviations: Slow Vianey  Distance: 150ft    Stairs/Curb  Stairs?: Yes  Curbs: 4\"  Assistance: Contact guard assistance  Comment: VCs for technique.        PT Exercises  Side stepping EOB without UE support; STS x6; standing march at WW x15; standing without UE support 45sec.       ASSESSMENT/PROGRESS TOWARDS GOALS:   Assessment: Pt cont to have improved participation and is progressing towards goals.  Requires occasional cues for safety with tranasfers ad stair training.   present and observed mobility.  Prgressed gait drills to without UE support.  Pt reports increased back pain with retro gait drill.    Goals:  Long Term Goals  Long Term Goal 1: Pt to complete bed mobility with indep  Long Term Goal 2: Pt to complete transfers with supervision  Long Term Goal 3: Pt to ambulate 150ft with LRD and Supervision  Long Term Goal 4: Pt to complete 3 steps with HR and SBA    PLAN OF CARE/Safety: Cont per POC.       Therapy Time:   Individual   Time In 1300   Time Out 1330   Minutes 30      Minutes:  Transfer/Bed mobility trainin  Gait training:15  Neuro re education:2  Therapeutic ex:8    Melissa Swan PTA, 24 at 1:33 PM

## 2024-02-06 NOTE — PROGRESS NOTES
Gastroenterology Progress Note    Mulu Pandya is a 68 y.o. female patient.  Hospitalization Day:14    Chief C/O: abnormal CT scan    SUBJECTIVE: Seen and examined, tolerating diet, no abdominal pain, no diarrhea, participating in therapy.     ROS:  Gastrointestinal ROS: no abdominal pain, change in bowel habits, or black or bloody stools    Physical    VITALS:  /71   Pulse 68   Temp 97.7 °F (36.5 °C)   Resp 16   Ht 1.6 m (5' 3\")   Wt 73.5 kg (162 lb 2.2 oz)   SpO2 93%   BMI 28.72 kg/m²   TEMPERATURE:  Current - Temp: 97.7 °F (36.5 °C); Max - Temp  Av.5 °F (36.4 °C)  Min: 97.3 °F (36.3 °C)  Max: 97.7 °F (36.5 °C)    General:  Alert and oriented,  No apparent distress  Skin- without jaundice  Eyes: anicteric sclera  Cardiac: RRR, Nl s1s2, without murmurs  Lungs CTA Bilaterally, normal effort  Abdomen soft, ND, NT, no HSM, Bowel sounds normal  Ext: without edema  Neuro: no asterixis     Data    Data Review:    No results for input(s): \"WBC\", \"HGB\", \"HCT\", \"MCV\", \"PLT\" in the last 72 hours.  Recent Labs     24  1427   CREATININE 1.24*     No results for input(s): \"AST\", \"ALT\", \"ALB\", \"BILIDIR\", \"BILITOT\", \"ALKPHOS\" in the last 72 hours.  No results for input(s): \"LIPASE\", \"AMYLASE\" in the last 72 hours.  No results for input(s): \"PROTIME\", \"INR\" in the last 72 hours.    Radiology Review:  CT abd pelvis    ASSESSMENT:  69 y/o female in rehab for PT/OT after recent stroke with hemorrhagic effect, during her hospital course patient developed shortness of breath & tachycardia and had CT of the chest notable for bilateral PEs with incidental finding of  large low-density pancreatic body mass measuring 9.8 x 6.4 cm possibly a pseudocyst, unable to be fully characterized with recommendations for CT of the abdomen and pelvis with IV contrast.  Patient has no history of chronic pancreatitis, reports daily alcohol most of her adult life, none as of recent.  She has no abdominal pain, no nausea or  vomiting, no diarrhea, no unintentional weight loss.  Most recent liver enzymes are normal.   2/6/24 tolerating diet, no abdominal pain, no diarrhea, participating in therapy. CT abd pelvis with uncomplicated cystic lesion in the tail of the pancreas measuring 6 x 6 x 10 cm, no prior imaging for comparison, no history of chronic pancreatitis, no abdominal pain, diarrhea, changes to appetite, or unintentional weight loss.     PLAN :  -obtain tumor markers  -further recommendations pending, will need EUS versus further follow-up imaging, timing dependent on clinical course    Thank you for allowing me to participate in the care of your patient.  Please feel free to contact me with any concerns.    Lala Garcia, APRN - CNP

## 2024-02-06 NOTE — PROGRESS NOTES
Physical Therapy Rehab Treatment Note  [facility]  [facility]       NAME: Mulu Pandya  : 1955 (68 y.o.)  MRN: [id number]  CODE STATUS: Full Code    Date of Service: 2024     Restrictions:  Restrictions/Precautions: Fall Risk, Seizure     SUBJECTIVE:   Subjective: Pt states she is doing ok.  No new reports.  Pain  Pain: Denies pre and post session pain.    OBJECTIVE:            Transfers  Sit to Stand: Supervision  Stand to Sit: Supervision (VCs to reach back for chair with stand to sit.)  Car Transfer: Stand by assistance  Comment: Vcs for safe approach to chair.    Ambulation  Surface: Level tile;Uneven;Carpet  Device: Rolling Walker  Other Apparatus: O2 (1L)  Assistance: Stand by assistance  Gait Deviations: Slow Vianey  Distance: 150ft  Comments: SPO2 98% HR 68BPM ay rest on 1L O2.  SPO2 96% HR 77BPM after gait.    Stairs  # Steps : 4  Stairs Height: 6\"  Rails: Left ascending (B grasp)  Assistance: Contact guard assistance  Comment: Instructed  on gaurding techniques on stairs.        PT Exercises  Dynamic Standing Balance Exercises: Gait drills with L UE on kristen rail support F/R/L SBA.  Without UE support Fwd.  VCs for posture and to increase step length.        Education Provided: Family Education  Education  Education Given To: Patient;Family  Education Provided: Family Education  Education Provided Comments: Pt's  observed and educated on assisting pt with transfers, gait with WW, and stairs.      ASSESSMENT/PROGRESS TOWARDS GOALS:   Assessment: Pt cont to have improved participation and is progressing towards goals.  Requires occasional cues for safety with tranasfers ad stair training.   present and observed mobility.  Prgressed gait drills to without UE support.  Pt reports increased back pain with retro gait drill.    Goals:  Long Term Goals  Long Term Goal 1: Pt to complete bed mobility with indep  Long Term Goal 2: Pt to complete transfers

## 2024-02-06 NOTE — FLOWSHEET NOTE
Assessment completed. AXOX4. Medication given. Patient denies pain or SOB at this time. LBM 2/6. Incontinent of bladder and continent of bowel. She has a rash on buttocks zinc is being used. Patient in bed, call light within reach an bed alarm.

## 2024-02-06 NOTE — PROGRESS NOTES
medical status reassessed regarding patient’s ability to participate in therapies and patient found to be able to participate in acute intensive comprehensive inpatient rehabilitation program including PT/OT to improve balance, ambulation, ADL’s, and to improve the P/AROM.  Therapeutic modifications regarding activities in therapies, place, amount of time per day and intensity of therapy made daily.  In bed therapies or bedside therapies prn.   Bowel occasional diarrhea and Bladder dysfunction, Neurogenic bowel and bladder:  frequent toileting, ambulate to bathroom with assistance, check post void residuals.  Check for C.difficile x1 if >2 loose stools in 24 hours, continue bowel & bladder program.  Monitor bowel and bladder function.  Lactinex 2 PO every AC.  MOM prn, Brown Bomb prn, Glycerin suppository prn, enema prn.  Encourage therapy and nursing to co-treat and problem solve re continence.  IV fluids as needed  Mild to moderate back pain,  , as well as generalized OA pain: reassess pain every shift and prior to and after each therapy session, give prn Tylenol and consider scheduled Tylenol, modalities prn in therapy, masage, Lidoderm, K-pad prn. Consider scheduled AM pain meds.  Skin healing   and breakdown risk:  continue pressure relief program.  Daily skin exams and reports from nursing.  Fatigue due to nutritional and hydration deficiency:   titrate vitamin B12 vitamin D and CoQ10 continue to monitor I&O’s, calorie counts prn, dietary consult prn.    healthy snack at night.  IV fluids as needed  Acute episodic insomnia with situational adjustment disorder:  prn Ambien, monitor for day time sedation.  Falls risk elevated:  patient to use call light to get nursing assistance to get up, bed and chair alarm.  Elevated DVT risk: progressive activities in PT, continue prophylaxis ARAMIS hose, elevation and meds-see MAR.   Oral pharyngeal dysphagia-up in a degrees of feeds-modified diet as needed.Discussed with  speech and language pathology.  Bedside swallow reviewed patient okay to be upgraded to regular with thins with strict monitoring up at 90 degrees no straws.  Complex discharge planning: Discharge 2/10/2024 home with her  and home health care.  Until then continue weekly team meeting every Thursday to re-assess progress towards goals, discuss and address social, psychological and medical comorbidities and to address difficulties they may be having progressing in therapy.  Patient and family education is in progress.  The patient is to follow-up with their family physician after discharge.        Complex Active General Medical Issues that complicate care Assess & Plan:     Type 2 diabetes mellitus with chronic kidney disease-Continue blood sugar checks every shift, diet, add diabetic add dietary education, restrict carbohydrates to lowest effective and safe carb count per meal advising 4 carbs per meal, add at bedtime snack to prevent a.m. hypoglycemia, adjust/add medications.  Chronic insomnia, depression, anxiety-emotional support provided daily, vitamin B12, encourage participation in rehabilitation support group and recreational therapy, adjust/add medications.  Acute exacerbation of COPD (chronic obstructive pulmonary disease) with acute bronchitis-rule out pneumonia -acute rehab for endurance traing with Pulse Ox to monitoring oxygen saturation and heart rate with O2 titration to lowest effective dose. Pulse oximeter checks to shift and at HS to dose and titrate oxygen and aerosol treatments monitor for nocturnal hypoxemia, monitor vital signs, oxygen prn.  Focus on energy conservation-IV Rocephin- SP  IV Zithromax  SP  pulmonology   Sinus Tach,  Mixed hyperlipidemia,  Essential hypertension-Acute rehab to monitor heart rate and rhythm with the option of telemetry and the effects of chronotropic medication with respect to increasing physical activity and exercise in PT, OT, ADLs with medication titration

## 2024-02-06 NOTE — PROGRESS NOTES
temporoparietal lobe. 5. Stable atrophy and chronic changes again seen within the brain.     CTA CHEST W WO CONTRAST    Addendum Date: 1/31/2024    ADDENDUM: I discussed the findings with Dr. Sanchez at 8:40 p.m. on 01/31/2024.     Result Date: 1/31/2024  EXAMINATION: CTA OF THE CHEST WITH AND WITHOUT CONTRAST 1/31/2024 3:40 pm TECHNIQUE: CTA of the chest was performed before and after the administration of intravenous contrast.  Multiplanar reformatted images are provided for review.  MIP images are provided for review. Automated exposure control, iterative reconstruction, and/or weight based adjustment of the mA/kV was utilized to reduce the radiation dose to as low as reasonably achievable. COMPARISON: Low-dose CT of the chest from 11/25/2022. HISTORY: ORDERING SYSTEM PROVIDED HISTORY: Shortness of breath, tachycardia, hypoxia, unable to receive DVT prophylaxis TECHNOLOGIST PROVIDED HISTORY: Reason for exam:->Shortness of breath, tachycardia, hypoxia, unable to receive DVT prophylaxis Additional Contrast?->1 What reading provider will be dictating this exam?->CRC FINDINGS: Pulmonary arteries: Mild amount of thrombus seen in the distal right lower lobe pulmonary artery.  A small amount of thrombus is seen in the subsegmental arteries of the posterior basilar segment in the left lower lobe. Clot burden is low.  The RV/LV ratio is 1.03, just above top normal.  There is no sign of flattening of the interventricular septum. Aorta: No evidence of thoracic aortic aneurysm or dissection.  No acute abnormality of the aorta. Mediastinum: No evidence of mediastinal lymphadenopathy. There is mild triple-vessel coronary calcification.  The heart and pericardium demonstrate no acute abnormality. Lungs/Pleura: The lungs are without acute process.  No focal consolidation or pulmonary edema.  No evidence of pleural effusion or pneumothorax. Upper Abdomen: There is a new large low-density region extending inferiorly from the

## 2024-02-06 NOTE — PROGRESS NOTES
Comprehensive Nutrition Assessment    Type and Reason for Visit:  Reassess    Nutrition Recommendations/Plan:   Recommend Carb Control 3      Malnutrition Assessment:  Malnutrition Status:  No malnutrition (01/24/24 1608)      Nutrition Assessment:    Pt continues with fair to good po intake, dislikes hospital food at times.  Pt ate > 75% of her lunch generally and generally eating > 50% of her meals.  Hyperglycemia noted, recommend add carb control 3 to diet order.      Nutrition Related Findings:    PMH-anxiety, COPD, CVA, type II DM, HTN, asthma, PUD, HLD, CKD3; admitted with CVA. BSE 1/24-regular consistancy. Labs/meds reviewed. Gluc- x last 3 days, Meds includ insulin, prednisone D/C'd 2/4. Last BM noted 2/6. Mildly thick liquids D/C'd 2/3 Wound Type: None       Current Nutrition Intake & Therapies:    Average Meal Intake: 51-75%  Average Supplements Intake: None Ordered  ADULT DIET; Regular    Anthropometric Measures:  Height: 160 cm (5' 3\")  Ideal Body Weight (IBW): 115 lbs (52 kg)    Admission Body Weight: 70.8 kg (156 lb) (stated)  Current Body Weight: 73.5 kg (162 lb) (2/4),   IBW. Weight Source: Bed Scale  Current BMI (kg/m2): 28.7  Usual Body Weight: 69.9 kg (154 lb) (2/2023)                       BMI Categories: Overweight (BMI 25.0-29.9)    Estimated Daily Nutrient Needs:  Energy Requirements Based On: Kcal/kg  Weight Used for Energy Requirements: Admission  Energy (kcal/day): 2984-8853 (kg x 22-23)  Weight Used for Protein Requirements: Ideal  Protein (g/day): 62 (kg x 1.2)  Method Used for Fluid Requirements: 1 ml/kcal  Fluid (ml/day): ~1500    Nutrition Diagnosis:   Altered nutrition-related lab values related to endocrine dysfuntion as evidenced by lab values    Nutrition Interventions:   Food and/or Nutrient Delivery:  (Recommend Carb Control 3)  Nutrition Education/Counseling: No recommendation at this time  Coordination of Nutrition Care: Continue to monitor while inpatient

## 2024-02-06 NOTE — PROGRESS NOTES
L hand with good ability. Pt demonstrated ability to remove all rings from the fixture with increased time to complete. Pt observed to use L hand to position to make it easier to grab them with her R hand.    Pt was tasked to hold large beads with R hand and thread a string into them with her L hand. Pt demonstrated mod to max difficulty holding onto and threading beads. Verbal and tactile cues provided to assist pt. With assist pt able to thread 2 beads. Pt verbalized frustration with difficulty of activity. Activity changed to putty.    FM Coordination and Strengthening:  Patient engaged in a FM coordination and strengthening to increase I with fasteners and small objects for ADL's and IADL's.    Pt was tasked to manipulate putty to provide stimulation to muscles in hand to facilitate movement and increase strength.  Patient able to squeeze putty in one hand then transfer to the other and squeeze. Pt repeated this cycle several times.  Patient able to roll theraputty into log shape with min difficulty and increased time to complete.  Patient able to roll theraputty into ball shape with good ability.     Toileting  Assistance Level: Minimal assistance  Skilled Clinical Factors: Assist to drop/pull up pants on R side.  Toilet Transfers  Technique: Stand pivot  Equipment: Standard toilet;Grab bars  Additional Factors: Increased time to complete;With handrails  Assistance Level: Stand by assist      Education:  Education  Education Given To: Patient  Education Provided: Plan of Care;Role of Therapy  Education Method: Verbal  Barriers to Learning: Cognition  Education Outcome: Continued education needed      ASSESSMENT:  Assessment: Pt demonstrated good participation and effort.  Activity Tolerance: Patient tolerated treatment well      PLAN OF CARE:  Strengthening, ROM, Balance training, Functional mobility training, Endurance training, Neuromuscular re-education, Cognitive/Perceptual training, Self-Care / ADL,

## 2024-02-06 NOTE — PROGRESS NOTES
[facility]  Occupational Therapy      NAME:  Mulu Pandya  ROOM: R243/R243-01  :  1955  DATE: 2024    Attempted to see Mulu Pandya for   []  Initial Evaluation   []  Scheduled therapy    []  Make-up therapy   []  Group therapy   [x]  Get up and go    Patient was unable to be seen due to:   [] Off unit for testing/procedure   [] Patient refused, stating \"    [] Therapy on hold due to     [] Nursing deferred due to    [x] Other:  Pt resting in bed- pt declines to get into w/c or go to bathroom. Pt reports she had just went to the bathroom with nursing     Electronically signed by Vicente Barrett OT on 24 at 8:23 AM EST

## 2024-02-07 LAB
ECHO BSA: 1.77 M2
GLUCOSE BLD-MCNC: 106 MG/DL (ref 70–99)
GLUCOSE BLD-MCNC: 147 MG/DL (ref 70–99)
GLUCOSE BLD-MCNC: 151 MG/DL (ref 70–99)
GLUCOSE BLD-MCNC: 225 MG/DL (ref 70–99)
PERFORMED ON: ABNORMAL

## 2024-02-07 PROCEDURE — 6370000000 HC RX 637 (ALT 250 FOR IP): Performed by: NURSE PRACTITIONER

## 2024-02-07 PROCEDURE — 6370000000 HC RX 637 (ALT 250 FOR IP): Performed by: PHYSICAL MEDICINE & REHABILITATION

## 2024-02-07 PROCEDURE — 6370000000 HC RX 637 (ALT 250 FOR IP): Performed by: FAMILY MEDICINE

## 2024-02-07 PROCEDURE — 92507 TX SP LANG VOICE COMM INDIV: CPT

## 2024-02-07 PROCEDURE — 97535 SELF CARE MNGMENT TRAINING: CPT

## 2024-02-07 PROCEDURE — 97032 APPL MODALITY 1+ESTIM EA 15: CPT

## 2024-02-07 PROCEDURE — 97112 NEUROMUSCULAR REEDUCATION: CPT

## 2024-02-07 PROCEDURE — 99231 SBSQ HOSP IP/OBS SF/LOW 25: CPT | Performed by: NURSE PRACTITIONER

## 2024-02-07 PROCEDURE — 92526 ORAL FUNCTION THERAPY: CPT

## 2024-02-07 PROCEDURE — 1180000000 HC REHAB R&B

## 2024-02-07 PROCEDURE — 6360000002 HC RX W HCPCS: Performed by: PHYSICAL MEDICINE & REHABILITATION

## 2024-02-07 PROCEDURE — 94640 AIRWAY INHALATION TREATMENT: CPT

## 2024-02-07 PROCEDURE — 2700000000 HC OXYGEN THERAPY PER DAY

## 2024-02-07 PROCEDURE — 94761 N-INVAS EAR/PLS OXIMETRY MLT: CPT

## 2024-02-07 PROCEDURE — 6370000000 HC RX 637 (ALT 250 FOR IP)

## 2024-02-07 PROCEDURE — 97116 GAIT TRAINING THERAPY: CPT

## 2024-02-07 PROCEDURE — 2500000003 HC RX 250 WO HCPCS

## 2024-02-07 PROCEDURE — 99232 SBSQ HOSP IP/OBS MODERATE 35: CPT | Performed by: INTERNAL MEDICINE

## 2024-02-07 RX ORDER — DIPHENHYDRAMINE HCL 25 MG
50 TABLET ORAL NIGHTLY PRN
Status: DISCONTINUED | OUTPATIENT
Start: 2024-02-07 | End: 2024-02-09

## 2024-02-07 RX ORDER — BENZOCAINE/MENTHOL 6 MG-10 MG
LOZENGE MUCOUS MEMBRANE 3 TIMES DAILY
Status: DISCONTINUED | OUTPATIENT
Start: 2024-02-07 | End: 2024-02-09

## 2024-02-07 RX ADMIN — INSULIN LISPRO 5 UNITS: 100 INJECTION, SOLUTION INTRAVENOUS; SUBCUTANEOUS at 12:41

## 2024-02-07 RX ADMIN — HYDROCORTISONE: 1 CREAM TOPICAL at 20:34

## 2024-02-07 RX ADMIN — Medication 1 DROP: at 20:50

## 2024-02-07 RX ADMIN — ALBUTEROL SULFATE 2 PUFF: 90 AEROSOL, METERED RESPIRATORY (INHALATION) at 16:13

## 2024-02-07 RX ADMIN — Medication 5 MG: at 20:44

## 2024-02-07 RX ADMIN — METOPROLOL TARTRATE 25 MG: 25 TABLET, FILM COATED ORAL at 20:44

## 2024-02-07 RX ADMIN — CYANOCOBALAMIN 1000 MCG: 1000 INJECTION, SOLUTION INTRAMUSCULAR; SUBCUTANEOUS at 09:03

## 2024-02-07 RX ADMIN — INSULIN LISPRO 5 UNITS: 100 INJECTION, SOLUTION INTRAVENOUS; SUBCUTANEOUS at 09:02

## 2024-02-07 RX ADMIN — ESCITALOPRAM OXALATE 15 MG: 10 TABLET ORAL at 09:03

## 2024-02-07 RX ADMIN — INSULIN GLARGINE 10 UNITS: 100 INJECTION, SOLUTION SUBCUTANEOUS at 20:44

## 2024-02-07 RX ADMIN — DIPHENHYDRAMINE HCL 50 MG: 25 TABLET ORAL at 21:56

## 2024-02-07 RX ADMIN — BUDESONIDE AND FORMOTEROL FUMARATE DIHYDRATE 2 PUFF: 80; 4.5 AEROSOL RESPIRATORY (INHALATION) at 16:13

## 2024-02-07 RX ADMIN — Medication 100 MG: at 09:03

## 2024-02-07 RX ADMIN — ROSUVASTATIN CALCIUM 40 MG: 40 TABLET, FILM COATED ORAL at 20:44

## 2024-02-07 RX ADMIN — MICONAZOLE NITRATE: 2 POWDER TOPICAL at 18:10

## 2024-02-07 RX ADMIN — Medication 2000 UNITS: at 17:27

## 2024-02-07 RX ADMIN — HYDROCORTISONE: 1 CREAM TOPICAL at 14:03

## 2024-02-07 ASSESSMENT — ENCOUNTER SYMPTOMS
WHEEZING: 0
NAUSEA: 0
COLOR CHANGE: 0
COUGH: 0
CHEST TIGHTNESS: 0
SHORTNESS OF BREATH: 0
TROUBLE SWALLOWING: 0
VOMITING: 0

## 2024-02-07 ASSESSMENT — PAIN SCALES - GENERAL: PAINLEVEL_OUTOF10: 0

## 2024-02-07 NOTE — CARE COORDINATION
complete leg ADL care seated (02/07/24 1026)  Upper Extremity Dressing  Assistance Level: Moderate assistance (02/07/24 1026)  Skilled Clinical Factors: pt sitting EOB to complete dressing tasks. pt using BUE throughout the task, assist to pull down shirt around stomach (02/06/24 0836)  Lower Extremity Dressing  Assistance Level: Moderate assistance (02/07/24 1026)  Skilled Clinical Factors: assist to thread over R food and pull pants up over B hips. (02/07/24 1026)  Putting On/Taking Off Footwear  Assistance Level: Stand by assist (02/07/24 1026)  Skilled Clinical Factors: hospital socks, pt sit EOB with increase time to don clean socks. (02/06/24 0836)  Toileting  Assistance Level: Minimal assistance (02/06/24 1451)  Skilled Clinical Factors: none (02/07/24 1026)  Toilet Transfers  Technique: Stand pivot (02/06/24 1451)  Equipment: Standard toilet;Grab bars (02/06/24 1451)  Additional Factors: Increased time to complete;With handrails (02/06/24 1451)  Assistance Level: Stand by assist (02/06/24 1451)  Skilled Clinical Factors: none (02/07/24 1026)  Tub/Shower Transfers  Type: Shower (02/07/24 1026)  Transfer From: Rolling walker (02/07/24 1026)  Transfer To: Shower chair with back (02/07/24 1026)  Additional Factors: Verbal cues (02/07/24 1026)  Assistance Level: Minimal assistance (02/07/24 1026)  Skilled Clinical Factors: min A to s/u of walker to transition to shower grab bar, CGA for transfer (02/07/24 1026)        LTG:  Eating:Discharge Goal: Independent  Oral Hygiene:Discharge Goal: Independent  Shower/Bathe self: Discharge Goal: Supervision or touching assistance  Upper body dressing:Discharge Goal: Independent  Lower body dressing:Discharge Goal: Independent  Putting on taking off footwear:Discharge Goal: Independent   Toileting: Discharge Goal: Independent  Toilet transfer:Discharge Goal: Independent  OT Treatment Time: 2.0 hrs      SPEECH THERAPY       Comprehension: Within Functional Limits  Verbal  Expression: Exceptions to functional limits (min assist-stand by cues)      Diet/Swallow:        Dysphagia Outcome Severity Scale: Level 6: Within functional limits/Modified independence    Compensatory Swallowing Strategies : Small bites/sips, Upright as possible for all oral intake, Alternate solids and liquids  Therapeutic Interventions: Patient/Family education, Diet tolerance monitoring, Oral motor exercises      LTG:  Long Term Goals  Time Frame for Long Term Goals: 2-3 weeks  Goal 1: Pt will improve her receptive and expressive language abilities to a modified independent level for understanding and expression of complex wants, needs, feelings/ideas, and medical/safety information.  Goal 2: Pt will improve her Speech Intelligibility to modified independent for effective communication of wants, needs, feelings, ideas, and medical/safety information with familiar and unfamiliar listeners.  Long-term Goals  Timeframe for Long-term Goals: 2-3 weeks  Goal 1: d/c          COGNITION  OT: Cognition  Overall Cognitive Status: Exceptions (02/06/24 1451)  Arousal/Alertness: Appropriate responses to stimuli (02/07/24 1025)  Following Commands: Follows one step commands consistently (02/07/24 1025)  Attention Span: Appears intact (02/07/24 1025)  Memory: Appears intact (02/07/24 1025)  Safety Judgement: Decreased awareness of need for safety (02/07/24 1025)  Problem Solving: Assistance required to generate solutions;Assistance required to implement solutions (02/07/24 1025)  Insights: Fully aware of deficits (02/07/24 1025)  Initiation: Requires cues for some (02/07/24 1025)  Sequencing: Requires cues for some (02/07/24 1025)  Cognition Comment: comp I, expression Min A, min A,problem Min A, memory Min A (01/24/24 0963)          Orientation  Overall Orientation Status: Within Functional Limits (02/07/24 1025)  Orientation Level: Oriented X4 (02/07/24 1025)  SP:Memory: Within Functional Limits        Problem Solving:

## 2024-02-07 NOTE — PLAN OF CARE
Problem: Safety - Adult  Goal: Free from fall injury  Outcome: Progressing     Problem: Discharge Planning  Goal: Discharge to home or other facility with appropriate resources  Outcome: Progressing  Flowsraya (Taken 2/6/2024 2101 by Fiona Fulton RN)  Discharge to home or other facility with appropriate resources: Identify barriers to discharge with patient and caregiver     Problem: Skin/Tissue Integrity  Goal: Absence of new skin breakdown  Description: 1.  Monitor for areas of redness and/or skin breakdown  2.  Assess vascular access sites hourly  3.  Every 4-6 hours minimum:  Change oxygen saturation probe site  4.  Every 4-6 hours:  If on nasal continuous positive airway pressure, respiratory therapy assess nares and determine need for appliance change or resting period.  Outcome: Progressing     Problem: Neurosensory - Adult  Goal: Achieves stable or improved neurological status  Outcome: Progressing  Flowsraya (Taken 2/6/2024 2101 by Fiona Fulton RN)  Achieves stable or improved neurological status: Assess for and report changes in neurological status  Goal: Achieves maximal functionality and self care  Outcome: Progressing  Flowsheets (Taken 2/6/2024 2101 by Fioan Fulton RN)  Achieves maximal functionality and self care: Monitor swallowing and airway patency with patient fatigue and changes in neurological status     Problem: Skin/Tissue Integrity - Adult  Goal: Skin integrity remains intact  Outcome: Progressing  Flowsheets (Taken 2/6/2024 2101 by Fiona Fulton RN)  Skin Integrity Remains Intact: Monitor for areas of redness and/or skin breakdown     Problem: Musculoskeletal - Adult  Goal: Return mobility to safest level of function  Outcome: Progressing  Flowsheets (Taken 2/6/2024 2101 by Fiona Fulton RN)  Return Mobility to Safest Level of Function: Assess patient stability and activity tolerance for standing, transferring and ambulating with or without assistive devices  Goal: Maintain

## 2024-02-07 NOTE — PROGRESS NOTES
OCCUPATIONAL THERAPY  INPATIENT REHAB TREATMENT NOTE  [facility]      NAME: Mulu Pandya  : 1955 (68 y.o.)  MRN: [id number]  CODE STATUS: Full Code  Room: R243/R243-01    Date of Service: 2024    Referring Physician: Dr Stacy  Rehab Diagnosis: Impaired mobility and ADL's due to acute infart in the left superior mid parietal lobe MCA territory    Hospital course:      Restrictions  Restrictions/Precautions  Restrictions/Precautions: Fall Risk, Seizure          Patient's date of birth confirmed: Yes    SAFETY:  Safety Devices  Type of devices: All fall risk precautions in place    SUBJECTIVE:   \"You would\" (pt pleasant and joking during session)    Pain at start of treatment: No    Pain at end of treatment: No    OBJECTIVE:    While seated, completed fine motor task with the LUE and gross pinch/grasp with the RUE focus UE function and coordination    Challenged pt through usage of small pegs. Pt required to manipulate dowels, through pinching/grasping, reaching and placing according to instruction into pegboard w/ L hand.   Pt then challenged to remove with the R hand  Cues needed for technique to remove pegs pt with intermittent follow through.   Pt with difficulty removing pegs but was able to complete approx 10 with [place] at times  Downgraded task to pt removing from board with L hand- placing peg in R hand and then dropping the peg into a basket to the far R of the table. Pt still intermittently dropping pegs     Estim- targeting RUE digit flexion/extension x 15 minutes. No adverse reactions noted. Pt with good tolerance and + reaction. Pt with good response. Demos some movement in all joints however remains weak. Guided movements completed during estim    Intensity: adjusted to setting 5-7 on NMES device  Duty duration:  On time 20 sec  Off time 10 sec  Frequency  40 Hz  Pulse width  200 uS    Education:  Education  Education Given To: Patient  Education Provided: Plan of Care;Role of

## 2024-02-07 NOTE — PROGRESS NOTES
Hydrocortisone cream applied to rash on pt's back and buttocks, fine red raised rash on back, scattered dime-sized areas of red raised rash on buttocks. Pt denies pain, no respiratory distress on RA. Call light in reach. Electronically signed by Liza Portillo RN on 2/7/2024 at 2:09 PM

## 2024-02-07 NOTE — PROGRESS NOTES
Physical Therapy Rehab Treatment Note  Facility/Department: [facility]  Room: R243/R243-01       NAME: Mulu Pandya  : 1955 (68 y.o.)  MRN: [id number]  CODE STATUS: Full Code    Date of Service: 2024     Restrictions:  Restrictions/Precautions: Fall Risk, Seizure     SUBJECTIVE:   Subjective: Pt states she has a rash on her back.       Pain  Pain: Denies pre and post session pain.    OBJECTIVE:            Transfers  Sit to Stand: Supervision  Stand to Sit: Supervision  Car Transfer: Stand by assistance  Comment: Occasional Vcs for handplacement and safe approach to chair.    Ambulation  Surface: Level tile;Uneven;Carpet  Device: Rolling Walker  Other Apparatus:  (room air)  Assistance: Supervision  Quality of Gait: Occasional Vcs to ambulate within proximity to WW with upright posture.  Distance: 150ft  Comments: SPO2 94% HR 99BPM ay rest on room air.  SPO2 93% HR 112BPM after gait.  HR decreased to <100 after seated rest.       PT Exercises  Dynamic Standing Balance Exercises: Gait drills around obstacles with WW.  Standing with 1 UE support on walker picking up objects with reacher.  SBA  Breathing Techniques: Incentive spirometer x10 reps to 500ml        Education  Education Given To: Patient  Education Provided Comments: Educated pt on use and  benefits of incentive spirometer.  Encouraged pt to complete x10 reps every waking hour.  Education Method: Demonstration;Verbal;Teach Back  Education Outcome: Verbalized understanding;Demonstrated understanding        ASSESSMENT/PROGRESS TOWARDS GOALS:   Assessment: Pt presented to PT on room air with ticket to ride reporting room air.  Monitored SPO2 and HR throughout treatment with activity.  Maintained SPO2 >93%.  HR increased to 112BPM with gait.  Returned to <100BPM after seated rest.  Formal family training scheduled for tomorrow  however completed today.  Pt requires occasional cues to improve gait quality and safety with transfers.       Goals:  Long Term Goals  Long Term Goal 1: Pt to complete bed mobility with indep  Long Term Goal 2: Pt to complete transfers with supervision  Long Term Goal 3: Pt to ambulate 150ft with LRD and Supervision  Long Term Goal 4: Pt to complete 3 steps with HR and SBA    PLAN OF CARE/Safety: Cont per POC.       Therapy Time:   Individual   Time In 1300   Time Out 1330   Minutes 30      Minutes:  Transfer/Bed mobility trainin  Gait training:10  Neuro re education:15  Therapeutic ex:0    Melissa Swan PTA, 24 at 1:47 PM

## 2024-02-07 NOTE — PROGRESS NOTES
[facility]  Facility/Department: [facility]  Speech Language Pathology   Treatment Note          Mulu Pandya  1955  R243/R243-01  [x]   confirmed    Date: 2024      Restrictions/Precautions: Fall Risk, Seizure     ADULT DIET; Regular     Respiratory Status: Room air   No active isolations    Rehab Diagnosis: Impaired mobility and ADL's due to acute infart in the left superior mid parietal lobe MCA territory     Subjective:  Alert, Cooperative, and Pleasant        Interventions used this date:  Speech Production, Expressive Language, Receptive Language, and Therapeutic Meal Monitor    Objective/Assessment:  Patient progressing towards goals:  Short Term Goal    Goal 1: Patient will read aloud and/or generate phrases-sentences using slow rate and over-articulation to promote speech intelligibility during structured tasks with SLP so that they may functionally communicate and express safety/medical concerns across all environments with 90% accuracy.  Pt was able to independently sequence 4 step picture cards with 100% accuracy. Pt was able to provide verbal explanation based on the pictures sequential order. Pt independently read aloud 5-7 word sentences with 42% accuracy increasing to 85% accuracy given min-mod cueing. Pt demonstrated difficulties with speech intelligibility due to consistent apraxic errors (I.e. phonemes /p/, /k/, /t/, and /f/). However, given extended time, repetition, and utilizing phrasing strategy, pt was able to increase level of speech ineligibility.   Goal 2: To decrease the presence of apraxia of speech and improve articulatory accuracy to adequately express wants and needs, the pt will complete structured articulation tasks following SLP model with min cues-fading cues for articulatory placement in 90% of opportunities with 1-3 syllables words at the phrase-sentence level.  Goal 3: Pt will answer questions involving a short paragraph just heard/read with 90% accuracy.  Goal

## 2024-02-07 NOTE — PROGRESS NOTES
Physical Therapy Rehab Treatment Note  [facility]ility/Department: [facility] [place]  [facility]       NAME: Mulu Pandya  : 1955 (68 y.o.)  MRN: [id number]  CODE STATUS: Full Code    Date of Service: 2024     Restrictions:  Restrictions/Precautions: Fall Risk, Seizure     SUBJECTIVE:   Subjective: Pt states she has a rash on her back.   reports pt does not have a WW.  States he spoke with SW about it.    Pain  Pain: Denies pre and post session pain.    OBJECTIVE:            Transfers  Sit to Stand: Supervision  Stand to Sit: Supervision  Car Transfer: Stand by assistance  Comment: Occasional Vcs for handplacement and safe approach to chair.    Ambulation  Surface: Level tile;Uneven;Carpet  Device: Rolling Walker  Other Apparatus:  (room air)  Assistance: Supervision  Quality of Gait: Occasional Vcs to ambulate within proximity to WW with upright posture.  Distance: 150ft  Comments: SPO2 94% HR 99BPM ay rest on room air.  SPO2 93% HR 118BPM after gait.  HR decreased to <100 after seated rest.    Stairs/Curb  Stairs?: Yes  Stairs  # Steps : 4  Stairs Height: 6\"  Rails: Left ascending (B grasp)  Curbs: 6\"  Device: Rolling walker  Assistance: Contact guard assistance  Comment: VCs for sequencing.  SPO2 96%; HR 122BPM after stair on room air.                 Education Provided: Family Education  Education  Education Given To: Patient;Family  Education Provided: Family Education  Education Provided Comments: Educated  on cuing and gaurding techniques.  Pt's  assisted pt with chair/car transfer, gait with WW, and curb/4stairs.   appropriately cued and gaurded pt.   reports he feels comfortable assisting pt at this level.  Education Method: Demonstration;Verbal;Teach Back  Education Outcome: Verbalized understanding;Demonstrated understanding        ASSESSMENT/PROGRESS TOWARDS GOALS:   Assessment: Pt presented to PT on room air with ticket to ride reporting room air.  Monitored  SPO2 and HR throughout treatment with activity.  Maintained SPO2 >93%.  HR increased to 120BPM with gait and stairs.  Returned to <100BPM after seated rest.  Pt's  demo'd safe abilty to assist pt with all mobility.  Formal family training scheduled for tomorrow  however completed today.   encouraged to continue attending therapy sessions as able.    Goals:  Long Term Goals  Long Term Goal 1: Pt to complete bed mobility with indep  Long Term Goal 2: Pt to complete transfers with supervision  Long Term Goal 3: Pt to ambulate 150ft with LRD and Supervision  Long Term Goal 4: Pt to complete 3 steps with HR and SBA    PLAN OF CARE/Safety: Cont per POC.       Therapy Time:   Individual   Time In 1100   Time Out 1130   Minutes 30      Minutes:  Transfer/Bed mobility training:10  Gait trainin  Neuro re education:0  Therapeutic ex:0    Melissa Swan PTA, 24 at 11:46 AM

## 2024-02-07 NOTE — PROGRESS NOTES
OCCUPATIONAL THERAPY  INPATIENT REHAB TREATMENT NOTE  [facility]      NAME: Mulu Pandya  : 1955 (68 y.o.)  MRN: [id number]  CODE STATUS: Full Code  Room: 43/R243-01    Date of Service: 2024    Referring Physician: Dr Stacy  Rehab Diagnosis: Impaired mobility and ADL's due to acute infart in the left superior mid parietal lobe MCA territory    Hospital course:          Restrictions  Restrictions/Precautions  Restrictions/Precautions: Fall Risk, Seizure                 Patient's date of birth confirmed: Yes    SAFETY:  Safety Devices  Safety Devices in place: Yes  Type of devices: All fall risk precautions in place    SUBJECTIVE:       Pain at start of treatment: No    Pain at end of treatment: No      COGNITION:  Orientation  Overall Orientation Status: Within Functional Limits  Orientation Level: Oriented X4  Cognition  Arousal/Alertness: Appropriate responses to stimuli  Following Commands: Follows one step commands consistently  Attention Span: Appears intact  Memory: Appears intact  Safety Judgement: Decreased awareness of need for safety  Problem Solving: Assistance required to generate solutions;Assistance required to implement solutions  Insights: Fully aware of deficits  Initiation: Requires cues for some  Sequencing: Requires cues for some      OBJECTIVE:         Grooming/Oral Hygiene  Assistance Level: Supervision  Skilled Clinical Factors: with s/u provided to place toothpaste on toothbrush, denture care  Upper Extremity Bathing  Assistance Level: Increased time to complete;Verbal cues;Stand by assist  Lower Extremity Bathing  Assistance Level: Minimal assistance  Skilled Clinical Factors: SBA for balance while standing, s/u for anterior washing/ Min A  drying posterior area thoruoughly d/t noted bowel harshal on towel post washing, DEP A for BLE feet, pt able to complete seated, and s/u to complete leg ADL care seated  Upper Extremity Dressing  Assistance Level: Moderate

## 2024-02-07 NOTE — PROGRESS NOTES
anterior  parietal periventricular white matter, with no sign of any underlying mass or  surrounding edema. This may be from amyloid angiopathy or hypertension.  3. Old infarcts in the right mid-posterior MCA territory, left inferior  cerebellar hemisphere in the left PICA territory, right thalamus, and  multiple tiny infarcts in both inferior cerebellar hemispheres in the PICA  territories.  4. Right CP angle meningioma measuring 2.4 x 1.6 x 2.1 cm, producing moderate  mass effect upon the right middle cerebellar peduncle and minimal mass effect  upon the right pontomedullary junction.  5. Moderate, age-appropriate atrophy and mild, age-appropriate small vessel  ischemic change.                            MRA of the Head and Neck: No results found for this or any previous visit.  No results found for this or any previous visit.  No results found for this or any previous visit.                            CT of the Head: Results for orders placed during the hospital encounter of 01/18/24    CT HEAD WO CONTRAST    Narrative  EXAMINATION:  CT OF THE HEAD WITHOUT CONTRAST  1/19/2024 6:06 am    TECHNIQUE:  CT of the head was performed without the administration of intravenous  contrast. Automated exposure control, iterative reconstruction, and/or weight  based adjustment of the mA/kV was utilized to reduce the radiation dose to as  low as reasonably achievable.    COMPARISON:  01/18/2024    HISTORY:  ORDERING SYSTEM PROVIDED HISTORY: ICH  TECHNOLOGIST PROVIDED HISTORY:  Reason for exam:->ICH  Has a \"code stroke\" or \"stroke alert\" been called?->No  What reading provider will be dictating this exam?->CRC    FINDINGS:  BRAIN/VENTRICLES: There is a stable small 2 cm left frontal lobe  periventricular hematoma identified.  There is no significant surrounding  edema or mass effect.  Stable encephalomalacia areas identified from old  insult within the right temporal lobe with evolving insult identified within  the left parietal  lobe.  No abnormal extra-axial fluid collection.  Mild ex  vacuole dilatation seen of the ventricular system.  Minimal chronic small  vessel ischemic changes seen within the periventricular and subcortical white  matter to suggest chronic small vessel ischemic change.  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    ORBITS: The visualized portion of the orbits demonstrate no acute abnormality.    SINUSES: The visualized paranasal sinuses and mastoid air cells demonstrate  no acute abnormality.    SOFT TISSUES/SKULL:  No acute abnormality of the visualized skull or soft  tissues.    Impression  Stable parenchymal hemorrhage involving the left frontal periventricular  white matter.  No significant surrounding edema or mass effect.  Evolving  insult in the left parietal lobe with encephalomalacia change stable within  the right temporal lobe.  No results found for this or any previous visit.  No results found for this or any previous visit.      Carotid duplex: No results found for this or any previous visit.  No results found for this or any previous visit.  No results found for this or any previous visit.      Echo No results found for this or any previous visit.            Assessment/Plan:    Acute infarct in the left superior mid parietal lobe MCA territory with minimal mass effect.  There is no change in small acute intraparenchymal hemorrhage in the left anterior parietal periventricular white matter.  No mass effect or edema.  There are old infarcts in the right mid posterior MCA territory, left inferior cerebellar hemisphere in the PICA territory, right thalamus and multiple tiny infarcts in both inferior cerebellar hemispheres in the PICA territories.  Patient also noted to have right CP angle meningioma measuring 2.4 x 1.6 x 2.1 cm producing moderate mass effect upon the right middle cerebral appendical and right pontomedullary

## 2024-02-07 NOTE — PROGRESS NOTES
INPATIENT PROGRESS NOTES    PATIENT NAME: Mulu Pandya  MRN: [id number]  SERVICE DATE:  February 7, 2024   SERVICE TIME:  9:53 AM      PRIMARY SERVICE: Pulmonary Disease    CHIEF COMPLAINTS: PE    INTERVAL HPI: Patient seen and examined at bedside, Interval Notes, orders reviewed. Nursing notes noted    Doing good, no complaint, no chest pain, no shortness of breath, no hemoptysis, no worsening lower extremity edema.  She is on room air saturation 93%    New information updated in the note today, rest of the examination did not change compared to yesterday.      Review of system:     GI Abdominal pain No  Skin Rash No    Social History     Tobacco Use    Smoking status: Some Days     Current packs/day: 1.00     Average packs/day: 1 pack/day for 49.0 years (49.0 ttl pk-yrs)     Types: Cigarettes    Smokeless tobacco: Never    Tobacco comments:     12/21/17 started age 13   Substance Use Topics    Alcohol use: No     Comment: denies         Problem Relation Age of Onset    Heart Disease Sister     High Blood Pressure Other     Cancer Other     High Blood Pressure Mother     Cancer Brother          OBJECTIVE    Body mass index is 28.72 kg/m².    PHYSICAL EXAM:  Vitals:  BP 96/65   Pulse 79   Temp 97.2 °F (36.2 °C) (Oral)   Resp 17   Ht 1.6 m (5' 3\")   Wt 73.5 kg (162 lb 2.2 oz)   SpO2 93%   BMI 28.72 kg/m²     General: alert, cooperative, no distress  Head: normocephalic, atraumatic  Eyes:No gross abnormalities.  ENT:  MMM no lesions  Neck:  supple and no masses  Chest : clear to auscultation bilaterally- no wheezes, rales or rhonchi, normal air movement, no respiratory distress  Heart:: Heart sounds are normal.  Regular rate and rhythm without murmur, gallop or rub.  ABD:  symmetric, soft, non-tender  Musculoskeletal : no cyanosis, no clubbing, and no edema  Neuro:  Grossly normal  Skin: No rashes or nodules noted.  Lymph node:  no cervical nodes  Urology: No Small   Psychiatric: Calm    DATA:   No results for  input(s): \"WBC\", \"HGB\", \"HCT\", \"MCV\", \"PLT\" in the last 72 hours.    Recent Labs     02/04/24  1427   CREATININE 1.24*   LABGLOM 46.7*       MV Settings:          No results for input(s): \"PHART\", \"IGU2UOV\", \"PO2ART\", \"FSA3QKT\", \"BEART\", \"J0PAABBJ\" in the last 72 hours.    O2 Device: None (Room air)  O2 Flow Rate (L/min): 2 L/min    ADULT DIET; Regular     MEDICATIONS during current hospitalization:    Continuous Infusions:        Scheduled Meds:   cinnamon oil  1 drop Oral 4x Daily    metoprolol tartrate  25 mg Oral BID    Vitamin D  2,000 Units Oral Dinner    cyanocobalamin  1,000 mcg IntraMUSCular Weekly    coenzyme Q10  100 mg Oral Daily    albuterol sulfate HFA  2 puff Inhalation BID    budesonide-formoterol  2 puff Inhalation BID RT    escitalopram  15 mg Oral Daily    insulin glargine  10 Units SubCUTAneous Nightly    insulin lispro  5 Units SubCUTAneous TID WC    melatonin  5 mg Oral Nightly    nicotine  1 patch TransDERmal Q24H    insulin lispro  0-4 Units SubCUTAneous TID WC    insulin lispro  0-4 Units SubCUTAneous Nightly    rosuvastatin  40 mg Oral Nightly       PRN Meds:lidocaine, acetaminophen, bisacodyl, sodium phosphate, albuterol, albuterol sulfate HFA, glucagon (rDNA), glucose, ondansetron **OR** ondansetron    Radiology  CT chest imaging studies reviewed by me, shows right lower and left lower lobe PE,      IMPRESSION AND SUGGESTION:  Patient is at risk due to   PE, bilateral, provoked  Recent stroke with hemorrhagic transformation  Dysphagia  Abnormal lesion on pancreas noted on CT chest, needs further workup,    Recommendation  O2 to keep sat 90 to 92%, wean as tolerated  status post IVC filter  PT OT  Aspiration precaution            Electronically signed by Shannon Pavon MD,  [facility]   on 2/7/2024 at 9:53 AM

## 2024-02-08 ENCOUNTER — APPOINTMENT (OUTPATIENT)
Dept: CT IMAGING | Age: 69
End: 2024-02-08
Attending: PHYSICAL MEDICINE & REHABILITATION
Payer: MEDICARE

## 2024-02-08 ENCOUNTER — PREP FOR PROCEDURE (OUTPATIENT)
Dept: GASTROENTEROLOGY | Age: 69
End: 2024-02-08

## 2024-02-08 PROBLEM — K86.89 PANCREATIC MASS: Status: ACTIVE | Noted: 2024-01-23

## 2024-02-08 LAB
GLUCOSE BLD-MCNC: 141 MG/DL (ref 70–99)
GLUCOSE BLD-MCNC: 190 MG/DL (ref 70–99)
GLUCOSE BLD-MCNC: 513 MG/DL (ref 70–99)
GLUCOSE BLD-MCNC: 95 MG/DL (ref 70–99)
PERFORMED ON: ABNORMAL
PERFORMED ON: NORMAL

## 2024-02-08 PROCEDURE — 94760 N-INVAS EAR/PLS OXIMETRY 1: CPT

## 2024-02-08 PROCEDURE — 6370000000 HC RX 637 (ALT 250 FOR IP): Performed by: FAMILY MEDICINE

## 2024-02-08 PROCEDURE — 70450 CT HEAD/BRAIN W/O DYE: CPT

## 2024-02-08 PROCEDURE — 97535 SELF CARE MNGMENT TRAINING: CPT

## 2024-02-08 PROCEDURE — 6370000000 HC RX 637 (ALT 250 FOR IP): Performed by: PHYSICAL MEDICINE & REHABILITATION

## 2024-02-08 PROCEDURE — 2700000000 HC OXYGEN THERAPY PER DAY

## 2024-02-08 PROCEDURE — 94640 AIRWAY INHALATION TREATMENT: CPT

## 2024-02-08 PROCEDURE — 94762 N-INVAS EAR/PLS OXIMTRY CONT: CPT

## 2024-02-08 PROCEDURE — 99232 SBSQ HOSP IP/OBS MODERATE 35: CPT | Performed by: NURSE PRACTITIONER

## 2024-02-08 PROCEDURE — 1180000000 HC REHAB R&B

## 2024-02-08 PROCEDURE — 92507 TX SP LANG VOICE COMM INDIV: CPT

## 2024-02-08 PROCEDURE — 94761 N-INVAS EAR/PLS OXIMETRY MLT: CPT

## 2024-02-08 PROCEDURE — 97112 NEUROMUSCULAR REEDUCATION: CPT

## 2024-02-08 PROCEDURE — 97116 GAIT TRAINING THERAPY: CPT

## 2024-02-08 PROCEDURE — 6370000000 HC RX 637 (ALT 250 FOR IP)

## 2024-02-08 RX ADMIN — ALBUTEROL SULFATE 2 PUFF: 90 AEROSOL, METERED RESPIRATORY (INHALATION) at 15:51

## 2024-02-08 RX ADMIN — METOPROLOL TARTRATE 12.5 MG: 25 TABLET, FILM COATED ORAL at 20:14

## 2024-02-08 RX ADMIN — HYDROCORTISONE: 1 CREAM TOPICAL at 09:18

## 2024-02-08 RX ADMIN — ESCITALOPRAM OXALATE 15 MG: 10 TABLET ORAL at 09:18

## 2024-02-08 RX ADMIN — BUDESONIDE AND FORMOTEROL FUMARATE DIHYDRATE 2 PUFF: 80; 4.5 AEROSOL RESPIRATORY (INHALATION) at 15:51

## 2024-02-08 RX ADMIN — ROSUVASTATIN CALCIUM 40 MG: 40 TABLET, FILM COATED ORAL at 20:14

## 2024-02-08 RX ADMIN — Medication 5 MG: at 20:14

## 2024-02-08 RX ADMIN — ALBUTEROL SULFATE 2 PUFF: 90 AEROSOL, METERED RESPIRATORY (INHALATION) at 04:24

## 2024-02-08 RX ADMIN — INSULIN LISPRO 5 UNITS: 100 INJECTION, SOLUTION INTRAVENOUS; SUBCUTANEOUS at 09:17

## 2024-02-08 RX ADMIN — INSULIN LISPRO 5 UNITS: 100 INJECTION, SOLUTION INTRAVENOUS; SUBCUTANEOUS at 17:21

## 2024-02-08 RX ADMIN — BUDESONIDE AND FORMOTEROL FUMARATE DIHYDRATE 2 PUFF: 80; 4.5 AEROSOL RESPIRATORY (INHALATION) at 04:24

## 2024-02-08 RX ADMIN — INSULIN GLARGINE 10 UNITS: 100 INJECTION, SOLUTION SUBCUTANEOUS at 20:18

## 2024-02-08 RX ADMIN — Medication 100 MG: at 09:18

## 2024-02-08 RX ADMIN — Medication 2000 UNITS: at 17:21

## 2024-02-08 RX ADMIN — HYDROCORTISONE: 1 CREAM TOPICAL at 13:52

## 2024-02-08 RX ADMIN — Medication 1 DROP: at 20:19

## 2024-02-08 RX ADMIN — DIPHENHYDRAMINE HCL 50 MG: 25 TABLET ORAL at 20:14

## 2024-02-08 RX ADMIN — HYDROCORTISONE: 1 CREAM TOPICAL at 20:14

## 2024-02-08 ASSESSMENT — PAIN SCALES - GENERAL: PAINLEVEL_OUTOF10: 0

## 2024-02-08 NOTE — PROGRESS NOTES
Gastroenterology Progress Note    Mulu Pandya is a 68 y.o. female patient.  Hospitalization Day:16    Chief C/O: pancreatic cyst    SUBJECTIVE: No acute events, no abdominal pain, tolerating diet, participating in therapy services, no diarrhea, no constipation, no fever or chills    ROS:  Gastrointestinal ROS: no abdominal pain, change in bowel habits, or black or bloody stools    Physical    VITALS:  BP (!) 98/58   Pulse 73   Temp 98.1 °F (36.7 °C) (Oral)   Resp 17   Ht 1.6 m (5' 3\")   Wt 73.5 kg (162 lb 2.2 oz)   SpO2 100%   BMI 28.72 kg/m²   TEMPERATURE:  Current - Temp: 98.1 °F (36.7 °C); Max - Temp  Av.9 °F (36.6 °C)  Min: 97.7 °F (36.5 °C)  Max: 98.1 °F (36.7 °C)    General:  Alert and oriented,  No apparent distress  Skin- without jaundice  Eyes: anicteric sclera  Cardiac: RRR, Nl s1s2, without murmurs  Lungs CTA Bilaterally, normal effort  Abdomen soft, ND, NT, no HSM, Bowel sounds normal  Ext: without edema  Neuro: no asterixis     Data    Data Review:    No results for input(s): \"WBC\", \"HGB\", \"HCT\", \"MCV\", \"PLT\" in the last 72 hours.  No results for input(s): \"NA\", \"K\", \"CL\", \"CO2\", \"PHOS\", \"BUN\", \"CREATININE\", \"CA\" in the last 72 hours.  No results for input(s): \"AST\", \"ALT\", \"ALB\", \"BILIDIR\", \"BILITOT\", \"ALKPHOS\" in the last 72 hours.  No results for input(s): \"LIPASE\", \"AMYLASE\" in the last 72 hours.  No results for input(s): \"PROTIME\", \"INR\" in the last 72 hours.    Radiology Review: CT of the abdomen and pelvis    ASSESSMENT:  67 y/o female in rehab for PT/OT after recent stroke with hemorrhagic effect, during her hospital course patient developed shortness of breath & tachycardia and had CT of the chest notable for bilateral PEs with incidental finding of  large low-density pancreatic body mass measuring 9.8 x 6.4 cm possibly a pseudocyst, unable to be fully characterized with recommendations for CT of the abdomen and pelvis with IV contrast.  Patient has no history of chronic  pancreatitis, reports daily alcohol most of her adult life, none as of recent.  She has no abdominal pain, no nausea or vomiting, no diarrhea, no unintentional weight loss.  Most recent liver enzymes are normal.   2/6/24 tolerating diet, no abdominal pain, no diarrhea, participating in therapy. CT abd pelvis with uncomplicated cystic lesion in the tail of the pancreas measuring 6 x 6 x 10 cm, no prior imaging for comparison, no history of chronic pancreatitis, no abdominal pain, diarrhea, changes to appetite, or unintentional weight loss.   2/8/24 elevated CEA 4.0 and CA 19-9 309, no abdominal pain, tolerating diet, participating in therapy services, no diarrhea, no constipation, no fever or chills    PLAN :  -n.p.o. after midnight for EUS with FNA of pancreatic lesion      Thank you for allowing me to participate in the care of your patient.  Please feel free to contact me with any concerns.    Lala Garcia, APRN - CNP

## 2024-02-08 NOTE — PLAN OF CARE
Problem: Safety - Adult  Goal: Free from fall injury  2/8/2024 1249 by Liza Portillo RN  Outcome: Progressing  2/8/2024 0027 by Janet Larsen RN  Outcome: Progressing     Problem: Discharge Planning  Goal: Discharge to home or other facility with appropriate resources  2/8/2024 1249 by Liza Portillo RN  Outcome: Progressing  2/8/2024 0027 by Janet Larsen RN  Outcome: Progressing  Flowsheets (Taken 2/7/2024 2030)  Discharge to home or other facility with appropriate resources: Identify barriers to discharge with patient and caregiver     Problem: Skin/Tissue Integrity  Goal: Absence of new skin breakdown  Description: 1.  Monitor for areas of redness and/or skin breakdown  2.  Assess vascular access sites hourly  3.  Every 4-6 hours minimum:  Change oxygen saturation probe site  4.  Every 4-6 hours:  If on nasal continuous positive airway pressure, respiratory therapy assess nares and determine need for appliance change or resting period.  2/8/2024 1249 by Liza Portillo RN  Outcome: Progressing  2/8/2024 0027 by Janet Larsen RN  Outcome: Progressing     Problem: Neurosensory - Adult  Goal: Achieves stable or improved neurological status  2/8/2024 1249 by Liza Portillo RN  Outcome: Progressing  2/8/2024 0027 by Janet Larsen RN  Outcome: Progressing  Flowsheets (Taken 2/7/2024 2030)  Achieves stable or improved neurological status: Assess for and report changes in neurological status  Goal: Achieves maximal functionality and self care  2/8/2024 1249 by Liza Portillo RN  Outcome: Progressing  2/8/2024 0027 by Janet Larsen RN  Outcome: Progressing  Flowsheets (Taken 2/7/2024 2030)  Achieves maximal functionality and self care: Monitor swallowing and airway patency with patient fatigue and changes in neurological status     Problem: Skin/Tissue Integrity - Adult  Goal: Skin integrity remains intact  2/8/2024 1249 by Liza Portillo RN  Outcome: Progressing  2/8/2024 0027 by Janet Larsen  RN  Outcome: Progressing  Flowsheets (Taken 2/7/2024 2030)  Skin Integrity Remains Intact: Monitor for areas of redness and/or skin breakdown     Problem: Musculoskeletal - Adult  Goal: Return mobility to safest level of function  2/8/2024 1249 by Liza Portillo RN  Outcome: Progressing  2/8/2024 0027 by Janet Larsen RN  Outcome: Progressing  Flowsheets (Taken 2/7/2024 2030)  Return Mobility to Safest Level of Function: Assess patient stability and activity tolerance for standing, transferring and ambulating with or without assistive devices  Goal: Maintain proper alignment of affected body part  2/8/2024 1249 by Liza Portillo RN  Outcome: Progressing  2/8/2024 0027 by Janet Larsen RN  Outcome: Progressing  Flowsheets (Taken 2/7/2024 2030)  Maintain proper alignment of affected body part: Support and protect limb and body alignment per provider's orders  Goal: Return ADL status to a safe level of function  2/8/2024 1249 by Liza Portillo RN  Outcome: Progressing  2/8/2024 0027 by Janet Larsen RN  Outcome: Progressing  Flowsheets (Taken 2/7/2024 2030)  Return ADL Status to a Safe Level of Function: Administer medication as ordered     Problem: Gastrointestinal - Adult  Goal: Maintains or returns to baseline bowel function  2/8/2024 1249 by Liza Portillo RN  Outcome: Progressing  2/8/2024 0027 by Janet Larsen RN  Outcome: Progressing  Goal: Maintains adequate nutritional intake  2/8/2024 1249 by Liza Portillo RN  Outcome: Progressing  2/8/2024 0027 by Janet Larsen RN  Outcome: Progressing     Problem: Metabolic/Fluid and Electrolytes - Adult  Goal: Electrolytes maintained within normal limits  2/8/2024 1249 by Liza Portillo RN  Outcome: Progressing  2/8/2024 0027 by Janet Larsen RN  Outcome: Progressing  Goal: Glucose maintained within prescribed range  2/8/2024 1249 by Liza Portillo RN  Outcome: Progressing  2/8/2024 0027 by Janet Larsen RN  Outcome: Progressing     Problem:

## 2024-02-08 NOTE — PROGRESS NOTES
Pt assessment completed. VSS. Pt denies pain. C/O itching \"back\". Dr. Carroll here and made aware-medicated with benadryl 50mg as ordered. LBM 2/7, pt with pure wic attached-urine yellow and cldy RICCO smell at this time.hydrocortisone applied to rash on back and Buttocks.  Lungs cl upper but remain very diminished. Spot check pulse ox @ 2330 was 87-89%-placed back on 2L per NC- pulse ox up to 95%. Question do nocturnal before D/C. Call light with in reach, bed alarm activated.       Electronically signed by Janet Larsen RN on 2/8/2024 at 6:39 AM pt bed alarm, alarming-pt sitting side of bed attempting to  socks-pt seems alittle confused. Pt states \"just waking up\" Usually uses call light appropriately. Pt unsteady with W/W to BR, poor safety awareness. Pt with Large brown formed BM-pericare completed. Pt assisted back to bed -pulse ox 90% RM- placed back on 2L per NC. Pulse ox up to 94%. Call light with in reach. Bed alarm activated.

## 2024-02-08 NOTE — PROGRESS NOTES
Physical Therapy Rehab Treatment Note  Facility/Department: [facility]  Room: [place] [address]       NAME: Mulu Pandya  : 1955 (68 y.o.)  MRN: [id number]  CODE STATUS: Full Code    Date of Service: 2024       Restrictions:  Restrictions/Precautions: Fall Risk, Seizure       SUBJECTIVE:   Subjective: pt reports she is doing okay.    Pain  Pain: denies pain      OBJECTIVE:   Orientation  Overall Orientation Status: Within Functional Limits  Cognition  Overall Cognitive Status: WFL  Arousal/Alertness: Appropriate responses to stimuli  Following Commands: Follows one step commands consistently  Attention Span: Appears intact  Memory: Appears intact  Safety Judgement: Decreased awareness of need for safety  Insights: Fully aware of deficits  Initiation: Requires cues for some  Sequencing: Requires cues for some         Bed Mobility Training  Bed Mobility Training: Yes  Overall Level of Assistance: Stand-by assistance  Interventions: Safety awareness training  Rolling: Stand-by assistance  Supine to Sit: Stand-by assistance  Scooting: Stand-by assistance  Bed Mobility  Overall Assistance Level: Stand By Assist  Additional Factors: With handrails  Roll Right  Assistance Level: Stand by assist  Sit to Supine  Assistance Level: Stand by assist  Scooting  Assistance Level: Stand by assist    Transfer Training  Transfer Training: Yes  Overall Level of Assistance: Stand-by assistance  Interventions: Safety awareness training  Sit to Stand: Stand-by assistance;Supervision  Stand to Sit: Stand-by assistance;Supervision    Gait Training: Yes  Overall Level of Assistance: Stand-by assistance  Distance (ft): 150 Feet  Assistive Device: Walker, rolling  Interventions: Safety awareness training  Base of Support: Widened  Speed/Vianey: Fluctuations  Gait Abnormalities: Ataxic  Rail Use: Left  Right Side Weight Bearing: As tolerated  Left Side Weight Bearing: As tolerated    Stairs - Level of Assistance: Minimum

## 2024-02-08 NOTE — PROGRESS NOTES
Physical Therapy Missed Treatment   Facility/Department: [facility] MED SURG R243/R243-01    NAME: Mulu Pandya  Patient Status:   : 1955 (68 y.o.)  MRN: [id number]  Account: [id number]  Gender: female        [x] Patient Declines PT Treatment            [] Patient Unavailable:     Attempted to make up missed minutes with patient this pm and she adamantly refused. Maximal encouragement give and patient said she is exhausted. Biopsy cancelled and rescheduled for Monday the .   Will attempt PT Treatment again at earliest convenience.        Electronically signed by Jazmin Christian PTA on 24 at 4:12 PM EST

## 2024-02-08 NOTE — CARE COORDINATION
LSW met with pt and spouse following family training and both confirmed they feel comfortable with pt discharging home on 2/10. HHC vs OP was discussed and LSW explained that IDT was recommending OP over HHC. However, pt would like to start off with [facility] first and then move on to OP. LSW also discussed DME, LSW will order a ww through [facility]. LSW explained to spouse that a pulse oximeter and blood pressure cuff is recommended at discharge to monitor her heart rate. Spouse stated that he would purchase. LSW also noted that a nocturnal pulse ox would be completed tonight and pt may need O2 for nighttime use, both verbalized understanding and are in agreement. Electronically signed by ELEAZAR Friend, KASSANDRA on 2/8/2024 at 12:30 PM

## 2024-02-08 NOTE — PROGRESS NOTES
02/08/24 1500   RT Protocol   History Pulmonary Disease 2   Respiratory pattern 0   Breath sounds 2   Cough 0   Indications for Bronchodilator Therapy Decreased or absent breath sounds   Bronchodilator Assessment Score 4

## 2024-02-08 NOTE — PROGRESS NOTES
[facility]  Occupational Therapy      NAME:  Mulu Pandya  ROOM: R243/R243-01  :  1955  DATE: 2024    Attempted to see Mulu Pandya on this date for 60 minute session for   []  Initial Evaluation   [x]  Scheduled therapy    []  Make-up therapy   []  Group therapy   []  Family training    Patient was unable to be seen due to:   [] Off unit for testing/procedure   [x] Patient refused, stating \"I wont\" \"Im beat\"   [] Therapy on hold due to     [] Nursing deferred due to    [] Other:      Attempted to encourage patient multiple times to participate in OT session. Offered in in-room and in-bed therapy with pt still declining. Educated patient regarding importance of participating d/t upcoming d/c home. Pt still declined d/t fatigue from previous therapy sessions    Discussed with NITZA sanders    RN also reports pt will now not have her biopsy tomorrow and is able to participate all day for her therapy     Electronically signed by Vicente Barrett OT on 24 at 3:11 PM EST

## 2024-02-08 NOTE — PROGRESS NOTES
Physical Therapy Rehab Treatment Note  Facility/Department: [facility] [place] [facility]  [facility]       NAME: Mulu Pandya  : 1955 (68 y.o.)  MRN: [id number]  CODE STATUS: Full Code    Date of Service: 2024       Restrictions:fall risk          SUBJECTIVE:   Subjective: pt states she is looking fwd to going home saturday    Pain  Pain: denies pain      OBJECTIVE:   Orientation  Overall Orientation Status: Within Functional Limits  Cognition  Overall Cognitive Status: WFL  Following Commands: Follows one step commands consistently  Attention Span: Appears intact  Memory: Appears intact  Safety Judgement: Decreased awareness of need for safety  Insights: Fully aware of deficits  Initiation: Requires cues for some  Sequencing: Requires cues for some         Bed Mobility Training  Bed Mobility Training: No    Transfer Training  Transfer Training: Yes  Overall Level of Assistance: Stand-by assistance  Interventions: Safety awareness training  Sit to Stand: Stand-by assistance;Supervision  Stand to Sit: Stand-by assistance;Supervision  Car transfer: min assist with low seat  Gait Training: Yes  Overall Level of Assistance: Stand-by assistance  Distance (ft): 100 Feet  Assistive Device: Walker, rolling  Interventions: Safety awareness training  Base of Support: Widened  Speed/Vianey: Fluctuations  Gait Abnormalities: Ataxic  Rail Use: Left  Right Side Weight Bearing: As tolerated  Left Side Weight Bearing: As tolerated    Stairs - Level of Assistance: Minimum assistance  Number of Stairs Trained: 4  Pt required min assist on 2 out of 4 steps with ascending. Decreased grasp on rail.        Neuromuscular Education  Neuromuscular Education: Yes  NDT Treatment: Gait   Neuromuscular Comments: around bolsters with ww without lob. 5 x sit to stands.                             ASSESSMENT/PROGRESS TOWARDS GOALS: progressing daily.    Pt has biopsy of pancreas tomorrow in the afternoon. Encouraged pt to participate in

## 2024-02-08 NOTE — PROGRESS NOTES
Subjective:  The patient complains of moderate to severe acute on chronic progressive fatigue and left-sided weakness, cognitive slowing, motor planning deficits partially relieved by rest, medications, PT,  OT,   SLP and rest and exacerbated by recent  CVA.  She was admitted through the emergency room on January 18, 2024 with right-sided weakness aphasia and dysphagia.  She was diagnosed with acute CVA-subacute stroke on the right parietal lob .  Her  reported she had had multiple falls recently.  She was admitted under the care of the hospitalist with neurology consulting.  She was also found to have an intracranial hemorrhage.  CT of the head revealed a 2 cm parenchymal bleed in the left basal ganglia.  Neurosurgery was consulted and they did not feel that she should have surgery.  She has been struggling with aphasia dysphagia and hypoxia since being admitte neurology has also been following.   Stroke workup as   including a GUSTABO which was unremarkable.    I am concerned about patient’s medical complexities and barriers to advancing in rehab goals including deficits of motor planning.    She also had a pulmonary embolism and possible bronchitis pneumonia.  Stabilized medically after IVC filter and IV antibiotics.  Medications were adjusted.  Her diarrhea resolved.  White count and pulse ox improved slowly.  Patient cannot be on anticoagulation because of her parenchymal bleed.    I reviewed current care and plans for further care with other rehab providers including nursing and case management.  According to recent  note,  no substantive notes-- only form notes from the nursing over the past 24 hours any and all other notes reviewed as well.    I would like to see if nursing can get rid of the IV be Hep-Lock at her arm.  Also she is on insulin here and was on Glucophage at home would like to transition back to that prior to discharge.  Liza Portillo, RN  Registered Nurse  Nursing     Progress Notes    IV fluids as needed  Acute episodic insomnia with situational adjustment disorder:  prn Ambien, monitor for day time sedation.  Falls risk elevated:  patient to use call light to get nursing assistance to get up, bed and chair alarm.  Elevated DVT risk: progressive activities in PT, continue prophylaxis ARAMIS hose, elevation and meds-see MAR.   Oral pharyngeal dysphagia-up in a degrees of feeds-modified diet as needed.Discussed with speech and language pathology.  Bedside swallow reviewed patient okay to be upgraded to regular with thins with strict monitoring up at 90 degrees no straws.  Complex discharge planning: Discharge 2/10/2024 home with her  and home health care.  Until then continue weekly team meeting every Thursday to re-assess progress towards goals, discuss and address social, psychological and medical comorbidities and to address difficulties they may be having progressing in therapy.  Patient and family education is in progress.  The patient is to follow-up with their family physician after discharge.        Complex Active General Medical Issues that complicate care Assess & Plan:     Type 2 diabetes mellitus with chronic kidney disease-Continue blood sugar checks every shift, diet, add diabetic add dietary education, restrict carbohydrates to lowest effective and safe carb count per meal advising 4 carbs per meal, add at bedtime snack to prevent a.m. hypoglycemia, adjust/add medications.  Chronic insomnia, depression, anxiety-emotional support provided daily, vitamin B12, encourage participation in rehabilitation support group and recreational therapy, adjust/add medications.  Acute exacerbation of COPD (chronic obstructive pulmonary disease) with acute bronchitis-rule out pneumonia -acute rehab for endurance traing with Pulse Ox to monitoring oxygen saturation and heart rate with O2 titration to lowest effective dose. Pulse oximeter checks to shift and at HS to dose and titrate oxygen and

## 2024-02-08 NOTE — PROGRESS NOTES
Hospitalist Progress Note      PCP: Eileen Edmondson, APRN - NP    Date of Admission: 1/23/2024    Chief Complaint:    No chief complaint on file.      HPI:     Pt is s/p IVC filter placement day #6.  Seen  Patient just finished her breakfast.  Patient states that she is feeling and doing good today. The only complaint that was made is the skin rash on her back and diaper rash. Patient is alert and oriented x 3, pleasant and cooperative. Patient denies chest pain, dyspnea, dizziness, or headache; 2 L supplemental NC.  She also denies fever, chills, abdominal pain, nausea/ vomiting, and changes in appetite.  Patient continue working with PT/OT/ST and tolerates the therapy well.    Subjective:    12 point ROS negative other than mentioned above     Medications:  Reviewed    Infusion Medications   Scheduled Medications    miconazole   Topical BID    hydrocortisone   Topical TID    cinnamon oil  1 drop Oral 4x Daily    metoprolol tartrate  25 mg Oral BID    Vitamin D  2,000 Units Oral Dinner    cyanocobalamin  1,000 mcg IntraMUSCular Weekly    coenzyme Q10  100 mg Oral Daily    albuterol sulfate HFA  2 puff Inhalation BID    budesonide-formoterol  2 puff Inhalation BID RT    escitalopram  15 mg Oral Daily    insulin glargine  10 Units SubCUTAneous Nightly    insulin lispro  5 Units SubCUTAneous TID WC    melatonin  5 mg Oral Nightly    nicotine  1 patch TransDERmal Q24H    insulin lispro  0-4 Units SubCUTAneous TID WC    insulin lispro  0-4 Units SubCUTAneous Nightly    rosuvastatin  40 mg Oral Nightly     PRN Meds: diphenhydrAMINE, lidocaine, acetaminophen, bisacodyl, sodium phosphate, albuterol, albuterol sulfate HFA, glucagon (rDNA), glucose, ondansetron **OR** ondansetron      Intake/Output Summary (Last 24 hours) at 2/8/2024 1043  Last data filed at 2/8/2024 0443  Gross per 24 hour   Intake 180 ml   Output 550 ml   Net -370 ml       Exam:    BP (!) 98/58   Pulse 73   Temp 98.1 °F (36.7 °C) (Oral)   Resp 17   Ht  cream  Started on miconazole powder  Bath daily and as needed  Monitor skin for new breakdown      Additional work up or/and treatment plan may be added today or then after based on clinical progression. I am managing a portion of pt care. Some medical issues are handled by other specialists. Additional work up and treatment should be done in out pt setting by pt PCP and other out pt providers.      In addition to examining and evaluating pt, I spent additional time explaining care, normal/abnormal findings and treatment plan, reviewing patient's chart and adjusting/ reconciling medications. All of pt questions were answered. Counseling, diet and education were  provided. Case will be discussed with nursing staff when appropriate. Family will be updated if and when appropriate.    Diet: ADULT DIET; Regular  Diet NPO Exceptions are: Sips of Water with Meds    Code Status: Full Code    PT/OT Eval           Electronically signed by BALTAZAR Mckeon CNP on 2/8/2024 at 10:43 AM

## 2024-02-08 NOTE — PROGRESS NOTES
[facility]  Facility/Department: [facility]  Speech Language Pathology   Treatment Note          Mulu Pandya  1955  R243/R243-01  [x]   confirmed    Date: 2024      Restrictions/Precautions: Fall Risk, Seizure     ADULT DIET; Regular  Diet NPO Exceptions are: Sips of Water with Meds     Respiratory Status: Room air   No active isolations    Rehab Diagnosis: Impaired mobility and ADL's due to acute infart in the left superior mid parietal lobe MCA territory     Subjective:  Alert, Cooperative, and Pleasant      Pt's  present for session.  SLP provided education on goal progress and ideas for carryover at home.  All questions answered.  Pt plans to continue with ST via [facility].     Interventions used this date:  Speech Production, Expressive Language, Receptive Language, and Therapeutic Meal Monitor    Objective/Assessment:  Patient progressing towards goals:  Short Term Goal    Goal 1: Patient will read aloud and/or generate phrases-sentences using slow rate and over-articulation to promote speech intelligibility during structured tasks with SLP so that they may functionally communicate and express safety/medical concerns across all environments with 90% accuracy.  Not addressed   Goal 2: To decrease the presence of apraxia of speech and improve articulatory accuracy to adequately express wants and needs, the pt will complete structured articulation tasks following SLP model with min cues-fading cues for articulatory placement in 90% of opportunities with 1-3 syllables words at the phrase-sentence level.  3 syllable with /f,v/: 80% acc with model   5-6 syllable phrase strin% acc with model   Goal 3: Pt will answer questions involving a short paragraph just heard/read with 90% accuracy  Inferences: 30% acc I increasing to 75% acc with mod cues and repetitions.   Goal 4: Pt will complete convergent and divergent naming tasks with 90% accuracy to promote semantic organization and the overall  effectiveness and efficiency for expression of their wants, needs, feelings, and ideas.  Convergent naming tasking with abstract categories: 81% acc I increasing to 94% acc with min cues       Treatment/Activity Tolerance:  Patient tolerated treatment well    Plan:  Alter current POC (see above)    Pain Assessment:  Patient does not c/o pain.    Pain Re-assessment:  Patient does not c/o pain.    Patient/Caregiver Education:  Patient educated on session and progression towards goals.    Safety Devices:  All fall risk precautions in place        Speech Therapy Level of Assistance Scale    AUDITORY COMPREHENSION  Rating:  Modified Independent    VERBAL EXPRESSION  Rating:  Supervised Assistance-Minimal Assistance    MOTOR SPEECH  Rating: Minimal Assistance        Therapy Time  SLP Individual Minutes  Time In: 1100  Time Out: 1130  Minutes: 30       Signature: Electronically signed by DEL Garcia on 2/8/2024 at 11:57 AM

## 2024-02-08 NOTE — PLAN OF CARE
Problem: Safety - Adult  Goal: Free from fall injury  2/8/2024 0027 by Janet Larsen, RN  Outcome: Progressing  2/7/2024 1056 by Liza Portillo, RN  Outcome: Progressing

## 2024-02-08 NOTE — PROGRESS NOTES
Addendum     Date of Service: 2/8/2024 12:30 AM       Pt assessment completed. VSS. Pt denies pain. C/O itching \"back\". Dr. Carroll here and made aware-medicated with benadryl 50mg as ordered. LBM 2/7, pt with pure wic attached-urine yellow and cldy RICCO smell at this time.hydrocortisone applied to rash on back and Buttocks.  Lungs cl upper but remain very diminished. Spot check pulse ox @ 2330 was 87-89%-placed back on 2L per NC- pulse ox up to 95%. Question do nocturnal before D/C. Call light with in reach, bed alarm activated.         Electronically signed by Janet Larsen RN on 2/8/2024 at 6:39 AM pt bed alarm, alarming-pt sitting side of bed attempting to  socks-pt seems alittle confused. Pt states \"just waking up\" Usually uses call light appropriately. Pt unsteady with W/W to BR, poor safety awareness. Pt with Large brown formed BM-pericare completed. Pt assisted back to bed -pulse ox 90% RM- placed back on 2L per NC. Pulse ox up to 94%. Call light with in reach. Bed alarm activated.             ROS x10:  The patient also complains of severely impaired mobility and activities of daily living.  Otherwise no new problems with vision, hearing, nose, mouth, throat, dermal, cardiovascular, GI, , pulmonary, musculoskeletal, psychiatric or neurological. See also Acute Rehab PM&R H&P.       Vital signs:  BP (!) 98/58   Pulse 73   Temp 98.1 °F (36.7 °C) (Oral)   Resp 17   Ht 1.6 m (5' 3\")   Wt 73.5 kg (162 lb 2.2 oz)   SpO2 100%   BMI 28.72 kg/m²   I/O:   PO/Intake:  poor  PO intake,   regular with thin liquid diet-up at 90 degrees no straws    Bowel:   Continent-  1/28/2024- Incontinent of bladder and bowel.   Bladder:  no encinas--but uses purewick/External catheter on due to incontinence.   General:  Patient is well developed,   adequately nourished, and    well kempt.     HEENT:    Pupils equal, hearing intact to loud voice, external inspection of ear and nose benign.  Inspection of lips, tongue  hypoxemia, monitor vital signs, oxygen prn.  Focus on energy conservation-IV Rocephin- SP  IV Zithromax  SP  pulmonology   Sinus Tach,  Mixed hyperlipidemia,  Essential hypertension-Acute rehab to monitor heart rate and rhythm with the option of telemetry and the effects of chronotropic medication with respect to increasing physical activity and exercise in PT, OT, ADLs with medication titration to lowest effective dosing.  Continue blood signs every shift focusing on heart rate, rhythm and blood pressure checks with orthostatic checks-monitoring the effect of exercise, therapy and posture.  Consult hospitalist for backup medical and adjust/add medications.  Monitor heart rate and blood pressure as well as medications effects on vital signs before during and after therapy with especial focus on preventing orthostasis and falls risk.  Check CBC CMP and consult cardiology.    Chronic renal failure, stage 3a-Eliminate toxic medications, monitor I's and O's focusing on urine output, recheck BMP.    Intracranial hemorrhage sp acute infarction in the left superior mid parietal lobe MCA territory    Cerebral infarction due to occlusion of left middle cerebral artery with 2 cm acute intraparenchymal left frontal lobe hematoma-mitigate stroke risk factors and consult neurology    Dysphagia, oropharyngeal phase--SLP-up at 90 degrees when eating recheck modified barium swallow was refused by patient.  Tobacco addiction-NicoDerm titration stop smoking counseling    Late effects of CVA (cerebrovascular accident)-stroke risk mitigation    Urinary incontinence-UA/Culture from [facility] and sent to lab via tube system  Active chronic nausea and vomiting-IV fluids as needed as needed Zofran consult medical consider GI consult monitor for GI cause versus central cause-scatter a.m. medications Zofran versus Compazine as needed  Status post aspiration pneumonia-IV antibiotics discontinued, titrate O2, IV normal saline,

## 2024-02-08 NOTE — PROGRESS NOTES
OCCUPATIONAL THERAPY  INPATIENT REHAB TREATMENT NOTE  [facility]      NAME: Mulu Pandya  : 1955 (68 y.o.)  MRN: [id number]  CODE STATUS: Full Code  Room: R243/R243-01    Date of Service: 2024    Referring Physician: Dr Stacy  Rehab Diagnosis: Impaired mobility and ADL's due to acute infart in the left superior mid parietal lobe MCA territory    Hospital course:          Restrictions  Restrictions/Precautions  Restrictions/Precautions: Fall Risk, Seizure           Patient's date of birth confirmed: Yes    SAFETY:  Safety Devices  Type of devices: All fall risk precautions in place    SUBJECTIVE:  Subjective: \"it's enough!\" pt states several times when encouraged to shower/sponge bathe.    Pain at start of treatment: No    Pain at end of treatment: No      COGNITION:  Orientation  Overall Orientation Status: Within Functional Limits  Orientation Level: Oriented X4  Cognition  Arousal/Alertness: Appropriate responses to stimuli  Following Commands: Follows one step commands consistently  Attention Span: Appears intact  Memory: Appears intact  Safety Judgement: Decreased awareness of need for safety  Problem Solving: Assistance required to generate solutions;Assistance required to implement solutions  Insights: Fully aware of deficits  Initiation: Requires cues for some  Sequencing: Requires cues for some      OBJECTIVE:       Pt declines full shower on this date despite encouragement from both therapist and  who is present for family training. Pt agreeable to washing face and brushing teeth only and then declines further bathing. Stating she showered yesterday and will shower again tomorrow.       Grooming/Oral Hygiene  Assistance Level: Supervision;Increased time to complete;Verbal cues  Skilled Clinical Factors: seated. cues for kristen techniques for managing toothpaste. increased time for thorough denture care and washing face.  Upper Extremity Bathing  Skilled Clinical Factors:

## 2024-02-08 NOTE — PROGRESS NOTES
Orders for NPO after midnight tonight and biopsy of pancreatic mass received. Per NP Lala with Dr. Segovia's office the procedure needs to be done ASAP rather than outpatient. Pt and spouse aware of plan. Therapy staff updated. Electronically signed by Liza Portillo RN on 2/8/2024 at 10:51 AM

## 2024-02-09 LAB
ANION GAP SERPL CALCULATED.3IONS-SCNC: 9 MEQ/L (ref 9–15)
BASOPHILS # BLD: 0 K/UL (ref 0–0.2)
BASOPHILS NFR BLD: 0.4 %
BUN SERPL-MCNC: 20 MG/DL (ref 8–23)
CALCIUM SERPL-MCNC: 8.1 MG/DL (ref 8.5–9.9)
CHLORIDE SERPL-SCNC: 106 MEQ/L (ref 95–107)
CO2 SERPL-SCNC: 29 MEQ/L (ref 20–31)
CREAT SERPL-MCNC: 1.08 MG/DL (ref 0.5–0.9)
EOSINOPHIL # BLD: 0.2 K/UL (ref 0–0.7)
EOSINOPHIL NFR BLD: 3 %
ERYTHROCYTE [DISTWIDTH] IN BLOOD BY AUTOMATED COUNT: 14.5 % (ref 11.5–14.5)
GLUCOSE BLD-MCNC: 139 MG/DL (ref 70–99)
GLUCOSE BLD-MCNC: 171 MG/DL (ref 70–99)
GLUCOSE BLD-MCNC: 173 MG/DL (ref 70–99)
GLUCOSE BLD-MCNC: 174 MG/DL (ref 70–99)
GLUCOSE BLD-MCNC: 93 MG/DL (ref 70–99)
GLUCOSE SERPL-MCNC: 125 MG/DL (ref 70–99)
HCT VFR BLD AUTO: 36.9 % (ref 37–47)
HGB BLD-MCNC: 11.6 G/DL (ref 12–16)
INR PPP: 1
LYMPHOCYTES # BLD: 2.3 K/UL (ref 1–4.8)
LYMPHOCYTES NFR BLD: 28.3 %
MAGNESIUM SERPL-MCNC: 2.1 MG/DL (ref 1.7–2.4)
MCH RBC QN AUTO: 28.9 PG (ref 27–31.3)
MCHC RBC AUTO-ENTMCNC: 31.4 % (ref 33–37)
MCV RBC AUTO: 91.8 FL (ref 79.4–94.8)
MONOCYTES # BLD: 0.9 K/UL (ref 0.2–0.8)
MONOCYTES NFR BLD: 10.7 %
NEUTROPHILS # BLD: 4.4 K/UL (ref 1.4–6.5)
NEUTS SEG NFR BLD: 55.8 %
PERFORMED ON: ABNORMAL
PERFORMED ON: NORMAL
PLATELET # BLD AUTO: 284 K/UL (ref 130–400)
POTASSIUM SERPL-SCNC: 3.5 MEQ/L (ref 3.4–4.9)
PROTHROMBIN TIME: 14.2 SEC (ref 12.3–14.9)
RBC # BLD AUTO: 4.02 M/UL (ref 4.2–5.4)
SODIUM SERPL-SCNC: 144 MEQ/L (ref 135–144)
WBC # BLD AUTO: 7.9 K/UL (ref 4.8–10.8)

## 2024-02-09 PROCEDURE — 94761 N-INVAS EAR/PLS OXIMETRY MLT: CPT

## 2024-02-09 PROCEDURE — 80048 BASIC METABOLIC PNL TOTAL CA: CPT

## 2024-02-09 PROCEDURE — 92507 TX SP LANG VOICE COMM INDIV: CPT

## 2024-02-09 PROCEDURE — 94640 AIRWAY INHALATION TREATMENT: CPT

## 2024-02-09 PROCEDURE — 99232 SBSQ HOSP IP/OBS MODERATE 35: CPT | Performed by: INTERNAL MEDICINE

## 2024-02-09 PROCEDURE — 99232 SBSQ HOSP IP/OBS MODERATE 35: CPT | Performed by: PSYCHIATRY & NEUROLOGY

## 2024-02-09 PROCEDURE — 97535 SELF CARE MNGMENT TRAINING: CPT

## 2024-02-09 PROCEDURE — 6370000000 HC RX 637 (ALT 250 FOR IP): Performed by: FAMILY MEDICINE

## 2024-02-09 PROCEDURE — 97112 NEUROMUSCULAR REEDUCATION: CPT

## 2024-02-09 PROCEDURE — 36415 COLL VENOUS BLD VENIPUNCTURE: CPT

## 2024-02-09 PROCEDURE — 83735 ASSAY OF MAGNESIUM: CPT

## 2024-02-09 PROCEDURE — 85610 PROTHROMBIN TIME: CPT

## 2024-02-09 PROCEDURE — 97116 GAIT TRAINING THERAPY: CPT

## 2024-02-09 PROCEDURE — 6370000000 HC RX 637 (ALT 250 FOR IP): Performed by: PHYSICAL MEDICINE & REHABILITATION

## 2024-02-09 PROCEDURE — 1180000000 HC REHAB R&B

## 2024-02-09 PROCEDURE — 97110 THERAPEUTIC EXERCISES: CPT

## 2024-02-09 PROCEDURE — 99231 SBSQ HOSP IP/OBS SF/LOW 25: CPT | Performed by: NURSE PRACTITIONER

## 2024-02-09 PROCEDURE — 85025 COMPLETE CBC W/AUTO DIFF WBC: CPT

## 2024-02-09 PROCEDURE — 2700000000 HC OXYGEN THERAPY PER DAY

## 2024-02-09 RX ORDER — ESCITALOPRAM OXALATE 10 MG/1
15 TABLET ORAL DAILY
Status: DISCONTINUED | OUTPATIENT
Start: 2024-02-10 | End: 2024-02-10 | Stop reason: HOSPADM

## 2024-02-09 RX ORDER — ACETAMINOPHEN 325 MG/1
650 TABLET ORAL EVERY 4 HOURS PRN
Status: DISCONTINUED | OUTPATIENT
Start: 2024-02-09 | End: 2024-02-10 | Stop reason: HOSPADM

## 2024-02-09 RX ORDER — SODIUM CHLORIDE 9 MG/ML
INJECTION, SOLUTION INTRAVENOUS PRN
Status: DISCONTINUED | OUTPATIENT
Start: 2024-02-09 | End: 2024-02-09

## 2024-02-09 RX ORDER — ROSUVASTATIN CALCIUM 40 MG/1
40 TABLET, COATED ORAL NIGHTLY
Status: DISCONTINUED | OUTPATIENT
Start: 2024-02-09 | End: 2024-02-10 | Stop reason: HOSPADM

## 2024-02-09 RX ORDER — SODIUM CHLORIDE 9 MG/ML
INJECTION, SOLUTION INTRAVENOUS CONTINUOUS
Status: CANCELLED | OUTPATIENT
Start: 2024-02-09

## 2024-02-09 RX ORDER — GLUCAGON 1 MG/ML
1 KIT INJECTION PRN
Status: DISCONTINUED | OUTPATIENT
Start: 2024-02-09 | End: 2024-02-10 | Stop reason: HOSPADM

## 2024-02-09 RX ORDER — BUDESONIDE AND FORMOTEROL FUMARATE DIHYDRATE 80; 4.5 UG/1; UG/1
2 AEROSOL RESPIRATORY (INHALATION)
Status: DISCONTINUED | OUTPATIENT
Start: 2024-02-09 | End: 2024-02-10 | Stop reason: HOSPADM

## 2024-02-09 RX ORDER — CHOLECALCIFEROL (VITAMIN D3) 50 MCG
2000 TABLET ORAL
Qty: 60 TABLET | Refills: 2 | Status: SHIPPED | OUTPATIENT
Start: 2024-02-10 | End: 2024-02-10

## 2024-02-09 RX ORDER — SODIUM CHLORIDE 9 MG/ML
INJECTION, SOLUTION INTRAVENOUS PRN
Status: CANCELLED | OUTPATIENT
Start: 2024-02-09

## 2024-02-09 RX ORDER — SODIUM CHLORIDE 9 MG/ML
25 INJECTION, SOLUTION INTRAVENOUS PRN
Status: DISCONTINUED | OUTPATIENT
Start: 2024-02-09 | End: 2024-02-09

## 2024-02-09 RX ORDER — SODIUM CHLORIDE 0.9 % (FLUSH) 0.9 %
5-40 SYRINGE (ML) INJECTION PRN
Status: DISCONTINUED | OUTPATIENT
Start: 2024-02-09 | End: 2024-02-09

## 2024-02-09 RX ORDER — INSULIN LISPRO 100 [IU]/ML
0-4 INJECTION, SOLUTION INTRAVENOUS; SUBCUTANEOUS NIGHTLY
Status: DISCONTINUED | OUTPATIENT
Start: 2024-02-09 | End: 2024-02-10 | Stop reason: HOSPADM

## 2024-02-09 RX ORDER — SODIUM CHLORIDE 0.9 % (FLUSH) 0.9 %
5-40 SYRINGE (ML) INJECTION PRN
Status: CANCELLED | OUTPATIENT
Start: 2024-02-09

## 2024-02-09 RX ORDER — BUDESONIDE AND FORMOTEROL FUMARATE DIHYDRATE 80; 4.5 UG/1; UG/1
2 AEROSOL RESPIRATORY (INHALATION)
Qty: 10.2 G | Refills: 3 | Status: SHIPPED | OUTPATIENT
Start: 2024-02-10 | End: 2024-02-10

## 2024-02-09 RX ORDER — ONDANSETRON 2 MG/ML
4 INJECTION INTRAMUSCULAR; INTRAVENOUS EVERY 6 HOURS PRN
Status: DISCONTINUED | OUTPATIENT
Start: 2024-02-09 | End: 2024-02-10 | Stop reason: HOSPADM

## 2024-02-09 RX ORDER — CYANOCOBALAMIN 1000 UG/ML
1000 INJECTION, SOLUTION INTRAMUSCULAR; SUBCUTANEOUS WEEKLY
Status: DISCONTINUED | OUTPATIENT
Start: 2024-02-14 | End: 2024-02-10 | Stop reason: HOSPADM

## 2024-02-09 RX ORDER — SODIUM CHLORIDE 0.9 % (FLUSH) 0.9 %
5-40 SYRINGE (ML) INJECTION EVERY 12 HOURS SCHEDULED
Status: CANCELLED | OUTPATIENT
Start: 2024-02-09

## 2024-02-09 RX ORDER — CINNAMON
1 OIL (ML) MISCELLANEOUS 4 TIMES DAILY
Status: DISCONTINUED | OUTPATIENT
Start: 2024-02-09 | End: 2024-02-10 | Stop reason: HOSPADM

## 2024-02-09 RX ORDER — ONDANSETRON 4 MG/1
4 TABLET, ORALLY DISINTEGRATING ORAL EVERY 8 HOURS PRN
Status: DISCONTINUED | OUTPATIENT
Start: 2024-02-09 | End: 2024-02-10 | Stop reason: HOSPADM

## 2024-02-09 RX ORDER — BENZOCAINE/MENTHOL 6 MG-10 MG
LOZENGE MUCOUS MEMBRANE 3 TIMES DAILY
Status: DISCONTINUED | OUTPATIENT
Start: 2024-02-09 | End: 2024-02-10 | Stop reason: HOSPADM

## 2024-02-09 RX ORDER — ALBUTEROL SULFATE 2.5 MG/3ML
2.5 SOLUTION RESPIRATORY (INHALATION) EVERY 6 HOURS PRN
Status: DISCONTINUED | OUTPATIENT
Start: 2024-02-09 | End: 2024-02-10 | Stop reason: HOSPADM

## 2024-02-09 RX ORDER — SODIUM CHLORIDE 0.9 % (FLUSH) 0.9 %
5-40 SYRINGE (ML) INJECTION EVERY 12 HOURS SCHEDULED
Status: DISCONTINUED | OUTPATIENT
Start: 2024-02-09 | End: 2024-02-09

## 2024-02-09 RX ORDER — ROSUVASTATIN CALCIUM 40 MG/1
40 TABLET, COATED ORAL NIGHTLY
Qty: 30 TABLET | Refills: 3 | Status: SHIPPED | OUTPATIENT
Start: 2024-02-09 | End: 2024-02-10

## 2024-02-09 RX ORDER — INSULIN LISPRO 100 [IU]/ML
0-4 INJECTION, SOLUTION INTRAVENOUS; SUBCUTANEOUS
Status: DISCONTINUED | OUTPATIENT
Start: 2024-02-09 | End: 2024-02-10 | Stop reason: HOSPADM

## 2024-02-09 RX ORDER — UBIDECARENONE 100 MG
100 CAPSULE ORAL DAILY
Status: DISCONTINUED | OUTPATIENT
Start: 2024-02-10 | End: 2024-02-10 | Stop reason: HOSPADM

## 2024-02-09 RX ORDER — MECOBALAMIN 5000 MCG
5 TABLET,DISINTEGRATING ORAL NIGHTLY
Status: DISCONTINUED | OUTPATIENT
Start: 2024-02-09 | End: 2024-02-10 | Stop reason: HOSPADM

## 2024-02-09 RX ORDER — ALBUTEROL SULFATE 90 UG/1
2 AEROSOL, METERED RESPIRATORY (INHALATION) EVERY 4 HOURS PRN
Status: DISCONTINUED | OUTPATIENT
Start: 2024-02-09 | End: 2024-02-10 | Stop reason: HOSPADM

## 2024-02-09 RX ORDER — INSULIN GLARGINE 100 [IU]/ML
10 INJECTION, SOLUTION SUBCUTANEOUS NIGHTLY
Status: DISCONTINUED | OUTPATIENT
Start: 2024-02-09 | End: 2024-02-10 | Stop reason: HOSPADM

## 2024-02-09 RX ORDER — SODIUM CHLORIDE 9 MG/ML
INJECTION, SOLUTION INTRAVENOUS CONTINUOUS
Status: DISCONTINUED | OUTPATIENT
Start: 2024-02-09 | End: 2024-02-09

## 2024-02-09 RX ORDER — ALBUTEROL SULFATE 90 UG/1
2 AEROSOL, METERED RESPIRATORY (INHALATION) EVERY 4 HOURS PRN
Qty: 18 G | Refills: 3 | Status: SHIPPED | OUTPATIENT
Start: 2024-02-09 | End: 2024-02-10

## 2024-02-09 RX ORDER — VITAMIN B COMPLEX
2000 TABLET ORAL
Status: DISCONTINUED | OUTPATIENT
Start: 2024-02-09 | End: 2024-02-10 | Stop reason: HOSPADM

## 2024-02-09 RX ORDER — NICOTINE 21 MG/24HR
1 PATCH, TRANSDERMAL 24 HOURS TRANSDERMAL EVERY 24 HOURS
Status: DISCONTINUED | OUTPATIENT
Start: 2024-02-09 | End: 2024-02-10 | Stop reason: HOSPADM

## 2024-02-09 RX ORDER — BISACODYL 10 MG
10 SUPPOSITORY, RECTAL RECTAL DAILY PRN
Status: DISCONTINUED | OUTPATIENT
Start: 2024-02-09 | End: 2024-02-10 | Stop reason: HOSPADM

## 2024-02-09 RX ORDER — DIPHENHYDRAMINE HCL 25 MG
50 TABLET ORAL NIGHTLY PRN
Status: DISCONTINUED | OUTPATIENT
Start: 2024-02-09 | End: 2024-02-10 | Stop reason: HOSPADM

## 2024-02-09 RX ADMIN — ESCITALOPRAM OXALATE 15 MG: 10 TABLET ORAL at 09:11

## 2024-02-09 RX ADMIN — ROSUVASTATIN CALCIUM 40 MG: 40 TABLET, FILM COATED ORAL at 20:48

## 2024-02-09 RX ADMIN — Medication 5 MG: at 20:48

## 2024-02-09 RX ADMIN — METOPROLOL TARTRATE 12.5 MG: 25 TABLET, FILM COATED ORAL at 20:48

## 2024-02-09 RX ADMIN — INSULIN GLARGINE 10 UNITS: 100 INJECTION, SOLUTION SUBCUTANEOUS at 20:48

## 2024-02-09 RX ADMIN — BUDESONIDE AND FORMOTEROL FUMARATE DIHYDRATE 2 PUFF: 80; 4.5 AEROSOL RESPIRATORY (INHALATION) at 04:25

## 2024-02-09 RX ADMIN — BUDESONIDE AND FORMOTEROL FUMARATE DIHYDRATE 2 PUFF: 80; 4.5 AEROSOL RESPIRATORY (INHALATION) at 16:35

## 2024-02-09 RX ADMIN — HYDROCORTISONE: 1 CREAM TOPICAL at 20:56

## 2024-02-09 RX ADMIN — Medication 2000 UNITS: at 16:20

## 2024-02-09 RX ADMIN — Medication 100 MG: at 09:11

## 2024-02-09 RX ADMIN — HYDROCORTISONE: 1 CREAM TOPICAL at 09:11

## 2024-02-09 RX ADMIN — Medication 1 DROP: at 09:15

## 2024-02-09 RX ADMIN — ALBUTEROL SULFATE 2 PUFF: 90 AEROSOL, METERED RESPIRATORY (INHALATION) at 04:25

## 2024-02-09 RX ADMIN — HYDROCORTISONE: 1 CREAM TOPICAL at 16:23

## 2024-02-09 ASSESSMENT — ENCOUNTER SYMPTOMS
CHEST TIGHTNESS: 0
COUGH: 0
TROUBLE SWALLOWING: 0
SHORTNESS OF BREATH: 0
COLOR CHANGE: 0
VOMITING: 0
WHEEZING: 0
NAUSEA: 0

## 2024-02-09 ASSESSMENT — PAIN SCALES - GENERAL: PAINLEVEL_OUTOF10: 0

## 2024-02-09 NOTE — PROGRESS NOTES
Pt. Chart reviewed. Signed and held orders evaluated. List cleaned up.   Pt. Was to go for a endoscopic ultrasound with Dr. Segovia. However this was canceled by the physician.   Pt. Was updated this test will be done outpatient.  Pt. Home medication list was complete per order.

## 2024-02-09 NOTE — PROGRESS NOTES
[facility] Occupational Therapy      Date: 2024  Patient Name: Mulu Pandya        MRN: [id number]  Account: [id number]   : 1955  (68 y.o.)  Room: [place] [address]    Chart reviewed, attempted OT at 0720 for Get UP and Go/make up minutes. Patient not seen 2° to:    Other: Pt stated she did not need anything, declined toileting/wiping face off.    Will attempt again when able.    Electronically signed by ABIGAIL Jones on 2024 at 9:14 AM

## 2024-02-09 NOTE — PROGRESS NOTES
Gastroenterology Progress Note    Mulu Pandya is a 68 y.o. female patient.  Hospitalization Day:17    Chief C/O: Pancreatic cyst/lesion    SUBJECTIVE: Seen in rehab, no abdominal pain, no weight loss, no diarrhea, no constipation, tolerating diet, plan for discharge home tomorrow    ROS:  Gastrointestinal ROS: no abdominal pain, change in bowel habits, or black or bloody stools    Physical    VITALS:  /61   Pulse 86   Temp 98.8 °F (37.1 °C) (Oral)   Resp 17   Ht 1.6 m (5' 3\")   Wt 73.5 kg (162 lb 2.2 oz)   SpO2 98%   BMI 28.72 kg/m²   TEMPERATURE:  Current - Temp: 98.8 °F (37.1 °C); Max - Temp  Av.3 °F (36.8 °C)  Min: 97.7 °F (36.5 °C)  Max: 98.8 °F (37.1 °C)    General:  Alert and oriented,  No apparent distress  Skin- without jaundice  Eyes: anicteric sclera  Cardiac: RRR, Nl s1s2, without murmurs  Lungs CTA Bilaterally, normal effort  Abdomen soft, ND, NT, no HSM, Bowel sounds normal  Ext: without edema  Neuro: no asterixis     Data    Data Review:    Recent Labs     24  0457   WBC 7.9   HGB 11.6*   HCT 36.9*   MCV 91.8        Recent Labs     24  0457      K 3.5      CO2 29   BUN 20   CREATININE 1.08*     No results for input(s): \"AST\", \"ALT\", \"ALB\", \"BILIDIR\", \"BILITOT\", \"ALKPHOS\" in the last 72 hours.  No results for input(s): \"LIPASE\", \"AMYLASE\" in the last 72 hours.  Recent Labs     24  0457   PROTIME 14.2   INR 1.0           ASSESSMENT:  67 y/o female in rehab for PT/OT after recent stroke with hemorrhagic effect, during her hospital course patient developed shortness of breath & tachycardia and had CT of the chest notable for bilateral PEs with incidental finding of  large low-density pancreatic body mass measuring 9.8 x 6.4 cm possibly a pseudocyst, unable to be fully characterized with recommendations for CT of the abdomen and pelvis with IV contrast.  Patient has no history of chronic pancreatitis, reports daily alcohol most of her adult life,

## 2024-02-09 NOTE — DISCHARGE INSTR - COC
Continuity of Care Form    Patient Name: Mulu Pandya   :  1955  MRN:  [id number]    Admit date:  2024  Discharge date:  2/10/24    Code Status Order: Full Code   Advance Directives:     Admitting Physician:  Jayne Stacy DO  PCP: Eileen Edmondson APRN - NP    Discharging Nurse: Miroslava Portillo RN  Discharging Hospital Unit/Room#: R243/R243-01  Discharging Unit Phone Number: [phone]    Emergency Contact:   Extended Emergency Contact Information  Primary Emergency Contact: Shaq Pandya  Address: [id number] [facility]  Home Phone: [phone]  Mobile Phone: [phone]  Relation: Spouse    Past Surgical History:  Past Surgical History:   Procedure Laterality Date    BREAST SURGERY Left 2009    Benign bx    FRACTURE SURGERY      INVASIVE VASCULAR N/A 2024    Vena cava filter insertion - cath lab rehab 243 performed by Jin Gill DO at [facility]ATH [facility]    TUBAL LIGATION         Immunization History:   Immunization History   Administered Date(s) Administered    COVID-19, MODERNA BLUE border, Primary or Immunocompromised, (age 12y+), IM, 100 mcg/0.5mL 2021, 2021, 2021, 2022    COVID-19, MODERNA Bivalent, (age 12y+), IM, 50 mcg/0.5 mL 2022    COVID-19, MODERNA Booster BLUE border, (age 18y+), IM, 50mcg/0.25mL 2021    COVID-19, PFIZER Bivalent, DO NOT Dilute, (age 12y+), IM, 30 mcg/0.3 mL 2023    Influenza Nasal 2014    Influenza Virus Vaccine 10/25/2011, 2015, 10/13/2017    Influenza, FLUARIX, FLULAVAL, FLUZONE (age 6 mo+) AND AFLURIA, (age 3 y+), PF, 0.5mL 2016, 10/13/2017    Influenza, FLUZONE (age 65 y+), High Dose, 0.7mL 10/07/2020, 10/07/2020, 2021, 10/05/2022    Influenza, High Dose (Fluzone 65 yrs and older) 10/23/2018, 10/22/2019, 10/07/2020, 2021    Pneumococcal, PCV-13, PREVNAR 13, (age 6w+), IM, 0.5mL 2018    Pneumococcal, PPSV23, PNEUMOVAX 23, (age 2y+),  logical, thought processes intact, and able to concentrate and follow conversation    IV Access:  - None    Nursing Mobility/ADLs:  Walking   Independent  Transfer  Independent  Bathing  Independent  Dressing  Independent  Toileting  Independent  Feeding  Independent  Med Admin  Independent  Med Delivery   whole    Wound Care Documentation and Therapy:        Elimination:  Continence:   Bowel: Yes  Bladder: Yes  Urinary Catheter: None   Colostomy/Ileostomy/Ileal Conduit: No       Date of Last BM: 2/10/24  No intake or output data in the 24 hours ending 02/09/24 0946  I/O last 3 completed shifts:  In: 180 [P.O.:180]  Out: 550 [Urine:550]    Safety Concerns:     At Risk for Falls    Impairments/Disabilities:      None    Nutrition Therapy:  Current Nutrition Therapy:   - Oral Diet:  General    Routes of Feeding: Oral  Liquids: Thin Liquids  Daily Fluid Restriction: no  Last Modified Barium Swallow with Video (Video Swallowing Test): done on 1/18    Treatments at the Time of Hospital Discharge:   Respiratory Treatments: Inhalers  Oxygen Therapy:   Wear oxygen set at 2L/min at night.  Ventilator:    - No ventilator support    Rehab Therapies: Physical Therapy, Occupational Therapy, and Speech/Language Therapy  Weight Bearing Status/Restrictions: No weight bearing restrictions  Other Medical Equipment (for information only, NOT a DME order):  walker  Other Treatments: N/A    Patient's personal belongings (please select all that are sent with patient):  Glasses, dentures, and all other personal belongings sent home with patient.     RN SIGNATURE:  Electronically signed by Liza Portillo RN on 2/10/24 at 2:10 PM EST    CASE MANAGEMENT/SOCIAL WORK SECTION    Inpatient Status Date: Admitted to inpatient on 1/23/24    Readmission Risk Assessment Score:  Readmission Risk              Risk of Unplanned Readmission:  20           Discharging to Facility/ Agency   Name: [facility]  Address:  Phone:

## 2024-02-09 NOTE — CARE COORDINATION
LSW met with pt and spouse and discussed discharge for 2/10, both confirmed that they feel ready to discharge pt home. LSW provided wheeled walker from [facility] and discussed the need to call [phone] once home to schedule delivery of the O2 concentrator for nighttime use. Both verbalized understanding and are in agreement. Referral made to [facility] per their request. Patient satisfaction survey completed. Electronically signed by ELEAZAR Friend, KASSANDRA on 2/9/2024 at 4:21 PM

## 2024-02-09 NOTE — PROGRESS NOTES
Physical Therapy Rehab Treatment Note  Facility/Department: [facility]  Room: [place] [address]       NAME: Mulu Pandya  : 1955 (68 y.o.)  MRN: [id number]  CODE STATUS: Full Code    Date of Service: 2024     Restrictions:  Restrictions/Precautions: Fall Risk, Seizure     SUBJECTIVE: Pt states she is ready to go home tomorrow.       Pain   Pt denies    OBJECTIVE:            Transfers  Sit to Stand: Modified independent  Stand to Sit: Supervision  Bed to Chair: Supervision  Car Transfer: Supervision    Ambulation  Surface: Level tile;Uneven;Carpet  Device: Rolling Walker  Assistance: Supervision  Quality of Gait: Mild flexed trunk and hips  Distance: 150ft  Comments: SPO2 94% HR 109BPM ay rest on room air after gait.    Stairs/Curb  Stairs?: Yes  Stairs  # Steps : 4  Stairs Height: 6\"  Rails: Left ascending (B grasp)  Assistance: Stand by assistance        PT Exercises  Dynamic Standing Balance Exercises: Utilizing reacher to pick objects up from floor without assist.           ASSESSMENT/PROGRESS TOWARDS GOALS:   Assessment: Pt has met goals.    Goals:  Long Term Goals  Long Term Goal 1: Pt to complete bed mobility with indep-met  Long Term Goal 2: Pt to complete transfers with supervision-met  Long Term Goal 3: Pt to ambulate 150ft with LRD and Supervision-met  Long Term Goal 4: Pt to complete 3 steps with HR and SBA-met    PLAN OF CARE/Safety: Cont per POC.       Therapy Time:   Individual   Time In 1130   Time Out 1200   Minutes 30      Minutes:  Transfer/Bed mobility trainin  Gait trainin  Neuro re education:5  Therapeutic ex:0    Melissa Swan PTA, 24 at 12:22 PM

## 2024-02-09 NOTE — PROGRESS NOTES
Pt asleep with nocturnal pulse ox attached. Pt was assisted to BR x1 with W/W-pt unsteady, trunk flexed forward, ataxic gait - and  pt with poor safety awareness. did do \"family training\" 2/8- with therapies.call light with in reach. Bed alarm activated.        Electronically signed by Janet Larsen RN on 2/9/2024 at 4:12 AM spot check BS @  0200 -was 92. Pt asleep with nocturnal pulse ox attached. Call light with in reach. Bed alarm activated.

## 2024-02-09 NOTE — PROGRESS NOTES
INPATIENT PROGRESS NOTES    PATIENT NAME: Mulu Pandya  MRN: [id number]  SERVICE DATE:  February 9, 2024   SERVICE TIME:  3:29 PM      PRIMARY SERVICE: Pulmonary Disease    CHIEF COMPLAIN: Pulmonary embolism      INTERVAL HPI: Patient seen and examined at bedside, Interval Notes, orders reviewed. Nursing notes noted  Patient is feeling better.  She is using 2 L O2 during sleep.  Currently on room air O2 saturation 98%.  No complaint of chest pain.  No cough.  No shortness of breath.  She is on Symbicort 2 puff twice daily and albuterol HFA.         OBJECTIVE   I/O:24HR INTAKE/OUTPUT:  No intake or output data in the 24 hours ending 02/09/24 1529    No intake/output data recorded.  Body mass index is 28.72 kg/m².    PHYSICAL EXAM:  Vitals:  /61   Pulse 86   Temp 98.8 °F (37.1 °C) (Oral)   Resp 17   Ht 1.6 m (5' 3\")   Wt 73.5 kg (162 lb 2.2 oz)   SpO2 98%   BMI 28.72 kg/m²   General: Alert, awake .comfortable in bed, No distress.  Head: Atraumatic , Normocephalic   Eyes: PERRL. No sclera icterus. No conjunctival injection. No discharge   ENT: No nasal  discharge. Pharynx clear.  Neck:  Trachea midline. No thyromegaly, no JVD, No cervical adenopathy.  Chest : Bilaterally symmetrical ,Normal effort,  No accessory muscle use  Lung : Diminished breath sound bilaterally No Rales. No wheezing. No rhonchi.   Heart:: Normal rate. Regular rhythm. No mumur ,  Rub or gallop  ABD: Non-tender. Non-distended. No masses. No organmegaly. Normal bowel sounds. No hernia.  Ext : No Pitting both leg , No Cyanosis No clubbing  Neuro: Minimal right-sided weakness    Labs:  Recent Labs     02/09/24  0457   WBC 7.9   HGB 11.6*        Recent Labs     02/09/24  0457      K 3.5      CO2 29   BUN 20   CREATININE 1.08*   GLUCOSE 125*     No results for input(s): \"AST\", \"ALT\", \"ALB\", \"BILITOT\", \"ALKPHOS\" in the last 72 hours.      MV Settings:          No results for input(s): \"PHART\", \"FDM8KNP\", \"PO2ART\",

## 2024-02-09 NOTE — PROGRESS NOTES
Physical Therapy Rehab Treatment Note  [facility]/Department[facility]  [facility]       NAME: Mulu Pandya  : 1955 (68 y.o.)  MRN: [id number]  CODE STATUS: Full Code    Date of Service: 2024     Restrictions:  Restrictions/Precautions: Fall Risk, Seizure     SUBJECTIVE: Pt staes she is \"very ready\" to go home tomorrow.       Pain   Pt denies pain.    OBJECTIVE:         Bed mobility  Rolling to Left: Modified independent  Rolling to Right: Modified independent  Supine to Sit: Modified independent  Sit to Supine: Modified independent    Transfers  Sit to Stand: Modified independent  Stand to Sit: Modified independent  Bed to Chair: Modified independent  Car Transfer: Supervision    Ambulation  Surface: Level tile;Uneven;Carpet  Device: Rolling Walker  Assistance: Supervision  Quality of Gait: Mild flexed trunk and hips.  Decreased step length once fatigued.  Distance: 150ft  Comments: SPO2 94% HR 109BPM ay rest on room air after gait.        PT Exercises  A/AROM Exercises: seated AP, LAQ, march, hip add with ball, hip abd x20 ea  Reviewed and issued for HEP.  Dynamic Standing Balance Exercises: Utilizing reacher to pick objects up from floor without assist.        Education Provided: Home Exercise Program  Education  Education Given To: Patient  Education Provided: Home Exercise Program  Education Method: Demonstration;Verbal;Teach Back;Printed Information/Hand-outs  Education Outcome: Verbalized understanding;Demonstrated understanding        ASSESSMENT/PROGRESS TOWARDS GOALS:   Assessment: Pt has met goals.    Goals:  Long Term Goals  Long Term Goal 1: Pt to complete bed mobility with indep-met  Long Term Goal 2: Pt to complete transfers with supervision-met  Long Term Goal 3: Pt to ambulate 150ft with LRD and Supervision-met  Long Term Goal 4: Pt to complete 3 steps with HR and SBA-met    PLAN OF CARE/Safety: Pt scheduled to D/C home tomorrow.       Therapy Time:   Individual   Time In 1300

## 2024-02-09 NOTE — PROGRESS NOTES
OCCUPATIONAL THERAPY  INPATIENT REHAB TREATMENT NOTE  [facility]      NAME: Mulu Pandya  : 1955 (68 y.o.)  MRN: [id number]  CODE STATUS: Full Code  Room: R243/R243-01    Date of Service: 2024    Referring Physician: Dr Stacy  Rehab Diagnosis: Impaired mobility and ADL's due to acute infart in the left superior mid parietal lobe MCA territory    Hospital course:          Restrictions  Restrictions/Precautions  Restrictions/Precautions: Fall Risk, Seizure                 Patient's date of birth confirmed: Yes    SAFETY:  Safety Devices  Safety Devices in place: Yes  Type of devices: All fall risk precautions in place    SUBJECTIVE:\"My  took them home.\"(Pants)       Pain at start of treatment: No    Pain at end of treatment: No    Location: N/A  Description N/A  Nursing notified: No  RN: N/A  Intervention: Repositioned    COGNITION:         Patient's cognition will improve or maintain baseline to safely perform ADLs:    OBJECTIVE:         Feeding  Assistance Level: Independent  Grooming/Oral Hygiene  Assistance Level: Stand by assist;Increased time to complete  Upper Extremity Bathing  Assistance Level: Stand by assist  Lower Extremity Bathing  Assistance Level: Stand by assist  Upper Extremity Dressing  Assistance Level: Set-up  Lower Extremity Dressing  Assistance Level: Modified independent;Increased time to complete  Putting On/Taking Off Footwear  Assistance Level: Set-up  Toilet Transfers  Equipment: Grab bars  Assistance Level: Supervision  Tub/Shower Transfers  Type: Shower  Transfer From: Rolling walker  Transfer To: Shower chair with back  Additional Factors: With handrails  Assistance Level: Supervision                 Education:       Equipment recommendations:         ASSESSMENT:Pt making gains to increase independent function for adl completion.         PLAN OF CARE:  Strengthening, ROM, Balance training, Functional mobility training, Endurance training, Neuromuscular

## 2024-02-09 NOTE — PROGRESS NOTES
02/09/24 0425   RT Protocol   History Pulmonary Disease 2   Respiratory pattern 0   Breath sounds 2   Cough 0   Indications for Bronchodilator Therapy Decreased or absent breath sounds   Bronchodilator Assessment Score 4

## 2024-02-09 NOTE — PROGRESS NOTES
Assessment complete. Slept well. Patient denies pain.  Electronically signed by Dinah Brito LPN on 2/9/2024 at 8:48 AM

## 2024-02-09 NOTE — PROGRESS NOTES
[facility] OCCUPATIONAL THERAPY DISCHARGE SUMMARY- REHAB     Date: 2024  Patient Name: Mulu Pandya        MRN: [id number]  Account: [id number]   : 1955  (68 y.o.)  Room: [place] [address]    Diagnosis:  Impaired mobility and ADL's due to acute infart in the left superior mid parietal lobe MCA territory    Past Medical History:   Diagnosis Date    Anxiety     Asthma     Chronic back pain     Chronic renal failure, stage 3a (HCC)     COPD (chronic obstructive pulmonary disease) (HCC)     Depression     History of tinnitus     Hyperlipidemia     Hypertension     Obesity     Osteoporosis     2019 T -3.2    Peripheral vascular disease (HCC)     Proteinuria     Type II or unspecified type diabetes mellitus without mention of complication, not stated as uncontrolled      Past Surgical History:   Procedure Laterality Date    BREAST SURGERY Left 2009    Benign bx    FRACTURE SURGERY      INVASIVE VASCULAR N/A 2024    Vena cava filter insertion - cath lab rehab 243 [place] [facility] [place] [facility]    TUBAL LIGATION         Precautions:   Restrictions/Precautions: Fall Risk, Seizure     Social/Functional History:  Social/Functional History  Lives With: Spouse  Type of Home: House  Home Layout: One level  Home Access: Stairs to enter with rails  Entrance Stairs - Number of Steps: 3  Entrance Stairs - Rails: Left (up)  Bathroom Shower/Tub: Walk-in shower, Doors, Shower chair without back  Bathroom Toilet: Standard  Bathroom Equipment: Grab bars in shower, Shower chair, Hand-held shower  Bathroom Accessibility: Walker accessible, Wheelchair accessible  Home Equipment: Cane  Has the patient had two or more falls in the past year or any fall with injury in the past year?: Yes  ADL Assistance: Independent  Homemaking Assistance: Independent  Meal Prep Responsibility: No (spouse completes)  Laundry Responsibility: No (spouse completes)  Cleaning Responsibility: No (spouse completes)  Bill

## 2024-02-09 NOTE — PROGRESS NOTES
OCCUPATIONAL THERAPY  INPATIENT REHAB TREATMENT NOTE  [facility]      NAME: Mulu Pandya  : 1955 (68 y.o.)  MRN: [id number]  CODE STATUS: Full Code  Room: R243/R243-01    Date of Service: 2024    Referring Physician: Dr Stacy  Rehab Diagnosis: Impaired mobility and ADL's due to acute infart in the left superior mid parietal lobe MCA territory    Hospital course:        Restrictions  Restrictions/Precautions  Restrictions/Precautions: Fall Risk, Seizure         Patient's date of birth confirmed: Yes    SAFETY:  Safety Devices  Type of devices: All fall risk precautions in place    SUBJECTIVE:   \"I am ready\"    Pain at start of treatment: No    Pain at end of treatment: No    OBJECTIVE:    While seated, challenged strength, activity tolerance, ROM, and coordination for improved ADL performance.    Using towels with the BUEs (focused on affected RUE) targeted gravity eliminated/reduced movements focusing on scapular protraction/retraction, shoulder flexion/rotation/horizontal abduction, and elbow flexion/extension.  Pt with good ability to follow instructions but needed assist to maintain rep count   Provided BUE HEP handout for these exercises     Completed theraputty (min resistance) exercises and tasks. Focused on manipulation of the putty which required  strength, various pinch styles, dexterity, and compound movements of the digits to alter putty according to instruction.   Provided patient with verbal exercises to complete and theraputty handout     Also instructed pt in completing sign language HEP for isolated digit movement    Pt educated regarding need for regular completion     Pt returned to bed at end of session: SBA for pivot transfer and bed mobility      Education:  Education  Education Given To: Patient  Education Provided: Role of Therapy;Plan of Care;Home Exercise Program;Precautions;Safety  Education Method: Verbal;Demonstration  Education Outcome: Continued education

## 2024-02-09 NOTE — PLAN OF CARE
Problem: Safety - Adult  Goal: Free from fall injury  2/9/2024 0128 by Janet Larsen, RN  Outcome: Progressing  2/8/2024 1249 by Liza Portillo, RN  Outcome: Progressing

## 2024-02-09 NOTE — PROGRESS NOTES
Pt assessment was completed. VSS. Pt denies pain -pt again was placed on oxygen for pulse ox of 91%-2Liters per NC. However RT here and placed pt on nocturnal pulse ox and removed oxygen. Pt lungs clear with diminished bases. Also noted 1 pluds Kaveh LE edema.   Pt denies any cough. Pt prefers to leave pure wic off tonight. Hydrocortisone applied to upper back for fine, fading, red pin point rash.  Request benadryl  for \"itching\". Gave with HS meds. Rash to Kaveh buttocks also looks better-hive like rash. Pt took little fluid with meds. Accu check rechecked for 513 reading, was 198 when rechecked. Will monitor. .  Call light with in reach. Bed alarm activated will spot check BS.

## 2024-02-09 NOTE — PROGRESS NOTES
\"stroke alert\" been called?->No  What reading provider will be dictating this exam?->CRC    FINDINGS:  BRAIN/VENTRICLES: There is a stable small 2 cm left frontal lobe  periventricular hematoma identified.  There is no significant surrounding  edema or mass effect.  Stable encephalomalacia areas identified from old  insult within the right temporal lobe with evolving insult identified within  the left parietal lobe.  No abnormal extra-axial fluid collection.  Mild ex  vacuole dilatation seen of the ventricular system.  Minimal chronic small  vessel ischemic changes seen within the periventricular and subcortical white  matter to suggest chronic small vessel ischemic change.  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    ORBITS: The visualized portion of the orbits demonstrate no acute abnormality.    SINUSES: The visualized paranasal sinuses and mastoid air cells demonstrate  no acute abnormality.    SOFT TISSUES/SKULL:  No acute abnormality of the visualized skull or soft  tissues.    Impression  Stable parenchymal hemorrhage involving the left frontal periventricular  white matter.  No significant surrounding edema or mass effect.  Evolving  insult in the left parietal lobe with encephalomalacia change stable within  the right temporal lobe.  No results found for this or any previous visit.  No results found for this or any previous visit.      Carotid duplex: No results found for this or any previous visit.  No results found for this or any previous visit.  No results found for this or any previous visit.      Echo No results found for this or any previous visit.            Assessment/Plan:    Acute infarct in the left superior mid parietal lobe MCA territory with minimal mass effect.  There is no change in small acute intraparenchymal hemorrhage in the left anterior parietal periventricular white matter.  No mass effect or edema.   follow-up    CT scan reviewed and there appears to be still some hemorrhagic areas at the transformation seen.  I do not think that this is a cavernoma as MRI was already done in January which had not shown this findings.  Will follow this up again in another week or 2.      Zheng Chavez MD, VANDANA  Diplomate, American Board of Psychiatry & Neurology  Board Certified in Vascular Neurology  Board Certified in Neuromuscular Medicine  Certified in Neurorehabilitation     Collaborating physicians: Dr Chavez    Electronically signed by Zheng Chavez MD on 2/9/2024 at 3:44 PM     Improve po intake >50%

## 2024-02-09 NOTE — CARE COORDINATION
[facility]  INPATIENT REHABILITATION  UPDATE NOTE  Room: R243/R243-01  Admit Date: 2024       Date: 2024  Patient Name: Mulu Pandya        MRN: [id number]    : 1955  (68 y.o.)  Gender: female        Anticipated Discharge Plan: Pt to discharge 2/10 home with spouse. Referral made to [facility] for continued therapy. Orders placed for wheeled walker and nocturnal O2 through [employer]. Family training was completed and spouse demonstrated ability to provide care for pt. No concerns at this time, pt set to discharge 2/10.      REHAB DIAGNOSIS:   Diagnosis: Impaired mobility and ADL's due to acute infart in the left superior mid parietal lobe MCA territory    CO MORBIDITIES:    Past Medical History:   Diagnosis Date    Anxiety     Asthma     Chronic back pain     Chronic renal failure, stage 3a (HCC)     COPD (chronic obstructive pulmonary disease) (HCC)     Depression     History of tinnitus     Hyperlipidemia     Hypertension     Obesity     Osteoporosis      T -3.2    Peripheral vascular disease (HCC)     Proteinuria     Type II or unspecified type diabetes mellitus without mention of complication, not stated as uncontrolled      Past Surgical History:   Procedure Laterality Date    BREAST SURGERY Left 2009    Benign bx    FRACTURE SURGERY      INVASIVE VASCULAR N/A 2024    Vena cava filter insertion - cath lab rehab 243 performed by Jin Gill DO at [facility] [place] [facility]    TUBAL LIGATION          Last set of vitals:  Vital Signs  Temp: 98.8 °F (37.1 °C)  Temp Source: Oral  Pulse: 86  Heart Rate Source: Monitor  Respirations: 17  BP: 105/61  MAP (Calculated): 76  MAP (mmHg): 74  BP Location: Right upper arm  BP Method: Automatic  Patient Position: Other (Comment)    Results/Findings:   Labs:   Recent Labs     24  0457   WBC 7.9   HGB 11.6*   HCT 36.9*        Recent Labs     24  045      K 3.5      CO2 29   BUN 20  Minimal Assistance  Comment: Unable to assess. Pt declines  Gait:   Ambulation  Surface: Level tile  Device: Rolling Walker  Other Apparatus: O2 (2L)  Assistance: Minimal assistance  Quality of Gait: shuffling steps with decreased step length; downward gaze and slow paced  Gait Deviations: Slow Vianey;Decreased step length;Decreased step height  Distance: 20ft with 1 turn  Comments: Pt declines  Stairs:  Stairs  # Steps : 3  Stairs Height: 6\"  Rails: Bilateral  Curbs: 4\"  Device: Rolling walker  Assistance: Minimal assistance  Comment: VCs for sequencing.            Current status:   Bed mobility:   Supervision 2/6/24  Transfers:  Transfers  Sit to Stand: Modified independent  Stand to Sit: Supervision  Bed to Chair: Supervision  Car Transfer: Supervision  Gait:   Ambulation  Surface: Level tile;Uneven;Carpet  Device: Rolling Walker  Assistance: Supervision  Quality of Gait: Mild flexed trunk and hips  Distance: 150ft  Comments: SPO2 94% HR 109BPM ay rest on room air after gait.  Stairs:  Stairs/Curb  Stairs?: Yes  Stairs  # Steps : 4  Stairs Height: 6\"  Rails: Left ascending (B grasp)  Assistance: Stand by assistance      Assessment: Pt improving in all aspects of mobility. Pt meeting all goals. Family training completed with spouse who demonstrates ability to care for pt at recommended level. No concerns for DC at this time.    LTG:  Long Term Goal 1: Pt to complete bed mobility with indep-met  Long Term Goal 2: Pt to complete transfers with supervision-met  Long Term Goal 3: Pt to ambulate 150ft with LRD and Supervision-met  Long Term Goal 4: Pt to complete 3 steps with HR and SBA-met    Signature: Electronically signed by Tiffany Carrington PT on 2/9/2024 at 1:09 PM        OCCUPATIONAL THERAPY   Initial Self Care Status:  ADL  Feeding: Setup, Increased time to complete  Grooming: Minimal assistance, Increased time to complete  UE Bathing: Moderate assistance, Increased time to complete  LE Bathing: Moderate

## 2024-02-09 NOTE — PROGRESS NOTES
[facility]  Facility/Department: [facility]  Speech Language Pathology   Treatment Note          Mulu Pandya  1955  R243/R243-01  [x]   confirmed    Date: 2024      Restrictions/Precautions: Fall Risk, Seizure     ADULT DIET; Regular     Respiratory Status: Room air  No active isolations    Rehab Diagnosis: Impaired mobility and ADL's due to acute infart in the left superior mid parietal lobe MCA territory     Subjective:  Alert, Cooperative, and Pleasant      Spouse was present.    Interventions used this date:  Speech Production and Expressive Language    Objective/Assessment:  Patient progressing towards goals:  Short Term Goals    Goal 1: Patient will read aloud and/or generate phrases-sentences using slow rate and over-articulation to promote speech intelligibility during structured tasks with SLP so that they may functionally communicate and express safety/medical concerns across all environments with 90% accuracy.  Pt independently read aloud 8-9 word sentences with 55% accuracy increasing to 92% accuracy given min-mod cueing. Pt increased speech intelligibly given frequent reminders to use strategies such as slow rate and phrasing within sentences. Reviewed take home packet that included practice sentences and strategies with pt and spouse.    Goal 2: To decrease the presence of apraxia of speech and improve articulatory accuracy to adequately express wants and needs, the pt will complete structured articulation tasks following SLP model with min cues-fading cues for articulatory placement in 90% of opportunities with 1-3 syllables words at the phrase-sentence level.  Goal 3: Pt will answer questions involving a short paragraph just heard/read with 90% accuracy.  Goal 4: Pt will complete convergent and divergent naming tasks with 90% accuracy to promote semantic organization and the overall effectiveness and efficiency for expression of their wants, needs, feelings, and ideas.  Given 3  items, pt was able to independently name abstract category with 85% accuracy increasing to 100% accuracy given min cueing. Pt was able to independently list 3 items within abstract category with 100% accuracy.      Treatment/Activity Tolerance:  Patient tolerated treatment well    Plan:  Discharge    Pain Assessment:  Patient does not c/o pain.    Pain Re-assessment:  Patient does not c/o pain.    Patient/Caregiver Education:  Patient educated on session and progression towards goals.    Safety Devices:  All fall risk precautions in place        Speech Therapy Level of Assistance Scale    AUDITORY COMPREHENSION  Rating:  Modified Independent    VERBAL EXPRESSION  Rating:  Supervised Assistance    MOTOR SPEECH  Rating: Supervised-Minimal Assistance     Therapy Time  SLP Individual Minutes  Time In: 1100  Time Out: 1130  Minutes: 30            Signature: Electronically signed by Chhaya Lui on 2/9/2024 at 2:25 PM

## 2024-02-10 VITALS
OXYGEN SATURATION: 99 % | BODY MASS INDEX: 28.73 KG/M2 | TEMPERATURE: 97.3 F | DIASTOLIC BLOOD PRESSURE: 68 MMHG | WEIGHT: 162.14 LBS | HEART RATE: 91 BPM | HEIGHT: 63 IN | RESPIRATION RATE: 16 BRPM | SYSTOLIC BLOOD PRESSURE: 104 MMHG

## 2024-02-10 LAB
GLUCOSE BLD-MCNC: 140 MG/DL (ref 70–99)
PERFORMED ON: ABNORMAL

## 2024-02-10 PROCEDURE — 94640 AIRWAY INHALATION TREATMENT: CPT

## 2024-02-10 PROCEDURE — 97112 NEUROMUSCULAR REEDUCATION: CPT

## 2024-02-10 PROCEDURE — 6370000000 HC RX 637 (ALT 250 FOR IP): Performed by: PHYSICAL MEDICINE & REHABILITATION

## 2024-02-10 PROCEDURE — 99232 SBSQ HOSP IP/OBS MODERATE 35: CPT | Performed by: INTERNAL MEDICINE

## 2024-02-10 PROCEDURE — 97110 THERAPEUTIC EXERCISES: CPT

## 2024-02-10 PROCEDURE — 97116 GAIT TRAINING THERAPY: CPT

## 2024-02-10 PROCEDURE — 2700000000 HC OXYGEN THERAPY PER DAY

## 2024-02-10 RX ORDER — ROSUVASTATIN CALCIUM 40 MG/1
40 TABLET, COATED ORAL NIGHTLY
Qty: 30 TABLET | Refills: 3 | Status: SHIPPED | OUTPATIENT
Start: 2024-02-10

## 2024-02-10 RX ORDER — ALBUTEROL SULFATE 90 UG/1
2 AEROSOL, METERED RESPIRATORY (INHALATION) EVERY 4 HOURS PRN
Qty: 18 G | Refills: 3 | Status: SHIPPED | OUTPATIENT
Start: 2024-02-10

## 2024-02-10 RX ORDER — CHOLECALCIFEROL (VITAMIN D3) 50 MCG
2000 TABLET ORAL
Qty: 60 TABLET | Refills: 2 | Status: SHIPPED | OUTPATIENT
Start: 2024-02-10

## 2024-02-10 RX ORDER — BUDESONIDE AND FORMOTEROL FUMARATE DIHYDRATE 80; 4.5 UG/1; UG/1
2 AEROSOL RESPIRATORY (INHALATION)
Qty: 10.2 G | Refills: 3 | Status: SHIPPED | OUTPATIENT
Start: 2024-02-10

## 2024-02-10 RX ADMIN — Medication 100 MG: at 09:14

## 2024-02-10 RX ADMIN — ESCITALOPRAM OXALATE 15 MG: 10 TABLET ORAL at 09:15

## 2024-02-10 RX ADMIN — HYDROCORTISONE: 1 CREAM TOPICAL at 09:15

## 2024-02-10 RX ADMIN — BUDESONIDE AND FORMOTEROL FUMARATE DIHYDRATE 2 PUFF: 80; 4.5 AEROSOL RESPIRATORY (INHALATION) at 04:14

## 2024-02-10 NOTE — PLAN OF CARE
Problem: Safety - Adult  Goal: Free from fall injury  Outcome: Progressing     Problem: Discharge Planning  Goal: Discharge to home or other facility with appropriate resources  Outcome: Progressing     Problem: Skin/Tissue Integrity  Goal: Absence of new skin breakdown  Description: 1.  Monitor for areas of redness and/or skin breakdown  2.  Assess vascular access sites hourly  3.  Every 4-6 hours minimum:  Change oxygen saturation probe site  4.  Every 4-6 hours:  If on nasal continuous positive airway pressure, respiratory therapy assess nares and determine need for appliance change or resting period.  Outcome: Progressing     Problem: Neurosensory - Adult  Goal: Achieves stable or improved neurological status  Outcome: Progressing  Goal: Achieves maximal functionality and self care  Outcome: Progressing     Problem: Skin/Tissue Integrity - Adult  Goal: Skin integrity remains intact  Outcome: Progressing     Problem: Musculoskeletal - Adult  Goal: Return mobility to safest level of function  Outcome: Progressing  Goal: Maintain proper alignment of affected body part  Outcome: Progressing  Goal: Return ADL status to a safe level of function  Outcome: Progressing     Problem: Gastrointestinal - Adult  Goal: Maintains or returns to baseline bowel function  Outcome: Progressing  Goal: Maintains adequate nutritional intake  Outcome: Progressing     Problem: Metabolic/Fluid and Electrolytes - Adult  Goal: Electrolytes maintained within normal limits  Outcome: Progressing  Goal: Glucose maintained within prescribed range  Outcome: Progressing     Problem: Nutrition Deficit:  Goal: Optimize nutritional status  Outcome: Progressing     Problem: Chronic Conditions and Co-morbidities  Goal: Patient's chronic conditions and co-morbidity symptoms are monitored and maintained or improved  Outcome: Progressing     Problem: Pain  Goal: Verbalizes/displays adequate comfort level or baseline comfort level  Outcome: Progressing

## 2024-02-10 NOTE — PROGRESS NOTES
Subjective:  The patient complains of moderate to severe acute on chronic progressive fatigue and left-sided weakness, cognitive slowing, motor planning deficits partially relieved by rest, medications, PT,  OT,   SLP and rest and exacerbated by recent  CVA.  She was admitted through the emergency room on January 18, 2024 with right-sided weakness aphasia and dysphagia.  She was diagnosed with acute CVA-subacute stroke on the right parietal lob .  Her  reported she had had multiple falls recently.  She was admitted under the care of the hospitalist with neurology consulting.  She was also found to have an intracranial hemorrhage.  CT of the head revealed a 2 cm parenchymal bleed in the left basal ganglia.  Neurosurgery was consulted and they did not feel that she should have surgery.  She has been struggling with aphasia dysphagia and hypoxia since being admitte neurology has also been following.   Stroke workup as   including a GUSTABO which was unremarkable.    I am concerned about patient’s medical complexities and barriers to advancing in rehab goals including deficits of motor planning.    She also had a pulmonary embolism and possible bronchitis pneumonia.  Stabilized medically after IVC filter and IV antibiotics.  Medications were adjusted.  Her diarrhea resolved.  White count and pulse ox improved slowly.  Patient cannot be on anticoagulation because of her parenchymal bleed.    I reviewed current care and plans for further care with other rehab providers including nursing and case management.  According to recent  note,  no substantive notes-- only form notes from the nursing over the past 24 hours any and all other notes reviewed as well.    I would like to see if nursing can get rid of the IV be Hep-Lock at her arm.  Also she is on insulin here and was on Glucophage at home would like to transition back to that prior to discharge.    Janet Larsen, RN  Registered Nurse  Nursing     Progress Notes

## 2024-02-10 NOTE — DISCHARGE SUMMARY
nocturnal pulse ox attached. Call light with in reach. Bed alarm activated.                  ROS x10:  The patient also complains of severely impaired mobility and activities of daily living.  Otherwise no new problems with vision, hearing, nose, mouth, throat, dermal, cardiovascular, GI, , pulmonary, musculoskeletal, psychiatric or neurological. See also Acute Rehab PM&R H&P.       Vital signs:  /68   Pulse 91   Temp 97.3 °F (36.3 °C) (Oral)   Resp 16   Ht 1.6 m (5' 3\")   Wt 73.5 kg (162 lb 2.2 oz)   SpO2 99%   BMI 28.72 kg/m²   I/O:   PO/Intake:  poor  PO intake,   regular with thin liquid diet-up at 90 degrees no straws    Bowel:   Continent-  1/28/2024- Incontinent of bladder and bowel.   Bladder:  no encinas--but uses purewick/External catheter on due to incontinence.   General:  Patient is well developed,   adequately nourished, and    well kempt.     HEENT:    Pupils equal, hearing intact to loud voice, external inspection of ear and nose benign.  Inspection of lips, tongue and gums oral thrush  Musculoskeletal: No significant change in strength or tone.  All joints stable.      Inspection and palpation of digits and nails show no clubbing, cyanosis or inflammatory conditions.   Neuro/Psychiatric: Affect: flat but pleasant.  Alert and oriented to person, place and situation with  min cues.  No significant change in deep tendon reflexes or sensation  Lungs:  Diminished, CTA-B. Respiration effort is   normal at rest.      Heart:   S1 = S2,   RRR.       Abdomen:  Soft, non-tender, no enlargement of liver or spleen.  Extremities:  Mild right  lower extremity edema and tenderness   Skin:   Intact to general survey, healing right groin incision from venipuncture for IVC filter    Rehabilitation:  Physical Therapy:   Bed mobility:  Bed mobility  Bridging: Stand by assistance (02/04/24 1210)  Rolling to Left: Modified independent (02/09/24 1311)  Rolling to Right: Modified independent (02/09/24  and functional status.  The patient was discussed in detail at the treatment team meeting focusing on current medical issues, progress in therapies, social issues, psychological issues, barriers to progress and strategies to address these barriers, and discharge planning.  See the hand written addendum to rehab progress note.  The patient continues to be high risk for future disability and their medical and rehabilitation prognosis continue to be good and therefore, we will continue the patient's rehabilitation course as planned.  The patient's tentative discharge date was set. Patient and family education was discussed.  The patient was made aware of the team discussion regarding their progress.  Discharge plans were discussed along with barriers to progress and strategies to address these barriers, patient encouraged to continue to discuss discharge plans with .       Complex Physical Medicine & Rehab Issues Assess & Plan:   Severe abnormality of gait and mobility and impaired self-care and ADL's secondary to  acute CVA -infarction in the left superior mid parietal lobe MCA territory-2 cm acute intraparenchymal left frontal lobe hematoma  .  Functional and medical status reassessed regarding patient’s ability to participate in therapies and patient found to be able to participate in acute intensive comprehensive inpatient rehabilitation program including PT/OT to improve balance, ambulation, ADL’s, and to improve the P/AROM.  Therapeutic modifications regarding activities in therapies, place, amount of time per day and intensity of therapy made daily.  In bed therapies or bedside therapies prn.   Bowel occasional diarrhea and Bladder dysfunction, Neurogenic bowel and bladder:  frequent toileting, ambulate to bathroom with assistance, check post void residuals.  Check for C.difficile x1 if >2 loose stools in 24 hours, continue bowel & bladder program.  Monitor bowel and bladder function.  Lactinex 2 PO

## 2024-02-10 NOTE — PROGRESS NOTES
Dr. Carroll in to see pt. Printed prescriptions given, spouse here to drive pt home. Discharge instructions and medications reviewed, pt in stable condition. Electronically signed by Liza Portillo RN on 2/10/2024 at 2:37 PM    Patient taken via w/c to hospital exit with all personal belongings by transport personnel. Electronically signed by Liza Portillo RN on 2/10/2024 at 2:42 PM

## 2024-02-10 NOTE — PROGRESS NOTES
INPATIENT PROGRESS NOTES    PATIENT NAME: Mulu Pandya  MRN: [id number]  SERVICE DATE:  February 10, 2024   SERVICE TIME:  12:12 PM      PRIMARY SERVICE: Pulmonary Disease    CHIEF COMPLAIN: Pulmonary embolism      INTERVAL HPI: Patient seen and examined at bedside, Interval Notes, orders reviewed. Nursing notes noted  Patient is feeling better.  She is using 2 L O2 during sleep.  Discussed with RN she will have home oxygen at nighttime.  No complaint of chest pain.  No cough.  No shortness of breath.  She is on Symbicort 2 puff twice daily and albuterol HFA.         OBJECTIVE   I/O:24HR INTAKE/OUTPUT:    Intake/Output Summary (Last 24 hours) at 2/10/2024 1212  Last data filed at 2/9/2024 2048  Gross per 24 hour   Intake 240 ml   Output --   Net 240 ml       02/09 0701 - 02/10 0700  In: 240 [P.O.:240]  Out: -   Body mass index is 28.72 kg/m².    PHYSICAL EXAM:  Vitals:  /68   Pulse 91   Temp 97.3 °F (36.3 °C) (Oral)   Resp 16   Ht 1.6 m (5' 3\")   Wt 73.5 kg (162 lb 2.2 oz)   SpO2 99%   BMI 28.72 kg/m²   General: Alert, awake .comfortable in bed, No distress.  Head: Atraumatic , Normocephalic   Eyes: PERRL. No sclera icterus. No conjunctival injection. No discharge   ENT: No nasal  discharge. Pharynx clear.  Neck:  Trachea midline. No thyromegaly, no JVD, No cervical adenopathy.  Chest : Bilaterally symmetrical ,Normal effort,  No accessory muscle use  Lung : Diminished breath sound bilaterally No Rales. No wheezing. No rhonchi.   Heart:: Normal rate. Regular rhythm. No mumur ,  Rub or gallop  ABD: Non-tender. Non-distended. No masses. No organmegaly. Normal bowel sounds. No hernia.  Ext : No Pitting both leg , No Cyanosis No clubbing  Neuro: Minimal right-sided weakness    Labs:  Recent Labs     02/09/24  0457   WBC 7.9   HGB 11.6*        Recent Labs     02/09/24  0457      K 3.5      CO2 29   BUN 20   CREATININE 1.08*   GLUCOSE 125*     No results for input(s): \"AST\", \"ALT\", \"ALB\",  the abdomen and pelvis with contrast. 4. Fatty infiltration of the liver. RECOMMENDATIONS: Recommend CT of the abdomen and pelvis with contrast to evaluate abnormalities involving the pancreas and left kidney.     XR CHEST (2 VW)    Result Date: 1/30/2024  EXAMINATION: TWO XRAY VIEWS OF THE CHEST 1/30/2024 9:24 am COMPARISON: None. HISTORY: ORDERING SYSTEM PROVIDED HISTORY: low sat TECHNOLOGIST PROVIDED HISTORY: Reason for exam:->low sat What reading provider will be dictating this exam?->CRC FINDINGS: Cardiopericardial silhouette normal.  Pulmonary vasculature normal.  Lungs clear.  Remote internal fixation lower thoracic and lumbar spine.     No acute cardiopulmonary disease.     IMPRESSION AND SUGGESTION:  Acute bilateral PE likely provoked   CVA with right-sided weakness and aphasia and dysphagia  Risk for aspiration  Asthma, COPD with exacerbation  Elevated CA 19-9 and CEA  Hypertension  Diabetes mellitus  CKD stage III    Continue O2 to keep saturation 90% or above.  She will have 2 L O2 during sleep.  She had IVC filter.  Watch for aspiration.  Elevated CA 19-9 and CEA.  She has 9.8 x 6.4 cm heterogeneous low-density area could be pseudocyst or low-density mass and in the upper pole of left kidney 5.7 x 4 cm necrotic mass which appeared to be connected to the pancreas.  Patient is followed by GI and they are arranging for outpatient biopsy of pancreatic lesion under ultrasound.  She is going home today.  Follow-up in office 2 to 3 weeks.  NOTE: This report was transcribed using voice recognition software. Every effort was made to ensure accuracy; however, inadvertent computerized transcription errors may be present.      Electronically signed by Elliot Harris MD, FCCP on 2/10/2024 at 12:12 PM

## 2024-02-10 NOTE — DISCHARGE INSTRUCTIONS
Take medications as prescribed. Keep all follow up appointments. Activity as per therapy guidelines/recommendations. Check your blood pressure before taking metoprolol, do not take if the top number (systolic) of your blood pressure reading is less than 110.

## 2024-02-10 NOTE — PROGRESS NOTES
OCCUPATIONAL THERAPY  INPATIENT REHAB TREATMENT NOTE  [facility]      NAME: Mulu Pandya  : 1955 (68 y.o.)  MRN: [id number]  CODE STATUS: Full Code  Room: R243/R243-01    Date of Service: 2/10/2024    Referring Physician: Dr Stacy  Rehab Diagnosis: Impaired mobility and ADL's due to acute infart in the left superior mid parietal lobe MCA territory    Hospital course:          Restrictions  Restrictions/Precautions  Restrictions/Precautions: Fall Risk, Seizure               Patient's date of birth confirmed: Yes    SAFETY:  Safety Devices  Safety Devices in place: Yes  Type of devices: All fall risk precautions in place    SUBJECTIVE: \"I need to do something about this hand.\"       Pain at start of treatment: No    Pain at end of treatment: No    COGNITION:  Orientation  Overall Orientation Status: Within Functional Limits  Orientation Level: Oriented X4    OBJECTIVE:  Sitting Balance/Weight Shifting  Pt participated in a seated activity to focus on sitting balance and overall strength.  WC<>edge of the mat SV  Sitting balance good   Stack of rings on L side, pt instructed to use R hand to cross midline, then lateral lean to retrieve ring then throw at vertical target 4 feet away. 15 rings. Slight difficulty to grasp ring with R hand. Increased time and tactile cues PRN.      Stack of rings on R side, pt instructed to use L hand to cross midline, then lateral lean to retrieve ring then throw at vertical target 4 feet away. 15 rings. Good lateral shift to the RUE notes. No difficulty returning back to midline.      Rings on at shoulder height on L side, pt instructed to use R hand to cross midline, then forward lean to retrieve ring then throw at vertical target 4 feet away. 15 rings.     Rings on at shoulder height on R side, pt instructed to use L hand to cross midline, then forward lean to retrieve ring then throw at vertical target 4 feet away. 15 rings.      ASSESSMENT:  Assessment: Pt

## 2024-02-10 NOTE — PROGRESS NOTES
place;Call light within reach;Bed alarm in place;Left in bed    Therapy Time:   Individual   Time In 830   Time Out 0900   Minutes 30      Minutes:  Transfer/Bed mobility trainin  Gait training: 15  Neuro re education:0  Therapeutic ex:10      LEDY LOWERY PTA, 02/10/24 at 8:55 AM

## 2024-02-10 NOTE — DISCHARGE INSTR - DIET
Good nutrition is important when healing from an illness, injury, or surgery.  Follow any nutrition recommendations given to you during your hospital stay.   If you were given an oral nutrition supplement while in the hospital, continue to take this supplement at home.  You can take it with meals, in-between meals, and/or before bedtime. These supplements can be purchased at most local grocery stores, pharmacies, and chain [employer]-stores.   If you have any questions about your diet or nutrition, call the hospital and ask for the dietitian.    Regular diet    **Nothing to eat or drink after midnight on Sunday 2/11 for biopsy on Monday.

## 2024-02-12 ENCOUNTER — ANESTHESIA EVENT (OUTPATIENT)
Dept: ENDOSCOPY | Age: 69
End: 2024-02-12
Payer: MEDICARE

## 2024-02-12 ENCOUNTER — ANESTHESIA (OUTPATIENT)
Dept: ENDOSCOPY | Age: 69
End: 2024-02-12
Payer: MEDICARE

## 2024-02-12 ENCOUNTER — HOSPITAL ENCOUNTER (OUTPATIENT)
Age: 69
Setting detail: OUTPATIENT SURGERY
Discharge: HOME | End: 2024-02-12
Attending: INTERNAL MEDICINE | Admitting: INTERNAL MEDICINE
Payer: MEDICARE

## 2024-02-12 VITALS
OXYGEN SATURATION: 98 % | TEMPERATURE: 97.9 F | BODY MASS INDEX: 28.53 KG/M2 | WEIGHT: 161 LBS | SYSTOLIC BLOOD PRESSURE: 118 MMHG | RESPIRATION RATE: 18 BRPM | DIASTOLIC BLOOD PRESSURE: 68 MMHG | HEIGHT: 63 IN | HEART RATE: 76 BPM

## 2024-02-12 DIAGNOSIS — K86.89 PANCREATIC MASS: ICD-10-CM

## 2024-02-12 LAB
GLUCOSE BLD-MCNC: 129 MG/DL (ref 70–99)
PERFORMED ON: ABNORMAL

## 2024-02-12 PROCEDURE — 2500000003 HC RX 250 WO HCPCS: Performed by: NURSE ANESTHETIST, CERTIFIED REGISTERED

## 2024-02-12 PROCEDURE — C1753 CATH, INTRAVAS ULTRASOUND: HCPCS | Performed by: INTERNAL MEDICINE

## 2024-02-12 PROCEDURE — 3700000001 HC ADD 15 MINUTES (ANESTHESIA): Performed by: INTERNAL MEDICINE

## 2024-02-12 PROCEDURE — 3609018500 HC EGD US SCOPE W/ADJACENT STRUCTURES: Performed by: INTERNAL MEDICINE

## 2024-02-12 PROCEDURE — 2709999900 HC NON-CHARGEABLE SUPPLY: Performed by: INTERNAL MEDICINE

## 2024-02-12 PROCEDURE — 3700000000 HC ANESTHESIA ATTENDED CARE: Performed by: INTERNAL MEDICINE

## 2024-02-12 PROCEDURE — 43238 EGD US FINE NEEDLE BX/ASPIR: CPT | Performed by: INTERNAL MEDICINE

## 2024-02-12 PROCEDURE — 2580000003 HC RX 258: Performed by: INTERNAL MEDICINE

## 2024-02-12 PROCEDURE — 6360000002 HC RX W HCPCS: Performed by: NURSE ANESTHETIST, CERTIFIED REGISTERED

## 2024-02-12 PROCEDURE — 2580000003 HC RX 258

## 2024-02-12 PROCEDURE — 2720000010 HC SURG SUPPLY STERILE: Performed by: INTERNAL MEDICINE

## 2024-02-12 PROCEDURE — 7100000010 HC PHASE II RECOVERY - FIRST 15 MIN: Performed by: INTERNAL MEDICINE

## 2024-02-12 PROCEDURE — 88307 TISSUE EXAM BY PATHOLOGIST: CPT

## 2024-02-12 PROCEDURE — 6370000000 HC RX 637 (ALT 250 FOR IP): Performed by: INTERNAL MEDICINE

## 2024-02-12 PROCEDURE — 6360000002 HC RX W HCPCS: Performed by: INTERNAL MEDICINE

## 2024-02-12 RX ORDER — SODIUM CHLORIDE 9 MG/ML
INJECTION, SOLUTION INTRAVENOUS PRN
Status: DISCONTINUED | OUTPATIENT
Start: 2024-02-12 | End: 2024-02-12 | Stop reason: HOSPADM

## 2024-02-12 RX ORDER — SODIUM CHLORIDE 9 MG/ML
INJECTION, SOLUTION INTRAVENOUS
Status: COMPLETED
Start: 2024-02-12 | End: 2024-02-12

## 2024-02-12 RX ORDER — SODIUM CHLORIDE 0.9 % (FLUSH) 0.9 %
5-40 SYRINGE (ML) INJECTION PRN
Status: DISCONTINUED | OUTPATIENT
Start: 2024-02-12 | End: 2024-02-12 | Stop reason: HOSPADM

## 2024-02-12 RX ORDER — SODIUM CHLORIDE 0.9 % (FLUSH) 0.9 %
5-40 SYRINGE (ML) INJECTION EVERY 12 HOURS SCHEDULED
Status: DISCONTINUED | OUTPATIENT
Start: 2024-02-12 | End: 2024-02-12 | Stop reason: HOSPADM

## 2024-02-12 RX ORDER — MAGNESIUM HYDROXIDE 1200 MG/15ML
LIQUID ORAL PRN
Status: DISCONTINUED | OUTPATIENT
Start: 2024-02-12 | End: 2024-02-12 | Stop reason: ALTCHOICE

## 2024-02-12 RX ORDER — PROPOFOL 10 MG/ML
INJECTION, EMULSION INTRAVENOUS PRN
Status: DISCONTINUED | OUTPATIENT
Start: 2024-02-12 | End: 2024-02-12 | Stop reason: SDUPTHER

## 2024-02-12 RX ORDER — CIPROFLOXACIN 2 MG/ML
200 INJECTION, SOLUTION INTRAVENOUS ONCE
Status: COMPLETED | OUTPATIENT
Start: 2024-02-12 | End: 2024-02-12

## 2024-02-12 RX ORDER — SIMETHICONE 20 MG/.3ML
EMULSION ORAL PRN
Status: DISCONTINUED | OUTPATIENT
Start: 2024-02-12 | End: 2024-02-12 | Stop reason: ALTCHOICE

## 2024-02-12 RX ORDER — CIPROFLOXACIN 250 MG/1
250 TABLET, FILM COATED ORAL 2 TIMES DAILY
Qty: 6 TABLET | Refills: 0 | Status: SHIPPED | OUTPATIENT
Start: 2024-02-12 | End: 2024-02-15

## 2024-02-12 RX ORDER — SODIUM CHLORIDE 9 MG/ML
INJECTION, SOLUTION INTRAVENOUS CONTINUOUS
Status: DISCONTINUED | OUTPATIENT
Start: 2024-02-12 | End: 2024-02-12 | Stop reason: HOSPADM

## 2024-02-12 RX ORDER — ONDANSETRON 2 MG/ML
4 INJECTION INTRAMUSCULAR; INTRAVENOUS
Status: DISCONTINUED | OUTPATIENT
Start: 2024-02-12 | End: 2024-02-12 | Stop reason: HOSPADM

## 2024-02-12 RX ORDER — LIDOCAINE HYDROCHLORIDE 10 MG/ML
1 INJECTION, SOLUTION EPIDURAL; INFILTRATION; INTRACAUDAL; PERINEURAL
Status: DISCONTINUED | OUTPATIENT
Start: 2024-02-12 | End: 2024-02-12 | Stop reason: HOSPADM

## 2024-02-12 RX ORDER — LIDOCAINE HYDROCHLORIDE 20 MG/ML
INJECTION, SOLUTION INFILTRATION; PERINEURAL PRN
Status: DISCONTINUED | OUTPATIENT
Start: 2024-02-12 | End: 2024-02-12 | Stop reason: SDUPTHER

## 2024-02-12 RX ADMIN — SODIUM CHLORIDE: 9 INJECTION, SOLUTION INTRAVENOUS at 12:25

## 2024-02-12 RX ADMIN — LIDOCAINE HYDROCHLORIDE 60 MG: 20 INJECTION, SOLUTION INFILTRATION; PERINEURAL at 13:10

## 2024-02-12 RX ADMIN — CIPROFLOXACIN 200 MG: 200 INJECTION, SOLUTION INTRAVENOUS at 13:15

## 2024-02-12 RX ADMIN — PROPOFOL 540 MG: 10 INJECTION, EMULSION INTRAVENOUS at 13:10

## 2024-02-12 NOTE — ANESTHESIA POSTPROCEDURE EVALUATION
Department of Anesthesiology  Postprocedure Note    Patient: Mulu Pandya  MRN: [id number]  YOB: 1955  Date of evaluation: 2/12/2024    Procedure Summary       Date: 02/12/24 Room / Location: [facility] OR 02 / [facility]    Anesthesia Start: 1306 Anesthesia Stop: 1355    Procedure: ESOPHAGOGASTRODUODENOSCOPY and ENDOSCOPIC ULTRASOUND WITH FNA Diagnosis:       Pancreatic mass      (Pancreatic mass [K86.89])    Surgeons: Trace Segovia MD Responsible Provider: Tiffanie Guadarrama APRN - CRNA    Anesthesia Type: MAC ASA Status: 4            Anesthesia Type: No value filed.    Taylor Phase I: Taylor Score: 10    Taylor Phase II:      Anesthesia Post Evaluation    Patient location during evaluation: PACU  Patient participation: complete - patient participated  Level of consciousness: sleepy but conscious  Pain score: 0  Airway patency: patent  Nausea & Vomiting: no nausea and no vomiting  Cardiovascular status: hemodynamically stable  Respiratory status: acceptable  Hydration status: euvolemic  Pain management: adequate        No notable events documented.

## 2024-02-12 NOTE — PROGRESS NOTES
Facility/Department: [facility] RE[facility]AB  Physical Therapy Acute Rehab Discharge Summary  Room: [place] [address]    NAME: Mulu Pandya  : 1955  MRN: [id number]    Admission Date: 2024  6:51 PM  Discharge Date: 2/10/24    Rehab Diagnosis(es): Impaired mobility and ADL's due to acute infart in the left superior mid parietal lobe MCA territory  Patient Active Problem List    Diagnosis Date Noted    Type 2 diabetes mellitus with chronic kidney disease 11/10/2022    Pancreatic cyst 2024    Pulmonary embolism (HCC) 2024    Tachycardia 2024    Hypoxia 2024    Impaired mobility and ADLs dt CVA-2 cm acute intraparenchymal left frontal lobe hematoma. 2024    Urinary incontinence 2024    Meningioma (HCC) 2024    Nontraumatic intracerebral hemorrhage (HCC) 2024    Pancreatic mass 2024    Aphasia 2024    Impaired mobility and activities of daily living 2024    Hyperglycemia due to type 2 diabetes mellitus (HCC) 2024    Dysphagia, oropharyngeal phase 2024    Late effects of CVA (cerebrovascular accident) 2024    CVA (cerebral vascular accident) (HCC) 2024    Intracranial hemorrhage (HCC) 2024    Cerebral infarction due to occlusion of left middle cerebral artery (HCC) 2024    Chronic renal failure, stage 3a (HCC)     Senile osteoporosis 2020    Recurrent major depressive disorder, in full remission (HCC) 2018    Proteinuria     Asthma     Anxiety     Chronic back pain     COPD (chronic obstructive pulmonary disease) (HCC)     Depression     Mixed hyperlipidemia     Essential hypertension        Past Medical History:   Diagnosis Date    Anxiety     Asthma     Chronic back pain     Chronic renal failure, stage 3a (HCC)     COPD (chronic obstructive pulmonary disease) ([facility])     Depression     History of tinnitus     Hyperlipidemia     Hypertension     Obesity     Osteoporosis     2019 T -3.2    Peripheral vascular

## 2024-02-12 NOTE — ANESTHESIA PRE PROCEDURE
Department of Anesthesiology  Preprocedure Note       Name:  Mulu Pandya   Age:  68 y.o.  :  1955                                          MRN:  [id number]         Date:  2024      Surgeon: Surgeon(s):  Trace Segovia MD    Procedure: Procedure(s):  ENDOSCOPIC ULTRASOUND WITH FNA    Medications prior to admission:   Prior to Admission medications    Medication Sig Start Date End Date Taking? Authorizing Provider   albuterol sulfate HFA (PROVENTIL;VENTOLIN;PROAIR) 108 (90 Base) MCG/ACT inhaler Inhale 2 puffs into the lungs every 4 hours as needed for Wheezing 2/10/24   Nadia Tineo MD   budesonide-formoterol (SYMBICORT) 80-4.5 MCG/ACT AERO Inhale 2 puffs into the lungs in the morning and 2 puffs in the evening. 2/10/24   Nadia Tineo MD   rosuvastatin (CRESTOR) 40 MG tablet Take 1 tablet by mouth nightly 2/10/24   Nadia Tineo MD   metoprolol tartrate (LOPRESSOR) 25 MG tablet Take 0.5 tablets by mouth 2 times daily 2/10/24   Nadia Tineo MD   vitamin D (CHOLECALCIFEROL) 50 MCG (2000 UT) TABS tablet Take 1 tablet by mouth Daily with supper 2/10/24   Nadia Tineo MD   metFORMIN (GLUCOPHAGE) 1000 MG tablet Take 1 tablet by mouth 2 times daily (with meals) 2/10/24   Nadia Tineo MD   Multiple Vitamins-Minerals (HAIR SKIN AND NAILS FORMULA) TABS Take 1 tablet by mouth daily  Patient not taking: Reported on 2024 2/10/23   Collin Chavez PA       Current medications:    Current Facility-Administered Medications   Medication Dose Route Frequency Provider Last Rate Last Admin   • 0.9 % sodium chloride infusion   IntraVENous Continuous Trace Segovia MD       • sodium chloride flush 0.9 % injection 5-40 mL  5-40 mL IntraVENous 2 times per day Trace Segovia MD       • sodium chloride flush 0.9 % injection 5-40 mL  5-40 mL IntraVENous PRN Trace Segovia MD       • 0.9 % sodium chloride infusion   IntraVENous PRN Trace Segovia MD

## 2024-02-12 NOTE — H&P
Patient Name: Mulu Pandya  : 1955  MRN: [id number]  DATE: 24      ENDOSCOPY  History and Physical    Procedure:    [] Diagnostic Colonoscopy       [] Screening Colonoscopy  [] EGD      [] ERCP      [x] EUS       [] Other    [x] Previous office notes/History and Physical reviewed from the patients chart. Please see EMR for further details of HPI. I have examined the patient's status immediately prior to the procedure and:      Indications/HPI:    []Abdominal Pain   []Cancer- GI/Lung  []Fhx of colon CA  []History of Polyps   []Aggarwal’s   []Melena  []Abnormal Imaging   []Dysphagia    []Persistent Pneumonia  []Anemia   []Food Impaction  []History of Polyps  []GI Bleed   []Pulmonary nodule/Mass  []Change in bowel habits  []Heartburn/Reflux  []Rectal Bleed (BRBPR)  []Chest Pain - Non Cardiac  []Heme (+) Stool  []Ulcers  []Constipation   []Hemoptysis   []Varices  []Diarrhea   []Hypoxemia  []Nausea/Vomiting   []Screening   []Crohns/Colitis  [x]Other: Pancreas mass/cyst  68-year-old female admitted recently after a stroke with hemorrhagic affect, CT of the chest demonstrated bilateral PEs with incidental finding of a large cystic lesion on the body/tail of the pancreas.  Further evaluation with CT abdomen demonstrated a cystic lesion measuring 6 x 6 x 10 cm with pancreatic duct dilation.  CA 19-9 noted to be elevated at 309, CEA noted to be normal at 4.  Patient presents for further evaluation with EUS +/- fine-needle aspiration/biopsy  Anesthesia:   [x] MAC [] Moderate Sedation   [] General   [] None     ROS: 12 pt Review of Symptoms was negative unless mentioned above    Medications:   Prior to Admission medications    Medication Sig Start Date End Date Taking? Authorizing Provider   albuterol sulfate HFA (PROVENTIL;VENTOLIN;PROAIR) 108 (90 Base) MCG/ACT inhaler Inhale 2 puffs into the lungs every 4 hours as needed for Wheezing 2/10/24   Nadia Tineo MD   budesonide-formoterol (SYMBICORT)

## 2024-02-13 ENCOUNTER — TELEPHONE (OUTPATIENT)
Dept: FAMILY MEDICINE CLINIC | Age: 69
End: 2024-02-13

## 2024-02-13 ENCOUNTER — CARE COORDINATION (OUTPATIENT)
Dept: CARE COORDINATION | Age: 69
End: 2024-02-13

## 2024-02-13 NOTE — TELEPHONE ENCOUNTER
[facility] states patient was admitted to home health today. She will SN/PT/OT/ST/MSW starting today. Will NEL Chavez follow? They will be sending orders over.    Please advise    Callback [phone]

## 2024-02-13 NOTE — CARE COORDINATION
Care Transitions Initial Follow Up Call    Call within 2 business days of discharge: Yes    Patient Current Location:  Home: [address] [address]    Care Transition Nurse contacted the spouse/partner by telephone to perform post hospital discharge assessment. Provided introduction to self, and explanation of the Care Transition Nurse role.     Patient: Mulu Pandya Patient : 1955   MRN: <[id number]>  Reason for Admission: Impaired mobility and ADLs d/t CVA  Discharge Date: 24 RARS: Readmission Risk Score: 15.5      Last Discharge Facility       Date Complaint Diagnosis Description Type Department Provider    24  Pancreatic mass Admission (Discharged) Trace Pereira MD            Was this an external facility discharge? No     Challenges to be reviewed by the provider   Additional needs identified to be addressed with provider: No  none               Method of communication with provider: none.    CTN called and spoke with the pt's , Shaq (On HIPAA communication release form), for an initial care transition call post ARU discharge. Pt initially admitted IP [facility] from -24 dx ICH, CVA d/t occlusion of L middle cerebral artery. Pt presented to the ED with c/o several falls prior to admission, R-sided weakness, facial droop, and slurred speech. Pt was then discharged to ARU on 24 for rehabilitation.    Pt admitted to  ARU at [facility] from -2/10/24 for rehabilitation post CVA. Pt dx during ARU with brittany PEs and R perorenal DVT s/p IVC filter on 24. Pt also had COPD exacerbation d/t bronchitis and was seen by pulmonary. Also, on imaging, incidental findings of a lg cystic lesion on body/tail of pancrease was found and GI consulted and recommended OP w/u with EUS. Pt is s/p Upper EUS with Dr. Segovia on 24. FNA/Bx and pt prescribed Cipro x 3 days post procedure.     CTN spoke with Shaq d/t pt's difficulty with speech. Shaq states

## 2024-02-14 NOTE — PROGRESS NOTES
[facility]   Facility/Department: [facility]  Speech Language Pathology  Discharge Report        Patient: Mulu Pandya  : 1955    Date: 2024    Initial Status:  Diet: regular/thin diet      Dysphagia Outcome Severity Scale:  Ratin    Speech Therapy Level of Assistance Scale:  Auditory Comprehension:  Rating: Supervised Assistance  Verbal Expression:  Rating:Minimal Assistance  Motor Speech:  Rating: Moderate Assistance    Long Term Goals:  Long Term Goals  Time Frame for Long Term Goals: 2-3 weeks  Goal 1: Pt will improve her receptive and expressive language abilities to a modified independent level for understanding and expression of complex wants, needs, feelings/ideas, and medical/safety information.  Goal 2: Pt will improve her Speech Intelligibility to modified independent for effective communication of wants, needs, feelings, ideas, and medical/safety information with familiar and unfamiliar listeners.  Long-term Goals  Timeframe for Long-term Goals: 2-3 weeks  Goal 1: d/c        Patient's Response to Therapy:  Pt participated in speech, language, and dysphagia therapy. Pt responded well to treatment by meeting current goals and objectives. Pt's speech is characterized by frequent apraxic/substitution errors and decreased breath support. However, pt often self corrects and utilizes strategies such as slow rate and phrasing during speech production to increase overall level of speech intelligibility. Pt responded well to dysphagia treatment. Patient is currently on regular/thin diet. Pt recalls safe swallowing strategies such as small bites/sips and upright position when eating and drinking. Recommend continued speech therapy to address imprecise speech and overall intelligibility.       Discharge Status:  Diet:   Regular/thin diet  Compensatory Swallowing Strategies : Small bites/sips, Upright as possible for all oral intake, Alternate solids and liquids  Dysphagia Outcome Severity

## 2024-02-15 LAB
MISCELLANEOUS LAB TEST ORDER: NORMAL
WHOPPER PROMPT: NORMAL

## 2024-02-20 NOTE — PROGRESS NOTES
Post-Discharge Transitional Care Follow Up      Mulu Pandya   YOB: 1955    Date of Office Visit:  2/21/2024  Date of Hospital Admission: 2/12/24  Date of Hospital Discharge: 2/12/24  Readmission Risk Score (high >=14%. Medium >=10%):Readmission Risk Score: 15.5      Care management risk score Rising risk (score 2-5) and Complex Care (Scores >=6): No Risk Score On File     Non face to face  following discharge, date last encounter closed (first attempt may have been earlier): 02/13/2024     Call initiated 2 business days of discharge: Yes     Cerebral infarction due to occlusion of left middle cerebral artery (HCC)  Impaired mobility and ADLs dt CVA-2 cm acute intraparenchymal left frontal lobe hematoma.  - patient following the neurosurgery. Not sure as to why she is not candidate for any procedures at this time.   - patient is to continue physical therapy and occupation therapy.   Basal ganglia hemorrhage (HCC)  - same as above.   Recurrent major depressive disorder, in full remission ([facility])  - stable chronic condition. Continue medicaiton as prescirbed.   Acute pulmonary embolism without acute cor pulmonale, unspecified pulmonary embolism type (HCC)  -IVC filter placed. F/u with pulmonology. Not candidate for anticoagulation.   Pancreatic mass  - Patient will f/u with GI.   Bilious vomiting with nausea  -     ondansetron (ZOFRAN) 4 MG tablet; Take 1 tablet by mouth 3 times daily as needed for Nausea or Vomiting, Disp-30 tablet, R-0Normal  Mild protein-calorie malnutrition (HCC)  -     Nutritional Supplements (GLUCERNA 1.5 BRYAN) LIQD; Take 1 each by mouth daily, Disp-30 each, R-3Print  Type 2 diabetes mellitus with stage 3a chronic kidney disease, without long-term current use of insulin (HCC)  -     glimepiride (AMARYL) 4 MG tablet; Take 1 tablet by mouth every morning, Disp-30 tablet, R-3Normal  -switch metforming to glimiperide. Will f/u with patient with A1c    Hospital discharge follow-up  -

## 2024-02-21 ENCOUNTER — OFFICE VISIT (OUTPATIENT)
Dept: FAMILY MEDICINE CLINIC | Age: 69
End: 2024-02-21
Payer: MEDICARE

## 2024-02-21 VITALS
TEMPERATURE: 96.4 F | HEIGHT: 63 IN | SYSTOLIC BLOOD PRESSURE: 102 MMHG | BODY MASS INDEX: 25.52 KG/M2 | WEIGHT: 144 LBS | OXYGEN SATURATION: 94 % | RESPIRATION RATE: 16 BRPM | DIASTOLIC BLOOD PRESSURE: 68 MMHG | HEART RATE: 72 BPM

## 2024-02-21 DIAGNOSIS — I61.0 BASAL GANGLIA HEMORRHAGE (HCC): ICD-10-CM

## 2024-02-21 DIAGNOSIS — Z74.09 IMPAIRED MOBILITY AND ADLS: ICD-10-CM

## 2024-02-21 DIAGNOSIS — F33.42 RECURRENT MAJOR DEPRESSIVE DISORDER, IN FULL REMISSION (HCC): ICD-10-CM

## 2024-02-21 DIAGNOSIS — N18.31 TYPE 2 DIABETES MELLITUS WITH STAGE 3A CHRONIC KIDNEY DISEASE, WITHOUT LONG-TERM CURRENT USE OF INSULIN (HCC): ICD-10-CM

## 2024-02-21 DIAGNOSIS — I26.99 ACUTE PULMONARY EMBOLISM WITHOUT ACUTE COR PULMONALE, UNSPECIFIED PULMONARY EMBOLISM TYPE (HCC): ICD-10-CM

## 2024-02-21 DIAGNOSIS — E11.22 TYPE 2 DIABETES MELLITUS WITH STAGE 3A CHRONIC KIDNEY DISEASE, WITHOUT LONG-TERM CURRENT USE OF INSULIN (HCC): ICD-10-CM

## 2024-02-21 DIAGNOSIS — Z09 HOSPITAL DISCHARGE FOLLOW-UP: ICD-10-CM

## 2024-02-21 DIAGNOSIS — Z78.9 IMPAIRED MOBILITY AND ADLS: ICD-10-CM

## 2024-02-21 DIAGNOSIS — I63.512 CEREBRAL INFARCTION DUE TO OCCLUSION OF LEFT MIDDLE CEREBRAL ARTERY (HCC): Primary | ICD-10-CM

## 2024-02-21 DIAGNOSIS — N13.1 ACQUIRED HYDRONEPHROSIS DUE TO OBSTRUCTION OF URETER: ICD-10-CM

## 2024-02-21 DIAGNOSIS — E44.1 MILD PROTEIN-CALORIE MALNUTRITION (HCC): ICD-10-CM

## 2024-02-21 DIAGNOSIS — K86.89 PANCREATIC MASS: ICD-10-CM

## 2024-02-21 DIAGNOSIS — R11.14 BILIOUS VOMITING WITH NAUSEA: ICD-10-CM

## 2024-02-21 PROCEDURE — 3078F DIAST BP <80 MM HG: CPT | Performed by: PHYSICIAN ASSISTANT

## 2024-02-21 PROCEDURE — 3074F SYST BP LT 130 MM HG: CPT | Performed by: PHYSICIAN ASSISTANT

## 2024-02-21 PROCEDURE — 99215 OFFICE O/P EST HI 40 MIN: CPT | Performed by: PHYSICIAN ASSISTANT

## 2024-02-21 PROCEDURE — 1111F DSCHRG MED/CURRENT MED MERGE: CPT | Performed by: PHYSICIAN ASSISTANT

## 2024-02-21 PROCEDURE — 3052F HG A1C>EQUAL 8.0%<EQUAL 9.0%: CPT | Performed by: PHYSICIAN ASSISTANT

## 2024-02-21 PROCEDURE — 1123F ACP DISCUSS/DSCN MKR DOCD: CPT | Performed by: PHYSICIAN ASSISTANT

## 2024-02-21 RX ORDER — GLIMEPIRIDE 4 MG/1
4 TABLET ORAL EVERY MORNING
Qty: 30 TABLET | Refills: 3 | Status: SHIPPED | OUTPATIENT
Start: 2024-02-21

## 2024-02-21 RX ORDER — INFANT FORM.IRON LAC-F/DHA/ARA 3.1 G/1
1 POWDER (GRAM) ORAL DAILY
Qty: 30 EACH | Refills: 3 | Status: SHIPPED | OUTPATIENT
Start: 2024-02-21

## 2024-02-21 RX ORDER — ONDANSETRON 4 MG/1
4 TABLET, FILM COATED ORAL 3 TIMES DAILY PRN
Qty: 30 TABLET | Refills: 0 | Status: SHIPPED | OUTPATIENT
Start: 2024-02-21

## 2024-02-21 SDOH — ECONOMIC STABILITY: FOOD INSECURITY: WITHIN THE PAST 12 MONTHS, THE FOOD YOU BOUGHT JUST DIDN'T LAST AND YOU DIDN'T HAVE MONEY TO GET MORE.: NEVER TRUE

## 2024-02-21 SDOH — ECONOMIC STABILITY: INCOME INSECURITY: HOW HARD IS IT FOR YOU TO PAY FOR THE VERY BASICS LIKE FOOD, HOUSING, MEDICAL CARE, AND HEATING?: NOT HARD AT ALL

## 2024-02-21 SDOH — ECONOMIC STABILITY: FOOD INSECURITY: WITHIN THE PAST 12 MONTHS, YOU WORRIED THAT YOUR FOOD WOULD RUN OUT BEFORE YOU GOT MONEY TO BUY MORE.: NEVER TRUE

## 2024-02-21 ASSESSMENT — ENCOUNTER SYMPTOMS
DIARRHEA: 0
SHORTNESS OF BREATH: 0
COUGH: 0
SORE THROAT: 0
ABDOMINAL PAIN: 0
SINUS PRESSURE: 0
NAUSEA: 0
TROUBLE SWALLOWING: 0
VOMITING: 0
BACK PAIN: 0
SINUS PAIN: 0
CHEST TIGHTNESS: 0

## 2024-02-21 ASSESSMENT — PATIENT HEALTH QUESTIONNAIRE - PHQ9
SUM OF ALL RESPONSES TO PHQ QUESTIONS 1-9: 0
SUM OF ALL RESPONSES TO PHQ QUESTIONS 1-9: 0
4. FEELING TIRED OR HAVING LITTLE ENERGY: 0
6. FEELING BAD ABOUT YOURSELF - OR THAT YOU ARE A FAILURE OR HAVE LET YOURSELF OR YOUR FAMILY DOWN: 0
1. LITTLE INTEREST OR PLEASURE IN DOING THINGS: 0
SUM OF ALL RESPONSES TO PHQ9 QUESTIONS 1 & 2: 0
5. POOR APPETITE OR OVEREATING: 0
SUM OF ALL RESPONSES TO PHQ QUESTIONS 1-9: 0
3. TROUBLE FALLING OR STAYING ASLEEP: 0
8. MOVING OR SPEAKING SO SLOWLY THAT OTHER PEOPLE COULD HAVE NOTICED. OR THE OPPOSITE, BEING SO FIGETY OR RESTLESS THAT YOU HAVE BEEN MOVING AROUND A LOT MORE THAN USUAL: 0
SUM OF ALL RESPONSES TO PHQ QUESTIONS 1-9: 0
2. FEELING DOWN, DEPRESSED OR HOPELESS: 0
7. TROUBLE CONCENTRATING ON THINGS, SUCH AS READING THE NEWSPAPER OR WATCHING TELEVISION: 0
9. THOUGHTS THAT YOU WOULD BE BETTER OFF DEAD, OR OF HURTING YOURSELF: 0
10. IF YOU CHECKED OFF ANY PROBLEMS, HOW DIFFICULT HAVE THESE PROBLEMS MADE IT FOR YOU TO DO YOUR WORK, TAKE CARE OF THINGS AT HOME, OR GET ALONG WITH OTHER PEOPLE: 0

## 2024-02-21 ASSESSMENT — VISUAL ACUITY: OU: 1

## 2024-02-26 ENCOUNTER — TELEPHONE (OUTPATIENT)
Dept: FAMILY MEDICINE CLINIC | Age: 69
End: 2024-02-26

## 2024-02-26 ENCOUNTER — OFFICE VISIT (OUTPATIENT)
Dept: NEUROSURGERY | Age: 69
End: 2024-02-26
Payer: MEDICARE

## 2024-02-26 VITALS
WEIGHT: 144 LBS | SYSTOLIC BLOOD PRESSURE: 118 MMHG | DIASTOLIC BLOOD PRESSURE: 72 MMHG | HEIGHT: 63 IN | BODY MASS INDEX: 25.52 KG/M2

## 2024-02-26 DIAGNOSIS — I61.0 NONTRAUMATIC SUBCORTICAL HEMORRHAGE OF LEFT CEREBRAL HEMISPHERE (HCC): Primary | ICD-10-CM

## 2024-02-26 DIAGNOSIS — N18.31 TYPE 2 DIABETES MELLITUS WITH STAGE 3A CHRONIC KIDNEY DISEASE, WITHOUT LONG-TERM CURRENT USE OF INSULIN (HCC): Primary | ICD-10-CM

## 2024-02-26 DIAGNOSIS — E11.22 TYPE 2 DIABETES MELLITUS WITH STAGE 3A CHRONIC KIDNEY DISEASE, WITHOUT LONG-TERM CURRENT USE OF INSULIN (HCC): Primary | ICD-10-CM

## 2024-02-26 PROCEDURE — 3078F DIAST BP <80 MM HG: CPT | Performed by: NEUROLOGICAL SURGERY

## 2024-02-26 PROCEDURE — 99214 OFFICE O/P EST MOD 30 MIN: CPT | Performed by: NEUROLOGICAL SURGERY

## 2024-02-26 PROCEDURE — 1123F ACP DISCUSS/DSCN MKR DOCD: CPT | Performed by: NEUROLOGICAL SURGERY

## 2024-02-26 PROCEDURE — 3074F SYST BP LT 130 MM HG: CPT | Performed by: NEUROLOGICAL SURGERY

## 2024-02-26 RX ORDER — ACYCLOVIR 400 MG/1
1 TABLET ORAL DAILY
Qty: 1 EACH | Refills: 0 | Status: SHIPPED | OUTPATIENT
Start: 2024-02-26

## 2024-02-26 RX ORDER — ACYCLOVIR 400 MG/1
1 TABLET ORAL
Qty: 3 EACH | Refills: 12 | Status: SHIPPED | OUTPATIENT
Start: 2024-02-26

## 2024-02-26 RX ORDER — BLOOD-GLUCOSE SENSOR
1 EACH MISCELLANEOUS
Qty: 3 EACH | Refills: 12 | Status: SHIPPED | OUTPATIENT
Start: 2024-02-26

## 2024-02-26 RX ORDER — BLOOD-GLUCOSE,RECEIVER,CONT
1 EACH MISCELLANEOUS DAILY
Qty: 1 EACH | Refills: 0 | Status: SHIPPED | OUTPATIENT
Start: 2024-02-26

## 2024-02-26 ASSESSMENT — ENCOUNTER SYMPTOMS
TROUBLE SWALLOWING: 0
EYE PAIN: 0
ABDOMINAL DISTENTION: 0
COUGH: 0
SHORTNESS OF BREATH: 0
ABDOMINAL PAIN: 0
BACK PAIN: 0

## 2024-02-26 NOTE — TELEPHONE ENCOUNTER
Patient is asking for a continuous glucose monitoring system.    Please advise    Callback [phone]

## 2024-02-26 NOTE — PROGRESS NOTES
Patient Name: Mulu Pandya : 1955        Date: 2024      Type of Appt: New Patient    Reason for appt: PSR:  STATES SHE WAS AT [facility] AND TOLD TO FOLLOW UP WITH DR. CAGLE. SUBARACHNOID HEMORRHAGE. CT- SCAN AT [facility].     [facility]    Studies done: 2024 - CT HEAD WO CONTRAST @ [facility]  2024 - CT HEAD WO CONTRAST @ [facility]  2024 - MRI BRAIN WO CONTRAST @ [facility]    Smoking: Yes or No    REVIEW OF SYSTEMS:    Rash   Difficulty Urinating Nausea     Fever   Headaches  Bruising/Bleeding Easily     Hearing loss  Constipation  Sleep Disturbance    Shortness of breath Diarrhea  Neck Pain  Back Pain   
KENJI Polanco   ondansetron (ZOFRAN) 4 MG tablet Take 1 tablet by mouth 3 times daily as needed for Nausea or Vomiting 2/21/24   Collin Chavez PA   glimepiride (AMARYL) 4 MG tablet Take 1 tablet by mouth every morning 2/21/24   Collin Chavez PA   albuterol sulfate HFA (PROVENTIL;VENTOLIN;PROAIR) 108 (90 Base) MCG/ACT inhaler Inhale 2 puffs into the lungs every 4 hours as needed for Wheezing 2/10/24   Nadia Tineo MD   budesonide-formoterol (SYMBICORT) 80-4.5 MCG/ACT AERO Inhale 2 puffs into the lungs in the morning and 2 puffs in the evening. 2/10/24   Nadia Tineo MD   rosuvastatin (CRESTOR) 40 MG tablet Take 1 tablet by mouth nightly 2/10/24   Nadia Tineo MD   metoprolol tartrate (LOPRESSOR) 25 MG tablet Take 0.5 tablets by mouth 2 times daily 2/10/24   Nadia Tineo MD   vitamin D (CHOLECALCIFEROL) 50 MCG (2000 UT) TABS tablet Take 1 tablet by mouth Daily with supper 2/10/24   Nadia Tineo MD   Multiple Vitamins-Minerals (HAIR SKIN AND NAILS FORMULA) TABS Take 1 tablet by mouth daily  Patient not taking: Reported on 2/12/2024 2/10/23   Collin Chavez PA           Allergies: Nickel    Social History:      TOBACCO:   reports that she quit smoking about 4 weeks ago. Her smoking use included cigarettes. She has a 49.0 pack-year smoking history. She has never used smokeless tobacco.  ETOH:   reports no history of alcohol use.  RECREATIONAL DRUG USE:   Social History     Substance and Sexual Activity   Drug Use No       Family History:           Problem Relation Age of Onset    High Blood Pressure Mother     Heart Disease Sister     Cancer Brother     High Blood Pressure Other     Cancer Other     Colon Cancer Neg Hx              Review of Systems:    Review of Systems   Constitutional:  Negative for activity change and unexpected weight change.   HENT:  Negative for hearing loss and trouble swallowing.    Eyes:  Negative for pain and visual

## 2024-03-01 DIAGNOSIS — K86.89 PANCREATIC MASS: Primary | ICD-10-CM

## 2024-03-01 DIAGNOSIS — K86.2 PANCREATIC CYST: ICD-10-CM

## 2024-03-05 ENCOUNTER — TELEPHONE (OUTPATIENT)
Dept: FAMILY MEDICINE CLINIC | Age: 69
End: 2024-03-05

## 2024-03-11 ENCOUNTER — OFFICE VISIT (OUTPATIENT)
Dept: PULMONOLOGY | Age: 69
End: 2024-03-11
Payer: MEDICARE

## 2024-03-11 VITALS
OXYGEN SATURATION: 98 % | SYSTOLIC BLOOD PRESSURE: 112 MMHG | BODY MASS INDEX: 26.04 KG/M2 | DIASTOLIC BLOOD PRESSURE: 80 MMHG | HEART RATE: 74 BPM | WEIGHT: 147 LBS

## 2024-03-11 DIAGNOSIS — Z09 HOSPITAL DISCHARGE FOLLOW-UP: ICD-10-CM

## 2024-03-11 DIAGNOSIS — I26.99 OTHER ACUTE PULMONARY EMBOLISM WITHOUT ACUTE COR PULMONALE (HCC): ICD-10-CM

## 2024-03-11 DIAGNOSIS — J45.20 MILD INTERMITTENT ASTHMA WITHOUT COMPLICATION: Primary | ICD-10-CM

## 2024-03-11 PROCEDURE — 3074F SYST BP LT 130 MM HG: CPT | Performed by: INTERNAL MEDICINE

## 2024-03-11 PROCEDURE — 1123F ACP DISCUSS/DSCN MKR DOCD: CPT | Performed by: INTERNAL MEDICINE

## 2024-03-11 PROCEDURE — 99214 OFFICE O/P EST MOD 30 MIN: CPT | Performed by: INTERNAL MEDICINE

## 2024-03-11 PROCEDURE — 3079F DIAST BP 80-89 MM HG: CPT | Performed by: INTERNAL MEDICINE

## 2024-03-11 PROCEDURE — 1111F DSCHRG MED/CURRENT MED MERGE: CPT | Performed by: INTERNAL MEDICINE

## 2024-03-11 NOTE — PROGRESS NOTES
normal.   Eyes:      Pupils: Pupils are equal, round, and reactive to light.   Neck:      Thyroid: No thyromegaly.      Vascular: No JVD.      Trachea: No tracheal deviation.   Cardiovascular:      Rate and Rhythm: Normal rate and regular rhythm.      Heart sounds: No murmur heard.     No friction rub. No gallop.   Pulmonary:      Effort: No respiratory distress.      Breath sounds: No wheezing or rales.   Chest:      Chest wall: No tenderness.   Abdominal:      General: There is no distension.      Tenderness: There is no abdominal tenderness. There is no rebound.   Musculoskeletal:         General: Normal range of motion.   Lymphadenopathy:      Cervical: No cervical adenopathy.   Skin:     General: Skin is warm and dry.   Neurological:      Mental Status: She is alert and oriented to person, place, and time.      Coordination: Coordination normal.   Psychiatric:         Mood and Affect: Mood normal.         Behavior: Behavior normal.         Current Outpatient Medications   Medication Sig Dispense Refill    Continuous Blood Gluc  (DEXCOM G7 ) LANDRY 1 each by Does not apply route daily 1 each 0    Continuous Blood Gluc Sensor (DEXCOM G7 SENSOR) MISC 1 each by Does not apply route every 10 days 3 each 12    Continuous Blood Gluc Sensor (FREESTYLE SHANTELLE 3 SENSOR) MISC 1 each by Does not apply route every 14 days 3 each 12    Continuous Blood Gluc  (FREESTYLE SHANTELLE 3 READER) LANDRY 1 each by Does not apply route daily 1 each 0    Nutritional Supplements (GLUCERNA 1.5 BRYAN) LIQD Take 1 each by mouth daily 30 each 3    ondansetron (ZOFRAN) 4 MG tablet Take 1 tablet by mouth 3 times daily as needed for Nausea or Vomiting 30 tablet 0    glimepiride (AMARYL) 4 MG tablet Take 1 tablet by mouth every morning 30 tablet 3    albuterol sulfate HFA (PROVENTIL;VENTOLIN;PROAIR) 108 (90 Base) MCG/ACT inhaler Inhale 2 puffs into the lungs every 4 hours as needed for Wheezing 18 g 3    budesonide-formoterol

## 2024-03-12 ENCOUNTER — PATIENT MESSAGE (OUTPATIENT)
Dept: FAMILY MEDICINE CLINIC | Age: 69
End: 2024-03-12

## 2024-03-12 ENCOUNTER — HOSPITAL ENCOUNTER (OUTPATIENT)
Dept: MRI IMAGING | Age: 69
Discharge: HOME | End: 2024-03-14
Attending: INTERNAL MEDICINE
Payer: MEDICARE

## 2024-03-12 DIAGNOSIS — Z74.09 IMPAIRED MOBILITY AND ADLS: Primary | ICD-10-CM

## 2024-03-12 DIAGNOSIS — Z78.9 IMPAIRED MOBILITY AND ADLS: Primary | ICD-10-CM

## 2024-03-12 DIAGNOSIS — K86.2 PANCREATIC CYST: ICD-10-CM

## 2024-03-12 DIAGNOSIS — M54.50 CHRONIC MIDLINE LOW BACK PAIN, UNSPECIFIED WHETHER SCIATICA PRESENT: ICD-10-CM

## 2024-03-12 DIAGNOSIS — K86.89 PANCREATIC MASS: ICD-10-CM

## 2024-03-12 DIAGNOSIS — G89.29 CHRONIC MIDLINE LOW BACK PAIN, UNSPECIFIED WHETHER SCIATICA PRESENT: ICD-10-CM

## 2024-03-12 PROCEDURE — A9577 INJ MULTIHANCE: HCPCS | Performed by: INTERNAL MEDICINE

## 2024-03-12 PROCEDURE — 6360000004 HC RX CONTRAST MEDICATION: Performed by: INTERNAL MEDICINE

## 2024-03-12 PROCEDURE — 74183 MRI ABD W/O CNTR FLWD CNTR: CPT

## 2024-03-12 RX ADMIN — GADOBENATE DIMEGLUMINE 15 ML: 529 INJECTION, SOLUTION INTRAVENOUS at 10:08

## 2024-03-13 NOTE — TELEPHONE ENCOUNTER
From: Mulu Pandya  To: Collin Chavez  Sent: 3/12/2024 1:33 PM EDT  Subject: disabled placard     I would like 2 disabled parking cards for our cars. I have a gray [employer] focus 2017 and my  has a 2012 [employer] escape blue. It would make it easier with the walker and all the doctor appts. and therapy sessions.    Thanks  Mulu Pandya

## 2024-03-14 DIAGNOSIS — K86.2 PANCREAS CYST: Primary | ICD-10-CM

## 2024-03-15 ENCOUNTER — TELEPHONE (OUTPATIENT)
Dept: FAMILY MEDICINE CLINIC | Age: 69
End: 2024-03-15

## 2024-03-15 DIAGNOSIS — Z78.9 IMPAIRED MOBILITY AND ADLS: Primary | ICD-10-CM

## 2024-03-15 DIAGNOSIS — Z74.09 IMPAIRED MOBILITY AND ADLS: Primary | ICD-10-CM

## 2024-03-15 DIAGNOSIS — M54.50 CHRONIC MIDLINE LOW BACK PAIN, UNSPECIFIED WHETHER SCIATICA PRESENT: ICD-10-CM

## 2024-03-15 DIAGNOSIS — G89.29 CHRONIC MIDLINE LOW BACK PAIN, UNSPECIFIED WHETHER SCIATICA PRESENT: ICD-10-CM

## 2024-03-21 ENCOUNTER — OFFICE VISIT (OUTPATIENT)
Dept: GASTROENTEROLOGY | Age: 69
End: 2024-03-21
Payer: MEDICARE

## 2024-03-21 VITALS — HEIGHT: 63 IN | HEART RATE: 72 BPM | WEIGHT: 148 LBS | BODY MASS INDEX: 26.22 KG/M2 | OXYGEN SATURATION: 98 %

## 2024-03-21 DIAGNOSIS — K86.2 PANCREATIC CYST: Primary | ICD-10-CM

## 2024-03-21 PROCEDURE — 99212 OFFICE O/P EST SF 10 MIN: CPT | Performed by: NURSE PRACTITIONER

## 2024-03-21 PROCEDURE — 1123F ACP DISCUSS/DSCN MKR DOCD: CPT | Performed by: NURSE PRACTITIONER

## 2024-03-21 NOTE — PROGRESS NOTES
Gastroenterology Clinic Follow up Visit    Mulu Pandya  [id number]  Chief Complaint   Patient presents with    Follow-up       HPI: 68 y.o. female following up after hospitalization 1/23/2024 to 2/10/2024    Interval change: Patient presents to GI clinic for follow-up after Hospitalization for fall, CVA.  Patient had EUS with specimen collection per Dr. Segovia 2/12/2024 with findings noted below.  Patient and  were contacted per Dr. Segovia and discussed results  Including no evidence for neoplasia, suspect inadequate tissue.  Patient had repeat MRI/MRCP 3/12/2024 noting cystic lesion in the pancreatic body and tail have irregular rim and small anterior internal daughter cyst.  Patient was referred to Dr. Magaña at [facility].  Patient has appointment scheduled for 4/4/2024.    Patient denies any abdominal pain, nausea, nor vomiting.  Patient and  report patient has been eating without difficulty and having bowel movement daily.  Denies any dysphagia, hematemesis, hematochezia or melena.    Previous GI work up/Endoscopic investigations:  EUS 2/12/2024, Lucita Matson  Multiple abnormalities as detailed below noted in the pancreas/peripancreatic region.  A cystic lesion measuring 56 mm x 49 mm noted in the tail of the pancreas. Fine-needle aspiration was performed using a 22-gauge FNA needle with aspiration of 15 cc of turbid colored serous fluid.  This was sent for CEA, amylase and cytology. Another heterogeneous hypoechoic lesion measuring 60 mm x 49 mm noted in the body of the pancreas.  Fine-needle aspiration was performed with collection of 1 to 2 cc of thick turbid white fluid. Aspiration was difficult due to the thick turbid nature of the fluid.  Specimen was sent for CEA, amylase and cytology.  A hypoechoic lesion round in shape with clear defined margins measuring 17.2 mm in maximal diameter noted in the body of the pancreas.  Fine-needle biopsy performed using a 22-gauge FNB needle.  1 pass was made

## 2024-03-22 ENCOUNTER — OFFICE VISIT (OUTPATIENT)
Dept: CARDIOLOGY CLINIC | Age: 69
End: 2024-03-22
Payer: MEDICARE

## 2024-03-22 VITALS
DIASTOLIC BLOOD PRESSURE: 86 MMHG | HEART RATE: 100 BPM | OXYGEN SATURATION: 94 % | WEIGHT: 146.2 LBS | BODY MASS INDEX: 25.9 KG/M2 | SYSTOLIC BLOOD PRESSURE: 134 MMHG

## 2024-03-22 DIAGNOSIS — Z95.828 S/P INSERTION OF INFERIOR VENA CAVAL FILTER: Primary | ICD-10-CM

## 2024-03-22 DIAGNOSIS — Z72.0 TOBACCO ABUSE: ICD-10-CM

## 2024-03-22 DIAGNOSIS — Z86.711 HISTORY OF PULMONARY EMBOLISM: ICD-10-CM

## 2024-03-22 DIAGNOSIS — I63.312 CEREBROVASCULAR ACCIDENT (CVA) DUE TO THROMBOSIS OF LEFT MIDDLE CEREBRAL ARTERY (HCC): ICD-10-CM

## 2024-03-22 DIAGNOSIS — I10 ESSENTIAL HYPERTENSION: ICD-10-CM

## 2024-03-22 DIAGNOSIS — E78.2 MIXED HYPERLIPIDEMIA: ICD-10-CM

## 2024-03-22 PROCEDURE — 3079F DIAST BP 80-89 MM HG: CPT | Performed by: INTERNAL MEDICINE

## 2024-03-22 PROCEDURE — 3075F SYST BP GE 130 - 139MM HG: CPT | Performed by: INTERNAL MEDICINE

## 2024-03-22 PROCEDURE — 1123F ACP DISCUSS/DSCN MKR DOCD: CPT | Performed by: INTERNAL MEDICINE

## 2024-03-22 PROCEDURE — 99214 OFFICE O/P EST MOD 30 MIN: CPT | Performed by: INTERNAL MEDICINE

## 2024-03-22 ASSESSMENT — ENCOUNTER SYMPTOMS
SHORTNESS OF BREATH: 0
ALLERGIC/IMMUNOLOGIC NEGATIVE: 1
NAUSEA: 0
VOMITING: 0
ABDOMINAL PAIN: 0
EYES NEGATIVE: 1
WHEEZING: 0
GASTROINTESTINAL NEGATIVE: 1

## 2024-03-22 NOTE — PROGRESS NOTES
equal, round, and reactive to light.   Neck:      Thyroid: No thyromegaly.      Vascular: No JVD.      Trachea: No tracheal deviation.   Cardiovascular:      Rate and Rhythm: Normal rate and regular rhythm.      Chest Wall: PMI is not displaced.      Pulses: Intact distal pulses.      Heart sounds: Normal heart sounds. Heart sounds not distant. No murmur heard.     No friction rub. No gallop. No S3 sounds.   Pulmonary:      Effort: No respiratory distress.      Breath sounds: No wheezing or rales.   Chest:      Chest wall: No tenderness.   Abdominal:      General: Bowel sounds are normal. There is no distension.      Palpations: Abdomen is soft. There is no mass.      Tenderness: There is no abdominal tenderness. There is no guarding or rebound.   Musculoskeletal:         General: Normal range of motion.      Cervical back: Normal range of motion and neck supple.   Skin:     General: Skin is warm and dry.      Coloration: Skin is not pale.      Findings: No erythema or rash.   Neurological:      Mental Status: She is alert and oriented to person, place, and time.      Cranial Nerves: No cranial nerve deficit.   Psychiatric:         Behavior: Behavior normal.         Thought Content: Thought content normal.         Judgment: Judgment normal.          Pertinent Labs:  CBC: No results for input(s): \"WBC\", \"HGB\", \"PLT\" in the last 72 hours.  BMP:No results for input(s): \"NA\", \"K\", \"CL\", \"CO2\", \"BUN\", \"CREATININE\", \"GLUCOSE\", \"LABGLOM\" in the last 72 hours.  INR: No results for input(s): \"INR\" in the last 72 hours.  BNP: No results for input(s): \"BNP\" in the last 72 hours.   TSH:   Lab Results   Component Value Date    TSH 2.300 08/06/2014      Cardiac Injury Profile: No results for input(s): \"CKTOTAL\", \"CKMB\", \"CKMBINDEX\", \"TROPONINI\" in the last 72 hours.   Lipid Profile:   Lab Results   Component Value Date/Time    TRIG 148 01/19/2024 03:52 AM    HDL 28 01/19/2024 03:52 AM    LDLCALC 85 01/19/2024 03:52 AM    CHOL 143

## 2024-03-22 NOTE — PATIENT INSTRUCTIONS
Holter monitor for 2 weeks.   Any signs of irritation or the monitor falls off give us a call so we can assist you.   After the two weeks take the monitor off place it back into the box, put your address on the return label. Place directly into your mail box or [facility]     Any symptoms within the heart there is the button you can press and document in the booklet.   Please give us a call if you may have any questions. [phone].

## 2024-04-01 NOTE — PROGRESS NOTES
Subjective  Mulu Pandya 1955 is a 68 y.o. female who presents today with:  Chief Complaint   Patient presents with    Follow-up     6 week follow up want to discuss her only having one kidney that is functioning     Diabetes     Hasn't been able to get continuous reader, MA will reach out to insurance company, in the meantime patient needs glucometer        HPI  HTN  - metoprolol tartrate 12.5 mg BID,   - Patient is here for f/u HTN. Is compliant with meds and has no side effects from them. Avoids added salt. Tries to eat healthy. Exercises occasionally. Has no chest pain, shortness of breath, palpitations or edema.    COPD  - symbicort, prn albuterol  - .Patient is here for f/u HTN. Is compliant with meds and has no side effects from them. Avoids added salt. Tries to eat healthy. Exercises occasionally. Has no chest pain, shortness of breath, palpitations or edema.    DM, Type 2  - d/c metformin d/t SE. Started on Amaryl 4 mg.   - A1c 8.0 on 1/19/24  - no on ACEI/ARB. On rosurvastatin 40 mg.     CVA  - patient still having some deficites including dysarthria and weakness with her right hand. Completed physical therapy and speech therapy at home. She does not want out patient surgery, thought she would benefit from this.     Pancrease Cyst  - non-cancerous will have follow up with specialist, Dr. Magaña     Hydronephrosis  - saw Dr. Muñoz. Patient will have uro mri w/ UC and urine cytology.     Review of Systems   Constitutional:  Negative for activity change, appetite change, chills and fever.   HENT:  Negative for congestion, drooling, sinus pressure, sinus pain, sore throat and trouble swallowing.    Eyes:  Negative for visual disturbance.   Respiratory:  Negative for cough, chest tightness and shortness of breath.    Cardiovascular:  Negative for chest pain.   Gastrointestinal:  Negative for abdominal pain, diarrhea, nausea and vomiting.   Endocrine: Negative for cold intolerance.

## 2024-04-03 ENCOUNTER — TELEPHONE (OUTPATIENT)
Dept: CARDIOLOGY CLINIC | Age: 69
End: 2024-04-03

## 2024-04-03 ENCOUNTER — OFFICE VISIT (OUTPATIENT)
Dept: FAMILY MEDICINE CLINIC | Age: 69
End: 2024-04-03
Payer: MEDICARE

## 2024-04-03 VITALS
DIASTOLIC BLOOD PRESSURE: 72 MMHG | TEMPERATURE: 97.5 F | OXYGEN SATURATION: 91 % | BODY MASS INDEX: 25.87 KG/M2 | HEIGHT: 63 IN | SYSTOLIC BLOOD PRESSURE: 118 MMHG | WEIGHT: 146 LBS | HEART RATE: 100 BPM

## 2024-04-03 DIAGNOSIS — Z86.73 HISTORY OF STROKE: ICD-10-CM

## 2024-04-03 DIAGNOSIS — E11.22 TYPE 2 DIABETES MELLITUS WITH STAGE 3A CHRONIC KIDNEY DISEASE, WITHOUT LONG-TERM CURRENT USE OF INSULIN (HCC): Primary | ICD-10-CM

## 2024-04-03 DIAGNOSIS — J44.9 CHRONIC OBSTRUCTIVE PULMONARY DISEASE, UNSPECIFIED COPD TYPE (HCC): ICD-10-CM

## 2024-04-03 DIAGNOSIS — I63.312 CEREBROVASCULAR ACCIDENT (CVA) DUE TO THROMBOSIS OF LEFT MIDDLE CEREBRAL ARTERY (HCC): ICD-10-CM

## 2024-04-03 DIAGNOSIS — R29.898 RIGHT HAND WEAKNESS: ICD-10-CM

## 2024-04-03 DIAGNOSIS — N18.31 CHRONIC RENAL FAILURE, STAGE 3A (HCC): ICD-10-CM

## 2024-04-03 DIAGNOSIS — I69.322 DYSARTHRIA AS LATE EFFECT OF CEREBELLAR CEREBROVASCULAR ACCIDENT (CVA): ICD-10-CM

## 2024-04-03 DIAGNOSIS — K86.2 PANCREATIC CYST: ICD-10-CM

## 2024-04-03 DIAGNOSIS — N18.31 TYPE 2 DIABETES MELLITUS WITH STAGE 3A CHRONIC KIDNEY DISEASE, WITHOUT LONG-TERM CURRENT USE OF INSULIN (HCC): Primary | ICD-10-CM

## 2024-04-03 PROBLEM — I63.9 CVA (CEREBRAL VASCULAR ACCIDENT) (HCC): Status: RESOLVED | Noted: 2024-01-22 | Resolved: 2024-04-03

## 2024-04-03 PROCEDURE — 3052F HG A1C>EQUAL 8.0%<EQUAL 9.0%: CPT | Performed by: PHYSICIAN ASSISTANT

## 2024-04-03 PROCEDURE — 1123F ACP DISCUSS/DSCN MKR DOCD: CPT | Performed by: PHYSICIAN ASSISTANT

## 2024-04-03 PROCEDURE — 3078F DIAST BP <80 MM HG: CPT | Performed by: PHYSICIAN ASSISTANT

## 2024-04-03 PROCEDURE — 3074F SYST BP LT 130 MM HG: CPT | Performed by: PHYSICIAN ASSISTANT

## 2024-04-03 PROCEDURE — 99214 OFFICE O/P EST MOD 30 MIN: CPT | Performed by: PHYSICIAN ASSISTANT

## 2024-04-03 RX ORDER — GLUCOSAMINE HCL/CHONDROITIN SU 500-400 MG
1 CAPSULE ORAL DAILY
Qty: 100 STRIP | Refills: 2 | Status: SHIPPED | OUTPATIENT
Start: 2024-04-03

## 2024-04-03 RX ORDER — ACYCLOVIR 400 MG/1
1 TABLET ORAL
Qty: 3 EACH | Refills: 12 | Status: SHIPPED | OUTPATIENT
Start: 2024-04-03

## 2024-04-03 RX ORDER — BLOOD-GLUCOSE METER
1 KIT MISCELLANEOUS DAILY
Qty: 1 KIT | Refills: 0 | Status: SHIPPED | OUTPATIENT
Start: 2024-04-03

## 2024-04-03 RX ORDER — LANCETS 30 GAUGE
1 EACH MISCELLANEOUS 2 TIMES DAILY
Qty: 300 EACH | Refills: 1 | Status: SHIPPED | OUTPATIENT
Start: 2024-04-03

## 2024-04-03 ASSESSMENT — PATIENT HEALTH QUESTIONNAIRE - PHQ9
6. FEELING BAD ABOUT YOURSELF - OR THAT YOU ARE A FAILURE OR HAVE LET YOURSELF OR YOUR FAMILY DOWN: NOT AT ALL
3. TROUBLE FALLING OR STAYING ASLEEP: NOT AT ALL
5. POOR APPETITE OR OVEREATING: NOT AT ALL
SUM OF ALL RESPONSES TO PHQ9 QUESTIONS 1 & 2: 0
4. FEELING TIRED OR HAVING LITTLE ENERGY: NOT AT ALL
SUM OF ALL RESPONSES TO PHQ QUESTIONS 1-9: 0
7. TROUBLE CONCENTRATING ON THINGS, SUCH AS READING THE NEWSPAPER OR WATCHING TELEVISION: NOT AT ALL
SUM OF ALL RESPONSES TO PHQ QUESTIONS 1-9: 0
SUM OF ALL RESPONSES TO PHQ QUESTIONS 1-9: 0
8. MOVING OR SPEAKING SO SLOWLY THAT OTHER PEOPLE COULD HAVE NOTICED. OR THE OPPOSITE, BEING SO FIGETY OR RESTLESS THAT YOU HAVE BEEN MOVING AROUND A LOT MORE THAN USUAL: NOT AT ALL
SUM OF ALL RESPONSES TO PHQ QUESTIONS 1-9: 0
10. IF YOU CHECKED OFF ANY PROBLEMS, HOW DIFFICULT HAVE THESE PROBLEMS MADE IT FOR YOU TO DO YOUR WORK, TAKE CARE OF THINGS AT HOME, OR GET ALONG WITH OTHER PEOPLE: NOT DIFFICULT AT ALL
9. THOUGHTS THAT YOU WOULD BE BETTER OFF DEAD, OR OF HURTING YOURSELF: NOT AT ALL
2. FEELING DOWN, DEPRESSED OR HOPELESS: NOT AT ALL
1. LITTLE INTEREST OR PLEASURE IN DOING THINGS: NOT AT ALL

## 2024-04-03 ASSESSMENT — ENCOUNTER SYMPTOMS
BACK PAIN: 0
TROUBLE SWALLOWING: 0
SINUS PAIN: 0
COUGH: 0
VOMITING: 0
CHEST TIGHTNESS: 0
DIARRHEA: 0
NAUSEA: 0
SORE THROAT: 0
ABDOMINAL PAIN: 0
SHORTNESS OF BREATH: 0
SINUS PRESSURE: 0

## 2024-04-03 ASSESSMENT — VISUAL ACUITY: OU: 1

## 2024-04-03 NOTE — TELEPHONE ENCOUNTER
Scheduling dept calling for a new diagnosis code for nuclear stress test- lexiscan.     Please create a new order with a new diagnosis code.

## 2024-04-12 ENCOUNTER — TELEPHONE (OUTPATIENT)
Dept: FAMILY MEDICINE CLINIC | Age: 69
End: 2024-04-12

## 2024-04-13 ENCOUNTER — PATIENT MESSAGE (OUTPATIENT)
Dept: FAMILY MEDICINE CLINIC | Age: 69
End: 2024-04-13

## 2024-04-15 NOTE — TELEPHONE ENCOUNTER
Comments:     Last Office Visit (last PCP visit):   4/3/2024    Next Visit Date:  Future Appointments   Date Time Provider Department Center   4/18/2024  1:30 PM Sully Arevalo APRN - CNP [facility]   5/9/2024  3:30 PM Zheng Chavez MD [place] [facility] -   5/27/2024  1:00 PM [place] [facility] ROOM 2 [facility]   5/27/2024  1:30 PM [place] [facility] ROOM 1 [facility]   5/29/2024  3:00 PM Bull Arias MD [place] [facility]   7/8/2024  1:15 PM Collin Chavez PA [place]  [facility]   9/27/2024 12:00 PM Jin Gill DO [place] [facility]       **If hasn't been seen in over a year OR hasn't followed up according to last diabetes/ADHD visit, make appointment for patient before sending refill to provider.    Rx requested:  Requested Prescriptions     Pending Prescriptions Disp Refills    metoprolol tartrate (LOPRESSOR) 25 MG tablet 60 tablet 3     Sig: Take 0.5 tablets by mouth 2 times daily

## 2024-04-15 NOTE — TELEPHONE ENCOUNTER
From: Mulu Pandya  To: Collin Chavez  Sent: 4/13/2024 7:41 PM EDT  Subject: Metoprolol    about to run out and my chart will not let me ask for refill.

## 2024-04-17 DIAGNOSIS — I63.312 CEREBROVASCULAR ACCIDENT (CVA) DUE TO THROMBOSIS OF LEFT MIDDLE CEREBRAL ARTERY (HCC): ICD-10-CM

## 2024-04-18 ENCOUNTER — TELEPHONE (OUTPATIENT)
Dept: CARDIOLOGY CLINIC | Age: 69
End: 2024-04-18

## 2024-04-18 ENCOUNTER — OFFICE VISIT (OUTPATIENT)
Dept: CARDIOLOGY CLINIC | Age: 69
End: 2024-04-18
Payer: MEDICARE

## 2024-04-18 VITALS
SYSTOLIC BLOOD PRESSURE: 122 MMHG | BODY MASS INDEX: 25.19 KG/M2 | HEART RATE: 44 BPM | DIASTOLIC BLOOD PRESSURE: 82 MMHG | RESPIRATION RATE: 14 BRPM | OXYGEN SATURATION: 93 % | WEIGHT: 142.2 LBS

## 2024-04-18 DIAGNOSIS — R00.1 ASYMPTOMATIC BRADYCARDIA: ICD-10-CM

## 2024-04-18 DIAGNOSIS — I63.9 CRYPTOGENIC STROKE (HCC): Primary | ICD-10-CM

## 2024-04-18 PROCEDURE — 3074F SYST BP LT 130 MM HG: CPT

## 2024-04-18 PROCEDURE — 3079F DIAST BP 80-89 MM HG: CPT

## 2024-04-18 PROCEDURE — 1123F ACP DISCUSS/DSCN MKR DOCD: CPT

## 2024-04-18 PROCEDURE — 99213 OFFICE O/P EST LOW 20 MIN: CPT

## 2024-04-18 ASSESSMENT — ENCOUNTER SYMPTOMS
WHEEZING: 0
GASTROINTESTINAL NEGATIVE: 1
NAUSEA: 0
ALLERGIC/IMMUNOLOGIC NEGATIVE: 1
VOMITING: 0
ABDOMINAL PAIN: 0
EYES NEGATIVE: 1
SHORTNESS OF BREATH: 0

## 2024-04-18 NOTE — TELEPHONE ENCOUNTER
Patient seen today. Per Sully please monitor HR at home hold lopressor for HR <55 or SBP<110. Unable to reach patient.  full will send my chart message

## 2024-04-18 NOTE — PROGRESS NOTES
Chief Complaint   Patient presents with    Results     Event monitor           January 21, 2024: Patient is a 68 y.o. female who presents with a chief complaint of MS change/CVA type symptoms. . Patient is followed on a regular basis by Eileen Capps APRN - NP.  Patient with past medical history of diabetes, hypertension, hyperlipidemia, tobacco abuse who presents with CVA type symptoms.  Neurology requesting transesophageal echocardiogram to rule out cardiac source of emboli.  No prior cardiac history.  No prior history of atrial fibrillation  Normal sinus rhythm on telemetry.  Carotid ultrasound is negative for any significant      Patient presents for initial medical evaluation. Patient is followed on a regular basis by Collin Michael PA.     3/22/2024: Status post hospitalization for CVA With hemorrhagic transformation.  Status post negative transesophageal echocardiogram.  Ejection fraction of 55%  Patient also status post IVC filter placement due to pulmonary embolism with contraindication to oral anticoagulation.  Status post CT of the chest that noted to have right lower lobe pulmonary artery embolism.  Patient also noted to have pancreatic mass/cyst and undergoing workup    4/18/2024: Patient presents today to discuss 2-week event monitor results.  Results showed predominantly normal sinus rhythm with no malignant arrhythmias noted. There were a few episodes of SVT. Heart rate reached lowest rate of 55 bpm.   Given recent cryptogenic CVA and negative findings on event monitor, patient would benefit from loop recorder, as she is high risk for atrial fibrillation.  Patient also status post IVC filter placement due to pulmonary embolism with contraindication to oral anticoagulation.  Patient was unable to perform stress test from prior visit, as insurance was not able to cover it.  Patient denying chest pain, palpitations, shortness of breath, lightheadedness, dizziness.    Patient Active Problem

## 2024-04-21 ENCOUNTER — PATIENT MESSAGE (OUTPATIENT)
Dept: FAMILY MEDICINE CLINIC | Age: 69
End: 2024-04-21

## 2024-04-21 DIAGNOSIS — N18.31 TYPE 2 DIABETES MELLITUS WITH STAGE 3A CHRONIC KIDNEY DISEASE, WITHOUT LONG-TERM CURRENT USE OF INSULIN (HCC): ICD-10-CM

## 2024-04-21 DIAGNOSIS — E11.22 TYPE 2 DIABETES MELLITUS WITH STAGE 3A CHRONIC KIDNEY DISEASE, WITHOUT LONG-TERM CURRENT USE OF INSULIN (HCC): ICD-10-CM

## 2024-04-22 ENCOUNTER — PATIENT MESSAGE (OUTPATIENT)
Dept: FAMILY MEDICINE CLINIC | Age: 69
End: 2024-04-22

## 2024-04-22 RX ORDER — CHOLECALCIFEROL (VITAMIN D3) 50 MCG
2000 TABLET ORAL
Qty: 60 TABLET | Refills: 2 | Status: SHIPPED | OUTPATIENT
Start: 2024-04-22

## 2024-04-22 RX ORDER — GLIMEPIRIDE 4 MG/1
4 TABLET ORAL EVERY MORNING
Qty: 30 TABLET | Refills: 3 | Status: SHIPPED | OUTPATIENT
Start: 2024-04-22

## 2024-04-22 NOTE — TELEPHONE ENCOUNTER
From: Mulu Pandya  To: Collin Chavez  Sent: 4/22/2024 1:44 PM EDT  Subject: Rx switch    I would like to switch Budesonide and Formoterol Fumarate Dihydrate Inhalation Aerosol with WIXELA INHUB /50 for insurance purposes

## 2024-04-22 NOTE — TELEPHONE ENCOUNTER
From: Mulu Pandya  To: Collin Chavez  Sent: 4/21/2024 7:37 PM EDT  Subject: RX    need refill on Vitamin D    also out of Metoprolol

## 2024-04-22 NOTE — TELEPHONE ENCOUNTER
Comments:     Last Office Visit (last PCP visit):   4/3/2024    Next Visit Date:  Future Appointments   Date Time Provider Department Center   5/9/2024  3:30 PM Zheng Chavez MD [place] [facility] -   5/27/2024  1:00 PM [place] MRI ROOM 2 [facility]   5/27/2024  1:30 PM [place] MRI ROOM 1 [facility]   5/29/2024  3:00 PM Bull Arias MD [place] [facility]   7/8/2024  1:15 PM Collin Chavez PA [place]  [facility]   10/3/2024  1:15 PM Jin Gill DO [place] [facility]       **If hasn't been seen in over a year OR hasn't followed up according to last diabetes/ADHD visit, make appointment for patient before sending refill to provider.    Rx requested:  Requested Prescriptions     Pending Prescriptions Disp Refills    vitamin D (CHOLECALCIFEROL) 50 MCG (2000 UT) TABS tablet 60 tablet 2     Sig: Take 1 tablet by mouth Daily with supper

## 2024-04-23 RX ORDER — FLUTICASONE PROPIONATE AND SALMETEROL 100; 50 UG/1; UG/1
1 POWDER RESPIRATORY (INHALATION) EVERY 12 HOURS
Qty: 14 EACH | Refills: 3 | Status: SHIPPED | OUTPATIENT
Start: 2024-04-23

## 2024-04-24 NOTE — PROGRESS NOTES
Patient: Kavita Horan    YOB: 1955    Date: 6/11/19    Chief Complaint   Patient presents with    Diabetes     6 month follow up. She is due for labs, mammogram and DEXA scan.  Hypertension     6 month follow up    Hyperlipidemia     6 month follow up       Patient Active Problem List    Diagnosis Date Noted    Recurrent major depressive disorder, in full remission ([facility] [address] 06/11/2018    Proteinuria     Asthma     Anxiety     Chronic back pain     COPD (chronic obstructive pulmonary disease) (HCC)     Depression     Type 2 diabetes mellitus without complication (HCC)     Mixed hyperlipidemia     Essential hypertension        Allergies   Allergen Reactions    Nickel Itching and Rash       Vitals:    06/11/19 1256   BP: 122/80   Site: Left Upper Arm   Position: Sitting   Cuff Size: Large Adult   Pulse: 76   Resp: 16   Temp: 97.5 °F (36.4 °C)   TempSrc: Oral   SpO2: 94%   Weight: 145 lb 9.6 oz (66 kg)   Height: 5' 3\" (1.6 m)      Body mass index is 25.79 kg/m². Treatment Adherence:   Medication compliance:  compliant all of the time  Diet compliance:  compliant most of the time  Weight trend: decreasing  Current exercise: no regular exercise  Diabetes Mellitus Type 2: Current symptoms/problems include none. Home blood sugar records:  patient does not test  Any episodes of hypoglycemia? no  Eye exam current (within one year): no  Tobacco history: She  reports that she has been smoking cigarettes. She has a 49.00 pack-year smoking history. She has never used smokeless tobacco.   Daily Aspirin? No:   Known diabetic complications: none        HPI    She comes in to follow-up on her diabetes her blood pressure and her general health. Unfortunately she still smoking but her nerves have been good her depression is under good control she has no fever chills cough nausea or vomiting and she still continuing to keep weight down. Spent 25 in a face to face discussion on medical issues, lifestyle education and medication and therapeutic review. Review of Systems    Constitutional: Negative for fatigue, fever and sweats. HEENT: Negative for eye discharge and vision loss. Negative for ear drainage, hearing loss and nasal drainage. Respiratory: positive for cough, dyspnea and wheezing. Cardiovascular:  Negative for chest pain, claudication and irregular heartbeat/palpitations. Gastrointestinal: Negative for abdominal pain, nausea, constipation and diarrhea. Genitourinary: Negative for dysuria, patient postmenopausal.  Metabolic/Endocrine: Negative for cold intolerance, heat intolerance, polydipsia and polyphagia. No unintended weight loss or weight gain. Neuro/Psychiatric: Negative for gait disturbance. Negative for psychiatric symptoms. Dermatologic: Negative for pruritus and rash. Musculoskeletal: Negative for bone/joint symptoms. No numbness or tingling. No loss of function. Hematology: Negative for bleeding and easy bruising. Immunology:  Negative for environmental allergies and food allergies. Low Dose CT (LDCT) Lung Screening criteria met   Age 50-69   Pack year smoking >30   Still smoking or less than 15 year since quit   No sign or symptoms of lung cancer   > 11 months since last LDCT     Risks and benefits of lung cancer screening with LDCT scans discussed:    Significance of positive screen - False-positive LDCT results often occur. 95% of all positive results do not lead to a diagnosis of cancer. Usually further imaging can resolve most false-positive results; however, some patients may require invasive procedures. Over diagnosis risk - 10% to 12% of screen-detected lung cancer cases are over diagnosed--that is, the cancer would not have been detected in the patient's lifetime without the screening.     Need for follow up screens annually to continue lung cancer screening effectiveness     Risks associated with radiation from annual LDCT- Radiation exposure is about the Outpatient Medications   Medication Sig Dispense Refill    zoster recombinant adjuvanted vaccine (SHINGRIX) 50 MCG/0.5ML SUSR injection Inject 0.5 mLs into the muscle once for 1 dose 0.5 mL 1    simvastatin (ZOCOR) 40 MG tablet TAKE 1 TABLET BY MOUTH EVERY DAY IN THE EVENING 90 tablet 3    escitalopram (LEXAPRO) 10 MG tablet TAKE 1 & 1/2 TABLETS BY MOUTH EVERY  tablet 5    metFORMIN (GLUCOPHAGE-XR) 500 MG extended release tablet TAKE ONE TABLET BY MOUTH A  tablet 3    vitamin D (CHOLECALCIFEROL) 1000 UNIT TABS tablet Take 1,000 Units by mouth daily      Doxylamine Succinate, Sleep, (UNISOM PO) Take 1 tablet by mouth daily as needed      BIOTIN PO Take 1 tablet by mouth daily      Multiple Vitamins-Minerals (SENIOR MULTIVITAMIN PLUS PO) Take 1 tablet by mouth daily      Naproxen Sodium (ALEVE) 220 MG CAPS Take  by mouth daily as needed. No current facility-administered medications for this visit. Orders Placed This Encounter   Procedures    TONO DIGITAL SCREEN W CAD BILATERAL     Standing Status:   Future     Standing Expiration Date:   6/10/2020    DEXA Bone Density Axial Skeleton     Standing Status:   Future     Standing Expiration Date:   6/10/2020    CT Lung Screen (Annual)     Age: Patient is 70 y.o. Smoking History: Social History    Tobacco Use      Smoking status: Current Some Day Smoker        Packs/day: 1.00        Years: 49.00        Pack years: 52        Types: Cigarettes      Smokeless tobacco: Never Used      Tobacco comment: 15 started age 11    Alcohol use: No      Comment: denies    Drug use: No   Pack years: 52    Date of last lung cancer screenin/09 1409     Standing Status:   Future     Standing Expiration Date:   2020     Order Specific Question:   Is there documentation of shared decision making? Answer:   Yes     Order Specific Question:   Is this a low dose CT or a routine CT?      Answer:   Low Dose CT [1]     Order Specific Question:   Is this the first (baseline) CT or an annual exam?     Answer: Annual [2]     Order Specific Question:   Does the patient show any signs or symptoms of lung cancer? Answer:   No     Order Specific Question:   Smoking Status? Answer:   Current Some Day Smoker [2]     Order Specific Question:   Smoking packs per day? Answer:   1     Order Specific Question:   Years smoking? Answer:   52    Lipid Panel     Standing Status:   Future     Standing Expiration Date:   6/10/2020     Order Specific Question:   Is Patient Fasting?/# of Hours     Answer:   unknown    Comprehensive Metabolic Panel     Standing Status:   Future     Standing Expiration Date:   6/10/2020    POCT glycosylated hemoglobin (Hb A1C)    DC VISIT TO DISCUSS LUNG CA SCREEN W LDCT       Orders Placed This Encounter   Medications    zoster recombinant adjuvanted vaccine (SHINGRIX) 50 MCG/0.5ML SUSR injection     Sig: Inject 0.5 mLs into the muscle once for 1 dose     Dispense:  0.5 mL     Refill:  1              Return in about 6 months (around 12/11/2019).     Dr. Ganesh Nick      6/11/19  1:46 PM No

## 2024-04-29 ENCOUNTER — TELEPHONE (OUTPATIENT)
Dept: CARDIOLOGY CLINIC | Age: 69
End: 2024-04-29

## 2024-04-29 NOTE — TELEPHONE ENCOUNTER
FYI-    Scheduling dept calling to let us know that they tried calling to schedule testing 3x.  Pt's VM is full and not returning scheduling dept calls.   Marine daryl refill of ditropan annual scheduled for 11/8/19  Patient Active Problem List   Diagnosis    Spinal stenosis, lumbar    DDD (degenerative disc disease), lumbar    Low back pain without sciatica    Lumbar radiculopathy    Neurogenic claudication    Hyperlipidemia    Obesity (BMI 30.0-34.9)    Thrombocytopenia    Type 2 diabetes mellitus, controlled    Chronic insomnia    Hereditary and idiopathic peripheral neuropathy    Essential hypertension    Diabetes mellitus with neuropathy    Encounter for diabetic foot exam    Hepatomegaly    Splenomegaly    Anemia    Upper GI bleed    Polycystic liver disease    Portal hypertension    Iron deficiency anemia    Other chest pain    Dyspnea on exertion    Depression     Prior to Admission medications    Medication Sig Start Date End Date Taking? Authorizing Provider   ALPRAZolam (XANAX) 0.5 MG tablet TAKE 1 TABLET BY MOUTH ONCE DAILY AT BEDTIME AS NEEDED FOR ANXIETY 7/1/19   Serg Hwang MD   aspirin 81 MG Chew Take 81 mg by mouth once daily.    Historical Provider, MD   FLUoxetine 10 MG capsule TAKE 1 CAPSULE BY MOUTH ONCE DAILY 7/29/19   Serg Hwang MD   latanoprost 0.005 % ophthalmic solution  8/15/18   Historical Provider, MD   metFORMIN (GLUCOPHAGE) 500 MG tablet TAKE 1 TABLET BY MOUTH TWICE DAILY WITH MEALS 2/25/19   Serg Hwang MD   metoprolol tartrate (LOPRESSOR) 100 MG tablet TAKE 1/2 (ONE-HALF) TABLET BY MOUTH TWICE DAILY 11/20/18   Ami Valentin NP   nystatin (MYCOSTATIN) cream Apply topically 2 (two) times daily. 7/16/19   Debbie Alvse CNM   oxybutynin (DITROPAN) 5 MG Tab TAKE 1 TABLET BY MOUTH TWICE DAILY 5/31/19   Wilton Joseph MD   polymyxin B sulf-trimethoprim (POLYTRIM) 10,000 unit- 1 mg/mL Drop INSTILL 1 DROP INTO EACH EYE EVERY 4 HOURS FOR 1 WEEK 6/13/19   Historical Provider, MD   triamcinolone acetonide 0.1% (KENALOG) 0.1 % cream Apply topically 2 (two) times daily. 7/16/19   Debbie SNYDER  Long, DENG

## 2024-05-06 ENCOUNTER — TELEPHONE (OUTPATIENT)
Dept: CARDIOLOGY CLINIC | Age: 69
End: 2024-05-06

## 2024-05-06 DIAGNOSIS — R00.0 TACHYCARDIA, UNSPECIFIED: ICD-10-CM

## 2024-05-06 DIAGNOSIS — R00.0 TACHYCARDIA: Primary | ICD-10-CM

## 2024-05-06 NOTE — TELEPHONE ENCOUNTER
----- Message from Jin Gill DO sent at 5/5/2024  8:23 PM EDT -----  Regarding: RE: 05/03/24 - INUSRANCE REQUEST FOR PEER 2 PEER    Please notify patient insurance denied ILR. Can get 2 week event monitor first and then see if we still need ILR  Thanks    Electronically signed by Jin Gill DO on 5/5/2024 at 8:24 PM          ----- Message -----  From: Lulu Haider  Sent: 5/3/2024   4:03 PM EDT  To: Jin Gill DO; Kirsty Marti RN  Subject: 05/03/24 - INUSRANCE REQUEST FOR PEER 2 PEER     05/03/24 - INUSRANCE REQUEST FOR PEER 2 PEER-  [phone] Insert ILR    ORDER ID# [id number]  Mulu Pandya  1955

## 2024-05-07 NOTE — PROGRESS NOTES
I called patient to remind her about her procedure on 5/8, it went straight to voicemail and it was full, I was unable to leave a message.

## 2024-05-08 ENCOUNTER — HOSPITAL ENCOUNTER (OUTPATIENT)
Age: 69
Setting detail: OUTPATIENT SURGERY
Discharge: HOME | End: 2024-05-08
Attending: INTERNAL MEDICINE | Admitting: INTERNAL MEDICINE
Payer: MEDICARE

## 2024-05-08 VITALS
HEART RATE: 89 BPM | DIASTOLIC BLOOD PRESSURE: 71 MMHG | RESPIRATION RATE: 19 BRPM | SYSTOLIC BLOOD PRESSURE: 117 MMHG | BODY MASS INDEX: 27.99 KG/M2 | OXYGEN SATURATION: 98 % | TEMPERATURE: 97.9 F | WEIGHT: 158 LBS

## 2024-05-08 DIAGNOSIS — R00.0 TACHYCARDIA, UNSPECIFIED: ICD-10-CM

## 2024-05-08 PROCEDURE — 7100000010 HC PHASE II RECOVERY - FIRST 15 MIN: Performed by: INTERNAL MEDICINE

## 2024-05-08 PROCEDURE — C1894 INTRO/SHEATH, NON-LASER: HCPCS | Performed by: INTERNAL MEDICINE

## 2024-05-08 PROCEDURE — 33285 INSJ SUBQ CAR RHYTHM MNTR: CPT | Performed by: INTERNAL MEDICINE

## 2024-05-08 PROCEDURE — 2709999900 HC NON-CHARGEABLE SUPPLY: Performed by: INTERNAL MEDICINE

## 2024-05-08 PROCEDURE — 93285 PRGRMG DEV EVAL SCRMS IP: CPT | Performed by: INTERNAL MEDICINE

## 2024-05-08 PROCEDURE — C1764 EVENT RECORDER, CARDIAC: HCPCS | Performed by: INTERNAL MEDICINE

## 2024-05-08 PROCEDURE — 2500000003 HC RX 250 WO HCPCS: Performed by: INTERNAL MEDICINE

## 2024-05-08 PROCEDURE — 7100000011 HC PHASE II RECOVERY - ADDTL 15 MIN: Performed by: INTERNAL MEDICINE

## 2024-05-08 DEVICE — MONITOR CRD 1.4 CC 3.4 GM INSERTABLE LINQ: Type: IMPLANTABLE DEVICE | Status: FUNCTIONAL

## 2024-05-08 RX ORDER — SODIUM CHLORIDE 9 MG/ML
INJECTION, SOLUTION INTRAVENOUS PRN
Status: DISCONTINUED | OUTPATIENT
Start: 2024-05-08 | End: 2024-05-08 | Stop reason: HOSPADM

## 2024-05-08 RX ORDER — SODIUM CHLORIDE 0.9 % (FLUSH) 0.9 %
5-40 SYRINGE (ML) INJECTION EVERY 12 HOURS SCHEDULED
Status: DISCONTINUED | OUTPATIENT
Start: 2024-05-08 | End: 2024-05-08 | Stop reason: HOSPADM

## 2024-05-08 RX ORDER — LIDOCAINE HYDROCHLORIDE 10 MG/ML
INJECTION, SOLUTION INFILTRATION; PERINEURAL PRN
Status: DISCONTINUED | OUTPATIENT
Start: 2024-05-08 | End: 2024-05-08 | Stop reason: HOSPADM

## 2024-05-08 RX ORDER — SODIUM CHLORIDE 0.9 % (FLUSH) 0.9 %
5-40 SYRINGE (ML) INJECTION PRN
Status: DISCONTINUED | OUTPATIENT
Start: 2024-05-08 | End: 2024-05-08 | Stop reason: HOSPADM

## 2024-05-08 NOTE — PROGRESS NOTES
Pt. arrived to pre/post cath. Alert and oriented, consent signed. Prepped for procedure.  Electronically signed by Eileen Cox RN on 5/8/2024 at 7:36 AM      0825 Procedure completed. Pt. resting without any complaints. Aquacel dressing to left upper chest is clean dry and intact. Vital signs are stable.    0845 [employer] rep here speaking with he patient. Follow up appointment made for the pt for site check in 7 days.    0855 Discharged home with .  Electronically signed by Eileen Cox RN on 5/8/2024 at 9:00 AM

## 2024-05-08 NOTE — BRIEF OP NOTE
Section of Cardiology  Adult Brief Procedure Note        Procedure(s):  ILR insertion    Pre-operative Diagnosis:  Afib.     H&P Status: Completed and reviewed.     Post-operative Diagnosis:  Successful ILR placement    Findings: see full report    Complications:  none    Primary Proceduralist:   Dr. Jin Gill     Plan    Follow up in [facility]acer clinic.   Full procedure note to follow

## 2024-05-09 ENCOUNTER — PATIENT MESSAGE (OUTPATIENT)
Dept: NEUROLOGY | Age: 69
End: 2024-05-09

## 2024-05-09 ENCOUNTER — OFFICE VISIT (OUTPATIENT)
Dept: NEUROLOGY | Age: 69
End: 2024-05-09
Payer: MEDICARE

## 2024-05-09 VITALS
HEART RATE: 84 BPM | BODY MASS INDEX: 25.86 KG/M2 | WEIGHT: 146 LBS | SYSTOLIC BLOOD PRESSURE: 111 MMHG | DIASTOLIC BLOOD PRESSURE: 72 MMHG

## 2024-05-09 DIAGNOSIS — D33.3 ACOUSTIC NEUROMA (HCC): ICD-10-CM

## 2024-05-09 DIAGNOSIS — I61.5 LEFT-SIDED NONTRAUMATIC INTRAVENTRICULAR INTRACEREBRAL HEMORRHAGE (HCC): Primary | ICD-10-CM

## 2024-05-09 DIAGNOSIS — R27.0 ATAXIA: ICD-10-CM

## 2024-05-09 PROCEDURE — 99214 OFFICE O/P EST MOD 30 MIN: CPT | Performed by: PSYCHIATRY & NEUROLOGY

## 2024-05-09 PROCEDURE — 1123F ACP DISCUSS/DSCN MKR DOCD: CPT | Performed by: PSYCHIATRY & NEUROLOGY

## 2024-05-09 PROCEDURE — 3078F DIAST BP <80 MM HG: CPT | Performed by: PSYCHIATRY & NEUROLOGY

## 2024-05-09 PROCEDURE — 3074F SYST BP LT 130 MM HG: CPT | Performed by: PSYCHIATRY & NEUROLOGY

## 2024-05-09 NOTE — PROGRESS NOTES
Subjective:      Patient ID: Mulu Pandya is a 68 y.o. female who presents today for:  Chief Complaint   Patient presents with    Follow-Up from Hospital     Pt states she had a procedure for her heart done  on 5/8/24. Pt wants to know about her brain bleed. Pt wants to know what is going on. Pt had some blood clots but she can not take blood thinner because of her brain bleed        HPI 68-year-old right-handed female with a history of stroke.  Patient was brought in with new speech difficulty and right-sided weakness.  Initially it was BAT protocol but then it was noted that she had intracerebral hemorrhage..  She had a 1.6 cm rounded high density hemorrhage.  There was some question of underlying vascular malformation and therefore we recommend MRI which has not been done.  Patient also has a CP angle mass at 2.4 x 1.6 x 2.1 cm consistent with a meningioma this has not been recently.  This is a vestibular schwannoma..  She has other old infarct.  She recently had some procedure done on her heart.  She had a loop recorder replaced    Patient does not have any hearing loss but has some central ataxia without any vestibular dysfunction.    Past Medical History:   Diagnosis Date    Anxiety     Asthma     Chronic back pain     Chronic renal failure, stage 3a ([facility])     COPD (chronic obstructive pulmonary disease) ([facility])     CVA (cerebral vascular accident) ([facility]) 01/22/2024    Moderate-sized acute infarction in the left superior mid parietal lobe MCA territory with minimal mass effect.    Depression     History of tinnitus     Hyperlipidemia     Hypertension     Obesity     Osteoporosis     2019 T -3.2    Peripheral vascular disease (HCC)     Proteinuria     Type II or unspecified type diabetes mellitus without mention of complication, not stated as uncontrolled      Past Surgical History:   Procedure Laterality Date    BREAST SURGERY Left 2009    Benign bx    FRACTURE SURGERY      INVASIVE VASCULAR N/A 2/2/2024

## 2024-05-13 DIAGNOSIS — Z45.09 ENCOUNTER FOR LOOP RECORDER CHECK: Primary | ICD-10-CM

## 2024-05-15 ENCOUNTER — NURSE ONLY (OUTPATIENT)
Dept: CARDIOLOGY CLINIC | Age: 69
End: 2024-05-15

## 2024-05-15 NOTE — PROGRESS NOTES
WOUND SITE CHECKED. REMOVED DRESSING.  A GAP WAS NOTICED. THE GLUE CAME OFF WITH DRESSING. DR ROSAS LOOKED AT WOUND SITE. CLEANSED SITE WITH IODINE AND PUT THE SKIN GLUE AND STERI STRIPS ON WOUND SITE

## 2024-05-21 ENCOUNTER — HOSPITAL ENCOUNTER (OUTPATIENT)
Dept: CARDIOLOGY | Age: 69
Discharge: HOME | End: 2024-05-21
Payer: MEDICARE

## 2024-05-21 PROCEDURE — 93298 REM INTERROG DEV EVAL SCRMS: CPT

## 2024-05-24 NOTE — PROGRESS NOTES
Patient Name: Mulu Pandya : 1955        Date: 2024      Type of Appt: Follow up    Reason for appt: Return in about 3 months (around 2024) for After imaging studies obtained.     Pt last seen by Dr rAias on 2024    [facility]: [facility]  2024 - MRA HEAD WO CONTRAST @ [facility]    Smoking: Yes or No    REVIEW OF SYSTEMS:    Rash   Difficulty Urinating Nausea     Fever   Headaches  Bruising/Bleeding Easily     Hearing loss  Constipation  Sleep Disturbance    Shortness of breath Diarrhea  Neck Pain  Back Pain

## 2024-05-28 ENCOUNTER — HOSPITAL ENCOUNTER (OUTPATIENT)
Dept: MRI IMAGING | Age: 69
Discharge: HOME | End: 2024-05-30
Attending: NEUROLOGICAL SURGERY
Payer: MEDICARE

## 2024-05-28 DIAGNOSIS — I61.0 NONTRAUMATIC SUBCORTICAL HEMORRHAGE OF LEFT CEREBRAL HEMISPHERE (HCC): ICD-10-CM

## 2024-05-28 PROCEDURE — 70544 MR ANGIOGRAPHY HEAD W/O DYE: CPT

## 2024-05-28 PROCEDURE — 70551 MRI BRAIN STEM W/O DYE: CPT

## 2024-05-29 ENCOUNTER — OFFICE VISIT (OUTPATIENT)
Dept: NEUROSURGERY | Age: 69
End: 2024-05-29
Payer: MEDICARE

## 2024-05-29 VITALS
DIASTOLIC BLOOD PRESSURE: 74 MMHG | HEIGHT: 63 IN | WEIGHT: 146 LBS | SYSTOLIC BLOOD PRESSURE: 112 MMHG | BODY MASS INDEX: 25.87 KG/M2 | TEMPERATURE: 97 F

## 2024-05-29 DIAGNOSIS — G93.89 MASS OF BRAIN: Primary | ICD-10-CM

## 2024-05-29 DIAGNOSIS — I63.412 CEREBROVASCULAR ACCIDENT (CVA) DUE TO EMBOLISM OF LEFT MIDDLE CEREBRAL ARTERY (HCC): ICD-10-CM

## 2024-05-29 PROCEDURE — 3074F SYST BP LT 130 MM HG: CPT | Performed by: NEUROLOGICAL SURGERY

## 2024-05-29 PROCEDURE — 99214 OFFICE O/P EST MOD 30 MIN: CPT | Performed by: NEUROLOGICAL SURGERY

## 2024-05-29 PROCEDURE — 1123F ACP DISCUSS/DSCN MKR DOCD: CPT | Performed by: NEUROLOGICAL SURGERY

## 2024-05-29 PROCEDURE — 3078F DIAST BP <80 MM HG: CPT | Performed by: NEUROLOGICAL SURGERY

## 2024-05-29 ASSESSMENT — ENCOUNTER SYMPTOMS
ABDOMINAL DISTENTION: 0
SHORTNESS OF BREATH: 0
TROUBLE SWALLOWING: 0
COUGH: 0
ABDOMINAL PAIN: 0
EYE PAIN: 0
BACK PAIN: 0

## 2024-05-29 NOTE — PROGRESS NOTES
NEUROSURGERY and SPINE FOLLOW-UP NOTE      Patient Name: Mulu Pandya  Patient : 1955  MRN: [id number]       PCP: Collin Chavez PA      History of Present Ilness: 68 y.o. presents with f/u. She states that right HP and slurred speech are slowly improving. Has decreased hearing left side for many years.  She was hospitalized in January and CT of head had revealed left frontal ICH and neurosurgery was consulted.  CTA revealed left M2 occlusion.  She is planned for follow-up with Dr. Chavez.    Chief Complaint   Patient presents with    Other     Mra of head               Past Medical History:        Diagnosis Date    Anxiety     Asthma     Chronic back pain     Chronic renal failure, stage 3a (HCC)     COPD (chronic obstructive pulmonary disease) (HCC)     CVA (cerebral vascular accident) (HCC) 2024    Moderate-sized acute infarction in the left superior mid parietal lobe MCA territory with minimal mass effect.    Depression     History of tinnitus     Hyperlipidemia     Hypertension     Obesity     Osteoporosis     2019 T -3.2    Peripheral vascular disease (HCC)     Proteinuria     Type II or unspecified type diabetes mellitus without mention of complication, not stated as uncontrolled        Past Surgical History:        Procedure Laterality Date    BREAST SURGERY Left 2009    Benign bx    EP DEVICE PROCEDURE N/A 2024    Loop recorder insert performed by Jin Gill DO at [facility] CATH LAB    FRACTURE SURGERY      INVASIVE VASCULAR N/A 2024    Vena cava filter insertion - cath lab rehab 243 performed by Jin Gill DO at [facility] CARDI[facility] CATH LAB    TUBAL LIGATION      UPPER GASTROINTESTINAL ENDOSCOPY N/A 2024    ESOPHAGOGASTRODUODENOSCOPY and ENDOSCOPIC ULTRASOUND WITH FNA and FNB performed by Trace Segovia MD at [facility] CENTER       Home Medications:   Prior to Admission medications    Medication Sig Start Date End Date Taking? Authorizing Provider   fluticasone-salmeterol

## 2024-05-31 ENCOUNTER — TELEPHONE (OUTPATIENT)
Dept: CARDIOLOGY CLINIC | Age: 69
End: 2024-05-31

## 2024-05-31 NOTE — TELEPHONE ENCOUNTER
Per Dr Gill pt needs to be seen ASAP for tachycardia.     Unable to LM on VM due to VM being full.

## 2024-06-03 ENCOUNTER — TELEPHONE (OUTPATIENT)
Dept: CARDIOLOGY CLINIC | Age: 69
End: 2024-06-03

## 2024-06-04 ENCOUNTER — APPOINTMENT (OUTPATIENT)
Dept: NEUROSURGERY | Facility: CLINIC | Age: 69
End: 2024-06-04
Payer: MEDICARE

## 2024-06-07 ENCOUNTER — OFFICE VISIT (OUTPATIENT)
Dept: CARDIOLOGY CLINIC | Age: 69
End: 2024-06-07
Payer: MEDICARE

## 2024-06-07 VITALS
SYSTOLIC BLOOD PRESSURE: 110 MMHG | HEART RATE: 73 BPM | BODY MASS INDEX: 25.15 KG/M2 | WEIGHT: 142 LBS | OXYGEN SATURATION: 95 % | DIASTOLIC BLOOD PRESSURE: 70 MMHG

## 2024-06-07 DIAGNOSIS — I10 ESSENTIAL HYPERTENSION: ICD-10-CM

## 2024-06-07 DIAGNOSIS — Z86.711 HISTORY OF PULMONARY EMBOLISM: ICD-10-CM

## 2024-06-07 DIAGNOSIS — E78.2 MIXED HYPERLIPIDEMIA: ICD-10-CM

## 2024-06-07 DIAGNOSIS — Z72.0 TOBACCO ABUSE: ICD-10-CM

## 2024-06-07 DIAGNOSIS — R00.0 TACHYCARDIA, UNSPECIFIED: Primary | ICD-10-CM

## 2024-06-07 PROCEDURE — 3078F DIAST BP <80 MM HG: CPT | Performed by: INTERNAL MEDICINE

## 2024-06-07 PROCEDURE — 3074F SYST BP LT 130 MM HG: CPT | Performed by: INTERNAL MEDICINE

## 2024-06-07 PROCEDURE — 99213 OFFICE O/P EST LOW 20 MIN: CPT | Performed by: INTERNAL MEDICINE

## 2024-06-07 PROCEDURE — 1123F ACP DISCUSS/DSCN MKR DOCD: CPT | Performed by: INTERNAL MEDICINE

## 2024-06-07 ASSESSMENT — ENCOUNTER SYMPTOMS
ABDOMINAL PAIN: 0
ALLERGIC/IMMUNOLOGIC NEGATIVE: 1
WHEEZING: 0
EYES NEGATIVE: 1
NAUSEA: 0
VOMITING: 0
GASTROINTESTINAL NEGATIVE: 1
SHORTNESS OF BREATH: 0

## 2024-06-07 NOTE — PROGRESS NOTES
person, place, and time.      Cranial Nerves: No cranial nerve deficit.   Psychiatric:         Behavior: Behavior normal.         Thought Content: Thought content normal.         Judgment: Judgment normal.          Pertinent Labs:  CBC: No results for input(s): \"WBC\", \"HGB\", \"PLT\" in the last 72 hours.  BMP:No results for input(s): \"NA\", \"K\", \"CL\", \"CO2\", \"BUN\", \"CREATININE\", \"GLUCOSE\", \"LABGLOM\" in the last 72 hours.  INR: No results for input(s): \"INR\" in the last 72 hours.  BNP: No results for input(s): \"BNP\" in the last 72 hours.   TSH:   Lab Results   Component Value Date    TSH 2.300 08/06/2014      Cardiac Injury Profile: No results for input(s): \"CKTOTAL\", \"CKMB\", \"CKMBINDEX\", \"TROPONINI\" in the last 72 hours.   Lipid Profile:   Lab Results   Component Value Date/Time    TRIG 148 01/19/2024 03:52 AM    HDL 28 01/19/2024 03:52 AM    CHOL 143 01/19/2024 03:52 AM      Hemoglobin A1C: No components found for: \"HGBA1C\"           No orders of the defined types were placed in this encounter.      ASSESSMENT:     Diagnosis Orders   1. Tachycardia, unspecified        2. Mixed hyperlipidemia        3. Essential hypertension        4. History of pulmonary embolism        5. Tobacco abuse              PLAN:       As always, aggressive risk factor modification is strongly recommended. We should adhere to the JNC VIII guidelines for HTN management and the NCEP ATP III guidelines for LDL-C management.    Cardiac diet is always recommended with low fat, cholesterol, calories and sodium.    Continue medications at current doses.     Patient instructed to follow-up with neurology.  When she is able to initiate anticoagulation in the future will remove IVC filter    implantable loop recorder CHECKS.   Patient at high risk for developing atrial fibrillation    Patient ordered nuclear stress test last office visit- DECLINED BY INSURANCE.

## 2024-06-10 ENCOUNTER — TELEPHONE (OUTPATIENT)
Dept: CARDIOLOGY CLINIC | Age: 69
End: 2024-06-10

## 2024-06-10 NOTE — TELEPHONE ENCOUNTER
PATIENT'S  CALLED OFFICE STATES SHE HAD A LOOP RECORDER PLACED RECENTLY. DR CAGLE WANTS TO ORDER AN MRI, BUT THE SCHEDULING DEPARTMENT DOESN'T HAVE ANY INFORMATION IF IT IS OK TO HAVE AN MRI WITH A LOOP RECORDER PLACED.    PLEASE ADVISE

## 2024-06-21 ENCOUNTER — HOSPITAL ENCOUNTER (OUTPATIENT)
Dept: CARDIOLOGY | Age: 69
Discharge: HOME | End: 2024-06-21
Payer: MEDICARE

## 2024-06-21 PROCEDURE — 93298 REM INTERROG DEV EVAL SCRMS: CPT

## 2024-06-25 DIAGNOSIS — E11.22 TYPE 2 DIABETES MELLITUS WITH STAGE 3A CHRONIC KIDNEY DISEASE, WITHOUT LONG-TERM CURRENT USE OF INSULIN (HCC): ICD-10-CM

## 2024-06-25 DIAGNOSIS — N18.31 TYPE 2 DIABETES MELLITUS WITH STAGE 3A CHRONIC KIDNEY DISEASE, WITHOUT LONG-TERM CURRENT USE OF INSULIN (HCC): ICD-10-CM

## 2024-06-26 RX ORDER — GLIMEPIRIDE 4 MG/1
4 TABLET ORAL EVERY MORNING
Qty: 30 TABLET | Refills: 3 | Status: SHIPPED | OUTPATIENT
Start: 2024-06-26

## 2024-06-26 NOTE — TELEPHONE ENCOUNTER
Comments:     Last Office Visit (last PCP visit):   4/3/2024    Next Visit Date:  Future Appointments   Date Time Provider Department Center   7/1/2024  1:30 PM [facility] MRI ROOM 1 [facility]   7/1/2024  2:15 PM [place] MRI ROOM 2 [facility]   7/8/2024  1:15 PM Collin Chavez PA [place]  [facility]   8/15/2024  3:30 PM Zheng Chavez MD [place] [facility] Neurology -   10/3/2024  1:15 PM Jin Gill DO [place] [facility]       **If hasn't been seen in over a year OR hasn't followed up according to last diabetes/ADHD visit, make appointment for patient before sending refill to provider.    Rx requested:  Requested Prescriptions     Pending Prescriptions Disp Refills    glimepiride (AMARYL) 4 MG tablet 30 tablet 3     Sig: Take 1 tablet by mouth every morning

## 2024-06-29 DIAGNOSIS — J44.9 CHRONIC OBSTRUCTIVE PULMONARY DISEASE, UNSPECIFIED COPD TYPE (HCC): ICD-10-CM

## 2024-07-01 ENCOUNTER — HOSPITAL ENCOUNTER (OUTPATIENT)
Dept: MRI IMAGING | Age: 69
Discharge: HOME | End: 2024-07-03
Attending: NEUROLOGICAL SURGERY
Payer: MEDICARE

## 2024-07-01 DIAGNOSIS — G93.89 MASS OF BRAIN: ICD-10-CM

## 2024-07-01 LAB
ANION GAP SERPL CALCULATED.3IONS-SCNC: 13 MEQ/L (ref 9–15)
BUN SERPL-MCNC: 21 MG/DL (ref 8–23)
CALCIUM SERPL-MCNC: 9.5 MG/DL (ref 8.5–9.9)
CHLORIDE SERPL-SCNC: 102 MEQ/L (ref 95–107)
CO2 SERPL-SCNC: 28 MEQ/L (ref 20–31)
CREAT SERPL-MCNC: 1.11 MG/DL (ref 0.5–0.9)
GLUCOSE SERPL-MCNC: 108 MG/DL (ref 70–99)
POTASSIUM SERPL-SCNC: 4.3 MEQ/L (ref 3.4–4.9)
SODIUM SERPL-SCNC: 143 MEQ/L (ref 135–144)

## 2024-07-01 PROCEDURE — 6360000004 HC RX CONTRAST MEDICATION: Performed by: NEUROLOGICAL SURGERY

## 2024-07-01 PROCEDURE — A9579 GAD-BASE MR CONTRAST NOS,1ML: HCPCS | Performed by: NEUROLOGICAL SURGERY

## 2024-07-01 PROCEDURE — 70553 MRI BRAIN STEM W/O & W/DYE: CPT

## 2024-07-01 RX ORDER — FLUTICASONE PROPIONATE AND SALMETEROL 100; 50 UG/1; UG/1
1 POWDER RESPIRATORY (INHALATION) EVERY 12 HOURS
Qty: 60 EACH | Refills: 5 | Status: SHIPPED | OUTPATIENT
Start: 2024-07-01

## 2024-07-01 RX ADMIN — GADOTERIDOL 10 ML: 279.3 INJECTION, SOLUTION INTRAVENOUS at 13:52

## 2024-07-01 NOTE — TELEPHONE ENCOUNTER
Comments:     Last Office Visit (last PCP visit):   4/3/2024    Next Visit Date:  Future Appointments   Date Time Provider Department Center   7/1/2024  1:30 PM [facility] MRI ROOM 1 [facility]   7/1/2024  2:15 PM [place] MRI ROOM 2 [facility] RAD   7/8/2024  1:15 PM Collin Chavez PA [place]  [facility]   8/15/2024  3:30 PM Zheng Chavez MD [place] [facility] Neurology -   10/3/2024  1:15 PM Jin Gill DO [place] [facility]       **If hasn't been seen in over a year OR hasn't followed up according to last diabetes/ADHD visit, make appointment for patient before sending refill to provider.    Rx requested:  Requested Prescriptions     Pending Prescriptions Disp Refills    fluticasone-salmeterol (WIXELA INHUB) 100-50 MCG/ACT AEPB diskus inhaler 60 each 5     Sig: Inhale 1 puff into the lungs in the morning and 1 puff in the evening.

## 2024-07-02 ENCOUNTER — PATIENT MESSAGE (OUTPATIENT)
Dept: FAMILY MEDICINE CLINIC | Age: 69
End: 2024-07-02

## 2024-07-02 DIAGNOSIS — E11.22 TYPE 2 DIABETES MELLITUS WITH STAGE 3A CHRONIC KIDNEY DISEASE, WITHOUT LONG-TERM CURRENT USE OF INSULIN (HCC): ICD-10-CM

## 2024-07-02 DIAGNOSIS — N18.31 TYPE 2 DIABETES MELLITUS WITH STAGE 3A CHRONIC KIDNEY DISEASE, WITHOUT LONG-TERM CURRENT USE OF INSULIN (HCC): ICD-10-CM

## 2024-07-02 RX ORDER — GLIMEPIRIDE 4 MG/1
4 TABLET ORAL EVERY MORNING
Qty: 30 TABLET | Refills: 3 | Status: SHIPPED | OUTPATIENT
Start: 2024-07-02 | End: 2024-07-05 | Stop reason: SDUPTHER

## 2024-07-02 NOTE — PROGRESS NOTES
Smoking status: Former     Current packs/day: 0.00     Average packs/day: 1 pack/day for 49.0 years (49.0 ttl pk-yrs)     Types: Cigarettes     Quit date: 2024     Years since quittin.4    Smokeless tobacco: Never    Tobacco comments:     15 started age 13   Vaping Use    Vaping Use: Never used   Substance and Sexual Activity    Alcohol use: No     Comment: denies    Drug use: No    Sexual activity: Not Currently   Other Topics Concern    Not on file   Social History Narrative         Lives With: Spouse Shaq     Type of Home: [address] [place]         Home Layout: One level    Home Access: Stairs to enter with rails - Number of Steps: 3 - Rails: Left (up)    Bathroom Shower/Tub: Walk-in shower,  Toilet: Standard, Equipment: Grab bars in shower, Shower chair, Hand-held shower,  Accessibility: Accessible    Home Equipment: Cane    Receives Help From: Family    ADL Assistance: Independent    Homemaking Assistance: Independent,  Responsibilities: Yes    Ambulation Assistance: Independent (normally without AD)    Transfer Assistance: Independent    Active : Yes -Car    Occupation: Retired    Leisure & Hobbies: TV, music, reading    Additional Comments: pt with aphasia,         Social Determinants of Health     Financial Resource Strain: Low Risk  (2024)    Overall Financial Resource Strain (CARDIA)     Difficulty of Paying Living Expenses: Not hard at all   Food Insecurity: No Food Insecurity (2024)    Hunger Vital Sign     Worried About Running Out of Food in the Last Year: Never true     Ran Out of Food in the Last Year: Never true   Transportation Needs: No Transportation Needs (2024)    PRAPARE - Transportation     Lack of Transportation (Medical): No     Lack of Transportation (Non-Medical): No   Physical Activity: Inactive (2023)    Exercise Vital Sign     Days of Exercise per Week: 0 days     Minutes of Exercise per Session: 0 min   Stress: Not on file   Social

## 2024-07-05 RX ORDER — GLIMEPIRIDE 4 MG/1
4 TABLET ORAL EVERY MORNING
Qty: 30 TABLET | Refills: 3 | Status: SHIPPED | OUTPATIENT
Start: 2024-07-05

## 2024-07-05 NOTE — TELEPHONE ENCOUNTER
From: Mulu Pandya  To: Collin Chavez  Sent: 7/2/2024 3:13 PM EDT  Subject: refill    I know you just sent a refill to [facility] but I have not checked my messages and they put the RX back on the shelf yesterday. Could you send another refill for   glimepiride to [facility] ?    sorry for the extra moves  Mulu Pandya

## 2024-07-08 ENCOUNTER — OFFICE VISIT (OUTPATIENT)
Dept: FAMILY MEDICINE CLINIC | Age: 69
End: 2024-07-08
Payer: MEDICARE

## 2024-07-08 VITALS
WEIGHT: 147.8 LBS | OXYGEN SATURATION: 95 % | BODY MASS INDEX: 26.19 KG/M2 | DIASTOLIC BLOOD PRESSURE: 76 MMHG | HEIGHT: 63 IN | SYSTOLIC BLOOD PRESSURE: 120 MMHG | TEMPERATURE: 97.2 F | HEART RATE: 80 BPM | RESPIRATION RATE: 16 BRPM

## 2024-07-08 DIAGNOSIS — E11.22 TYPE 2 DIABETES MELLITUS WITH STAGE 3A CHRONIC KIDNEY DISEASE, WITHOUT LONG-TERM CURRENT USE OF INSULIN (HCC): ICD-10-CM

## 2024-07-08 DIAGNOSIS — I10 ESSENTIAL HYPERTENSION: ICD-10-CM

## 2024-07-08 DIAGNOSIS — E11.22 TYPE 2 DIABETES MELLITUS WITH STAGE 3A CHRONIC KIDNEY DISEASE, WITHOUT LONG-TERM CURRENT USE OF INSULIN (HCC): Primary | ICD-10-CM

## 2024-07-08 DIAGNOSIS — I26.94 MULTIPLE SUBSEGMENTAL PULMONARY EMBOLI WITHOUT ACUTE COR PULMONALE (HCC): ICD-10-CM

## 2024-07-08 DIAGNOSIS — N18.31 TYPE 2 DIABETES MELLITUS WITH STAGE 3A CHRONIC KIDNEY DISEASE, WITHOUT LONG-TERM CURRENT USE OF INSULIN (HCC): ICD-10-CM

## 2024-07-08 DIAGNOSIS — N18.31 TYPE 2 DIABETES MELLITUS WITH STAGE 3A CHRONIC KIDNEY DISEASE, WITHOUT LONG-TERM CURRENT USE OF INSULIN (HCC): Primary | ICD-10-CM

## 2024-07-08 DIAGNOSIS — Z74.09 IMPAIRED MOBILITY AND ADLS: ICD-10-CM

## 2024-07-08 DIAGNOSIS — J44.9 CHRONIC OBSTRUCTIVE PULMONARY DISEASE, UNSPECIFIED COPD TYPE (HCC): ICD-10-CM

## 2024-07-08 DIAGNOSIS — Z91.81 AT HIGH RISK FOR FALLS: ICD-10-CM

## 2024-07-08 DIAGNOSIS — Z78.9 IMPAIRED MOBILITY AND ADLS: ICD-10-CM

## 2024-07-08 DIAGNOSIS — R27.0 ATAXIA: ICD-10-CM

## 2024-07-08 DIAGNOSIS — I69.90 LATE EFFECTS OF CVA (CEREBROVASCULAR ACCIDENT): ICD-10-CM

## 2024-07-08 DIAGNOSIS — I69.322 DYSARTHRIA AS LATE EFFECT OF CEREBELLAR CEREBROVASCULAR ACCIDENT (CVA): ICD-10-CM

## 2024-07-08 DIAGNOSIS — R29.898 RIGHT HAND WEAKNESS: ICD-10-CM

## 2024-07-08 DIAGNOSIS — R47.01 APHASIA: ICD-10-CM

## 2024-07-08 DIAGNOSIS — N18.31 CHRONIC RENAL FAILURE, STAGE 3A (HCC): ICD-10-CM

## 2024-07-08 PROCEDURE — 1123F ACP DISCUSS/DSCN MKR DOCD: CPT | Performed by: PHYSICIAN ASSISTANT

## 2024-07-08 PROCEDURE — 99214 OFFICE O/P EST MOD 30 MIN: CPT | Performed by: PHYSICIAN ASSISTANT

## 2024-07-08 PROCEDURE — 3074F SYST BP LT 130 MM HG: CPT | Performed by: PHYSICIAN ASSISTANT

## 2024-07-08 PROCEDURE — 3078F DIAST BP <80 MM HG: CPT | Performed by: PHYSICIAN ASSISTANT

## 2024-07-08 PROCEDURE — 3052F HG A1C>EQUAL 8.0%<EQUAL 9.0%: CPT | Performed by: PHYSICIAN ASSISTANT

## 2024-07-08 RX ORDER — GLUCOSAMINE HCL/CHONDROITIN SU 500-400 MG
1 CAPSULE ORAL DAILY
Qty: 100 STRIP | Refills: 2 | Status: SHIPPED | OUTPATIENT
Start: 2024-07-08

## 2024-07-08 ASSESSMENT — ENCOUNTER SYMPTOMS
NAUSEA: 0
ABDOMINAL PAIN: 0
BACK PAIN: 0
SINUS PAIN: 0
SORE THROAT: 0
VOMITING: 0
CHEST TIGHTNESS: 0
SINUS PRESSURE: 0
TROUBLE SWALLOWING: 0
DIARRHEA: 0
COUGH: 0
SHORTNESS OF BREATH: 0

## 2024-07-08 ASSESSMENT — VISUAL ACUITY: OU: 1

## 2024-07-09 LAB
ESTIMATED AVERAGE GLUCOSE: 154 MG/DL
HBA1C MFR BLD: 7 % (ref 4–6)

## 2024-07-10 DIAGNOSIS — N18.31 TYPE 2 DIABETES MELLITUS WITH STAGE 3A CHRONIC KIDNEY DISEASE, WITHOUT LONG-TERM CURRENT USE OF INSULIN (HCC): ICD-10-CM

## 2024-07-10 DIAGNOSIS — E11.22 TYPE 2 DIABETES MELLITUS WITH STAGE 3A CHRONIC KIDNEY DISEASE, WITHOUT LONG-TERM CURRENT USE OF INSULIN (HCC): ICD-10-CM

## 2024-07-10 RX ORDER — ACYCLOVIR 400 MG/1
1 TABLET ORAL
Qty: 3 EACH | Refills: 12 | Status: SHIPPED | OUTPATIENT
Start: 2024-07-10

## 2024-07-10 NOTE — TELEPHONE ENCOUNTER
Comments:     Patient comment: say they need authorization from doctor to refill       Last Office Visit (last PCP visit):   2024    Next Visit Date:  Future Appointments   Date Time Provider Department Center   8/15/2024  3:30 PM Zheng Chavez MD [place] [facility] Neurology -   10/3/2024  1:15 PM [place], DO [facility]   10/9/2024  1:00 PM Collin Chavez PA [place]  [facility]       **If hasn't been seen in over a year OR hasn't followed up according to last diabetes/ADHD visit, make appointment for patient before sending refill to provider.    Rx requested:  Requested Prescriptions     Pending Prescriptions Disp Refills    Continuous Glucose Sensor (DEXCOM G7 SENSOR) MISC 3 each 12     Si each by Does not apply route every 10 days

## 2024-07-16 ENCOUNTER — HOSPITAL ENCOUNTER (OUTPATIENT)
Dept: OCCUPATIONAL THERAPY | Age: 69
Setting detail: THERAPIES SERIES
Discharge: HOME | End: 2024-07-16
Payer: MEDICARE

## 2024-07-16 PROCEDURE — 97166 OT EVAL MOD COMPLEX 45 MIN: CPT

## 2024-07-16 NOTE — THERAPY EVALUATION
involved in establishing Plan of Care and Goals: Yes    OT Treatment Completed:  N/A - Evaluation Only      GOALS     Long Term Goals  Time Frame for Long Term Goals : 2x/week for 12 visits  Long Term Goal 1: Patient will increase RUE shoulder AROM to >/=125* for increased ease with overhead activity.  Long Term Goal 2: Patient will increase RUE shoulder external rotation to >/=50* for increased ease with I/ADLs.  Long Term Goal 3: Patient will increase RUE coordination to assist with daily activities as measured by completion of 9-HPT in </=60 seconds.  Long Term Goal 4: Patient will increase right hand  strength by 15# to increase ability to  I/ADL items.  Long Term Goal 5: Patient will increase RUE strength by 1/2-1 MMT grade to increase ease with daily activities.  Long Term Goal 6: Patient will be independent with all recommended HEP for sensation, ROM, coordination, and strength.    Electronically signed by RIC Simons  on 7/16/2024 at 12:35 PM    Falls Risk Assessment    Age: 0-59 = 0          60-69= 1            > 70= 2 History of Falls:   0  Falls  last 6 mo = 0    1  fall  Last  6 mo = 1   1-3 falls last 6 mo = 2 Medical History:   Parkinsons, CVA,HTN, vertigo, >4 meds, use of assistive device (1pt.for each)  Mental Status:  A & O x 3 = 0  Disoriented to person, place, or time = 2     Date:   07/16/2024                                                Age:   1                                                         Falls: 1                                                          PMH: 1                                                          Mental: 0                                                       Total:  4                                                        *Patient 4 or younger:   Vestibular:     Signature: RIC Simons                                                      High Risk for Falls: >8  Intermediate Risk for Falls: 4-8   Low Risk for Falls: <4    * All pediatric

## 2024-07-18 ENCOUNTER — HOSPITAL ENCOUNTER (OUTPATIENT)
Dept: PHYSICAL THERAPY | Age: 69
Setting detail: THERAPIES SERIES
Discharge: HOME | End: 2024-07-18
Payer: MEDICARE

## 2024-07-18 ENCOUNTER — HOSPITAL ENCOUNTER (OUTPATIENT)
Dept: SPEECH THERAPY | Age: 69
Setting detail: THERAPIES SERIES
Discharge: HOME | End: 2024-07-18
Payer: MEDICARE

## 2024-07-18 PROCEDURE — 92523 SPEECH SOUND LANG COMPREHEN: CPT

## 2024-07-18 PROCEDURE — 97162 PT EVAL MOD COMPLEX 30 MIN: CPT

## 2024-07-18 NOTE — THERAPY EVALUATION
signal on T1 weighted imaging involving the left caudate nucleus and left subinsular white matter without corresponding diffusion abnormality.  There is stable increased FLAIR signal at this location. Multiple foci of increased T2 and FLAIR signal are present in white matter of both hemispheres suggesting chronic microvascular ischemia.  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  This extra-axial lesion measures approximately 0.6 x 2.1 cm and is stable compared to previous examination.  Left cerebellopontine angle is unremarkable.  No evidence of brainstem mass or edema.  There is a chronic appearing lacunar stroke involving the left cerebellar hemisphere.  Inner ear structures including bilateral semicircular canals and bilateral cochlea are unremarkable.     1. Redemonstration of areas of late subacute to chronic stroke involving the left caudate nucleus and subinsular white matter, left posterior frontal lobe cortex and subcortical white matter, as well as right temporal lobe cortex and subcortical white matter with areas of cortical laminar necrosis. 2. No evidence of supratentorial mass or acute stroke. 3. 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       Past Medical History  Past Medical History:   Diagnosis Date    Anxiety     Asthma     Chronic back pain     Chronic renal failure, stage 3a (Formerly [facility])     COPD (chronic obstructive pulmonary disease) (Formerly [facility])     CVA (cerebral vascular accident) (Formerly [facility]) 01/22/2024    Moderate-sized acute infarction in the left superior mid parietal lobe MCA territory with minimal mass effect.    Depression     History of tinnitus     Hyperlipidemia     Hypertension     Obesity     Osteoporosis     2019 T -3.2    Peripheral vascular disease (Formerly [facility])     Proteinuria     Type II

## 2024-07-18 NOTE — PROGRESS NOTES
[facility]            [address]            [place], [place] [address]             Phone: [phone]                        Fax:  [phone]       Outpatient Speech Therapy Note to Physician                                               Physician: Collin Chavez     From: [place], SLP, MS,CCC-SLP   Patient: Mulu Pandya       : 1955  Diagnosis: Dysarthria [I69.322] Aphasia [R47.01] Date: 2024  Treatment Diagnosis:  Dysarthria [I69.322] Aphasia [R47.01]      Dr. Collin Chavez :    Your patient Mulu Pandya is being seen at our clinic for Speech Therapy. We would like you to be aware of the following:     We are requesting the following referral:  [] Hearing Evaluation  [] Physical Therapy Evaluation  [] Occupational Therapy Evaluation  [] Psychological Testing Re:  [] Modified Barium Swallow Study  [x] Other: Dysphagia evaluation and referral for e-stim for facial musculature.      If you are in agreement, please fax a script with the above request, a diagnosis, the date, and a signature to [phone] or via the patient's account on [employer].     Please call with any questions, [phone].      Thank you for allowing us to participate in the care of your patient.       Signature: Electronically signed by DEL Whitt on 2024 at 11:32 AM       
patient's plan of care, and verify that you agree therapy should continue by signing the attached document and sending it back to our office.    If you have any questions or concerns, please don't hesitate to call.  Thank you for your referral.        Physician Signature:________________________________Date:__________________  By signing above, therapist’s plan is approved by physician

## 2024-07-18 NOTE — THERAPY EVALUATION
2024.  Pt admitted to hospital after exhibiting slurred speech, and right-sided weakness.  MRI showed left CVA. Pt in rehab from Jan to mid Feb focusing on dysphagia and lang/sp goals.  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  Leisure & Hobbies: Patient enjoys watching television    Vision and Hearing  Vision  Vision: Impaired  Vision Exceptions: Wears glasses at all times (Pt did not have glasses present for evaluation and stated, \"I really don't wear them like I should.\")  Hearing  Hearing: Within functional limits     Oral/Motor  Oral Motor   Labial: Decreased seal  Dentition: Upper dentures;Lower dentures  Oral Hygiene: Moist;Clean  Lingual: Right deviation (Very slight deviation)  Mandible: No impairment    Motor Speech  Motor Speech  Apraxic Characteristics: Phonemic errors  Dysarthric Characteristics: Imprecise;Decreased rate  Intelligibility: Impaired  Overall Impairment Severity: Mild-moderate    Comprehension  Auditory Comprehension  Comprehension: Exceptions  Simple yes/no questions: WFL  Moderate yes/no questions: WFL  Complex yes/no questions: Mild  One Step Commands: WFL  Two Step Commands: WFL  Multistep Commands: Mild  Common Objects: WFL  Pictures: WFL    Expression  Expression  Primary Mode of Expression: Verbal  Verbal Expression  Verbal Expression: Exceptions to functional limits  Confrontation: WFL  Convergent: WFL  Divergent: Mild (Mild to moderate for abstract divergent tasks)  Written Expression  Dominant Hand: Right (Unable to write.  Has OT threapy.)    Cognition  Orientation  Overall Orientation Status: Within Functional Limits  Attention  Attention: Within Functional Limits  Memory  Memory: Exceptions to WFL  Short-term Memory: Mild  Working Memory:

## 2024-07-19 ENCOUNTER — HOSPITAL ENCOUNTER (OUTPATIENT)
Dept: OCCUPATIONAL THERAPY | Age: 69
Setting detail: THERAPIES SERIES
Discharge: HOME | End: 2024-07-19
Payer: MEDICARE

## 2024-07-19 PROCEDURE — 97110 THERAPEUTIC EXERCISES: CPT

## 2024-07-19 PROCEDURE — 97530 THERAPEUTIC ACTIVITIES: CPT

## 2024-07-19 NOTE — PROGRESS NOTES
[facility]       Occupational Therapy: Daily Note   Patient: Mulu Pandya (69 y.o. female)   Date: 2024  Plan of Care Certification Period:  09/15/24   :  1955  MRN: [id number]  CSN: [id number]   Insurance: Payor: [employer] / Plan: [employer] MEDIBLUE ESSENTIAL/PLUS / Product Type: *No Product type* /   Insurance ID: [id number] - (Medicare Managed) Secondary Insurance (if applicable):    Referring Physician: Collin Chavez PA     PCP: Collin Chavez PA Visits to Date: Total # of Visits to Date: 1   Progress note:Progress Note Counter:   Visits Approved: 6 (OT COUNT ONLY)    No Show: 0  Cancelled Appts:0     Medical Diagnosis: Late effects of CVA (cerebrovascular accident) [I69.90]  Impaired mobility and ADLs [Z74.09, Z78.9]  Right hand weakness [R29.898]  Ataxia [R27.0] Late effects of CVA; Impaired mobility and ADLs; Right hand weakness; Ataxia        Therapy Time     Time in 1506   Time out 1601   Total treatment minutes 55   Total time code minutes  55 Minutes        OT Therapeutic activities 25 minutes for 2 unit(s), CPT 59223  OT Therapeutic exercises 30 minutes for 2 unit(s), CPT 17139       SUBJECTIVE EXAMINATION     Patient's date of birth confirmed: Yes     Pain Level:   Pt denies pain  Location: Back  Description: sore     Patient Comments:   \"Picking up stuff from the ground is hard to do.\"          Learning/Language barrier: yes, difficulty to find words        HEP/Strategies/Orthosis Compliance: N/A          Restrictions: NONE REPORTED            OBJECTIVE EXAMINATION       TREATMENT     Focus of treatment was on the following:   increase right UE active and active assist  ROM     Exercises/Activities:  Pt instructed on and performed RUE AAROM towel slides for HEP. Pt completed 10 reps of each to demonstrate understanding. 5 exercises.     Additional RUE AAROM with towel on table x 10 reps: not part of HEP  -elbow flexion/extension with forearm

## 2024-07-22 ENCOUNTER — HOSPITAL ENCOUNTER (OUTPATIENT)
Dept: OCCUPATIONAL THERAPY | Age: 69
Setting detail: THERAPIES SERIES
Discharge: HOME | End: 2024-07-22
Payer: MEDICARE

## 2024-07-22 NOTE — PROGRESS NOTES
Therapy                            Cancellation/No-show Note    Date: 2024  Patient Name: Mulu Pandya    : 1955  (69 y.o.)     MRN: [id number]    Account #: [id number]    No Show: 1  Canceled Appointment: 0    Comments:      For today's appointment patient:  []  Cancelled  []  Rescheduled appointment  [x]  No-show   []  Called pt to remind of next appointment     Reason given by patient:  []  Patient ill  []  Conflicting appointment  []  No transportation    []  Conflict with work  []  No reason given  []  Other:      [x] Pt has future appointments scheduled, no follow up needed  [] Pt requests to be on hold.    Reason:   If > 2 weeks please discuss with therapist.  [] Therapist to call pt for follow up     Signature: AKI Simons/L 2024 11:44 AM

## 2024-07-23 ENCOUNTER — APPOINTMENT (OUTPATIENT)
Dept: SPEECH THERAPY | Age: 69
End: 2024-07-23
Payer: MEDICARE

## 2024-07-25 DIAGNOSIS — E11.22 TYPE 2 DIABETES MELLITUS WITH STAGE 3A CHRONIC KIDNEY DISEASE, WITHOUT LONG-TERM CURRENT USE OF INSULIN (HCC): ICD-10-CM

## 2024-07-25 DIAGNOSIS — N18.31 TYPE 2 DIABETES MELLITUS WITH STAGE 3A CHRONIC KIDNEY DISEASE, WITHOUT LONG-TERM CURRENT USE OF INSULIN (HCC): ICD-10-CM

## 2024-07-25 RX ORDER — ACYCLOVIR 400 MG/1
1 TABLET ORAL
Qty: 3 EACH | Refills: 12 | Status: SHIPPED | OUTPATIENT
Start: 2024-07-25

## 2024-07-25 NOTE — TELEPHONE ENCOUNTER
Comments:     Last Office Visit (last PCP visit):   7/8/2024    Next Visit Date:  Future Appointments   Date Time Provider Department Center   7/26/2024  2:00 PM [facility]URO ONE [facility] PT [facility]   7/26/2024  3:00 PM Marah Pablo, OT [facility]   7/29/2024 11:00 AM Tiffany Houston, OTR/L [facility]   7/30/2024  2:00 PM [facility], CANCELLATION [facility]   7/30/2024  3:00 PM Sandra Stewart SLP [facility] SPEC [facility]   7/31/2024  3:00 PM Bull Arias MD [facility]   8/2/2024  2:00 PM Madie Fraser, PT [facility]   8/2/2024  3:00 PM Barbara Maritnez OTA [facility]   8/5/2024 11:00 AM Tiffany Houston, OTR/L [facility]   8/6/2024  2:00 PM Delfina Vee, PTA [facility]   8/6/2024  3:00 PM Sandra Stewart SLP [facility] SPEC [facility]   8/9/2024  2:00 PM Delfina Vee, PTA [facility]   8/9/2024  3:00 PM Barbara Martinez OTA [facility] OT [facility]   8/12/2024 11:00 AM Tiffany Houston, OTR/L [facility]   8/13/2024  2:00 PM Madie Fraser, PT [facility]   8/13/2024  3:00 PM Sandra Stewart SLP [facility]C [facility]   8/15/2024  3:30 PM Zheng Chavez MD [place] -   8/16/2024  2:00 PM Madie Fraser, PT [facility]   8/16/2024  3:00 PM Juan, Barbara, ABIGAIL [facility]   8/19/2024 11:00 AM Tiffany Houston, OTR/L [facility]   8/20/2024  2:00 PM Delfina Vee, PTA [facility]   8/20/2024  3:00 PM Sandra Stewart, SLP [facility] SPEC [facility]   8/23/2024  2:00 PM Delfina Vee, PTA [facility]   8/23/2024  3:00 PM Barbara Martinez ABIGAIL [facility] OT [facility]   8/26/2024 11:00 AM Tiffany Houston, OTR/L [facility]   8/27/2024  2:00 PM Delfina Vee, PTA [facility]   8/27/2024  3:00 PM Sandra Stewart, SLP [facility] SPEC [facility]   8/30/2024  2:00 PM Delfina Vee, PTA [facility]

## 2024-07-26 ENCOUNTER — HOSPITAL ENCOUNTER (OUTPATIENT)
Dept: CARDIOLOGY | Age: 69
Discharge: HOME | End: 2024-07-26
Payer: MEDICARE

## 2024-07-26 ENCOUNTER — HOSPITAL ENCOUNTER (OUTPATIENT)
Dept: PHYSICAL THERAPY | Age: 69
Setting detail: THERAPIES SERIES
Discharge: HOME | End: 2024-07-26
Payer: MEDICARE

## 2024-07-26 ENCOUNTER — HOSPITAL ENCOUNTER (OUTPATIENT)
Dept: OCCUPATIONAL THERAPY | Age: 69
Setting detail: THERAPIES SERIES
Discharge: HOME | End: 2024-07-26
Payer: MEDICARE

## 2024-07-26 PROCEDURE — 97530 THERAPEUTIC ACTIVITIES: CPT

## 2024-07-26 PROCEDURE — 97110 THERAPEUTIC EXERCISES: CPT

## 2024-07-26 PROCEDURE — 93298 REM INTERROG DEV EVAL SCRMS: CPT

## 2024-07-26 PROCEDURE — 97112 NEUROMUSCULAR REEDUCATION: CPT

## 2024-07-26 NOTE — PROGRESS NOTES
used L hand to help press 4 pegs firmly into hole before reporting that her hand felt too tired to continue. Pt removed pegs w/ R hand w/ no difficulty.    Pt pinched, pulled, and squeezed red theraputty to remove 10 hidden beads. Pt completed w/ increased time. Pt unable to  beads from container to place back into putty d/t not being able to feel them.    Pad-to-pad pinch x10 reps each digit. Pt unable to reach pads of 3rd-5th digits while completing.    Pt used 1# resistance clip to  5 pop beads from washcloth and place into container. Pt completed w/ good strength but difficulty maintaining palmar pinch on clip. Pt repeated w/ 2# resistance clip w/ no difficulty. Pt then grasped beads from container to place onto washcloth. Pt grasped 3 beads before grading down to 1# clip d/t difficulty maintaining palmar pinch on clip to adequately open it go grasp a bead.    Active digit extension w/ hand pronated x10 reps each digit.    Pt practiced pinching pennies between the pads of her 2nd digit and thumb. Pt pinched 20 pennies w/ increased difficulty maintaining pennies between pads of 2nd digit and thumb as activity progressed.    Patient Education/HEP:   Continue recommended HEP/activities.         ASSESSMENT       Assessment:  Pt tolerated treatment well. Pt w/ difficulty completing dexterity tasks this date. Pt w/ good R shoulder mobility. Pt will benefit from continued OT to increase RUE dexterity and coordination to increase participation in daily activities.          Post Treatment Pain: Pt denies pain    Patient's Activity Tolerance: good                 GOALS         Long Term Goals  Time Frame for Long Term Goals : 2x/week for 12 visits  Long Term Goal 1: Patient will increase RUE shoulder AROM to >/=125* for increased ease with overhead activity.  Long Term Goal 2: Patient will increase RUE shoulder external rotation to >/=50* for increased ease with I/ADLs.  Long Term Goal 3: Patient will

## 2024-07-26 NOTE — PROGRESS NOTES
and strength for better overall functional mobility.  Pt displayed more difficulty with tandem stance and static balance on foam requiring CGA and occasional UE support. Added mat exercises to HEP with printouts provided for pt to increase brittany LE strength. Will progress pt as able to improve gait quality and balance.  Treatment Diagnosis: Abnormality of gait  Therapy Prognosis: Good          Post-Pain Assessment:       Pain Rating (0-10 pain scale):   0/10   Location and pain description same as pre-treatment unless indicated.   Action: [] NA   [x] Perform HEP  [] Meds as prescribed  [] Modalities as prescribed   [] Call Physician     GOALS     Short Term Goals Completed by 2 weeks Goal Status   STG 1 Independent with HEP. In progress       Long Term Goals Completed by 6 weeks Goal Status   LTG 1 Improve brittany LE strength to 5/5 to improve stability with standing and walking. In progress   LTG 2 Pt will ambulate on even and uneven surfaces >/= 250' with improved foot clearance and heel strike S/I. In progress   LTG 3 Quesada >/= 48/56 and DGI >/= 19/24 to improve pt's QOL. In progress   LTG 4 TUG </= 12 secto improve safety with ambulation. In progress   LTG 5 5xSTS from chair without UEs </= 15 sec to improve functional LE strength. In progress            Plan:  Frequency/Duration:  Plan  Plan Frequency: 2  Plan weeks: 6  Current Treatment Recommendations: Strengthening, ROM, Balance training, Functional mobility training, Transfer training, Gait training, Stair training, Neuromuscular re-education, Manual, Home exercise program, Safety education & training, Patient/Caregiver education & training, Modalities, Equipment evaluation, education, & procurement, Group Therapy  Modalities: Heat/Cold, E-stim - unattended  Pt to continue current HEP.  See objective section for any therapeutic exercise changes, additions or modifications this date.    Therapy Time:      PT Individual Minutes  Time In: 1400  Time Out:

## 2024-07-29 ENCOUNTER — HOSPITAL ENCOUNTER (OUTPATIENT)
Dept: OCCUPATIONAL THERAPY | Age: 69
Setting detail: THERAPIES SERIES
Discharge: HOME | End: 2024-07-29
Payer: MEDICARE

## 2024-07-29 PROCEDURE — 97530 THERAPEUTIC ACTIVITIES: CPT

## 2024-07-29 PROCEDURE — 97140 MANUAL THERAPY 1/> REGIONS: CPT

## 2024-07-29 NOTE — PROGRESS NOTES
[facility]       Occupational Therapy: Daily Note   Patient: Mulu Pandya (69 y.o. female)   Date: 2024  Plan of Care Certification Period:  09/15/24   :  1955  MRN: [id number]  CSN: [id number]   Insurance: Payor: [employer] / Plan: [employer] MEDIBLUE ESSENTIAL/PLUS / Product Type: *No Product type* /   Insurance ID: [id number] - (Medicare Managed) Secondary Insurance (if applicable):    Referring Physician: Collin Chavez PA     PCP: Collin Chavez PA Visits to Date: Total # of Visits to Date: 3   Progress note:Progress Note Counter: 3/12  Visits Approved: 6    No Show: 1  Cancelled Appts:0     Medical Diagnosis: Late effects of CVA (cerebrovascular accident) [I69.90]  Impaired mobility and ADLs [Z74.09, Z78.9]  Right hand weakness [R29.898]  Ataxia [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          Learning/Language barrier: Difficulty with word finding       HEP/Strategies/Orthosis Compliance: Patient verbally confirmed compliant with HEP's          Restrictions: None reported           OBJECTIVE EXAMINATION         TREATMENT     Focus of treatment was on the following:   ROM     MFR/Manual:   Pt treated seated on chair  Upper extremity: soft tissue mobility (identify area): Right upper arm and shoulder, deep releases right biceps , and transverse plane right shoulder  and right arm     Exercises/Activities:  Patient completes AAROM activity using

## 2024-07-30 ENCOUNTER — HOSPITAL ENCOUNTER (OUTPATIENT)
Dept: PHYSICAL THERAPY | Age: 69
Setting detail: THERAPIES SERIES
Discharge: HOME | End: 2024-07-30
Payer: MEDICARE

## 2024-07-30 ENCOUNTER — HOSPITAL ENCOUNTER (OUTPATIENT)
Dept: SPEECH THERAPY | Age: 69
Setting detail: THERAPIES SERIES
Discharge: HOME | End: 2024-07-30
Payer: MEDICARE

## 2024-07-30 PROCEDURE — 97110 THERAPEUTIC EXERCISES: CPT

## 2024-07-30 PROCEDURE — 97130 THER IVNTJ EA ADDL 15 MIN: CPT

## 2024-07-30 PROCEDURE — 97129 THER IVNTJ 1ST 15 MIN: CPT

## 2024-07-30 PROCEDURE — 97112 NEUROMUSCULAR REEDUCATION: CPT

## 2024-07-30 NOTE — PROGRESS NOTES
Therapy                            Cancellation/No-show Note    Date: 2024  Patient: Mulu Pandya (69 y.o. female)  : 1955  MRN:  [id number]  Referring Physician: Collin Chavez PA    Medical Diagnosis: Late effects of CVA (cerebrovascular accident) [I69.90]  Impaired mobility and ADLs [Z74.09, Z78.9]  Right hand weakness [R29.898]  Ataxia [R27.0]      Visit Information:  Insurance: Payor: [employer]IC[employer] / Plan: [place] [id number] ESSENTIAL/PLUS / Product Type: *No Product type* /   Visits to Date: 2   No Show/Cancelled Appts: 0 / 1      For today's appointment patient:  [x]  Cancelled  []  Rescheduled appointment  []  No-show   []  Called pt to remind of next appointment     Reason given by patient:  []  Patient ill  []  Conflicting appointment  []  No transportation    []  Conflict with work  [x]  No reason given  []  Other:      [x] Pt has future appointments scheduled, no follow up needed  [] Pt requests to be on hold.    Reason:   If > 2 weeks please discuss with therapist.  [] Therapist to call pt for follow up     Comments:       Signature: Electronically signed by Keiko Dwyer PT on 24 at 8:51 AM EDT

## 2024-07-30 NOTE — PROGRESS NOTES
modifications this date.    Therapy Time:      PT Individual Minutes  Time In: 1400  Time Out: 1454  Minutes: 54  Timed Code Treatment Minutes: 54 Minutes  Procedure Minutes: 0  Timed Activity Minutes Units   Ther Ex  18  1   Neuro Watson  36  3     Electronically signed by Sana Randolph PTA on 7/30/24 at 3:38 PM EDT

## 2024-07-30 NOTE — PROGRESS NOTES
[facility]- Outpatient  Speech Language Pathology  Adult Daily Note    Mulu Pandya  : 1955  [x]   confirmed      Date: 2024    Visit Information:  Visit Information  SLP Insurance Information: BCBS  Total # of Visits Approved: 4  Total # of Visits to Date: 1  Timeframe Approved From:: 24  Timeframe Approved To:: 24  No Show: 0  Canceled Appointment: 0      Plan of care signed (Y/N):     Faxed    Certification Period:  Certification Period:24-24    Plan of Care Visit  # 1    Interventions used this date:  Speech Production and Cognitive Skill Development    Subjective:      Behavior:  Alert, Cooperative, and Pleasant       Objective/Assessment:   Short-term Goals  Goal 1: To increase safety awareness and judgment for safe completion of ADLs secondary to patient's cognitive deficits, patient will complete abstract reasoning tasks (i.e. word deduction, convergent and divergent naming, similarities/differences) with 80% accuracy and min cues.  Similarities/difference (1 each): 92% acc ind   Convergent: 88% acc with min cues.   Goal 2: To increase safety awareness and judgment for safe completion of ADLs secondary to patient's cognitive deficits,  patient will complete mid-high level problem solving tasks related to ADLs (e.g. medication management) with 80% accuracy with supervision cues.  Not addressed   Goal 3: To increase independence for functional activities for home and community, patient will complete mid level executive functioning tasks (i.e. path finding, scheduling appointments, prioritizing tasks) with 80% accuracy and min cues.  Path findin% acc ind increasing to 88% acc with min cues   Goal 4: To address patient's cognitive deficits and promote his/her ability to safely follow directives in a variety of environments, patient will carry out 2-3 directives of increasing complexity in everyday activities with 80% accuracy and min cues.  2 step: 55% acc Ind

## 2024-07-31 ENCOUNTER — OFFICE VISIT (OUTPATIENT)
Age: 69
End: 2024-07-31
Payer: MEDICARE

## 2024-07-31 VITALS
TEMPERATURE: 97 F | DIASTOLIC BLOOD PRESSURE: 82 MMHG | WEIGHT: 147 LBS | BODY MASS INDEX: 26.05 KG/M2 | HEIGHT: 63 IN | SYSTOLIC BLOOD PRESSURE: 124 MMHG

## 2024-07-31 DIAGNOSIS — G93.9 BRAIN LESION: Primary | ICD-10-CM

## 2024-07-31 PROCEDURE — 1123F ACP DISCUSS/DSCN MKR DOCD: CPT | Performed by: NEUROLOGICAL SURGERY

## 2024-07-31 PROCEDURE — 99214 OFFICE O/P EST MOD 30 MIN: CPT | Performed by: NEUROLOGICAL SURGERY

## 2024-07-31 PROCEDURE — 3074F SYST BP LT 130 MM HG: CPT | Performed by: NEUROLOGICAL SURGERY

## 2024-07-31 PROCEDURE — 3079F DIAST BP 80-89 MM HG: CPT | Performed by: NEUROLOGICAL SURGERY

## 2024-07-31 ASSESSMENT — ENCOUNTER SYMPTOMS
ABDOMINAL PAIN: 0
COUGH: 0
TROUBLE SWALLOWING: 0
SHORTNESS OF BREATH: 0
EYE PAIN: 0
BACK PAIN: 0
ABDOMINAL DISTENTION: 0

## 2024-07-31 NOTE — PROGRESS NOTES
Patient Name: Mulu Pandya : 1955        Date: 2024      Type of Appt: Follow up    Reason for appt: FOLLOW UP TO GO OVER MRI     Pt last seen by Dr Arias on 24    Studies done: 24 MRI of IAC Posterior fossa at [facility]  24 MRI of Brain at [facility]    Physical therapy: yes or no    Smoking: Yes or No    REVIEW OF SYSTEMS:    Rash   Difficulty Urinating Nausea     Fever   Headaches  Bruising/Bleeding Easily     Hearing loss  Constipation  Sleep Disturbance    Shortness of breath Diarrhea  Neck Pain  Back Pain   
stroke.  The patient and  understand and agree to the plan.      There are no diagnoses linked to this encounter.        Electronically signed by Bull Arias MD on 7/31/2024 at 3:10 PM

## 2024-08-02 ENCOUNTER — HOSPITAL ENCOUNTER (OUTPATIENT)
Dept: PHYSICAL THERAPY | Age: 69
Setting detail: THERAPIES SERIES
Discharge: HOME | End: 2024-08-02
Payer: MEDICARE

## 2024-08-02 ENCOUNTER — HOSPITAL ENCOUNTER (OUTPATIENT)
Dept: OCCUPATIONAL THERAPY | Age: 69
Setting detail: THERAPIES SERIES
Discharge: HOME | End: 2024-08-02
Payer: MEDICARE

## 2024-08-02 PROCEDURE — 97530 THERAPEUTIC ACTIVITIES: CPT

## 2024-08-02 RX ORDER — ROSUVASTATIN CALCIUM 40 MG/1
40 TABLET, COATED ORAL NIGHTLY
Qty: 30 TABLET | Refills: 3 | Status: SHIPPED | OUTPATIENT
Start: 2024-08-02

## 2024-08-02 NOTE — PROGRESS NOTES
[facility]  Outpatient Physical Therapy    Treatment Note        Date: 2024  Patient: Mulu Pandya  : 1955   Confirmed: Yes  MRN: [id number]  Referring Provider: Collin Chavez PA    Medical Diagnosis: Late effects of CVA (cerebrovascular accident) [I69.90]  Impaired mobility and ADLs [Z74.09, Z78.9]  Right hand weakness [R29.898]  Ataxia [R27.0]       Treatment Diagnosis: Abnormality of gait    Visit Information:  Insurance: Payor: BCBS MEDICARE / Plan: [employer] [email or web address] ESSENTIAL/PLUS / Product Type: *No Product type* /   PT Visit Information  PT Insurance Information: [facility] Medicare  Total # of Visits to Date: 4  Plan of Care/Certification Expiration Date: 10/16/24  No Show: 0  Progress Note Due Date: 24  Canceled Appointment: 1  Progress Note Counter:     Subjective Information:  Subjective: Patient reports fatigue after last session, slept alot yesterday. States she would like to continue to address strength, balance and ambulation.  HEP Compliance:  [] Good [x] Fair [] Poor [] Reports not doing due to:               Pain Screening  Patient Currently in Pain: Denies    Treatment:  Exercises:  Exercises  Exercise 8: 2 way SLR x 10 brittany  Exercise 9: Bridges x 12 (occ RBs due to cramping)       Manual:           Modalities:          *Indicates exercise, modality, or manual techniques to be initiated when appropriate    Objective Measures:                                 Strength: [] NT  [] MMT completed:                   ROM: [] NT  [] ROM measurements:                                                                             Assessment:   Body Structures, Functions, Activity Limitations Requiring Skilled Therapeutic Intervention: Decreased functional mobility , Decreased strength, Decreased balance, Decreased posture     Treatment Diagnosis: Abnormality of gait  Therapy Prognosis: Good          Post-Pain Assessment:       Pain Rating (0-10 pain scale):   **/10   Location and

## 2024-08-02 NOTE — PROGRESS NOTES
[facility]       Occupational Therapy: Daily Note   Patient: Mulu Pandya (69 y.o. female)   Date: 2024  Plan of Care Certification Period:  09/15/24   :  1955  MRN: [id number]  CSN: [id number]   Insurance: Payor: [employer] / Plan: [employer] MEDIBLUE ESSENTIAL/PLUS / Product Type: *No Product type* /   Insurance ID: [id number] - (Medicare Managed) Secondary Insurance (if applicable):    Referring Physician: Collin Chavez PA     PCP: Collin Chavez PA Visits to Date: Total # of Visits to Date: 4   Progress note:Progress Note Counter:   Visits Approved: 6    No Show: 1  Cancelled Appts:0     Medical Diagnosis: Late effects of CVA (cerebrovascular accident) [I69.90]  Impaired mobility and ADLs [Z74.09, Z78.9]  Right hand weakness [R29.898]  Ataxia [R27.0] Late effects of CVA; Impaired mobility and ADLs; Right hand weakness; Ataxia        Therapy Time     Time in 1502   Time out 1600   Total treatment minutes 58   Total time code minutes  58 Minutes        OT Therapeutic activities 58 minutes for 4 unit(s), CPT 49260       SUBJECTIVE EXAMINATION     Patient's date of birth confirmed: Yes     Pain Level:   Pt denies pain  Location: RUE   Description: no pain, numbness, tingling, achiness     Patient Comments:   \"My COPD and asthma are acting up today.\"          Learning/Language barrier: yes,  difficulty with word finding       HEP/Strategies/Orthosis Compliance: Patient verbally confirmed compliant with HEP's          Restrictions: none reported            OBJECTIVE EXAMINATION         TREATMENT     Focus of treatment was on the following:   Improving RUE AROM and COORD     Exercises/Activities:  Shoulder horizontal abduction(elbow extended) with forearm supination to release ball out radial side of the hand, 2 sets x 20 reps.      Index finger hook to lift velcro checkers off the board then push onto vertical stick, 2 sets x 18 reps.    Pt instructed on and performed

## 2024-08-03 DIAGNOSIS — F33.42 RECURRENT MAJOR DEPRESSIVE DISORDER, IN FULL REMISSION (HCC): ICD-10-CM

## 2024-08-03 DIAGNOSIS — F41.9 ANXIETY: ICD-10-CM

## 2024-08-05 ENCOUNTER — HOSPITAL ENCOUNTER (OUTPATIENT)
Dept: OCCUPATIONAL THERAPY | Age: 69
Setting detail: THERAPIES SERIES
Discharge: HOME | End: 2024-08-05
Payer: MEDICARE

## 2024-08-05 PROCEDURE — 97530 THERAPEUTIC ACTIVITIES: CPT

## 2024-08-05 PROCEDURE — 97140 MANUAL THERAPY 1/> REGIONS: CPT

## 2024-08-05 RX ORDER — ESCITALOPRAM OXALATE 10 MG/1
TABLET ORAL
Qty: 135 TABLET | Refills: 0 | OUTPATIENT
Start: 2024-08-05

## 2024-08-05 NOTE — PROGRESS NOTES
[facility]       Occupational Therapy: Daily Note   Patient: Mulu Pandya (69 y.o. female)   Date: 2024  Plan of Care Certification Period:  09/15/24   :  1955  MRN: [id number]  CSN: [id number]   Insurance: Payor: [employer] / Plan: [employer]M MEDIBLUE ESSENTIAL/PLUS / Product Type: *No Product type* /   Insurance ID: [id number] - (Medicare Managed) Secondary Insurance (if applicable):    Referring Physician: Collin Chavez PA     PCP: Collin Chavez PA Visits to Date: Total # of Visits to Date: 5   Progress note:Progress Note Counter:   Visits Approved: 6    No Show: 1  Cancelled Appts:0     Medical Diagnosis: Late effects of CVA (cerebrovascular accident) [I69.90]  Impaired mobility and ADLs [Z74.09, Z78.9]  Right hand weakness [R29.898]  Ataxia [R27.0] Late effects of CVA; Impaired mobility and ADLs; Right hand weakness; Ataxia        Therapy Time     Time in 1104   Time out 1157   Total treatment minutes 53   Total time code minutes  53 Minutes        OT Manual therapy 15 minutes for 1 unit(s), CPT 51909  OT Therapeutic activities 38 minutes for 3 unit(s), CPT 15524       SUBJECTIVE EXAMINATION     Patient's date of birth confirmed: Yes     Pain Level:   Pt denies pain  Location: N/A  Description: N/A    Patient Comments:   Patient states nothing new to report.         Learning/Language barrier: Difficulty with word finding        HEP/Strategies/Orthosis Compliance: Patient verbally confirmed compliant with HEP's          Restrictions: None reported           OBJECTIVE EXAMINATION         TREATMENT     Focus of treatment was on the following:   ROM     Patient educated on scapular mobility exercises, performing 1 set x 10 reps as follows:  - scapular elevation/depression  - backwards rolls  - scapular retraction  Comment: Patient provided with handout to include in HEP    Patient instructed in AAROM activities using light weight dowel, completing 1 set x 10 reps of

## 2024-08-06 ENCOUNTER — HOSPITAL ENCOUNTER (OUTPATIENT)
Dept: SPEECH THERAPY | Age: 69
Setting detail: THERAPIES SERIES
Discharge: HOME | End: 2024-08-06
Payer: MEDICARE

## 2024-08-06 ENCOUNTER — HOSPITAL ENCOUNTER (OUTPATIENT)
Dept: PHYSICAL THERAPY | Age: 69
Setting detail: THERAPIES SERIES
Discharge: HOME | End: 2024-08-06
Payer: MEDICARE

## 2024-08-06 PROCEDURE — 97110 THERAPEUTIC EXERCISES: CPT

## 2024-08-06 PROCEDURE — 92507 TX SP LANG VOICE COMM INDIV: CPT

## 2024-08-06 PROCEDURE — 97112 NEUROMUSCULAR REEDUCATION: CPT

## 2024-08-06 NOTE — PROGRESS NOTES
prioritizing tasks) with 80% accuracy and min cues.  Not addressed   Goal 4: To address patient's cognitive deficits and promote his/her ability to safely follow directives in a variety of environments, patient will carry out 2-3 directives of increasing complexity in everyday activities with 80% accuracy and min cues.  Not addressed   Goal 5: To decrease the presence of apraxia of speech and improve articulatory accuracy to adequately express wants and needs, the pt will complete structured articulation tasks following SLP model with min cues-fading cues for articulatory placement in 90% of opportunities with 1-3 syllables words at the phrase-sentence level.  Trisyllabic:46% acc with model;  following 3-5 word repetitions, pt able to complete with 90%   Goal 6: Pt will complete OM drills paired with speech sounds to improve speech accuracy and expression of their complex thoughts, needs, feelings, and ideas with 90% accuracy.  Not addressed       Pain Assessment:  Initial Assessment:  Patient does not c/o pain.    Re-assessment:  Patient does not c/o pain.      Plan:  Continue with current goals    Patient/Caregiver Education:  Patient/Caregiver educated on session.  Patient/Caregiver provided with home program:  Patient/Caregiver stated verbal understanding of directions.      Time in: 1500  Time out: 1555  Minutes seen:55              Signature:   Electronically signed by DEL Garcia on 8/6/2024 at 5:19 PM

## 2024-08-06 NOTE — PROGRESS NOTES
[facility]  Outpatient Physical Therapy    Treatment Note        Date: 2024  Patient: Mulu Pandya  : 1955   Confirmed: Yes  MRN: [id number]  Referring Provider: Collin Chavez PA    Medical Diagnosis: Late effects of CVA (cerebrovascular accident) [I69.90]  Impaired mobility and ADLs [Z74.09, Z78.9]  Right hand weakness [R29.898]  Ataxia [R27.0]       Treatment Diagnosis: Abnormality of gait    Visit Information:  Insurance: Payor: BCBS MEDICARE / Plan: [employer] ESSENTIAL/PLUS / Product Type: *No Product type* /   PT Visit Information  PT Insurance Information: [facility] Medicare  Total # of Visits to Date: 5  Plan of Care/Certification Expiration Date: 10/16/24  No Show: 0  Progress Note Due Date: 24  Canceled Appointment: 1  Progress Note Counter:     Subjective Information:  Subjective: Pt reports nothing new  HEP Compliance:  [x] Good [] Fair [] Poor [] Reports not doing due to:    Pain Screening  Patient Currently in Pain: Denies    Treatment:  Exercises:  Exercises  Exercise 2: Foam: wt shifts, head turns, marching,FA/FT/ EO EC, stepping on/off  Exercise 4: Gait drills: F/L/R/march/tandem with 3 sec hold x1-2 laps  Exercise 6: NDT pivots onto 6\" step x10 brittany  Exercise 8: 4 way hip YTB x 10 ea  Exercise 10: STS x 5 = 20.15s  Exercise 11: Step ups 6in box F/L x 10 ea brittany, Tap downs with 4\" box x 10  Exercise 12: Amb of 6\" hurdles F/L x2-3 laps ea  Exercise 13: DGI tasks: head turns, speed,  Exercise 14: dual tasking: ball toss to self, with and w/o catagorizing  Exercise 20: HEP: cont current   *Indicates exercise, modality, or manual techniques to be initiated when appropriate    Objective Measures: : Progressed dynamic balance for DGI     Strength: [x] NT  [] MMT completed:   ROM: [x] NT  [] ROM measurements:     Assessment:   Body Structures, Functions, Activity Limitations Requiring Skilled Therapeutic Intervention: Decreased functional mobility , Decreased

## 2024-08-09 ENCOUNTER — HOSPITAL ENCOUNTER (OUTPATIENT)
Dept: OCCUPATIONAL THERAPY | Age: 69
Setting detail: THERAPIES SERIES
Discharge: HOME | End: 2024-08-09
Payer: MEDICARE

## 2024-08-09 ENCOUNTER — HOSPITAL ENCOUNTER (OUTPATIENT)
Dept: PHYSICAL THERAPY | Age: 69
Setting detail: THERAPIES SERIES
Discharge: HOME | End: 2024-08-09
Payer: MEDICARE

## 2024-08-09 PROCEDURE — 97530 THERAPEUTIC ACTIVITIES: CPT

## 2024-08-09 PROCEDURE — 97112 NEUROMUSCULAR REEDUCATION: CPT

## 2024-08-09 PROCEDURE — 97110 THERAPEUTIC EXERCISES: CPT

## 2024-08-09 NOTE — PROGRESS NOTES
[facility]       Occupational Therapy: Daily Note   Patient: Mulu Pandya (69 y.o. female)   Date: 2024  Plan of Care Certification Period:  09/15/24   :  1955  MRN: [id number]  CSN: [id number]   Insurance: Payor: [facility] MEDICARE / Plan: ANTHEM MEDIBLUE ESSENTIAL/PLUS / Product Type: *No Product type* /   Insurance ID: [id number] - (Medicare Managed) Secondary Insurance (if applicable):    Referring Physician: Collin Chavez PA     PCP: Collin Chavez PA Visits to Date: Total # of Visits to Date: 6   Progress note:Progress Note Counter:   Visits Approved: 6    No Show: 1  Cancelled Appts:0     Medical Diagnosis: Late effects of CVA (cerebrovascular accident) [I69.90]  Impaired mobility and ADLs [Z74.09, Z78.9]  Right hand weakness [R29.898]  Ataxia [R27.0] Late effects of CVA; Impaired mobility and ADLs; Right hand weakness; Ataxia        Therapy Time     Time in 1500   Time out 1600   Total treatment minutes 60   Total time code minutes  60 Minutes        OT Therapeutic activities 45 minutes for 3 unit(s), CPT 80851  OT Therapeutic exercises 15 minutes for 1 unit(s), CPT 87234       SUBJECTIVE EXAMINATION     Patient's date of birth confirmed: Yes     Pain Level:   Pt denies pain at rest  Location: upper arm, above the elbow   Description: feels heavy and weak with movement.    Patient Comments:   \"I am tired after physical therapy.\"        Learning/Language barrier: yes,  difficulty with word finding         HEP/Strategies/Orthosis Compliance:   Pt reports she is completing the table top towel exercises. Pt stated she hasn't started any of the new FM/coordination activities, but has been working on picking up her keys from inside a purse. Pt reports she has completed a few of the scapula mobility and AAROM dowel exercises, but since they are new, haven't had a lot of time.    Restrictions: none reported            OBJECTIVE EXAMINATION     Upper Extremity Strength and

## 2024-08-09 NOTE — PROGRESS NOTES
[facility]   [facility] [place] [address]  Dept: [phone]  Dept Fax: [phone]  Loc: [phone]    Occupational Therapy: PROGRESS NOTE    Patient: Muul Pandya (69 y.o. female)   Date: 2024   :  1955  MRN: [id number]  CSN: [id number]  Account #: [id number]   Insurance: Payor: [facility] MEDICARE / Plan: ANTHEM MEDIBLUE ESSENTIAL/PLUS / Product Type: *No Product type* /   Insurance ID: [id number] - (Medicare Managed) Secondary Insurance (if applicable):    Referring Physician: Collin Chavez PA     PCP: Collin Chavez PA  Date of eval: 2024  Last POC date: 2024   Reporting period: 2024-2024  Visits to Date: Total # of Visits to Date: 6  Progress note:Progress Note Counter:   Visits Approved: 6    No Show: 1  Cancelled Appts:0     Medical Diagnosis: Late effects of CVA (cerebrovascular accident) [I69.90]  Impaired mobility and ADLs [Z74.09, Z78.9]  Right hand weakness [R29.898]  Ataxia [R27.0] Late effects of CVA; Impaired mobility and ADLs; Right hand weakness; Ataxia        Treating diagnosis: Decreased AROM RUE, Decreased strength RUE, Decreased coordination RUE      Assessment:    Goals Current/Discharge status  Met   Long Term Goal 1: Patient will increase RUE shoulder AROM to >/=125* for increased ease with overhead activity. See chart below. MIN improvement.       [] Met  [x] Partially Met  [] Not Met   Long Term Goal 2: Patient will increase RUE shoulder external rotation to >/=50* for increased ease with I/ADLs. See chart below. + 10° improvement.       [] Met  [x] Partially Met  [] Not Met   Long Term Goal 3: Patient will increase RUE coordination to assist with daily activities as measured by completion of 9-HPT in </=60 seconds. See chart below.        [] Met  [] Partially Met  [x] Not Met   Long Term Goal 4: Patient will increase right hand  strength by 15# to increase

## 2024-08-09 NOTE — PROGRESS NOTES
[facility]  Outpatient Physical Therapy    Treatment Note        Date: 2024  Patient: Mulu Pandya  : 1955   Confirmed: Yes  MRN: [id number]  Referring Provider: Collin Chavez PA    Medical Diagnosis: Late effects of CVA (cerebrovascular accident) [I69.90]  Impaired mobility and ADLs [Z74.09, Z78.9]  Right hand weakness [R29.898]  Ataxia [R27.0]       Treatment Diagnosis: Abnormality of gait    Visit Information:  Insurance: Payor: BCBS MEDICARE / Plan: [employer] ESSENTIAL/PLUS / Product Type: *No Product type* /   PT Visit Information  PT Insurance Information: [facility] Medicare  Total # of Visits to Date: 6  Plan of Care/Certification Expiration Date: 10/16/24  No Show: 0  Progress Note Due Date: 24  Canceled Appointment: 1  Progress Note Counter:     Subjective Information:  Subjective: Pt reports  HEP Compliance:  [x] Good [] Fair [] Poor [] Reports not doing due to:  Pain Screening  Patient Currently in Pain: Denies    Treatment:  Exercises:  Exercises  Exercise 3: Single stepping 6\" with reaching (F/L) x 10  Exercise 4: Gait drills: F/L/R/march/tandem( f/r) with 3 sec hold x1-2 laps  Exercise 8: 4 way hip YTB x 10 ea  Exercise 9: kaveh gastroc str 15s x 3on end of // bars  Exercise 10: GASTELUM = 48, TUG = 16.68  Exercise 12: Amb of 6\" hurdles F/L x2-3 laps ea, 0-2 HHA  Exercise 14: dual tasking: BB toss to self, with and w/o catagorizing  Exercise 15: NDT pivots on 6\" box, Rt x 10, in/out x 10  Exercise 16: step in/out Kaveh with 2 UE support  Exercise 17: rockerboard 3 way with balance lateral x 10  Exercise 20: HEP: cont current     *Indicates exercise, modality, or manual techniques to be initiated when appropriate    Objective Measures:        Strength: [x] NT  [] MMT completed:      LTG 3 Current Status:: : GASTELUM = 48  LTG 4 Current Status:: : TUG = 16.68       Assessment:   Body Structures, Functions, Activity Limitations Requiring Skilled Therapeutic Intervention:

## 2024-08-12 ENCOUNTER — HOSPITAL ENCOUNTER (OUTPATIENT)
Dept: OCCUPATIONAL THERAPY | Age: 69
Setting detail: THERAPIES SERIES
Discharge: HOME | End: 2024-08-12
Payer: MEDICARE

## 2024-08-12 NOTE — PROGRESS NOTES
Therapy                            Cancellation/No-show Note    Date: 2024  Patient Name: Mulu Pandya    : 1955  (69 y.o.)     MRN: [id number]    Account #: [id number]    No Show: 1  Canceled Appointment: 0    Comments:  Next scheduled appointment 2024 (pending authorization)    For today's appointment patient:  [x]  Cancelled  []  Rescheduled appointment  []  No-show   []  Called pt to remind of next appointment     Reason given by patient:  []  Patient ill  []  Conflicting appointment  []  No transportation    []  Conflict with work  []  No reason given  [x]  Other:  Awaiting insurance approval    [x] Pt has future appointments scheduled, no follow up needed  [] Pt requests to be on hold.    Reason:   If > 2 weeks please discuss with therapist.  [] Therapist to call pt for follow up     Signature: RIC Simons 2024 11:07 AM

## 2024-08-13 ENCOUNTER — HOSPITAL ENCOUNTER (OUTPATIENT)
Dept: SPEECH THERAPY | Age: 69
Setting detail: THERAPIES SERIES
Discharge: HOME | End: 2024-08-13
Payer: MEDICARE

## 2024-08-13 ENCOUNTER — HOSPITAL ENCOUNTER (OUTPATIENT)
Dept: PHYSICAL THERAPY | Age: 69
Setting detail: THERAPIES SERIES
Discharge: HOME | End: 2024-08-13
Payer: MEDICARE

## 2024-08-13 PROCEDURE — 97130 THER IVNTJ EA ADDL 15 MIN: CPT

## 2024-08-13 PROCEDURE — 97116 GAIT TRAINING THERAPY: CPT

## 2024-08-13 PROCEDURE — 97129 THER IVNTJ 1ST 15 MIN: CPT

## 2024-08-13 PROCEDURE — 97112 NEUROMUSCULAR REEDUCATION: CPT

## 2024-08-13 PROCEDURE — 97110 THERAPEUTIC EXERCISES: CPT

## 2024-08-13 NOTE — PROGRESS NOTES
[facility]  Outpatient Physical Therapy    Treatment Note        Date: 2024  Patient: Mulu Pandya  : 1955   Confirmed: Yes  MRN: [id number]  Referring Provider: Collin Chavez PA    Medical Diagnosis: Late effects of CVA (cerebrovascular accident) [I69.90]  Impaired mobility and ADLs [Z74.09, Z78.9]  Right hand weakness [R29.898]  Ataxia [R27.0]       Treatment Diagnosis: Abnormality of gait    Visit Information:  Insurance: Payor: BCBS MEDICARE / Plan: [employer] ESSENTIAL/PLUS / Product Type: *No Product type* /   PT Visit Information  PT Insurance Information: [facility] Medicare  Total # of Visits to Date: 7  Plan of Care/Certification Expiration Date: 10/16/24  No Show: 0  Progress Note Due Date: 24  Canceled Appointment: 1  Progress Note Counter:  ( until 9/10/24)    Subjective Information:  Subjective: Pt reports decreased compliance with HEP on non-therapy days due to sleeping a lot.  HEP Compliance:  [x] Good [] Fair [] Poor [] Reports not doing due to:    Pain Screening  Patient Currently in Pain: No    Treatment:  Exercises:  Exercises  Exercise 1: Standing with 1 foot on 4\" step: head turns  Exercise 2: Foam: step on/off x10 brittany  Exercise 3: Single stepping with bean bag toss ~20x brittany  Exercise 4: Gait drills: march with opp UE lift, heels, toes x2 laps ea  Exercise 5: Vector tapping onto sm riverstones x10ea brittany without UE support  Exercise 8: 4 way hip YTB x 15 ea brittany  Exercise 15: 4\" alt step taps, 2 corner step taps x10 brittany  Exercise 16: 4 square stepping x5 ea direction - good stability  Exercise 20: HEP: increase compliance on non-PT days     Objective Measures:   Ambulation  Surface: Outdoors, Uneven  Device: No Device  Quality of Gait: Decreased brittany step length with slight FWD flexed posture, on even and uneven surfaces with 2-3\" step up or down  Gait Deviations: Slow Vianey, Decreased step length, Decreased step height  Distance: 200'    Assessment:

## 2024-08-13 NOTE — PROGRESS NOTES
[facility]- Outpatient  Speech Language Pathology  Adult Daily Note    Mulu Pandya  : 1955  [x]   confirmed      Date: 2024    Visit Information:  Visit Information  SLP Insurance Information: BC  Total # of Visits Approved: 4  Total # of Visits to Date: 3  Timeframe Approved From:: 24  Timeframe Approved To:: 24  No Show: 0  Canceled Appointment: 0      Plan of care signed (Y/N):     Faxed    Certification Period:  Therapy Comments: Certification period: 2024-2024    Plan of Care Visit  # 3      Interventions used this date:  Expressive Language, Receptive Language, and Cognitive Skill Development    Subjective:  Pt seen after PT. Pt reports no new changes since last session.     Behavior:  Alert, Cooperative, and Pleasant       Objective/Assessment:   Short-term Goals  Goal 1: To increase safety awareness and judgment for safe completion of ADLs secondary to patient's cognitive deficits, patient will complete abstract reasoning tasks (i.e. word deduction, convergent and divergent naming, similarities/differences) with 80% accuracy and min cues.  Divergent (concrete): 90% accuracy independently, increasing to 100% accuracy given min verbal cues.  Convergent(concrete): 100% accuracy independently,  Word Deduction: 95% accuracy independently, increasing to 100% accuracy given min verbal cues.  Goal 2: To increase safety awareness and judgment for safe completion of ADLs secondary to patient's cognitive deficits,  patient will complete mid-high level problem solving tasks related to ADLs (e.g. medication management) with 80% accuracy with supervision cues.  Medication Management worksheet: 90% accuracy increasing to 100% accuracy given mod verbal cues.  Medication Management sort: 50% accuracy independently, increasing to 80% accuracy given min verbal cues and 90% accuracy given mod-max verbal cues.  Goal 3: To increase independence for functional activities for home and

## 2024-08-16 ENCOUNTER — HOSPITAL ENCOUNTER (OUTPATIENT)
Dept: PHYSICAL THERAPY | Age: 69
Setting detail: THERAPIES SERIES
Discharge: HOME | End: 2024-08-16
Payer: MEDICARE

## 2024-08-16 ENCOUNTER — HOSPITAL ENCOUNTER (OUTPATIENT)
Dept: OCCUPATIONAL THERAPY | Age: 69
Setting detail: THERAPIES SERIES
Discharge: HOME | End: 2024-08-16
Payer: MEDICARE

## 2024-08-16 PROCEDURE — 97530 THERAPEUTIC ACTIVITIES: CPT

## 2024-08-16 PROCEDURE — 97116 GAIT TRAINING THERAPY: CPT

## 2024-08-16 PROCEDURE — 97112 NEUROMUSCULAR REEDUCATION: CPT

## 2024-08-16 NOTE — PROGRESS NOTES
[facility]  Outpatient Physical Therapy    Treatment Note        Date: 2024  Patient: Mulu Pandya  : 1955   Confirmed: Yes  MRN: [id number]  Referring Provider: Collin Chavez PA    Medical Diagnosis: Late effects of CVA (cerebrovascular accident) [I69.90]  Impaired mobility and ADLs [Z74.09, Z78.9]  Right hand weakness [R29.898]  Ataxia [R27.0]       Treatment Diagnosis: Abnormality of gait    Visit Information:  Insurance: Payor: BCBS MEDICARE / Plan: [employer] ESSENTIAL/PLUS / Product Type: *No Product type* /   PT Visit Information  PT Insurance Information: [facility] Medicare  Total # of Visits to Date: 8  Plan of Care/Certification Expiration Date: 10/16/24  No Show: 0  Progress Note Due Date: 24  Canceled Appointment: 1  Progress Note Counter:  ( until 9/10/24)    Subjective Information:  Subjective: Patient denies recent falls or LOB. She reports she is able to walk longer distances now. \"I tried to make stuffed peppers the other day but I couldn't do the whole thing. My  had to help.\"  HEP Compliance:  [x] Good [x] Fair [] Poor [] Reports not doing due to:  Treatment:  Exercises:  Exercises  Exercise 1: Objective measurements  Exercise 2: 5X STS  Exercise 3: 6\" step taps 30 sec x2  Exercise 4: Gait drills: march with opp UE lift, cross crawls  Exercise 5: monster walks lateral YTB x3 length // bars  Exercise 6: Supported SLS 6\" block ball lifts x10 B  Exercise 13: DGI   Exercise 20: HEP: increase compliance on non-PT days      *Indicates exercise, modality, or manual techniques to be initiated when appropriate    Objective Measures:   Strength: [] NT  [] MMT completed:  Strength RLE  R Hip Flexion: 4+/5  R Hip Extension: 2/5  R Hip ABduction: 4-/5  R Knee Flexion: 5/5  R Knee Extension: 5/5  R Ankle Dorsiflexion: 5/5  Strength LLE  L Hip Flexion: 5/5  L Hip Extension: 2/5  L Hip ABduction: 3+/5  L Knee Flexion: 5/5  L Knee Extension: 5/5  L Ankle

## 2024-08-16 NOTE — PROGRESS NOTES
[facility]       Occupational Therapy: Daily Note   Patient: Mulu Pandya (69 y.o. female)   Date: 2024  Plan of Care Certification Period:  09/10/24 (UPDATE)   :  1955  MRN: [id number]  CSN: [id number]   Insurance: Payor: [facility] MEDICARE / Plan: ANTHEM MEDIBLUE ESSENTIAL/PLUS / Product Type: *No Product type* /   Insurance ID: [id number] - (Medicare Managed) Secondary Insurance (if applicable):    Referring Physician: Collin Chavez PA     PCP: Collin Chavez PA Visits to Date: Total # of Visits to Date: 1 (on new insurance AUTH. 7 total visits to date)   Progress note:Progress Note Counter:   Visits Approved: 3    No Show: 1  Cancelled Appts:0     Medical Diagnosis: Late effects of CVA (cerebrovascular accident) [I69.90]  Impaired mobility and ADLs [Z74.09, Z78.9]  Right hand weakness [R29.898]  Ataxia [R27.0] Late effects of CVA; Impaired mobility and ADLs; Right hand weakness; Ataxia        Therapy Time     Time in 1507   Time out 1602   Total treatment minutes 55   Total time code minutes  55 Minutes        OT Therapeutic activities 55 minutes for 4 unit(s), CPT 41432       SUBJECTIVE EXAMINATION     Patient's date of birth confirmed: Yes     Pain Level:   Pt denies pain  Location: R hand   Description: feels heavy, constant     Patient Comments:   \"I am fine with doing my 3 approved visits this week and next week like it is on the schedule.\" \"         Learning/Language barrier: yes, difficulty with word finding.       HEP/Strategies/Orthosis Compliance:   Pt reports she is completing table top towel exercises a couple times a week.   Pt reports she is completing FM/COORD activities a couple times a week, specifically coin , paper crumbling, towel gather, thumb opposition and paper clip chain.   Pt reports she is not using completing scapula mobility, however, is doing the AAROM exercises with her cane.         Restrictions: none reported            OBJECTIVE

## 2024-08-16 NOTE — PROGRESS NOTES
[facility]  PHYSICAL THERAPY PLAN OF CARE                                    [facility] [place] [address]     Ph: [phone]  Fax: [phone]      [] Certification  [] Recertification []  Plan of Care  [x] Progress Note [] Discharge      Referring Provider: Collin Chavez PA     From:  Madie Fraser PT, DPT   Patient: Mulu Pandya (69 y.o. female) : 1955 Date: 2024  Medical Diagnosis: Late effects of CVA (cerebrovascular accident) [I69.90]  Impaired mobility and ADLs [Z74.09, Z78.9]  Right hand weakness [R29.898]  Ataxia [R27.0]       Treatment Diagnosis: Abnormality of gait    Plan of Care/Certification Expiration Date: : 10/16/24   Progress Report Period from:  2024  to 2024    Visits to Date: 8 No Show: 0 Cancelled Appts: 1    OBJECTIVE:   Short Term Goals - Time Frame for Short Term Goals: 2 weeks    Goals Current/Discharge status  Status   Short Term Goal 1: Independent with HEP.  Patient independent with HEP every other day  In progress     Long Term Goals - Time Frame for Long Term Goals : 6 weeks  Goals Current/ Discharge status Status   Long Term Goal 1: Improve brittany LE strength to 5/5 to improve stability with standing and walking. Strength RLE  R Hip Flexion: 4+/5  R Hip Extension: 2/5  R Hip ABduction: 4-/5  R Knee Flexion: 5/5  R Knee Extension: 5/5  R Ankle Dorsiflexion: 5/5  Strength LLE  L Hip Flexion: 5/5  L Hip Extension: 2/5  L Hip ABduction: 3+/5  L Knee Flexion: 5/5  L Knee Extension: 5/5  L Ankle Dorsiflexion: 5/5            In progress   Long Term Goal 2: Pt will ambulate on even and uneven surfaces >/= 250' with improved foot clearance and heel strike S/I. Patient ambulates > 250 feet indoors with improved foot clearance. She continues to demonstrate decreased heel strike on uneven surfaces  In progress   Long Term Goal 3: Gastelum >/= 48/56 and DGI >/=  to improve pt's QOL. GASTELUM 48/56  DGI   Met   Long Term Goal 4: TUG </= 12 secto

## 2024-08-19 ENCOUNTER — HOSPITAL ENCOUNTER (OUTPATIENT)
Dept: OCCUPATIONAL THERAPY | Age: 69
Setting detail: THERAPIES SERIES
Discharge: HOME | End: 2024-08-19
Payer: MEDICARE

## 2024-08-19 PROCEDURE — 97530 THERAPEUTIC ACTIVITIES: CPT

## 2024-08-19 PROCEDURE — 97140 MANUAL THERAPY 1/> REGIONS: CPT

## 2024-08-19 NOTE — PROGRESS NOTES
[facility]       Occupational Therapy: Daily Note   Patient: Mulu Pandya (69 y.o. female)   Date: 2024  Plan of Care Certification Period:  09/10/24   :  1955  MRN: [id number]  CSN: [id number]   Insurance: Payor: [employer]ICARE / Plan: ANTHEM MEDIBLUE ESSENTIAL/PLUS / Product Type: *No Product type* /   Insurance ID: [id number] - (Medicare Managed) Secondary Insurance (if applicable):    Referring Physician: Collin Chavez PA     PCP: Collin Chavez PA Visits to Date: Total # of Visits to Date: 2 (on new insurance AUTH. 8 total visits to date)   Progress note:Progress Note Counter:   Visits Approved: 3    No Show: 1  Cancelled Appts:0     Medical Diagnosis: Late effects of CVA (cerebrovascular accident) [I69.90]  Impaired mobility and ADLs [Z74.09, Z78.9]  Right hand weakness [R29.898]  Ataxia [R27.0] Late effects of CVA; Impaired mobility and ADLs; Right hand weakness; Ataxia        Therapy Time     Time in 1102   Time out 1200   Total treatment minutes 58   Total time code minutes  58 Minutes        OT Manual therapy 10 minutes for 1 unit(s), CPT 22073  OT Therapeutic activities 48 minutes for 3 unit(s), CPT 58046       SUBJECTIVE EXAMINATION     Patient's date of birth confirmed: Yes     Pain Level:   Pt denies pain  Location: N/A  Description: N/A    Patient Comments:     Patient states she ordered the adaptive rocker knife- waiting for it to arrive. Patient also states she is now able to turn her car on with the right hand without assistance from left!         Learning/Language barrier: Difficulty with word finding        HEP/Strategies/Orthosis Compliance: Patient verbally confirmed compliant with HEP's          Restrictions: None reported           OBJECTIVE EXAMINATION         TREATMENT     Focus of treatment was on the following:   ROM, coordination, strength     Patient completes scapular mobility exercises whiles seated in chair, 2 sets x 10 reps:  -

## 2024-08-19 NOTE — THERAPY RECERTIFICATION
[facility]            [address]            [place], [place] [address]             Phone: [phone]                        Fax:  [phone]                   [facility]  Speech Language Pathology  Adult Progress Note                          Physician:  Collin Chavez    From: [place]o Salas, SLP, MS,CCC-SLP   Patient: Mulu Pandya      : 1955  Diagnosis: Aphasia [R47.01]          Date: 2024  Treatment Diagnosis: Aphasia [R47.01] Dysarthria [I69.322]  Secondary Diagnosis: Dysarthria [I69.322]  Date of Evaluation: 2024          Plan of Care/Treatment to date: Speech Production Therapy and Cognitive Skill Development    Date range from 2024 to 2024.    Subjective: Mulu currently attends speech therapy once a week for 60 min.  She has demonstrated good attendance this progress period, attending 3/3 sessions.  Pt would continue to benefit from skilled speech-language in order decrease dysarthria and her cognitive-linguistic deficits      Progress toward Short-Term Goals:  Goal 1: To increase safety awareness and judgment for safe completion of ADLs secondary to patient's cognitive deficits, patient will complete abstract reasoning tasks (i.e. word deduction, convergent and divergent naming, similarities/differences) with 80% accuracy and min cues.--Made progress. Continue goal.    Goal 2: To increase safety awareness and judgment for safe completion of ADLs secondary to patient's cognitive deficits,  patient will complete mid-high level problem solving tasks related to ADLs (e.g. medication management) with 80% accuracy with supervision cues.--Made progress. Continue goal.    Goal 3: To increase independence for functional activities for home and community, patient will complete mid level executive functioning tasks (i.e. path finding, scheduling appointments, prioritizing tasks) with 80%

## 2024-08-20 ENCOUNTER — HOSPITAL ENCOUNTER (OUTPATIENT)
Dept: PHYSICAL THERAPY | Age: 69
Setting detail: THERAPIES SERIES
Discharge: HOME | End: 2024-08-20
Payer: MEDICARE

## 2024-08-20 ENCOUNTER — HOSPITAL ENCOUNTER (OUTPATIENT)
Dept: SPEECH THERAPY | Age: 69
Setting detail: THERAPIES SERIES
Discharge: HOME | End: 2024-08-20
Payer: MEDICARE

## 2024-08-20 PROCEDURE — 97535 SELF CARE MNGMENT TRAINING: CPT

## 2024-08-20 PROCEDURE — 92507 TX SP LANG VOICE COMM INDIV: CPT

## 2024-08-20 PROCEDURE — 97112 NEUROMUSCULAR REEDUCATION: CPT

## 2024-08-20 PROCEDURE — 97110 THERAPEUTIC EXERCISES: CPT

## 2024-08-20 NOTE — PROGRESS NOTES
[facility]- Outpatient  Speech Language Pathology  Adult Daily Note    Mulu Pandya  : 1955  [x]   confirmed      Date: 2024    Visit Information:  Visit Information  SLP Insurance Information: BC  Total # of Visits Approved: 4  Total # of Visits to Date: 4  Timeframe Approved From:: 24  Timeframe Approved To:: 24  No Show: 0  Canceled Appointment: 0      Plan of care signed (Y/N):     Yes    Certification Period: 2024 to 2024.     Plan of Care Visit  # 1      Interventions used this date:  Speech Production, Expressive Language, Receptive Language, and Cognitive Skill Development    Subjective:  Pt was seen after PT. She reports no changes since last session. Pt motivated and cooperative throughout session.     Behavior:  Alert, Cooperative, Pleasant, and Motivated       Objective/Assessment:   Short-term Goals  Goal 1: To increase safety awareness and judgment for safe completion of ADLs secondary to patient's cognitive deficits, patient will complete abstract reasoning tasks (i.e. word deduction, convergent and divergent naming, similarities/differences) with 80% accuracy and min cues.  Abstract divergent with letter specification: 67% acc  I increasing to 90% acc with min cues  Goal 2: To increase safety awareness and judgment for safe completion of ADLs secondary to patient's cognitive deficits,  patient will complete mid-high level problem solving tasks related to ADLs (e.g. medication management) with 80% accuracy with supervision cues.  Not addressed   Goal 3: To increase independence for functional activities for home and community, patient will complete mid level executive functioning tasks (i.e. path finding, scheduling appointments, prioritizing tasks) with 80% accuracy and min cues.  Not addressed   Goal 4: To address patient's cognitive deficits and promote his/her ability to safely follow directives in a variety of environments, patient will carry out 2-3

## 2024-08-20 NOTE — PROGRESS NOTES
[facility]  Outpatient Physical Therapy    Treatment Note        Date: 2024  Patient: Mulu Pandya  : 1955   Confirmed: Yes  MRN: [id number]  Referring Provider: Collin Chavez PA    Medical Diagnosis: Late effects of CVA (cerebrovascular accident) [I69.90]  Impaired mobility and ADLs [Z74.09, Z78.9]  Right hand weakness [R29.898]  Ataxia [R27.0]       Treatment Diagnosis: Abnormality of gait    Visit Information:  Insurance: Payor: [employer] MEDICARE / Plan: [employer] ESSENTIAL/PLUS / Product Type: *No Product type* /   PT Visit Information  PT Insurance Information: [facility] Medicare  Total # of Visits to Date: 9  Plan of Care/Certification Expiration Date: 10/16/24  No Show: 0  Progress Note Due Date: 09/15/24  Canceled Appointment: 1  Progress Note Counter:  (3/4 V  until 9/10/24)    Subjective Information:  Subjective: Pt states she feels weak in the legs today  HEP Compliance:  [x] Good [] Fair [] Poor [] Reports not doing due to:    Pain Screening  Patient Currently in Pain: Denies    Treatment:  Exercises:  Exercises  Exercise 1: TUG = 11.08 secs  Exercise 2: 5X STS: See functional= 12.63 sec on 2nd attempt  Exercise 3: 6\" step taps 30 sec x2  Exercise 4: Gait drills: march with opp UE lift, cross crawls, tandem F/R x 2-3 laps  Exercise 5: monster walks lateral RTB x2 length // bars, march x 2 laps RTB  Exercise 6: Supported SLS 6\" block lifts n chops with boom vera x10 B  Exercise 7: 12\" single step with reach x 4-10, Brittany  Exercise 9: brittany gastroc str 20s x 2, Brittany HS str on stair 20s x 2  Exercise 12: Amb of \" hurdles F/L x2-3 laps ea, 0-2 HHA  Exercise 14: dual tasking with gait belt on cone, conversing  Exercise 20: HEP: continue current+ HS/gastroc stretch on stairs     *Indicates exercise, modality, or manual techniques to be initiated when appropriate    Objective Measures:      Strength: [x] NT  [] MMT completed:   LTG 4 Current Status:: : TUG = 11.08sec  LTG 5

## 2024-08-23 ENCOUNTER — HOSPITAL ENCOUNTER (OUTPATIENT)
Dept: OCCUPATIONAL THERAPY | Age: 69
Setting detail: THERAPIES SERIES
Discharge: HOME | End: 2024-08-23
Payer: MEDICARE

## 2024-08-23 ENCOUNTER — HOSPITAL ENCOUNTER (OUTPATIENT)
Dept: PHYSICAL THERAPY | Age: 69
Setting detail: THERAPIES SERIES
Discharge: HOME | End: 2024-08-23
Payer: MEDICARE

## 2024-08-23 PROCEDURE — 97116 GAIT TRAINING THERAPY: CPT

## 2024-08-23 PROCEDURE — 97110 THERAPEUTIC EXERCISES: CPT

## 2024-08-23 PROCEDURE — 97112 NEUROMUSCULAR REEDUCATION: CPT

## 2024-08-23 PROCEDURE — 97530 THERAPEUTIC ACTIVITIES: CPT

## 2024-08-23 NOTE — PROGRESS NOTES
[facility]  Outpatient Physical Therapy    Treatment Note        Date: 2024  Patient: Mulu Pandya  : 1955   Confirmed: Yes  MRN: [id number]  Referring Provider: Collin Chavez PA    Medical Diagnosis: Late effects of CVA (cerebrovascular accident) [I69.90]  Impaired mobility and ADLs [Z74.09, Z78.9]  Right hand weakness [R29.898]  Ataxia [R27.0]       Treatment Diagnosis: Abnormality of gait    Visit Information:  Insurance: Payor: [employer]BS MEDICARE / Plan: [employer] ESSENTIAL/PLUS / Product Type: *No Product type* /   PT Visit Information  PT Insurance Information: [facility] Medicare  Total # of Visits to Date: 10  Plan of Care/Certification Expiration Date: 10/16/24  No Show: 0  Progress Note Due Date: 09/15/24  Canceled Appointment: 1  Progress Note Counter: 10/12 (4/4 V  until 9/10/24)    Subjective Information:  Subjective: Pt states she has another MRI scheduled on   HEP Compliance:  [x] Good [x] Fair [] Poor [] Reports not doing due to:    Pain Screening  Patient Currently in Pain: Denies    Treatment:  Exercises:  Exercises  Exercise 1: obj measures: Gait, str, Quesada  Exercise 5: monster walks lateral RTB x2 length // bars, march x 2 laps RTB  Exercise 11: Step ups 6in box F/L x 10 ea brittany, Tap downs with 4\" box x 10  Exercise 13: Bridges with RTB x  10, march with RTB x 10, hip abd x 10 with RTB  Exercise 17: rockerboard 3 way with balance lateral x 10  Exercise 20: HEP: continue current+ 4 way ankle with TB       *Indicates exercise, modality, or manual techniques to be initiated when appropriate    Objective Measures:      Ambulation  Surface: Outdoors, Uneven  Device: No Device  Assistance: Independent  Quality of Gait: Decreased brittany step length with slight FWD flexed posture, on even and uneven surfaces with 2-3\" step up or down  Gait Deviations: Slow Vianey, Decreased step length, Decreased step height  Distance: > 300ft    Strength: [] NT  [x] MMT completed:  Strength

## 2024-08-23 NOTE — THERAPY DISCHARGE
[facility]  PHYSICAL THERAPY PLAN OF CARE                                    [place] [address]     Ph: [phone]  Fax: [phone]    [] Certification  [] Recertification []  Plan of Care  [] Progress Note [x] Discharge      Referring Provider: Collin Chavez PA    From: Madie MCDONALDT  Patient: Mulu Pandya (69 y.o. female) : 1955 Date: 2024   Medical Diagnosis: Late effects of CVA (cerebrovascular accident) [I69.90]  Impaired mobility and ADLs [Z74.09, Z78.9]  Right hand weakness [R29.898]  Ataxia [R27.0]    Treatment Diagnosis: Abnormality of gait    Plan of Care/Certification Expiration Date: 10/16/24   Progress Report Period from: 2024  to 2024    Visits to Date: 10 No Show: 0 Cancelled Appts: 1    OBJECTIVE:   Short Term Goals - Time Frame for Short Term Goals: 2 weeks    Goals Current/Discharge status  Status   Short Term Goal 1: Independent with HEP.  STG 1 Current Status:: : Pt reports compliance with HEP every other day   Met   Long Term Goals - Time Frame for Long Term Goals : 6 weeks  Goals Current/ Discharge status Status   Long Term Goal 1: Improve brittany LE strength to 5/5 to improve stability with standing and walking. Strength LLE  L Hip Flexion: 5/5  L Hip Extension: 3+/5  L Hip ABduction: 4-/5  L Knee Flexion: 5/5  L Knee Extension: 5/5  L Ankle Dorsiflexion: 5/5  Strength RLE  R Hip Flexion: 4+/5  R Hip Extension: 3+/5  R Hip ABduction: 4-/5, 4/5  R Knee Flexion: 5/5  R Knee Extension: 5/5  R Ankle Dorsiflexion: 5/5     Partially met   Long Term Goal 2: Pt will ambulate on even and uneven surfaces >/= 250' with improved foot clearance and heel strike S/I.     Ambulation  Surface: Outdoors, Uneven  Device: No Device  Assistance: Independent  Quality of Gait: Decreased brittany step length with slight FWD flexed posture, on even and uneven surfaces with 2-3\" step up or down  Gait Deviations: Slow Vianey, Decreased step length, Decreased

## 2024-08-23 NOTE — PROGRESS NOTES
[facility]       Occupational Therapy: Daily Note   Patient: Mulu Pandya (69 y.o. female)   Date: 2024  Plan of Care Certification Period:  Certification Period Expiration Date: 09/10/24  Progress Note Due Date: 24   :  1955  MRN: [id number]  CSN: [id number]   Insurance: Payor: [facility] MEDICARE / Plan: ANTHEM MEDIBLUE ESSENTIAL/PLUS / Product Type: *No Product type* /   Insurance ID: [id number] - (Medicare Managed) Secondary Insurance (if applicable):    Referring Physician: Collin Chavez PA     PCP: Collin Chavez PA Visits to Date: Total # of Visits to Date: 3 (on new insurance AUTH. 8 total visits to date)   Progress note:Progress Note Counter:   Visits Approved: 3    No Show: 1  Cancelled Appts:0     Medical Diagnosis: Late effects of CVA (cerebrovascular accident) [I69.90]  Impaired mobility and ADLs [Z74.09, Z78.9]  Right hand weakness [R29.898]  Ataxia [R27.0] Late effects of CVA; Impaired mobility and ADLs; Right hand weakness; Ataxia        Therapy Time     Time in 1500   Time out 1600   Total treatment minutes 60   Total time code minutes  60 Minutes        OT Therapeutic activities 60 minutes for 4 unit(s), CPT 29511       SUBJECTIVE EXAMINATION     Patient's date of birth confirmed: Yes     Pain Level:   Pt denies pain  Location: N/A  Description: N/A    Patient Comments:   Pt reported she drops things with her R hand, but able to pick them back up with no difficulty. Pt stating she can  utensils for example. Pt also stated she can insert kitchen utensils into slots in the  with mild difficulty.         Learning/Language barrier: Difficulty with word finding     HEP/Strategies/Orthosis Compliance: Patient verbally confirmed compliant with HEP's. Pt reports she is completing the table top towel exercises, FM/COORD activities, scapula mobility and AAROM dowel exercises.          Restrictions: none reported            OBJECTIVE 
Term Goal 4: Patient will increase right hand  strength by 15# to increase ability to  I/ADL items. See chart below. 3 lb improvement made.        [] Met  [x] Partially Met  [] Not Met   Long Term Goal 5: Patient will increase RUE strength by 1/2-1 MMT grade to increase ease with daily activities. See chart below. All R UE improved.        [x] Met  [] Partially Met  [] Not Met   Long Term Goal 6: Patient will be independent with all recommended HEP for sensation, ROM, coordination, and strength. Ongoing. Patient verbally confirmed compliant with HEP's. Pt reports she is completing the table top towel exercises, FM/COORD activities, scapula mobility and AAROM dowel exercises.  [x] Met  [] Partially Met  [] Not Met     Upper Extremity Strength and Range of Motion                   Right EVAL   Right CURRENT     MMT A Norm  A MMT   Shoulder                Flexion 3-/5 100 0-180 112 3+/5   Abduction 3-/5 98 0-180 104 3+/5   Internal Rotation 3/5 WFL 0-80 WFL 4-/5   External Rotation 3-/5 30 0-60 25 3-/5   Elbow             Flexion 3/5 150 0-150 WFL 4-/5   Extension 3/5 0 150-0 WFL 4-/5   Pronation 3/5 WFL 0-80 WFL 4-/5   Supination 3/5 80 0-80 80 3+/5   Wrist             Flexion 3/5 65 0-70 WFL 3+/5   Extension  3-/5 45 0-60 60 3+/5   Ulnar deviation NT 30 0-30 WFL 3/5   Radial Deviation  NT 20 0-20 WFL 3/5   Comments:       & Pinch Strength  Average of 3 tries Right EVAL Norm Right CURRENT    (lb) 20.5 Female age 70-74: 46 lbs  23   Sterling Pinch (lb) NT Female age 70-74: 9.5 lbs  8   Lateral Pinch (lb) NT Female age 70-74: 11.0 lbs  8   Comments:      Coordination & Dexterity    Right EVAL Norm Right CURRENT   Nine Hole Peg Test  (seconds) Unable; places 2 pegs in 90 seconds Female age 70-74: 20.2 s  1 minute to place 6 pegs, increased fatigue and fumbling noted during last three pegs.   Box & Blocks  (# of blocks) 11 Female age 70-74: 68.6 26   Comments: Therapist filled 3 x 3 board with small pegs

## 2024-08-26 ENCOUNTER — HOSPITAL ENCOUNTER (OUTPATIENT)
Dept: OCCUPATIONAL THERAPY | Age: 69
Setting detail: THERAPIES SERIES
Discharge: HOME | End: 2024-08-26
Payer: MEDICARE

## 2024-08-26 NOTE — PROGRESS NOTES
Therapy                            Cancellation/No-show Note    Date: 2024  Patient Name: Mulu Pandya    : 1955  (69 y.o.)     MRN: [id number]    Account #: [id number]    No Show: 1  Canceled Appointment: 0    Comments:      For today's appointment patient:  [x]  Cancelled  []  Rescheduled appointment  []  No-show   []  Called pt to remind of next appointment     Reason given by patient:  []  Patient ill  []  Conflicting appointment  []  No transportation    []  Conflict with work  []  No reason given  [x]  Other:  Requires insurance authorization    [] Pt has future appointments scheduled, no follow up needed  [] Pt requests to be on hold.    Reason:   If > 2 weeks please discuss with therapist.  [] Therapist to call pt for follow up     Signature: AKI Simons/L 2024 8:56 AM

## 2024-08-27 ENCOUNTER — HOSPITAL ENCOUNTER (OUTPATIENT)
Dept: SPEECH THERAPY | Age: 69
Setting detail: THERAPIES SERIES
Discharge: HOME | End: 2024-08-27
Payer: MEDICARE

## 2024-08-27 ENCOUNTER — APPOINTMENT (OUTPATIENT)
Dept: PHYSICAL THERAPY | Age: 69
End: 2024-08-27
Payer: MEDICARE

## 2024-08-27 PROCEDURE — 97129 THER IVNTJ 1ST 15 MIN: CPT

## 2024-08-27 PROCEDURE — 97130 THER IVNTJ EA ADDL 15 MIN: CPT

## 2024-08-27 NOTE — PROGRESS NOTES
[facility]- Outpatient  Speech Language Pathology  Adult Daily Note    Mulu Pandya  : 1955  [x]   confirmed      Date: 2024    Visit Information:  Visit Information  SLP Insurance Information: BCBS  Total # of Visits Approved: 6  Total # of Visits to Date: 6  Timeframe Approved From:: 24  Timeframe Approved To:: 10/19/24  No Show: 0  Canceled Appointment: 0      Plan of care signed (Y/N):     Yes    Certification Period: 2024 to 2024.        Plan of Care Visit  # 2      Interventions used this date:  Speech Production and Cognitive Skill Development    Subjective:  Pt arrived on time to session. Pt endorses completing speech HEP at home. No further changes since previous session.     Behavior:  Alert, Cooperative, Pleasant, and Motivated       Objective/Assessment:   Short-term Goals  Goal 1: To increase safety awareness and judgment for safe completion of ADLs secondary to patient's cognitive deficits, patient will complete abstract reasoning tasks (i.e. word deduction, convergent and divergent naming, similarities/differences) with 80% accuracy and min cues.  Divergent naming (abstract) - 10 item production - 80% acc I   Goal 2: To increase safety awareness and judgment for safe completion of ADLs secondary to patient's cognitive deficits,  patient will complete mid-high level problem solving tasks related to ADLs (e.g. medication management) with 80% accuracy with supervision cues.  Not addressed  Goal 3: To increase independence for functional activities for home and community, patient will complete mid level executive functioning tasks (i.e. path finding, scheduling appointments, prioritizing tasks) with 80% accuracy and min cues.  Path finding (high level) for visual mapping of appt and time restraints: 42% acc independently increasing to 79% acc with mod visual and verbal cueing with use of techniques   Goal 4: To address patient's cognitive deficits and promote his/her  ability to safely follow directives in a variety of environments, patient will carry out 2-3 directives of increasing complexity in everyday activities with 80% accuracy and min cues  2 step directives with 3 modifiers: 71% acc and min-mod cues   Goal 5: To decrease the presence of apraxia of speech and improve articulatory accuracy to adequately express wants and needs, the pt will complete structured articulation tasks following SLP model with min cues-fading cues for articulatory placement in 90% of opportunities with 1-3 syllables words at the phrase-sentence level.  Not addressed   Goal 6: Pt will complete OM drills paired with speech sounds to improve speech accuracy and expression of their complex thoughts, needs, feelings, and ideas with 90% accuracy.  Bilabial/alveolar CVC mix with verbal model and visual feedback: 83% acc increasing to 100% acc with repeated model and phonemic cueing.   Bilabial/alveolar/velar CVC mix with verbal model and visual feedback: 70% acc increasing to 100% acc with repeated model and phonemic cueing.       Pain Assessment:  Initial Assessment:  Patient does not c/o pain.    Re-assessment:  Patient does not c/o pain.      Plan:  Continue with current goals    Patient/Caregiver Education:  Patient/Caregiver educated on session.  Patient/Caregiver provided with home program:  Patient/Caregiver stated verbal understanding of directions.      Time in: 1500  Time out: 1600  Minutes seen: 60  Co  Speech: 22           Signature:   Electronically signed by DEL Garcia on 2024 at 5:31 PM

## 2024-08-30 ENCOUNTER — APPOINTMENT (OUTPATIENT)
Dept: PHYSICAL THERAPY | Age: 69
End: 2024-08-30
Payer: MEDICARE

## 2024-08-30 ENCOUNTER — HOSPITAL ENCOUNTER (OUTPATIENT)
Dept: OCCUPATIONAL THERAPY | Age: 69
Setting detail: THERAPIES SERIES
End: 2024-08-30
Payer: MEDICARE

## 2024-08-30 PROCEDURE — 93298 REM INTERROG DEV EVAL SCRMS: CPT | Performed by: INTERNAL MEDICINE

## 2024-08-30 NOTE — PROGRESS NOTES
Therapy                            Cancellation/No-show Note    Date: 2024  Patient Name: Mulu Pandya    : 1955  (69 y.o.)     MRN: [id number]    Account #: [id number]            Comments:  called pt to cancelled today's appointment d/t waiting for insurance auth. Pt does not have any more scheduled appointments at this time- will make more if insurance is approved    For today's appointment patient:  [x]  Cancelled  []  Rescheduled appointment  []  No-show   []  Called pt to remind of next appointment     Reason given by patient:  []  Patient ill  []  Conflicting appointment  []  No transportation    []  Conflict with work  []  No reason given  [x]  Other:  required insurance auth    [] Pt has future appointments scheduled, no follow up needed  [] Pt requests to be on hold.    Reason:   If > 2 weeks please discuss with therapist.  [x] Therapist to call pt for follow up     Signature: ABIGAIL Stuart 2024 1:47 PM

## 2024-09-03 ENCOUNTER — HOSPITAL ENCOUNTER (OUTPATIENT)
Dept: SPEECH THERAPY | Age: 69
Setting detail: THERAPIES SERIES
Discharge: HOME | End: 2024-09-03
Payer: MEDICARE

## 2024-09-03 PROCEDURE — 97130 THER IVNTJ EA ADDL 15 MIN: CPT

## 2024-09-03 PROCEDURE — 97129 THER IVNTJ 1ST 15 MIN: CPT

## 2024-09-03 NOTE — PROGRESS NOTES
[facility]- Outpatient  Speech Language Pathology  Adult Daily Note    Mulu Pandya  : 1955  [x]   confirmed      Date: 9/3/2024    Visit Information:  Visit Information  SLP Insurance Information: BCBS  Total # of Visits Approved: 6  Total # of Visits to Date: 2  Timeframe Approved From:: 24  No Show: 0  Canceled Appointment: 0      Plan of care signed (Y/N):     Yes    Certification Period: 2024 to 2024   Plan of Care Visit  # 3      Interventions used this date:  Speech Production, Cognitive Skill Development, and Instruction in Compensatory Strategies    Subjective:  Pt arrived on time to session. Pt reports no changes since last session. Pt completed HEP.     Behavior:  Alert, Cooperative, and Pleasant       Objective/Assessment:   Short-term Goals  Goal 1: To increase safety awareness and judgment for safe completion of ADLs secondary to patient's cognitive deficits, patient will complete abstract reasoning tasks (i.e. word deduction, convergent and divergent naming, similarities/differences) with 80% accuracy and min cues.  Rewording illogical sentences: 85% acc Ind increasing to 97% acc with min cues   Antonyms/synonyms: 100% acc Ind  Goal 2: To increase safety awareness and judgment for safe completion of ADLs secondary to patient's cognitive deficits,  patient will complete mid-high level problem solving tasks related to ADLs (e.g. medication management) with 80% accuracy with supervision cues.  Not addressed   Goal 3: To increase independence for functional activities for home and community, patient will complete mid level executive functioning tasks (i.e. path finding, scheduling appointments, prioritizing tasks) with 80% accuracy and min cues.  Scheduling and prioritizing time based tasks:  20% acc with setup assist; increasing to 65% acc with max cues   Goal 4: To address patient's cognitive deficits and promote his/her ability to safely follow directives in a variety of

## 2024-09-09 ENCOUNTER — HOSPITAL ENCOUNTER (OUTPATIENT)
Dept: CARDIOLOGY | Age: 69
Discharge: HOME | End: 2024-09-09
Payer: MEDICARE

## 2024-09-09 PROCEDURE — 93298 REM INTERROG DEV EVAL SCRMS: CPT

## 2024-09-10 ENCOUNTER — HOSPITAL ENCOUNTER (OUTPATIENT)
Dept: SPEECH THERAPY | Age: 69
Setting detail: THERAPIES SERIES
Discharge: HOME | End: 2024-09-10
Payer: MEDICARE

## 2024-09-10 PROCEDURE — 92507 TX SP LANG VOICE COMM INDIV: CPT

## 2024-09-17 ENCOUNTER — HOSPITAL ENCOUNTER (OUTPATIENT)
Dept: SPEECH THERAPY | Age: 69
Setting detail: THERAPIES SERIES
Discharge: HOME | End: 2024-09-17
Payer: MEDICARE

## 2024-09-17 PROCEDURE — 92507 TX SP LANG VOICE COMM INDIV: CPT

## 2024-09-24 ENCOUNTER — HOSPITAL ENCOUNTER (OUTPATIENT)
Dept: SPEECH THERAPY | Age: 69
Setting detail: THERAPIES SERIES
Discharge: HOME | End: 2024-09-24
Payer: MEDICARE

## 2024-09-24 PROCEDURE — 92507 TX SP LANG VOICE COMM INDIV: CPT

## 2024-10-01 ENCOUNTER — HOSPITAL ENCOUNTER (OUTPATIENT)
Dept: SPEECH THERAPY | Age: 69
Setting detail: THERAPIES SERIES
Discharge: HOME | End: 2024-10-01
Payer: MEDICARE

## 2024-10-01 PROCEDURE — 92507 TX SP LANG VOICE COMM INDIV: CPT

## 2024-10-01 NOTE — THERAPY DISCHARGE
[facility]            [address]            [place], [place] [address]             Phone: [phone]                        Fax:  [phone]            [facility]- Outpatient  Speech Language Pathology  Pediatric Discharge Note                          Physician:  Collin Chavez            From: Sandra Stewart, SLP, -SLP   Patient: Mulu Pandya      : 1955  MR#  [id number]     Date: 10/1/2024   Diagnosis: Aphasia [R47.01] Dysarthria [I69.322       Treatment Diagnosis: Aphasia [R47.01] Dysarthria [I69.322   Secondary Diagnoses: n/a  Date of Evaluation: 24      TREATMENT TO DATE: Speech Production Therapy, Expressive Language Therapy, Receptive Language Therapy, and Cognitive Skill Development      PROGRESS TOWARDS GOALS:   Short-term Goals  Goal 1: To increase independence for functional activities for home and community, patient will complete mid level executive functioning tasks (i.e. path finding, scheduling appointments, prioritizing tasks) with 80% accuracy and min cues.  Progress made   Goal 2: Pt will complete OM drills paired with speech sounds to improve speech accuracy and expression of their complex thoughts, needs, feelings, and ideas with 8 0% accuracy independently.  Progress made  Goal 3: To decrease the presence of apraxia of speech and improve articulatory accuracy to adequately express wants and needs, the pt will complete structured articulation tasks with 80% accuracy with 2-3 syllables words at the sentence level with use of breakdown repairs and strategies  Progress made  Goal 4: To decrease the presence of apraxia of speech and improve intelligibility in conversational speech, pt will complete tasks involving pitch changes, stress, and intonation following model with 80% accuracy in sentences.  Progress made    ACHIEVEMENT OF GOALS:  Made progress towards goals:      REASON FOR DISCHARGE: Speech therapy is

## 2024-10-01 NOTE — PROGRESS NOTES
[facility]- Outpatient  Speech Language Pathology  Adult Daily Note    Mulu Pandya  : 1955  [x]   confirmed      Date: 10/1/2024    Visit Information:  Visit Information  SLP Insurance Information: BCBS  Total # of Visits Approved: 6  Total # of Visits to Date: 6  Timeframe Approved From:: 24  Timeframe Approved To:: 10/19/24  No Show: 0  Canceled Appointment: 0      Plan of care signed (Y/N):     Faxed    Certification Period:  Therapy Comments: Certification period: 24-10/18/24    Plan of Care Visit  # 2      Interventions used this date:  Speech Production    Subjective:  Today was Mulu's last speech session. Pt reports feeling that she is \"done\" with therapy as she has been doing it for months. Pt feels that she is at her new baseline. SLP provided HEP and strategies for generalization of learning techniques. Pt verbalized understanding.     Behavior:  Alert, Cooperative, and Pleasant       Objective/Assessment:   Short-term Goals  Goal 1: To increase independence for functional activities for home and community, patient will complete mid level executive functioning tasks (i.e. path finding, scheduling appointments, prioritizing tasks) with 80% accuracy and min cues.  Not addressed   Goal 2: Pt will complete OM drills paired with speech sounds to improve speech accuracy and expression of their complex thoughts, needs, feelings, and ideas with 8 0% accuracy independently.  Alveolar- bilabial CVC: 90% acc   Bilabial- Alveolar CVC: 78% acc   Velar- Alveolar CVC: 100% acc   Velar- bilabial: 88% acc   Goal 3: To decrease the presence of apraxia of speech and improve articulatory accuracy to adequately express wants and needs, the pt will complete structured articulation tasks with 80% accuracy with 2-3 syllables words at the sentence level with use of breakdown repairs and strategies  3 syllable to sentence level: 79% acc ind   Goal 4: To decrease the presence of apraxia of speech and

## 2024-10-03 ENCOUNTER — OFFICE VISIT (OUTPATIENT)
Dept: CARDIOLOGY CLINIC | Age: 69
End: 2024-10-03
Payer: MEDICARE

## 2024-10-03 VITALS
BODY MASS INDEX: 28.52 KG/M2 | HEART RATE: 97 BPM | SYSTOLIC BLOOD PRESSURE: 120 MMHG | OXYGEN SATURATION: 98 % | DIASTOLIC BLOOD PRESSURE: 80 MMHG | WEIGHT: 161 LBS

## 2024-10-03 DIAGNOSIS — Z86.711 HISTORY OF PULMONARY EMBOLISM: ICD-10-CM

## 2024-10-03 DIAGNOSIS — Z95.828 S/P INSERTION OF INFERIOR VENA CAVAL FILTER: ICD-10-CM

## 2024-10-03 DIAGNOSIS — E78.2 MIXED HYPERLIPIDEMIA: Primary | ICD-10-CM

## 2024-10-03 DIAGNOSIS — Z87.891 HISTORY OF TOBACCO ABUSE: ICD-10-CM

## 2024-10-03 DIAGNOSIS — Z86.73 HISTORY OF CVA (CEREBROVASCULAR ACCIDENT): ICD-10-CM

## 2024-10-03 DIAGNOSIS — I10 ESSENTIAL HYPERTENSION: ICD-10-CM

## 2024-10-03 PROBLEM — R32 URINARY INCONTINENCE: Status: RESOLVED | Noted: 2024-01-24 | Resolved: 2024-10-03

## 2024-10-03 PROBLEM — R00.0 TACHYCARDIA, UNSPECIFIED: Status: RESOLVED | Noted: 2024-05-06 | Resolved: 2024-10-03

## 2024-10-03 PROBLEM — R29.898 RIGHT HAND WEAKNESS: Status: RESOLVED | Noted: 2024-04-03 | Resolved: 2024-10-03

## 2024-10-03 PROBLEM — R09.02 HYPOXIA: Status: RESOLVED | Noted: 2024-01-31 | Resolved: 2024-10-03

## 2024-10-03 PROBLEM — I26.99 PULMONARY EMBOLISM (HCC): Status: RESOLVED | Noted: 2024-02-01 | Resolved: 2024-10-03

## 2024-10-03 PROBLEM — R00.0 TACHYCARDIA: Status: RESOLVED | Noted: 2024-01-31 | Resolved: 2024-10-03

## 2024-10-03 PROCEDURE — 99214 OFFICE O/P EST MOD 30 MIN: CPT | Performed by: INTERNAL MEDICINE

## 2024-10-03 PROCEDURE — 3079F DIAST BP 80-89 MM HG: CPT | Performed by: INTERNAL MEDICINE

## 2024-10-03 PROCEDURE — 1123F ACP DISCUSS/DSCN MKR DOCD: CPT | Performed by: INTERNAL MEDICINE

## 2024-10-03 PROCEDURE — 3074F SYST BP LT 130 MM HG: CPT | Performed by: INTERNAL MEDICINE

## 2024-10-03 ASSESSMENT — ENCOUNTER SYMPTOMS
VOMITING: 0
GASTROINTESTINAL NEGATIVE: 1
EYES NEGATIVE: 1
SHORTNESS OF BREATH: 0
NAUSEA: 0
WHEEZING: 0
ABDOMINAL PAIN: 0
ALLERGIC/IMMUNOLOGIC NEGATIVE: 1

## 2024-10-03 NOTE — PROGRESS NOTES
Chief Complaint   Patient presents with    Hypertension    Hyperlipidemia    6 Month Follow-Up           January 21, 2024: Patient is a 68 y.o. female who presents with a chief complaint of MS change/CVA type symptoms. . Patient is followed on a regular basis by Eileen Capps APRN - NP.  Patient with past medical history of diabetes, hypertension, hyperlipidemia, tobacco abuse who presents with CVA type symptoms.  Neurology requesting transesophageal echocardiogram to rule out cardiac source of emboli.  No prior cardiac history.  No prior history of atrial fibrillation  Normal sinus rhythm on telemetry.  Carotid ultrasound is negative for any significant      Patient presents for initial medical evaluation. Patient is followed on a regular basis by Collin Michael PA.     3/22/2024: Status post hospitalization for CVA With hemorrhagic transformation.  Status post negative transesophageal echocardiogram.  Ejection fraction of 55%  Patient also status post IVC filter placement due to pulmonary embolism with contraindication to oral anticoagulation.  Status post CT of the chest that noted to have right lower lobe pulmonary artery embolism.  Patient also noted to have pancreatic mass/cyst and undergoing workup    6-7-24: Patient status post implantable loop recorder checks with reported heart rates as high as into the 200s.  Appears to be noise confirmed with pacemaker rep.  Patient does not sense any rapid heartbeat or palpitations  Patient with history of CVA with hemorrhagic transformation.  Status post negative transesophageal echocardiogram.  Ejection fraction of 55%  Patient also status post IVC filter placement due to pulmonary embolism with contraindication to oral anticoagulation.  Status post CT of the chest that noted to have right lower lobe pulmonary artery embolism.  Patient also noted to have pancreatic mass/cyst and undergoing workup      10-3-24: Status post recent implantable loop recorder

## 2024-10-07 NOTE — PROGRESS NOTES
Medicare Annual Wellness Visit    Mulu Pandya is here for Follow-up (Pt here to follow up) and Medicare AWV (Pt here to complete AWV)    Assessment & Plan   Acquired hydronephrosis due to obstruction of ureter  - follow up with Dr. Muñoz.     Type 2 diabetes mellitus with stage 3a chronic kidney disease, without long-term current use of insulin (HCC)  -     glimepiride (AMARYL) 4 MG tablet; Take 1 tablet by mouth every morning, Disp-30 tablet, R-5Normal  -     Continuous Glucose Sensor (FREESTYLE SHANTELLE 3 SENSOR) MISC; 1 each by Does not apply route every 14 days, Disp-2 each, R-5Normal  -     Continuous Glucose  (FREESTYLE SHANTELLE 3 READER) LANDRY; 1 each by Does not apply route daily, Disp-1 each, R-0Normal  -     Hemoglobin A1C; Future  -     Basic Metabolic Panel; Future  -     Microalbumin / Creatinine Urine Ratio; Future  Pancreatic cyst  - we will try and coordinate to have MRI completed here at [facility]. Patient did not want to go to [place].   Recommendations for Preventive Services Due: see orders and patient instructions/AVS.  Recommended screening schedule for the next 5-10 years is provided to the patient in written form: see Patient Instructions/AVS.     Return in about 4 months (around 2/9/2025).     Subjective   The following acute and/or chronic problems were also addressed today:  DM, Type 2  - d/c metformin d/t SE. Started on Amaryl 4 mg.   - A1c 7.0 on 07/08/24. Will need repeat testing  - no on ACEI/ARB. On rosurvastatin 40 mg.     Hydronephrosis  - has not followed up with Dr. Paz  - needs to follow up.     CVA  - patient did not follow up with Dr. Chavez on 08/15/24. She left without being seen.   - she will call top schedule  - completed PT/OT/Speech. Doing better. She is happy with her progress.     Pancreatic Cyst.   - patient was supposed to have MRI completed. Doesn't want to go to [facility].   - we will try to have MRI completed here for Dr. Lucio  - she will need to

## 2024-10-08 ENCOUNTER — APPOINTMENT (OUTPATIENT)
Dept: SPEECH THERAPY | Age: 69
End: 2024-10-08
Payer: MEDICARE

## 2024-10-09 ENCOUNTER — OFFICE VISIT (OUTPATIENT)
Dept: FAMILY MEDICINE CLINIC | Age: 69
End: 2024-10-09

## 2024-10-09 VITALS
TEMPERATURE: 97.8 F | OXYGEN SATURATION: 96 % | HEART RATE: 92 BPM | SYSTOLIC BLOOD PRESSURE: 126 MMHG | DIASTOLIC BLOOD PRESSURE: 60 MMHG

## 2024-10-09 DIAGNOSIS — N18.31 TYPE 2 DIABETES MELLITUS WITH STAGE 3A CHRONIC KIDNEY DISEASE, WITHOUT LONG-TERM CURRENT USE OF INSULIN (HCC): ICD-10-CM

## 2024-10-09 DIAGNOSIS — N13.1 ACQUIRED HYDRONEPHROSIS DUE TO OBSTRUCTION OF URETER: ICD-10-CM

## 2024-10-09 DIAGNOSIS — E11.22 TYPE 2 DIABETES MELLITUS WITH STAGE 3A CHRONIC KIDNEY DISEASE, WITHOUT LONG-TERM CURRENT USE OF INSULIN (HCC): ICD-10-CM

## 2024-10-09 DIAGNOSIS — Z00.00 MEDICARE ANNUAL WELLNESS VISIT, SUBSEQUENT: Primary | ICD-10-CM

## 2024-10-09 DIAGNOSIS — K86.2 PANCREATIC CYST: ICD-10-CM

## 2024-10-09 RX ORDER — KETOROLAC TROMETHAMINE 30 MG/ML
1 INJECTION, SOLUTION INTRAMUSCULAR; INTRAVENOUS DAILY
Qty: 1 EACH | Refills: 0 | Status: SHIPPED | OUTPATIENT
Start: 2024-10-09

## 2024-10-09 RX ORDER — GLIMEPIRIDE 4 MG/1
4 TABLET ORAL EVERY MORNING
Qty: 30 TABLET | Refills: 5 | Status: SHIPPED | OUTPATIENT
Start: 2024-10-09

## 2024-10-09 RX ORDER — BLOOD-GLUCOSE SENSOR
1 EACH MISCELLANEOUS
Qty: 2 EACH | Refills: 5 | Status: SHIPPED | OUTPATIENT
Start: 2024-10-09

## 2024-10-09 ASSESSMENT — PATIENT HEALTH QUESTIONNAIRE - PHQ9
SUM OF ALL RESPONSES TO PHQ9 QUESTIONS 1 & 2: 0
2. FEELING DOWN, DEPRESSED OR HOPELESS: NOT AT ALL
8. MOVING OR SPEAKING SO SLOWLY THAT OTHER PEOPLE COULD HAVE NOTICED. OR THE OPPOSITE, BEING SO FIGETY OR RESTLESS THAT YOU HAVE BEEN MOVING AROUND A LOT MORE THAN USUAL: NOT AT ALL
5. POOR APPETITE OR OVEREATING: NOT AT ALL
7. TROUBLE CONCENTRATING ON THINGS, SUCH AS READING THE NEWSPAPER OR WATCHING TELEVISION: NOT AT ALL
1. LITTLE INTEREST OR PLEASURE IN DOING THINGS: NOT AT ALL
SUM OF ALL RESPONSES TO PHQ QUESTIONS 1-9: 0
3. TROUBLE FALLING OR STAYING ASLEEP: NOT AT ALL
SUM OF ALL RESPONSES TO PHQ QUESTIONS 1-9: 0
6. FEELING BAD ABOUT YOURSELF - OR THAT YOU ARE A FAILURE OR HAVE LET YOURSELF OR YOUR FAMILY DOWN: NOT AT ALL
4. FEELING TIRED OR HAVING LITTLE ENERGY: NOT AT ALL
SUM OF ALL RESPONSES TO PHQ QUESTIONS 1-9: 0
9. THOUGHTS THAT YOU WOULD BE BETTER OFF DEAD, OR OF HURTING YOURSELF: NOT AT ALL
10. IF YOU CHECKED OFF ANY PROBLEMS, HOW DIFFICULT HAVE THESE PROBLEMS MADE IT FOR YOU TO DO YOUR WORK, TAKE CARE OF THINGS AT HOME, OR GET ALONG WITH OTHER PEOPLE: NOT DIFFICULT AT ALL
SUM OF ALL RESPONSES TO PHQ QUESTIONS 1-9: 0

## 2024-10-09 ASSESSMENT — LIFESTYLE VARIABLES
HOW OFTEN DO YOU HAVE A DRINK CONTAINING ALCOHOL: NEVER
HOW MANY STANDARD DRINKS CONTAINING ALCOHOL DO YOU HAVE ON A TYPICAL DAY: PATIENT DOES NOT DRINK

## 2024-10-09 NOTE — PATIENT INSTRUCTIONS
goal is to find out if you are having any problems that could make it hard to care for yourself or that make it unsafe for you to be on your own.  To measure your ADLs, your doctor will ask how hard it is for you to do routine tasks. Your doctor may also want to know if you have changed the way you do a task because of a health problem. Your doctor may watch how you:  Walk back and forth.  Keep your balance while you stand or walk.  Move from sitting to standing or from a bed to a chair.  Button or unbutton a shirt or sweater.  Remove and put on your shoes.  It's common to feel a little worried or anxious if you find you can't do all the things you used to be able to do. Talking with your doctor about ADLs is a way to make sure you're as safe as possible and able to care for yourself as well as you can. You may want to bring a caregiver, friend, or family member to your checkup. They can help you talk to your doctor.  Follow-up care is a key part of your treatment and safety. Be sure to make and go to all appointments, and call your doctor if you are having problems. It's also a good idea to know your test results and keep a list of the medicines you take.  Current as of: October 24, 2023  Content Version: 14.2  © 2024 [employer].   Care instructions adapted under license by [employer]. If you have questions about a medical condition or this instruction, always ask your healthcare professional. [employer] disclaims any warranty or liability for your use of this information.           Advance Directives: Care Instructions  Overview  An advance directive is a legal way to state your wishes at the end of your life. It tells your family and your doctor what to do if you can't say what you want.  There are two main types of advance directives. You can change them any time your wishes change.  Living will.  This form tells your family and your doctor your wishes about life support and other

## 2024-10-14 ENCOUNTER — HOSPITAL ENCOUNTER (OUTPATIENT)
Dept: CARDIOLOGY | Age: 69
Discharge: HOME | End: 2024-10-14
Payer: MEDICARE

## 2024-10-14 PROCEDURE — 93298 REM INTERROG DEV EVAL SCRMS: CPT

## 2024-10-14 RX ORDER — CHOLECALCIFEROL (VITAMIN D3) 50 MCG
CAPSULE ORAL
Qty: 90 CAPSULE | Refills: 1 | Status: SHIPPED | OUTPATIENT
Start: 2024-10-14

## 2024-10-14 RX ORDER — METFORMIN HCL 500 MG
TABLET, EXTENDED RELEASE 24 HR ORAL
Qty: 90 TABLET | Refills: 3 | Status: SHIPPED | OUTPATIENT
Start: 2024-10-14

## 2024-10-14 RX ORDER — ROSUVASTATIN CALCIUM 40 MG/1
40 TABLET, COATED ORAL NIGHTLY
Qty: 90 TABLET | Refills: 1 | Status: SHIPPED | OUTPATIENT
Start: 2024-10-14

## 2024-10-14 NOTE — TELEPHONE ENCOUNTER
Comments:     Last Office Visit (last PCP visit):   10/9/2024    Next Visit Date:  Future Appointments   Date Time Provider Department Center   10/14/2024  9:30 AM [facility] PC CLIN [facility] Center   10/29/2024 10:30 AM [facility] MRI ROOM 1 [facility] MRI [facility] Fac RAD   2/7/2025  1:15 PM Collin Chavez PA [facility] DEP   10/2/2025  1:00 PM Holiday, DO [facility] Melendrez       **If hasn't been seen in over a year OR hasn't followed up according to last diabetes/ADHD visit, make appointment for patient before sending refill to provider.    Rx requested:  Requested Prescriptions     Pending Prescriptions Disp Refills    D3 SUPER STRENGTH 50 MCG (2000 UT) CAPS capsule [Pharmacy Med Name: VITAMIN D3 2,000 UNIT SOFTGEL] 90 capsule 1     Sig: TAKE 1 TABLET BY MOUTH DAILY WITH SUPPER    rosuvastatin (CRESTOR) 40 MG tablet [Pharmacy Med Name: ROSUVASTATIN CALCIUM 40 MG TAB] 90 tablet 1     Sig: TAKE 1 TABLET BY MOUTH EVERY DAY AT NIGHT    metFORMIN (GLUCOPHAGE-XR) 500 MG extended release tablet [Pharmacy Med Name: METFORMIN HCL  MG TABLET] 90 tablet 3     Sig: TAKE ONE TABLET BY MOUTH A DAY

## 2024-10-15 ENCOUNTER — APPOINTMENT (OUTPATIENT)
Dept: SPEECH THERAPY | Age: 69
End: 2024-10-15
Payer: MEDICARE

## 2024-10-29 ENCOUNTER — HOSPITAL ENCOUNTER (OUTPATIENT)
Dept: MRI IMAGING | Age: 69
Discharge: HOME | End: 2024-10-31
Payer: MEDICARE

## 2024-10-29 DIAGNOSIS — K86.2 PANCREATIC CYST: ICD-10-CM

## 2024-10-29 PROCEDURE — A9577 INJ MULTIHANCE: HCPCS | Performed by: PHYSICIAN ASSISTANT

## 2024-10-29 PROCEDURE — 74183 MRI ABD W/O CNTR FLWD CNTR: CPT

## 2024-10-29 PROCEDURE — 6360000004 HC RX CONTRAST MEDICATION: Performed by: PHYSICIAN ASSISTANT

## 2024-10-29 RX ADMIN — GADOBENATE DIMEGLUMINE 20 ML: 529 INJECTION, SOLUTION INTRAVENOUS at 11:16

## 2024-11-18 ENCOUNTER — HOSPITAL ENCOUNTER (OUTPATIENT)
Dept: CARDIOLOGY | Age: 69
Discharge: HOME | End: 2024-11-18
Payer: MEDICARE

## 2024-11-18 PROCEDURE — 93298 REM INTERROG DEV EVAL SCRMS: CPT

## 2024-12-01 PROCEDURE — 93298 REM INTERROG DEV EVAL SCRMS: CPT | Performed by: INTERNAL MEDICINE

## 2024-12-23 ENCOUNTER — HOSPITAL ENCOUNTER (OUTPATIENT)
Dept: CARDIOLOGY | Age: 69
Discharge: HOME | End: 2024-12-23
Payer: MEDICARE

## 2024-12-23 PROCEDURE — 93298 REM INTERROG DEV EVAL SCRMS: CPT

## 2024-12-31 ENCOUNTER — TELEPHONE (OUTPATIENT)
Dept: CARDIOLOGY CLINIC | Age: 69
End: 2024-12-31

## 2024-12-31 NOTE — TELEPHONE ENCOUNTER
Per DR Gill patient needs appointment with [facility] today before 1130 if possible or  this week. Left message on machine to call office

## 2025-01-07 DIAGNOSIS — J44.9 CHRONIC OBSTRUCTIVE PULMONARY DISEASE, UNSPECIFIED COPD TYPE (HCC): ICD-10-CM

## 2025-01-07 RX ORDER — FLUTICASONE PROPIONATE AND SALMETEROL 100; 50 UG/1; UG/1
1 POWDER RESPIRATORY (INHALATION) EVERY 12 HOURS
Qty: 60 EACH | Refills: 5 | Status: SHIPPED | OUTPATIENT
Start: 2025-01-07

## 2025-01-07 NOTE — TELEPHONE ENCOUNTER
Comments: 10/09/24 LOV    Last Office Visit (last PCP visit):   Visit date not found    Next Visit Date:  Future Appointments   Date Time Provider Department Center   2/7/2025  1:15 PM Collin Chavez PA [facility]   10/2/2025  1:00 PM Holiday, DO Philomena Melendrez       **If hasn't been seen in over a year OR hasn't followed up according to last diabetes/ADHD visit, make appointment for patient before sending refill to provider.    Rx requested:  Requested Prescriptions     Pending Prescriptions Disp Refills    fluticasone-salmeterol (WIXELA INHUB) 100-50 MCG/ACT AEPB diskus inhaler 60 each 5     Sig: Inhale 1 puff into the lungs in the morning and 1 puff in the evening.

## 2025-01-08 PROCEDURE — 93298 REM INTERROG DEV EVAL SCRMS: CPT | Performed by: INTERNAL MEDICINE

## 2025-01-23 ENCOUNTER — OFFICE VISIT (OUTPATIENT)
Age: 70
End: 2025-01-23
Payer: MEDICARE

## 2025-01-23 ENCOUNTER — HOSPITAL ENCOUNTER (OUTPATIENT)
Dept: CARDIOLOGY | Age: 70
Discharge: HOME | End: 2025-01-23
Payer: MEDICARE

## 2025-01-23 VITALS
HEIGHT: 63 IN | OXYGEN SATURATION: 96 % | HEART RATE: 66 BPM | BODY MASS INDEX: 28.53 KG/M2 | WEIGHT: 161 LBS | SYSTOLIC BLOOD PRESSURE: 126 MMHG | DIASTOLIC BLOOD PRESSURE: 60 MMHG

## 2025-01-23 DIAGNOSIS — Z98.890 H/O LUMBOSACRAL SPINE SURGERY: ICD-10-CM

## 2025-01-23 DIAGNOSIS — M54.50 CHRONIC MIDLINE LOW BACK PAIN, UNSPECIFIED WHETHER SCIATICA PRESENT: Primary | ICD-10-CM

## 2025-01-23 DIAGNOSIS — G89.29 CHRONIC MIDLINE LOW BACK PAIN, UNSPECIFIED WHETHER SCIATICA PRESENT: Primary | ICD-10-CM

## 2025-01-23 PROCEDURE — 3078F DIAST BP <80 MM HG: CPT | Performed by: PHYSICIAN ASSISTANT

## 2025-01-23 PROCEDURE — 1160F RVW MEDS BY RX/DR IN RCRD: CPT | Performed by: PHYSICIAN ASSISTANT

## 2025-01-23 PROCEDURE — 93298 REM INTERROG DEV EVAL SCRMS: CPT

## 2025-01-23 PROCEDURE — 99214 OFFICE O/P EST MOD 30 MIN: CPT | Performed by: PHYSICIAN ASSISTANT

## 2025-01-23 PROCEDURE — 3074F SYST BP LT 130 MM HG: CPT | Performed by: PHYSICIAN ASSISTANT

## 2025-01-23 PROCEDURE — 1123F ACP DISCUSS/DSCN MKR DOCD: CPT | Performed by: PHYSICIAN ASSISTANT

## 2025-01-23 PROCEDURE — 1159F MED LIST DOCD IN RCRD: CPT | Performed by: PHYSICIAN ASSISTANT

## 2025-01-23 RX ORDER — BACLOFEN 5 MG/1
10 TABLET ORAL 3 TIMES DAILY PRN
Qty: 90 TABLET | Refills: 0 | Status: SHIPPED | OUTPATIENT
Start: 2025-01-23

## 2025-01-23 RX ORDER — TRAMADOL HYDROCHLORIDE 50 MG/1
50 TABLET ORAL EVERY 8 HOURS PRN
Qty: 42 TABLET | Refills: 0 | Status: SHIPPED | OUTPATIENT
Start: 2025-01-23 | End: 2025-02-06

## 2025-01-23 SDOH — ECONOMIC STABILITY: FOOD INSECURITY: WITHIN THE PAST 12 MONTHS, THE FOOD YOU BOUGHT JUST DIDN'T LAST AND YOU DIDN'T HAVE MONEY TO GET MORE.: NEVER TRUE

## 2025-01-23 SDOH — ECONOMIC STABILITY: FOOD INSECURITY: WITHIN THE PAST 12 MONTHS, YOU WORRIED THAT YOUR FOOD WOULD RUN OUT BEFORE YOU GOT MONEY TO BUY MORE.: NEVER TRUE

## 2025-01-23 ASSESSMENT — ENCOUNTER SYMPTOMS
BACK PAIN: 1
COUGH: 0
SINUS PAIN: 0
SORE THROAT: 0
TROUBLE SWALLOWING: 0
SHORTNESS OF BREATH: 0
DIARRHEA: 0
NAUSEA: 0
VOMITING: 0
CHEST TIGHTNESS: 0
ABDOMINAL PAIN: 0
SINUS PRESSURE: 0

## 2025-01-23 ASSESSMENT — PATIENT HEALTH QUESTIONNAIRE - PHQ9
10. IF YOU CHECKED OFF ANY PROBLEMS, HOW DIFFICULT HAVE THESE PROBLEMS MADE IT FOR YOU TO DO YOUR WORK, TAKE CARE OF THINGS AT HOME, OR GET ALONG WITH OTHER PEOPLE: NOT DIFFICULT AT ALL
6. FEELING BAD ABOUT YOURSELF - OR THAT YOU ARE A FAILURE OR HAVE LET YOURSELF OR YOUR FAMILY DOWN: NOT AT ALL
7. TROUBLE CONCENTRATING ON THINGS, SUCH AS READING THE NEWSPAPER OR WATCHING TELEVISION: NOT AT ALL
3. TROUBLE FALLING OR STAYING ASLEEP: NOT AT ALL
SUM OF ALL RESPONSES TO PHQ9 QUESTIONS 1 & 2: 2
SUM OF ALL RESPONSES TO PHQ QUESTIONS 1-9: 4
9. THOUGHTS THAT YOU WOULD BE BETTER OFF DEAD, OR OF HURTING YOURSELF: NOT AT ALL
SUM OF ALL RESPONSES TO PHQ QUESTIONS 1-9: 4
SUM OF ALL RESPONSES TO PHQ QUESTIONS 1-9: 4
4. FEELING TIRED OR HAVING LITTLE ENERGY: MORE THAN HALF THE DAYS
SUM OF ALL RESPONSES TO PHQ QUESTIONS 1-9: 4
2. FEELING DOWN, DEPRESSED OR HOPELESS: SEVERAL DAYS
1. LITTLE INTEREST OR PLEASURE IN DOING THINGS: SEVERAL DAYS
5. POOR APPETITE OR OVEREATING: NOT AT ALL
8. MOVING OR SPEAKING SO SLOWLY THAT OTHER PEOPLE COULD HAVE NOTICED. OR THE OPPOSITE, BEING SO FIGETY OR RESTLESS THAT YOU HAVE BEEN MOVING AROUND A LOT MORE THAN USUAL: NOT AT ALL

## 2025-01-23 ASSESSMENT — VISUAL ACUITY: OU: 1
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
## 2025-01-24 ENCOUNTER — TELEPHONE (OUTPATIENT)
Age: 70
End: 2025-01-24

## 2025-01-24 NOTE — TELEPHONE ENCOUNTER
Patient states [facility] advised they need clarification on the Baclofen rx that they received.    [phone]

## 2025-01-27 ENCOUNTER — HOSPITAL ENCOUNTER (OUTPATIENT)
Dept: GENERAL RADIOLOGY | Age: 70
Discharge: HOME | End: 2025-01-29
Payer: MEDICARE

## 2025-01-27 DIAGNOSIS — G89.29 CHRONIC MIDLINE LOW BACK PAIN, UNSPECIFIED WHETHER SCIATICA PRESENT: ICD-10-CM

## 2025-01-27 DIAGNOSIS — M54.50 CHRONIC MIDLINE LOW BACK PAIN, UNSPECIFIED WHETHER SCIATICA PRESENT: ICD-10-CM

## 2025-01-27 PROCEDURE — 72110 X-RAY EXAM L-2 SPINE 4/>VWS: CPT

## 2025-01-29 ENCOUNTER — PATIENT MESSAGE (OUTPATIENT)
Age: 70
End: 2025-01-29

## 2025-01-29 DIAGNOSIS — M54.50 CHRONIC MIDLINE LOW BACK PAIN, UNSPECIFIED WHETHER SCIATICA PRESENT: ICD-10-CM

## 2025-01-29 DIAGNOSIS — F33.42 RECURRENT MAJOR DEPRESSIVE DISORDER, IN FULL REMISSION (HCC): ICD-10-CM

## 2025-01-29 DIAGNOSIS — G89.29 CHRONIC MIDLINE LOW BACK PAIN, UNSPECIFIED WHETHER SCIATICA PRESENT: ICD-10-CM

## 2025-01-29 DIAGNOSIS — F41.9 ANXIETY: Primary | ICD-10-CM

## 2025-01-29 RX ORDER — ESCITALOPRAM OXALATE 10 MG/1
15 TABLET ORAL DAILY
Qty: 135 TABLET | Refills: 0 | Status: SHIPPED | OUTPATIENT
Start: 2025-01-29 | End: 2025-04-29

## 2025-01-29 RX ORDER — BACLOFEN 5 MG/1
5 TABLET ORAL 3 TIMES DAILY PRN
Qty: 90 TABLET | Refills: 0 | Status: SHIPPED | OUTPATIENT
Start: 2025-01-29 | End: 2025-02-28

## 2025-01-29 NOTE — TELEPHONE ENCOUNTER
I LM for Pt  Shaq to call and clarify  Bho needs clarification on this med from Pts  that the Pt wants to take this med. Last week at the visit Pt stated she did not want the med.  o sent the med to pharmacy but can cancel if needed

## 2025-02-06 NOTE — PATIENT INSTRUCTIONS
Call to go over MRI  Diana Lucio MD   [address]   [place], [place] [id number] [phone] (Work)      Call Cedrick Muñoz MD   [address] [place] [address] [phone] (Work)   Learning About Lung Cancer Screening  What is screening for lung cancer?     Lung cancer screening is a way to find some lung cancers early, before a person has any symptoms of the cancer.  Lung cancer screening may help those who have the highest risk for lung cancer--people age 50 and older who are or were heavy smokers. For most people, who aren't at increased risk, screening for lung cancer probably isn't helpful.  Screening won't prevent cancer. And it may not find all lung cancers. Lung cancer screening may lower the risk of dying from lung cancer in a small number of people.  How is it done?  Lung cancer screening is done with a low-dose CT (computed tomography) scan. A CT scan uses X-rays, or radiation, to make detailed pictures of your body. Experts recommend that screening be done in medical centers that focus on finding and treating lung cancer.  Who is screening recommended for?  Lung cancer screening is recommended for people age 50 and older who are or were heavy smokers. That means people with a smoking history of at least 20 pack years. A pack year is a way to measure how heavy a smoker you are or were.  To figure out your pack years, multiply how many packs a day on average (assuming 20 cigarettes per pack) you have smoked by how many years you have smoked. For example:  If you smoked 1 pack a day for 20 years, that's 1 times 20. So you have a smoking history of 20 pack years.  If you smoked 2 packs a day for 10 years, that's 2 times 10. So you have a smoking history of 20 pack years.  Experts agree that screening is for people who have a high risk of lung cancer. But experts don't agree on what high risk means. Some say people age 50 or older with at least a 20-pack-year smoking history

## 2025-02-06 NOTE — PROGRESS NOTES
Subjective  Mulu Pandya 1955 is a 69 y.o. female who presents today with:  Chief Complaint   Patient presents with    Follow-up     Pt here for follow up  Reports no concerns       HPI  DM, Type 2  -  Started on Amaryl 4 mg and extended release metformin 500 mg daily.  - A1c 7.0 on 07/08/24. POCT A1c Today 8.8%  - no on ACEI/ARB. On rosurvastatin 40 mg  - today increased amaryl 4 mg BID. Metformin  mg BID    Pancreatic Cyst  - patient was supposed to have MRI completed. Doesn't want to go to [facility].   - we will try to have MRI completed here for Dr. Lucio  - she will need to follow up with Dr. Segovia and Dr. Lucio  - She had MRI completed: pseudocyst of distal pancreatic body has shrunk from 7 cm to 2.5 cm. There is simple cyst of the left adenxa. Endometrial thickening 9 mm. No vaginal bleeding.       CVA  - patient did not follow up with Dr. Chavez on 08/15/24. She left without being seen.   - she will call top schedule  - completed PT/OT/Speech. Doing better.      COPD  - symbicort, prn albuterol  - .Patient is here for f/u HTN. Is compliant with meds and has no side effects from them. Avoids added salt. Tries to eat healthy. Exercises occasionally. Has no chest pain, shortness of breath, palpitations or edema.  - need CT Lung scan completed. One year of no smoking    HTN  - metoprolol tartrate 12.5 mg BID,   - Patient is here for f/u HTN. Is compliant with meds and has no side effects from them. Avoids added salt. Tries to eat healthy. Exercises occasionally. Has no chest pain, shortness of breath, palpitations or edema.    Hydronephrosis/CKD  - saw Dr. Muñoz. Patient will have uro mri w/ UC and urine cytology.  Review of Systems   Constitutional:  Negative for activity change, appetite change, chills and fever.   HENT:  Negative for congestion, drooling, sinus pressure, sinus pain, sore throat and trouble swallowing.    Eyes:  Negative for visual disturbance.   Respiratory:   2652

## 2025-02-07 ENCOUNTER — OFFICE VISIT (OUTPATIENT)
Age: 70
End: 2025-02-07
Payer: MEDICARE

## 2025-02-07 VITALS
OXYGEN SATURATION: 94 % | HEIGHT: 63 IN | SYSTOLIC BLOOD PRESSURE: 126 MMHG | BODY MASS INDEX: 28.52 KG/M2 | DIASTOLIC BLOOD PRESSURE: 60 MMHG | HEART RATE: 70 BPM

## 2025-02-07 DIAGNOSIS — N18.31 TYPE 2 DIABETES MELLITUS WITH STAGE 3A CHRONIC KIDNEY DISEASE, WITHOUT LONG-TERM CURRENT USE OF INSULIN (HCC): Primary | ICD-10-CM

## 2025-02-07 DIAGNOSIS — Z87.891 PERSONAL HISTORY OF TOBACCO USE: ICD-10-CM

## 2025-02-07 DIAGNOSIS — E11.22 TYPE 2 DIABETES MELLITUS WITH STAGE 3A CHRONIC KIDNEY DISEASE, WITHOUT LONG-TERM CURRENT USE OF INSULIN (HCC): Primary | ICD-10-CM

## 2025-02-07 DIAGNOSIS — I10 ESSENTIAL HYPERTENSION: ICD-10-CM

## 2025-02-07 DIAGNOSIS — N18.31 CHRONIC RENAL FAILURE, STAGE 3A (HCC): ICD-10-CM

## 2025-02-07 DIAGNOSIS — K86.2 PANCREATIC CYST: ICD-10-CM

## 2025-02-07 DIAGNOSIS — I69.90 LATE EFFECTS OF CVA (CEREBROVASCULAR ACCIDENT): ICD-10-CM

## 2025-02-07 DIAGNOSIS — J44.9 CHRONIC OBSTRUCTIVE PULMONARY DISEASE, UNSPECIFIED COPD TYPE (HCC): ICD-10-CM

## 2025-02-07 DIAGNOSIS — F33.42 RECURRENT MAJOR DEPRESSIVE DISORDER, IN FULL REMISSION (HCC): ICD-10-CM

## 2025-02-07 LAB — HBA1C MFR BLD: 8.8 %

## 2025-02-07 PROCEDURE — 3074F SYST BP LT 130 MM HG: CPT | Performed by: PHYSICIAN ASSISTANT

## 2025-02-07 PROCEDURE — 83036 HEMOGLOBIN GLYCOSYLATED A1C: CPT | Performed by: PHYSICIAN ASSISTANT

## 2025-02-07 PROCEDURE — 1123F ACP DISCUSS/DSCN MKR DOCD: CPT | Performed by: PHYSICIAN ASSISTANT

## 2025-02-07 PROCEDURE — 1159F MED LIST DOCD IN RCRD: CPT | Performed by: PHYSICIAN ASSISTANT

## 2025-02-07 PROCEDURE — 1160F RVW MEDS BY RX/DR IN RCRD: CPT | Performed by: PHYSICIAN ASSISTANT

## 2025-02-07 PROCEDURE — G0296 VISIT TO DETERM LDCT ELIG: HCPCS | Performed by: PHYSICIAN ASSISTANT

## 2025-02-07 PROCEDURE — 1126F AMNT PAIN NOTED NONE PRSNT: CPT | Performed by: PHYSICIAN ASSISTANT

## 2025-02-07 PROCEDURE — 3052F HG A1C>EQUAL 8.0%<EQUAL 9.0%: CPT | Performed by: PHYSICIAN ASSISTANT

## 2025-02-07 PROCEDURE — 3078F DIAST BP <80 MM HG: CPT | Performed by: PHYSICIAN ASSISTANT

## 2025-02-07 PROCEDURE — 99214 OFFICE O/P EST MOD 30 MIN: CPT | Performed by: PHYSICIAN ASSISTANT

## 2025-02-07 RX ORDER — METFORMIN HYDROCHLORIDE 500 MG/1
1000 TABLET, EXTENDED RELEASE ORAL
Qty: 90 TABLET | Refills: 3 | Status: SHIPPED | OUTPATIENT
Start: 2025-02-07 | End: 2025-08-06

## 2025-02-07 RX ORDER — GLIMEPIRIDE 4 MG/1
4 TABLET ORAL 2 TIMES DAILY
Qty: 180 TABLET | Refills: 1 | Status: SHIPPED | OUTPATIENT
Start: 2025-02-07 | End: 2025-08-06

## 2025-02-07 ASSESSMENT — ENCOUNTER SYMPTOMS
DIARRHEA: 0
SINUS PAIN: 0
VOMITING: 0
BACK PAIN: 0
CHEST TIGHTNESS: 0
SORE THROAT: 0
NAUSEA: 0
TROUBLE SWALLOWING: 0
SINUS PRESSURE: 0
SHORTNESS OF BREATH: 0
COUGH: 0
ABDOMINAL PAIN: 0

## 2025-02-07 ASSESSMENT — VISUAL ACUITY: OU: 1

## 2025-02-08 ENCOUNTER — PATIENT MESSAGE (OUTPATIENT)
Age: 70
End: 2025-02-08

## 2025-02-08 DIAGNOSIS — N18.31 TYPE 2 DIABETES MELLITUS WITH STAGE 3A CHRONIC KIDNEY DISEASE, WITHOUT LONG-TERM CURRENT USE OF INSULIN (HCC): Primary | ICD-10-CM

## 2025-02-08 DIAGNOSIS — N18.31 CHRONIC RENAL FAILURE, STAGE 3A (HCC): ICD-10-CM

## 2025-02-08 DIAGNOSIS — E11.22 TYPE 2 DIABETES MELLITUS WITH STAGE 3A CHRONIC KIDNEY DISEASE, WITHOUT LONG-TERM CURRENT USE OF INSULIN (HCC): Primary | ICD-10-CM

## 2025-02-11 ENCOUNTER — OFFICE VISIT (OUTPATIENT)
Dept: ORTHOPEDIC SURGERY | Age: 70
End: 2025-02-11
Payer: MEDICARE

## 2025-02-11 VITALS
OXYGEN SATURATION: 92 % | HEART RATE: 65 BPM | WEIGHT: 161 LBS | BODY MASS INDEX: 28.53 KG/M2 | TEMPERATURE: 96.4 F | HEIGHT: 63 IN

## 2025-02-11 DIAGNOSIS — M54.2 NECK PAIN: Primary | ICD-10-CM

## 2025-02-11 DIAGNOSIS — M54.50 CHRONIC MIDLINE LOW BACK PAIN, UNSPECIFIED WHETHER SCIATICA PRESENT: ICD-10-CM

## 2025-02-11 DIAGNOSIS — G89.29 CHRONIC MIDLINE LOW BACK PAIN, UNSPECIFIED WHETHER SCIATICA PRESENT: ICD-10-CM

## 2025-02-11 PROCEDURE — 99204 OFFICE O/P NEW MOD 45 MIN: CPT | Performed by: ORTHOPAEDIC SURGERY

## 2025-02-11 PROCEDURE — 1123F ACP DISCUSS/DSCN MKR DOCD: CPT | Performed by: ORTHOPAEDIC SURGERY

## 2025-02-11 PROCEDURE — 1159F MED LIST DOCD IN RCRD: CPT | Performed by: ORTHOPAEDIC SURGERY
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
## 2025-02-25 ENCOUNTER — HOSPITAL ENCOUNTER (OUTPATIENT)
Dept: CARDIOLOGY | Age: 70
Discharge: HOME | End: 2025-02-25
Payer: MEDICARE

## 2025-02-25 PROCEDURE — 93298 REM INTERROG DEV EVAL SCRMS: CPT

## 2025-03-08 ENCOUNTER — PATIENT MESSAGE (OUTPATIENT)
Age: 70
End: 2025-03-08

## 2025-03-08 DIAGNOSIS — E11.22 TYPE 2 DIABETES MELLITUS WITH STAGE 3A CHRONIC KIDNEY DISEASE, WITHOUT LONG-TERM CURRENT USE OF INSULIN (HCC): ICD-10-CM

## 2025-03-08 DIAGNOSIS — N18.31 TYPE 2 DIABETES MELLITUS WITH STAGE 3A CHRONIC KIDNEY DISEASE, WITHOUT LONG-TERM CURRENT USE OF INSULIN (HCC): ICD-10-CM

## 2025-03-09 RX ORDER — GLIMEPIRIDE 4 MG/1
4 TABLET ORAL 2 TIMES DAILY
Qty: 180 TABLET | Refills: 1 | Status: SHIPPED | OUTPATIENT
Start: 2025-03-09 | End: 2025-09-05

## 2025-03-09 RX ORDER — CHOLECALCIFEROL (VITAMIN D3) 50 MCG
2000 CAPSULE ORAL
Qty: 90 CAPSULE | Refills: 1 | Status: SHIPPED | OUTPATIENT
Start: 2025-03-09

## 2025-03-29 ENCOUNTER — HOSPITAL ENCOUNTER (OUTPATIENT)
Dept: CARDIOLOGY | Age: 70
Discharge: HOME | End: 2025-03-29
Payer: MEDICARE

## 2025-03-29 PROCEDURE — 93298 REM INTERROG DEV EVAL SCRMS: CPT

## 2025-04-03 ENCOUNTER — OFFICE VISIT (OUTPATIENT)
Age: 70
End: 2025-04-03
Payer: MEDICARE

## 2025-04-03 VITALS
HEIGHT: 63 IN | HEART RATE: 80 BPM | WEIGHT: 161 LBS | BODY MASS INDEX: 28.53 KG/M2 | OXYGEN SATURATION: 94 % | DIASTOLIC BLOOD PRESSURE: 60 MMHG | SYSTOLIC BLOOD PRESSURE: 126 MMHG

## 2025-04-03 DIAGNOSIS — J44.9 CHRONIC OBSTRUCTIVE PULMONARY DISEASE, UNSPECIFIED COPD TYPE (HCC): ICD-10-CM

## 2025-04-03 DIAGNOSIS — J44.1 COPD EXACERBATION (HCC): Primary | ICD-10-CM

## 2025-04-03 PROCEDURE — 3078F DIAST BP <80 MM HG: CPT | Performed by: PHYSICIAN ASSISTANT

## 2025-04-03 PROCEDURE — 1160F RVW MEDS BY RX/DR IN RCRD: CPT | Performed by: PHYSICIAN ASSISTANT

## 2025-04-03 PROCEDURE — 99214 OFFICE O/P EST MOD 30 MIN: CPT | Performed by: PHYSICIAN ASSISTANT

## 2025-04-03 PROCEDURE — 3074F SYST BP LT 130 MM HG: CPT | Performed by: PHYSICIAN ASSISTANT

## 2025-04-03 PROCEDURE — 1126F AMNT PAIN NOTED NONE PRSNT: CPT | Performed by: PHYSICIAN ASSISTANT

## 2025-04-03 PROCEDURE — 1159F MED LIST DOCD IN RCRD: CPT | Performed by: PHYSICIAN ASSISTANT

## 2025-04-03 PROCEDURE — 1123F ACP DISCUSS/DSCN MKR DOCD: CPT | Performed by: PHYSICIAN ASSISTANT

## 2025-04-03 RX ORDER — METHYLPREDNISOLONE 4 MG/1
TABLET ORAL
Qty: 1 KIT | Refills: 0 | Status: SHIPPED | OUTPATIENT
Start: 2025-04-03 | End: 2025-04-09

## 2025-04-03 RX ORDER — ALBUTEROL SULFATE 90 UG/1
2 INHALANT RESPIRATORY (INHALATION) EVERY 4 HOURS PRN
Qty: 18 G | Refills: 3 | Status: SHIPPED | OUTPATIENT
Start: 2025-04-03

## 2025-04-03 RX ORDER — IPRATROPIUM BROMIDE AND ALBUTEROL SULFATE 2.5; .5 MG/3ML; MG/3ML
1 SOLUTION RESPIRATORY (INHALATION) EVERY 4 HOURS
Qty: 360 ML | Refills: 2 | Status: SHIPPED | OUTPATIENT
Start: 2025-04-03

## 2025-04-03 RX ORDER — FLUTICASONE PROPIONATE AND SALMETEROL 250; 50 UG/1; UG/1
1 POWDER RESPIRATORY (INHALATION) EVERY 12 HOURS
Qty: 60 EACH | Refills: 3 | Status: SHIPPED | OUTPATIENT
Start: 2025-04-03

## 2025-04-03 ASSESSMENT — ENCOUNTER SYMPTOMS
CHEST TIGHTNESS: 1
SINUS PRESSURE: 0
SORE THROAT: 0
ABDOMINAL PAIN: 0
NAUSEA: 0
COUGH: 0
WHEEZING: 1
SINUS PAIN: 0
DIARRHEA: 0
SHORTNESS OF BREATH: 1
BACK PAIN: 0
VOMITING: 0
CHEST TIGHTNESS: 0
TROUBLE SWALLOWING: 0

## 2025-04-03 ASSESSMENT — VISUAL ACUITY: OU: 1

## 2025-04-03 ASSESSMENT — COPD QUESTIONNAIRES: COPD: 1

## 2025-04-03 NOTE — PROGRESS NOTES
Subjective  Mulu Pandya 1955 is a 69 y.o. female who presents today with:  Chief Complaint   Patient presents with    Other     Pt here stating COPD is getting worse and due to the new inhaler given 2wks ago        COPD  She complains of chest tightness, shortness of breath and wheezing. There is no cough. Episode onset: worsneing in the last couple of days. The problem has been gradually worsening. Associated symptoms include nasal congestion. Pertinent negatives include no appetite change, chest pain, ear pain, fever, headaches, sore throat, trouble swallowing or weight loss. Her symptoms are aggravated by change in weather, climbing stairs and strenuous activity. Her symptoms are alleviated by beta-agonist, oral steroids, ipratropium and steroid inhaler. Risk factors for lung disease include smoking/tobacco exposure. Her past medical history is significant for COPD.     History of Present Illness          Review of Systems   Constitutional:  Negative for activity change, appetite change, chills, fever and weight loss.   HENT:  Negative for congestion, drooling, ear pain, sinus pressure, sinus pain, sore throat and trouble swallowing.    Eyes:  Negative for visual disturbance.   Respiratory:  Positive for shortness of breath and wheezing. Negative for cough and chest tightness.    Cardiovascular:  Negative for chest pain.   Gastrointestinal:  Negative for abdominal pain, diarrhea, nausea and vomiting.   Endocrine: Negative for cold intolerance.   Genitourinary:  Negative for dysuria, flank pain, frequency and hematuria.   Musculoskeletal:  Negative for arthralgias and back pain.   Skin:  Negative for rash.   Allergic/Immunologic: Negative for food allergies.   Neurological:  Negative for weakness, light-headedness, numbness and headaches.   Hematological:  Does not bruise/bleed easily.       Past Medical History:   Diagnosis Date    Anxiety     Asthma     Chronic back pain     Chronic renal failure,

## 2025-04-06 ENCOUNTER — HOSPITAL ENCOUNTER (OUTPATIENT)
Dept: CARDIOLOGY | Age: 70
Discharge: HOME | End: 2025-04-06
Payer: MEDICARE

## 2025-04-06 PROCEDURE — 93298 REM INTERROG DEV EVAL SCRMS: CPT

## 2025-04-21 RX ORDER — METOPROLOL TARTRATE 25 MG/1
12.5 TABLET, FILM COATED ORAL 2 TIMES DAILY
Qty: 90 TABLET | Refills: 2 | Status: SHIPPED | OUTPATIENT
Start: 2025-04-21

## 2025-04-21 NOTE — TELEPHONE ENCOUNTER
Comments:     Last Office Visit (last PCP visit):   4/3/2025    Next Visit Date:  Future Appointments   Date Time Provider Department Center   5/7/2025 11:00 AM Collin Chavez PA [facility]   5/8/2025  1:00 PM Lorne Sarmiento DO [facility]   10/2/2025  1:00 PM HoliJn coe DO [facility]         Rx requested:  Requested Prescriptions     Pending Prescriptions Disp Refills    metoprolol tartrate (LOPRESSOR) 25 MG tablet [Pharmacy Med Name: METOPROLOL TARTRATE 25 MG TAB] 90 tablet 2     Sig: Take 0.5 tablets by mouth 2 times daily

## 2025-04-25 DIAGNOSIS — F41.9 ANXIETY: ICD-10-CM

## 2025-04-25 DIAGNOSIS — J44.9 CHRONIC OBSTRUCTIVE PULMONARY DISEASE, UNSPECIFIED COPD TYPE (HCC): ICD-10-CM

## 2025-04-25 DIAGNOSIS — F33.42 RECURRENT MAJOR DEPRESSIVE DISORDER, IN FULL REMISSION: ICD-10-CM

## 2025-04-25 NOTE — TELEPHONE ENCOUNTER
Last Office Visit (last PCP visit):   4/3/2025    Next Visit Date:  Future Appointments   Date Time Provider Department Center   5/7/2025 11:00 AM Collin Chavez PA [facility]   5/8/2025  1:00 PM Lorne Sarmiento DO [facility]   10/2/2025  1:00 PM HolJin coe DO [facility]       **If hasn't been seen in over a year OR hasn't followed up according to last diabetes/ADHD visit, make appointment for patient before sending refill to provider.    Rx requested:  Requested Prescriptions     Pending Prescriptions Disp Refills    escitalopram (LEXAPRO) 10 MG tablet 135 tablet 0     Sig: Take 1.5 tablets by mouth daily    albuterol sulfate HFA (PROVENTIL;VENTOLIN;PROAIR) 108 (90 Base) MCG/ACT inhaler 18 g 3     Sig: Inhale 2 puffs into the lungs every 4 hours as needed for Wheezing

## 2025-04-27 DIAGNOSIS — F33.42 RECURRENT MAJOR DEPRESSIVE DISORDER, IN FULL REMISSION: ICD-10-CM

## 2025-04-27 DIAGNOSIS — F41.9 ANXIETY: ICD-10-CM

## 2025-04-27 RX ORDER — ESCITALOPRAM OXALATE 10 MG/1
TABLET ORAL
Qty: 135 TABLET | Refills: 0 | OUTPATIENT
Start: 2025-04-27

## 2025-04-27 RX ORDER — ALBUTEROL SULFATE 90 UG/1
2 INHALANT RESPIRATORY (INHALATION) EVERY 4 HOURS PRN
Qty: 18 G | Refills: 3 | Status: SHIPPED | OUTPATIENT
Start: 2025-04-27

## 2025-04-27 RX ORDER — ROSUVASTATIN CALCIUM 40 MG/1
40 TABLET, COATED ORAL NIGHTLY
Qty: 90 TABLET | Refills: 1 | Status: SHIPPED | OUTPATIENT
Start: 2025-04-27

## 2025-04-27 RX ORDER — ESCITALOPRAM OXALATE 10 MG/1
15 TABLET ORAL DAILY
Qty: 135 TABLET | Refills: 0 | Status: SHIPPED | OUTPATIENT
Start: 2025-04-27 | End: 2025-07-26

## 2025-04-28 DIAGNOSIS — F41.9 ANXIETY: ICD-10-CM

## 2025-04-28 DIAGNOSIS — F33.42 RECURRENT MAJOR DEPRESSIVE DISORDER, IN FULL REMISSION: ICD-10-CM

## 2025-04-28 RX ORDER — ESCITALOPRAM OXALATE 10 MG/1
15 TABLET ORAL DAILY
Qty: 135 TABLET | Refills: 0 | OUTPATIENT
Start: 2025-04-28 | End: 2025-07-27

## 2025-05-07 ENCOUNTER — HOSPITAL ENCOUNTER (OUTPATIENT)
Dept: CARDIOLOGY | Age: 70
Discharge: HOME | End: 2025-05-07
Payer: MEDICARE

## 2025-05-07 PROCEDURE — 93298 REM INTERROG DEV EVAL SCRMS: CPT | Performed by: INTERNAL MEDICINE

## 2025-05-07 PROCEDURE — 93298 REM INTERROG DEV EVAL SCRMS: CPT

## 2025-05-15 ENCOUNTER — OFFICE VISIT (OUTPATIENT)
Age: 70
End: 2025-05-15

## 2025-05-15 VITALS
DIASTOLIC BLOOD PRESSURE: 80 MMHG | SYSTOLIC BLOOD PRESSURE: 120 MMHG | HEART RATE: 103 BPM | HEIGHT: 63 IN | WEIGHT: 157 LBS | BODY MASS INDEX: 27.82 KG/M2 | OXYGEN SATURATION: 94 %

## 2025-05-15 DIAGNOSIS — Z87.891 PERSONAL HISTORY OF TOBACCO USE: ICD-10-CM

## 2025-05-15 DIAGNOSIS — J44.9 CHRONIC OBSTRUCTIVE PULMONARY DISEASE, UNSPECIFIED COPD TYPE (HCC): ICD-10-CM

## 2025-05-15 DIAGNOSIS — Z12.31 ENCOUNTER FOR SCREENING MAMMOGRAM FOR BREAST CANCER: ICD-10-CM

## 2025-05-15 DIAGNOSIS — N18.31 TYPE 2 DIABETES MELLITUS WITH STAGE 3A CHRONIC KIDNEY DISEASE, WITHOUT LONG-TERM CURRENT USE OF INSULIN (HCC): ICD-10-CM

## 2025-05-15 DIAGNOSIS — Z00.00 MEDICARE ANNUAL WELLNESS VISIT, SUBSEQUENT: Primary | ICD-10-CM

## 2025-05-15 DIAGNOSIS — E11.22 TYPE 2 DIABETES MELLITUS WITH STAGE 3A CHRONIC KIDNEY DISEASE, WITHOUT LONG-TERM CURRENT USE OF INSULIN (HCC): ICD-10-CM

## 2025-05-15 LAB — HBA1C MFR BLD: 6.9 %

## 2025-05-15 RX ORDER — ALBUTEROL SULFATE 90 UG/1
2 INHALANT RESPIRATORY (INHALATION) EVERY 4 HOURS PRN
Qty: 18 G | Refills: 3 | Status: SHIPPED | OUTPATIENT
Start: 2025-05-15

## 2025-05-15 RX ORDER — FLUTICASONE PROPIONATE AND SALMETEROL 250; 50 UG/1; UG/1
1 POWDER RESPIRATORY (INHALATION) EVERY 12 HOURS
Qty: 60 EACH | Refills: 3 | Status: SHIPPED | OUTPATIENT
Start: 2025-05-15

## 2025-05-15 ASSESSMENT — PATIENT HEALTH QUESTIONNAIRE - PHQ9
4. FEELING TIRED OR HAVING LITTLE ENERGY: NOT AT ALL
1. LITTLE INTEREST OR PLEASURE IN DOING THINGS: NOT AT ALL
SUM OF ALL RESPONSES TO PHQ QUESTIONS 1-9: 0
SUM OF ALL RESPONSES TO PHQ QUESTIONS 1-9: 0
7. TROUBLE CONCENTRATING ON THINGS, SUCH AS READING THE NEWSPAPER OR WATCHING TELEVISION: NOT AT ALL
8. MOVING OR SPEAKING SO SLOWLY THAT OTHER PEOPLE COULD HAVE NOTICED. OR THE OPPOSITE, BEING SO FIGETY OR RESTLESS THAT YOU HAVE BEEN MOVING AROUND A LOT MORE THAN USUAL: NOT AT ALL
6. FEELING BAD ABOUT YOURSELF - OR THAT YOU ARE A FAILURE OR HAVE LET YOURSELF OR YOUR FAMILY DOWN: NOT AT ALL
SUM OF ALL RESPONSES TO PHQ QUESTIONS 1-9: 0
10. IF YOU CHECKED OFF ANY PROBLEMS, HOW DIFFICULT HAVE THESE PROBLEMS MADE IT FOR YOU TO DO YOUR WORK, TAKE CARE OF THINGS AT HOME, OR GET ALONG WITH OTHER PEOPLE: NOT DIFFICULT AT ALL
SUM OF ALL RESPONSES TO PHQ QUESTIONS 1-9: 0
5. POOR APPETITE OR OVEREATING: NOT AT ALL
3. TROUBLE FALLING OR STAYING ASLEEP: NOT AT ALL
2. FEELING DOWN, DEPRESSED OR HOPELESS: NOT AT ALL
9. THOUGHTS THAT YOU WOULD BE BETTER OFF DEAD, OR OF HURTING YOURSELF: NOT AT ALL

## 2025-05-15 NOTE — PROGRESS NOTES
Medicare Annual Wellness Visit    Mulu Pandya is here for Medicare AWV (Pt here to complete AWV)    Assessment & Plan   Medicare annual wellness visit, subsequent  Encounter for screening mammogram for breast cancer  -     TONO Digital Screen Bilateral [[id number]]; Future  -     POCT glycosylated hemoglobin (Hb A1C)  Chronic obstructive pulmonary disease, unspecified COPD type (HCC)  -     fluticasone-salmeterol (WIXELA INHUB) 250-50 MCG/ACT AEPB diskus inhaler; Inhale 1 puff into the lungs in the morning and 1 puff in the evening., Disp-60 each, R-3Normal  -     albuterol sulfate HFA (PROVENTIL;VENTOLIN;PROAIR) 108 (90 Base) MCG/ACT inhaler; Inhale 2 puffs into the lungs every 4 hours as needed for Wheezing, Disp-18 g, R-3Normal  Type 2 diabetes mellitus with stage 3a chronic kidney disease, without long-term current use of insulin (HCC)  -     ESTABLISHED, LOW MDM, 20-29 MIN [42064]  Personal history of tobacco use  -     DC VISIT TO DISCUSS LUNG CA SCREEN W LDCT        Return in about 4 months (around 9/15/2025) for Follow up DM.     Subjective   The following acute and/or chronic problems were also addressed today:  DM, Type 2  -Metformin xr 500 mg BID, Amryl 4 mg BID, jardiance 10mg  - POCT A1C 6.9%  - no on ACEI/ARB. On rosurvastatin 40 mg  - increased amaryl 4 mg BID. Metformin  mg BID at last appointment   - Stopped the metformin due to diarrhea, says she was given a substitute    Patient's complete Health Risk Assessment and screening values have been reviewed and are found in Flowsheets. The following problems were reviewed today and where indicated follow up appointments were made and/or referrals ordered.    Positive Risk Factor Screenings with Interventions:    Fall Risk:  Do you feel unsteady or are you worried about falling? : (!) yes  2 or more falls in past year?: no  Fall with injury in past year?: no     Interventions:    Reviewed medications, home hazards, visual acuity, and co-morbidities

## 2025-05-15 NOTE — PATIENT INSTRUCTIONS
warranty or liability for your use of this information.    Personalized Preventive Plan for Mulu Pandya - 5/15/2025  Medicare offers a range of preventive health benefits. Some of the tests and screenings are paid in full while other may be subject to a deductible, co-insurance, and/or copay.  Some of these benefits include a comprehensive review of your medical history including lifestyle, illnesses that may run in your family, and various assessments and screenings as appropriate.  After reviewing your medical record and screening and assessments performed today your provider may have ordered immunizations, labs, imaging, and/or referrals for you.  A list of these orders (if applicable) as well as your Preventive Care list are included within your After Visit Summary for your review.

## 2025-06-07 ENCOUNTER — HOSPITAL ENCOUNTER (OUTPATIENT)
Dept: CARDIOLOGY | Age: 70
Discharge: HOME | End: 2025-06-07
Payer: MEDICARE

## 2025-06-07 DIAGNOSIS — Z45.09 ENCOUNTER FOR LOOP RECORDER CHECK: Primary | ICD-10-CM

## 2025-06-07 PROCEDURE — 93298 REM INTERROG DEV EVAL SCRMS: CPT

## 2025-06-09 PROCEDURE — 93298 REM INTERROG DEV EVAL SCRMS: CPT | Performed by: INTERNAL MEDICINE

## 2025-06-13 DIAGNOSIS — J44.9 CHRONIC OBSTRUCTIVE PULMONARY DISEASE, UNSPECIFIED COPD TYPE (HCC): ICD-10-CM

## 2025-06-13 DIAGNOSIS — J44.1 COPD EXACERBATION (HCC): ICD-10-CM

## 2025-06-15 NOTE — TELEPHONE ENCOUNTER
Comments:     Last Office Visit (last PCP visit):   5/15/2025    Next Visit Date:  Future Appointments   Date Time Provider Department Center   9/15/2025  1:00 PM Collin Chavez PA [facility]   10/2/2025  1:00 PM Holiday, DO [facility]         Rx requested:  Requested Prescriptions     Pending Prescriptions Disp Refills    ipratropium 0.5 mg-albuterol 2.5 mg (DUONEB) 0.5-2.5 (3) MG/3ML SOLN nebulizer solution [Pharmacy Med Name: IPRAT-ALBUT 0.5-3(2.5) MG/3 ML] 360 mL 2     Sig: Inhale 3 mLs into the lungs every 4 hours

## 2025-06-17 RX ORDER — IPRATROPIUM BROMIDE AND ALBUTEROL SULFATE 2.5; .5 MG/3ML; MG/3ML
1 SOLUTION RESPIRATORY (INHALATION) EVERY 4 HOURS
Qty: 360 ML | Refills: 2 | Status: SHIPPED | OUTPATIENT
Start: 2025-06-17

## 2025-07-08 ENCOUNTER — HOSPITAL ENCOUNTER (OUTPATIENT)
Dept: CARDIOLOGY | Age: 70
Discharge: HOME | End: 2025-07-08
Payer: MEDICARE

## 2025-07-08 PROCEDURE — 93298 REM INTERROG DEV EVAL SCRMS: CPT

## 2025-07-13 DIAGNOSIS — E11.22 TYPE 2 DIABETES MELLITUS WITH STAGE 3A CHRONIC KIDNEY DISEASE, WITHOUT LONG-TERM CURRENT USE OF INSULIN (HCC): ICD-10-CM

## 2025-07-13 DIAGNOSIS — N18.31 TYPE 2 DIABETES MELLITUS WITH STAGE 3A CHRONIC KIDNEY DISEASE, WITHOUT LONG-TERM CURRENT USE OF INSULIN (HCC): ICD-10-CM

## 2025-07-13 DIAGNOSIS — N18.31 CHRONIC RENAL FAILURE, STAGE 3A (HCC): ICD-10-CM

## 2025-07-23 NOTE — CONSULTS
Pt was a code BAT this morning, pt was consulted concerning HCPOA, pt is  and has no HCPOA at this time, pt was given a hard copy of HCPOA to review at a later date, pt is experiencing slurred speech from her stroke earlier in the morning,   Prayed for the pt before leaving the room, Pt appreciated the visit,    [Time Spent: ___ minutes] : I have spent [unfilled] minutes of time on the encounter which excludes teaching and separately reported services.

## 2025-07-24 PROCEDURE — 93298 REM INTERROG DEV EVAL SCRMS: CPT | Performed by: INTERNAL MEDICINE

## 2025-07-30 DIAGNOSIS — F33.42 RECURRENT MAJOR DEPRESSIVE DISORDER, IN FULL REMISSION: ICD-10-CM

## 2025-07-30 DIAGNOSIS — F41.9 ANXIETY: ICD-10-CM

## 2025-07-30 RX ORDER — ESCITALOPRAM OXALATE 10 MG/1
TABLET ORAL
Qty: 135 TABLET | Refills: 0 | Status: SHIPPED | OUTPATIENT
Start: 2025-07-30

## 2025-07-31 DIAGNOSIS — J44.9 CHRONIC OBSTRUCTIVE PULMONARY DISEASE, UNSPECIFIED COPD TYPE (HCC): ICD-10-CM

## 2025-07-31 NOTE — TELEPHONE ENCOUNTER
Comments:     Last Office Visit (last PCP visit):   5/15/2025    Next Visit Date:  Future Appointments   Date Time Provider Department Center   9/15/2025  1:00 PM Collin Chavez PA [facility]   10/2/2025  1:00 PM Holiday, DO [facility]       **If hasn't been seen in over a year OR hasn't followed up according to last diabetes/ADHD visit, make appointment for patient before sending refill to provider.    Rx requested:  Requested Prescriptions     Pending Prescriptions Disp Refills    albuterol sulfate HFA (PROVENTIL;VENTOLIN;PROAIR) 108 (90 Base) MCG/ACT inhaler 18 g 3     Sig: Inhale 2 puffs into the lungs every 4 hours as needed for Wheezing

## 2025-08-01 RX ORDER — ALBUTEROL SULFATE 90 UG/1
2 INHALANT RESPIRATORY (INHALATION) EVERY 4 HOURS PRN
Qty: 18 G | Refills: 3 | Status: SHIPPED | OUTPATIENT
Start: 2025-08-01

## 2025-08-08 ENCOUNTER — HOSPITAL ENCOUNTER (OUTPATIENT)
Dept: CARDIOLOGY | Age: 70
Discharge: HOME | End: 2025-08-08
Payer: MEDICARE

## 2025-08-08 PROCEDURE — 93298 REM INTERROG DEV EVAL SCRMS: CPT

## 2025-08-10 ENCOUNTER — HOSPITAL ENCOUNTER (OUTPATIENT)
Dept: CARDIOLOGY | Age: 70
Discharge: HOME | End: 2025-08-10
Payer: MEDICARE

## 2025-08-10 PROCEDURE — 93298 REM INTERROG DEV EVAL SCRMS: CPT

## 2025-08-11 ENCOUNTER — HOSPITAL ENCOUNTER (OUTPATIENT)
Dept: CARDIOLOGY | Age: 70
Discharge: HOME | End: 2025-08-11
Payer: MEDICARE

## 2025-08-11 PROCEDURE — 93298 REM INTERROG DEV EVAL SCRMS: CPT

## 2025-08-13 PROCEDURE — 93298 REM INTERROG DEV EVAL SCRMS: CPT | Performed by: INTERNAL MEDICINE

## 2025-08-27 ENCOUNTER — OFFICE VISIT (OUTPATIENT)
Age: 70
End: 2025-08-27
Payer: MEDICARE

## 2025-08-27 VITALS
DIASTOLIC BLOOD PRESSURE: 76 MMHG | SYSTOLIC BLOOD PRESSURE: 122 MMHG | HEART RATE: 93 BPM | BODY MASS INDEX: 26.33 KG/M2 | OXYGEN SATURATION: 92 % | WEIGHT: 148.6 LBS | HEIGHT: 63 IN

## 2025-08-27 DIAGNOSIS — R19.7 DIARRHEA, UNSPECIFIED TYPE: Primary | ICD-10-CM

## 2025-08-27 DIAGNOSIS — R11.11 VOMITING WITHOUT NAUSEA, UNSPECIFIED VOMITING TYPE: ICD-10-CM

## 2025-08-27 PROCEDURE — 1159F MED LIST DOCD IN RCRD: CPT | Performed by: PHYSICIAN ASSISTANT

## 2025-08-27 PROCEDURE — 3078F DIAST BP <80 MM HG: CPT | Performed by: PHYSICIAN ASSISTANT

## 2025-08-27 PROCEDURE — 99214 OFFICE O/P EST MOD 30 MIN: CPT | Performed by: PHYSICIAN ASSISTANT

## 2025-08-27 PROCEDURE — 1125F AMNT PAIN NOTED PAIN PRSNT: CPT | Performed by: PHYSICIAN ASSISTANT

## 2025-08-27 PROCEDURE — 1123F ACP DISCUSS/DSCN MKR DOCD: CPT | Performed by: PHYSICIAN ASSISTANT

## 2025-08-27 PROCEDURE — 3074F SYST BP LT 130 MM HG: CPT | Performed by: PHYSICIAN ASSISTANT

## 2025-08-27 RX ORDER — PANTOPRAZOLE SODIUM 40 MG/1
40 TABLET, DELAYED RELEASE ORAL DAILY
Qty: 90 TABLET | Refills: 1 | Status: SHIPPED | OUTPATIENT
Start: 2025-08-27

## 2025-08-27 ASSESSMENT — ENCOUNTER SYMPTOMS
NAUSEA: 0
SINUS PRESSURE: 0
RECTAL PAIN: 0
SORE THROAT: 0
COUGH: 0
DIARRHEA: 1
BLOOD IN STOOL: 0
SHORTNESS OF BREATH: 0
VOMITING: 1
CHEST TIGHTNESS: 0
ABDOMINAL PAIN: 1
SINUS PAIN: 0
BACK PAIN: 0
CONSTIPATION: 0

## 2025-08-27 ASSESSMENT — VISUAL ACUITY: OU: 1

## 2025-08-28 ENCOUNTER — PATIENT MESSAGE (OUTPATIENT)
Age: 70
End: 2025-08-28

## 2025-08-28 DIAGNOSIS — F33.42 RECURRENT MAJOR DEPRESSIVE DISORDER, IN FULL REMISSION: ICD-10-CM

## 2025-08-28 DIAGNOSIS — N18.31 TYPE 2 DIABETES MELLITUS WITH STAGE 3A CHRONIC KIDNEY DISEASE, WITHOUT LONG-TERM CURRENT USE OF INSULIN (HCC): ICD-10-CM

## 2025-08-28 DIAGNOSIS — F41.9 ANXIETY: ICD-10-CM

## 2025-08-28 DIAGNOSIS — E11.22 TYPE 2 DIABETES MELLITUS WITH STAGE 3A CHRONIC KIDNEY DISEASE, WITHOUT LONG-TERM CURRENT USE OF INSULIN (HCC): ICD-10-CM

## 2025-08-29 RX ORDER — ESCITALOPRAM OXALATE 10 MG/1
TABLET ORAL
Qty: 135 TABLET | Refills: 0 | Status: SHIPPED | OUTPATIENT
Start: 2025-08-29

## 2025-08-29 RX ORDER — GLIMEPIRIDE 4 MG/1
4 TABLET ORAL 2 TIMES DAILY
Qty: 180 TABLET | Refills: 1 | Status: SHIPPED | OUTPATIENT
Start: 2025-08-29 | End: 2026-02-25

## (undated) DEVICE — LABEL MED MINI W/ MARKER

## (undated) DEVICE — TUBE SET 96 MM 64 MM H2O PERISTALTIC STD AUX CHANNEL

## (undated) DEVICE — BRUSH ENDO CLN L90.5IN SHTH DIA1.7MM BRIST DIA5-7MM 2-6MM

## (undated) DEVICE — INTRODUCER SHTH 0.018 IN 4 FRX40 CM KT SFT TIP NIT VSI 7266V

## (undated) DEVICE — Device: Brand: ENDO SMARTCAP

## (undated) DEVICE — ENDOSCOPIC ULTRASOUND FINE NEEDLE BIOPSY (FNB) DEVICE: Brand: ACQUIRE

## (undated) DEVICE — DRESSING HYDROFIBER AQUACEL AG ADVANTAGE 3.5X6 IN

## (undated) DEVICE — 4FR MICROPUNCTURE KIT STIFFEN

## (undated) DEVICE — PACK PROCEDURE SURG SURG CARDIAC CATH

## (undated) DEVICE — Device: Brand: BALLOON3

## (undated) DEVICE — GOWN,AURORA,NONREINFORCED,LARGE: Brand: MEDLINE

## (undated) DEVICE — KIT ANGIO W/ AT P65 PREM HND CTRL FOR CNTRST DEL ANGIOTOUCH

## (undated) DEVICE — ENDO CARRY-ON PROCEDURE KIT: Brand: ENDO CARRY-ON PROCEDURE KIT

## (undated) DEVICE — GLOVE SURG SZ 8 L12IN FNGR THK94MIL STD WHT LTX FREE

## (undated) DEVICE — TUBING, SUCTION, 1/4" X 10', STRAIGHT: Brand: MEDLINE

## (undated) DEVICE — GLOVE SURG SZ 6 THK91MIL LTX FREE SYN POLYISOPRENE ANTI

## (undated) DEVICE — GLOVE ORANGE PI 8   MSG9080

## (undated) DEVICE — SINGLE PORT MANIFOLD: Brand: NEPTUNE 2

## (undated) DEVICE — GLOVE ORANGE PI 7 1/2   MSG9075

## (undated) DEVICE — PACER PACK: Brand: MEDLINE INDUSTRIES, INC.

## (undated) DEVICE — GOWN,AURORA,NONRNF,XL,30/CS: Brand: MEDLINE

## (undated) DEVICE — CONMED SCOPE SAVER BITE BLOCK, 20X27 MM: Brand: SCOPE SAVER

## (undated) DEVICE — 1810 FOAM BLOCK NEEDLE COUNTER: Brand: DEVON

## (undated) DEVICE — ENDOSCOPIC TRAY TRNSPRT 20.5X16.5X4.1 IN RECYCL SUGAR PULP

## (undated) DEVICE — DRAPE,UTILITY,TAPE,15X26,STERILE: Brand: MEDLINE

## (undated) DEVICE — GUIDEWIRE VASC L180CM DIA0.035IN 3MM PTFE J TIP EXCHG FIX

## (undated) DEVICE — SYRINGE MED 10ML LUERLOCK TIP W/O SFTY DISP

## (undated) DEVICE — JELLY,LUBE,STERILE,FLIP TOP,TUBE,2-OZ: Brand: MEDLINE

## (undated) DEVICE — VIPERTRACK,  20 PACK: Brand: VIPERTRACK

## (undated) DEVICE — 3M™ TEGADERM™ TRANSPARENT FILM DRESSING FRAME STYLE, 1626W, 4 IN X 4-3/4 IN (10 CM X 12 CM), 50/CT 4CT/CASE: Brand: 3M™ TEGADERM™

## (undated) DEVICE — SPONGE GZ W4XL4IN COT 12 PLY TYP VII WVN C FLD DSGN STERILE

## (undated) DEVICE — SHEET,DRAPE,53X77,STERILE: Brand: MEDLINE

## (undated) DEVICE — NEEDLE ASPIR 22 GAX0-8 CM 1.65X2.4 MM SHTH EXPECT SLM LN

## (undated) DEVICE — DRAPE, RADIAL, STERILE: Brand: MEDLINE

## (undated) DEVICE — CONTAINER,SPECIMEN,OR STERILE,4OZ: Brand: MEDLINE